# Patient Record
Sex: FEMALE | Race: WHITE | Employment: FULL TIME | URBAN - METROPOLITAN AREA
[De-identification: names, ages, dates, MRNs, and addresses within clinical notes are randomized per-mention and may not be internally consistent; named-entity substitution may affect disease eponyms.]

---
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
## 2019-01-08 ENCOUNTER — OFFICE VISIT (OUTPATIENT)
Dept: ALLERGY | Facility: OTHER | Age: 19
End: 2019-01-08
Payer: COMMERCIAL

## 2019-01-08 VITALS
HEART RATE: 94 BPM | SYSTOLIC BLOOD PRESSURE: 106 MMHG | WEIGHT: 114 LBS | OXYGEN SATURATION: 100 % | DIASTOLIC BLOOD PRESSURE: 72 MMHG | HEIGHT: 65 IN | TEMPERATURE: 98.1 F | RESPIRATION RATE: 16 BRPM | BODY MASS INDEX: 18.99 KG/M2

## 2019-01-08 DIAGNOSIS — T78.2XXD ANAPHYLAXIS, SUBSEQUENT ENCOUNTER: Primary | ICD-10-CM

## 2019-01-08 PROCEDURE — 95079 INGEST CHALLENGE ADDL 60 MIN: CPT | Performed by: ALLERGY & IMMUNOLOGY

## 2019-01-08 PROCEDURE — 99207 ZZC DROP WITH A PROCEDURE: CPT | Performed by: ALLERGY & IMMUNOLOGY

## 2019-01-08 PROCEDURE — 95004 PERQ TESTS W/ALRGNC XTRCS: CPT | Performed by: ALLERGY & IMMUNOLOGY

## 2019-01-08 PROCEDURE — 95076 INGEST CHALLENGE INI 120 MIN: CPT | Performed by: ALLERGY & IMMUNOLOGY

## 2019-01-08 ASSESSMENT — MIFFLIN-ST. JEOR: SCORE: 1292.98

## 2019-01-08 NOTE — LETTER
1/8/2019         RE: Dominguez Oliver  [address]  [facility] [address]        Dear Colleague,    Thank you for referring your patient, Dominguez Oliver, to the [facility]. Please see a copy of my visit note below.    Dominguez Oliver is a 18 year old White female with previous medical history significant for anaphylaxis who returns for a follow up visit.    Patient presents today for oral food challenge. The patient is currently in a good state of health. No recent fevers, chills, cough, wheezing, shortness of breath, skin rash, angioedema, nausea, vomiting or diarrhea. The risks and benefits were discussed and the patient/patient's family wishes to proceed. The consent was signed.    Results for DOMINGUEZ OLIVER (MRN [id number]) as of 1/8/2019 13:15   Ref. Range 12/4/2018 15:30   Allergen, Chicken Meat Latest Ref Range: <0.10 KU(A)/L <0.10     Negative skin testing for chicken, shellfish and an fish.     Past Medical History:   Diagnosis Date     Depression      Nexplanon in place      Family History   Problem Relation Age of Onset     Depression Mother      Depression Father      Depression Maternal Grandmother      Cancer Maternal Grandmother      Diabetes Maternal Grandfather      Heart Disease Maternal Grandfather      Hypertension Maternal Grandfather      Depression Paternal Grandmother         bipolar     Depression Paternal Grandfather         bipolar     Past Surgical History:   Procedure Laterality Date     SURGICAL HISTORY OF -       PE tubes     TONSILLECTOMY, ADENOIDECTOMY ADULT, COMBINED Bilateral 3/19/2018    Procedure: COMBINED TONSILLECTOMY, ADENOIDECTOMY ADULT;  Bilateral Adenotonsillectomy;  Surgeon: Kevin Arias MD;  Location: [facility]       REVIEW OF SYSTEMS:  General: negative for weight gain. negative for weight loss. negative for changes in sleep.   Ears: negative for fullness. negative for hearing loss. negative for dizziness.   Nose: negative for snoring.negative for  changes in smell. negative for drainage.   Throat: negative for hoarseness. negative for sore throat. negative for trouble swallowing.   Lungs: negative for shortness of breath.negative for wheezing. negative for sputum production.   Cardiovascular: negative for chest pain. negative for swelling of ankles. negative for fast or irregular heartbeat.   Gastrointestinal: negative for nausea. negative for heartburn. negative for acid reflux.   Musculoskeletal: negative for joint pain. negative for joint stiffness. negative for joint swelling.   Neurologic: negative for seizures. negative for fainting. negative for weakness.   Psychiatric: negative for changes in mood. negative for anxiety.   Endocrine: negative for cold intolerance. negative for heat intolerance. negative for tremors.   Hematologic: negative for easy bruising. negative for easy bleeding.  Integumentary: negative for rash. negative for scaling. negative for nail changes.       Current Outpatient Medications:      Acetaminophen (TYLENOL PO), Take 650 mg by mouth , Disp: , Rfl:      alum & mag hydroxide-simethicone (MYLANTA/MAALOX) 200-200-20 MG/5ML SUSP suspension, Take 15 mLs by mouth every 4 hours as needed for indigestion or heartburn (upper abdominal pain), Disp: , Rfl: 0     EPINEPHrine (ADRENACLICK) 0.3 MG/0.3ML injection 2-pack, Inject 0.3 mLs (0.3 mg) into the muscle as needed for anaphylaxis, Disp: 0.6 mL, Rfl: 1  Immunization History   Administered Date(s) Administered     DTAP (<7y) 2000, 02/09/2001, 08/04/2005, 04/26/2006     DTaP / Hep B / IPV 04/26/2006     HEPA 11/19/2014, 08/13/2015     HPV 11/19/2014, 05/29/2015, 10/07/2016     HepB 2000, 04/26/2006, 08/13/2015     Hib (PRP-T) 1999     Influenza Vaccine IM 3yrs+ 4 Valent IIV4 10/07/2016     Influenza Vaccine, 3 YRS +, IM (QUADRIVALENT W/PRESERVATIVES) 11/19/2014     MMR 08/04/2005, 04/26/2006     Meningococcal (Menactra ) 08/13/2015     Poliovirus, inactivated (IPV)  1999, 08/04/2005, 04/26/2006     TDAP Vaccine (Adacel) 08/13/2015     Varicella 04/26/2006, 11/19/2014     Allergies   Allergen Reactions     Sulfa Drugs Other (See Comments)     Both parents are allergic           EXAM:   Constitutional:  Appears well-developed and well-nourished. No distress.   HEENT:   Head: Normocephalic.   Mouth/Throat:   No cobblestoning of posterior oropharynx.   Nasal tissue pink and normal appearing.  No rhinorrhea noted.    Eyes: Conjunctivae are non-erythematous   Cardiovascular: Normal rate, regular rhythm and normal heart sounds. Exam reveals no gallop and no friction rub.   No murmur heard.  Respiratory: Effort normal and breath sounds normal. No respiratory distress. No wheezes. No rales.   Musculoskeletal: Normal range of motion.   Neuro: Oriented to person, place, and time.  Skin: Skin is warm and dry. No rash noted.   Psychiatric: Normal mood and affect.     Nursing note and vitals reviewed.      WORKUP:   Chicken skin testing repeat today given last evaluation was immediately after reaction. Now it has been six weeks since reaction.     Oral food challenge  Food:  Chicken     Start time:1339  End time:1649    Outcome:  Passed chicken oral food challenge.    ASSESSMENT/PLAN:  Problem List Items Addressed This Visit        Other    Anaphylaxis, subsequent encounter - Primary     Hives, throat tightness and shortness of breath which developed while at work.  She had eaten a chicken quesadilla that contained flour shell, cheese, chicken and French fries prior to reaction.  Otherwise no associated medications, herbal supplements, soaps, shampoos, detergents, insect stings, exercise associated.  She does not have injectable epinephrine.     Allergy testing was negative for chicken.  Testing was also obtained for shellfish and fish as she does not consume these foods often and potential for cross contamination in her work environment.  These were negative as well.    Chicken skin  testing repeat today given last evaluation was immediately after reaction. Now it has been six weeks since reaction.     Oral food challenge  Food:  Chicken     Start time:1339  End time:1649    Outcome:  Passed chicken oral food challenge.      - Idiopathic reaction at this point.  Unclear etiology.  She can continue to consume chicken.  -If subsequent symptoms develop continue to follow anaphylaxis action plan and keep diary of potential etiologies.         Relevant Orders    ALLERGY SKIN TESTS,ALLERGENS [61239] (Completed)    HC INGESTION CHALLENGE TEST INITIAL 120 MINUTES (Completed)    HC INGESTION CHALLENGE TEST EACH ADDL 60 MINUTES (Completed)          Chart documentation with Dragon Voice recognition Software. Although reviewed after completion, some words and grammatical errors may remain.    Nic Ritchie,    Allergy/Immunology  [facility], [place] and [facility] [place]      Again, thank you for allowing me to participate in the care of your patient.        Sincerely,        Nic Ritchie, DO

## 2019-01-08 NOTE — PROGRESS NOTES
Dominguez Oliver is a 18 year old White female with previous medical history significant for anaphylaxis who returns for a follow up visit.    Patient presents today for oral food challenge. The patient is currently in a good state of health. No recent fevers, chills, cough, wheezing, shortness of breath, skin rash, angioedema, nausea, vomiting or diarrhea. The risks and benefits were discussed and the patient/patient's family wishes to proceed. The consent was signed.    Results for DOMINGUEZ OLIVER (MRN [id number]) as of 1/8/2019 13:15   Ref. Range 12/4/2018 15:30   Allergen, Chicken Meat Latest Ref Range: <0.10 KU(A)/L <0.10     Negative skin testing for chicken, shellfish and an fish.     Past Medical History:   Diagnosis Date     Depression      Nexplanon in place      Family History   Problem Relation Age of Onset     Depression Mother      Depression Father      Depression Maternal Grandmother      Cancer Maternal Grandmother      Diabetes Maternal Grandfather      Heart Disease Maternal Grandfather      Hypertension Maternal Grandfather      Depression Paternal Grandmother         bipolar     Depression Paternal Grandfather         bipolar     Past Surgical History:   Procedure Laterality Date     SURGICAL HISTORY OF -       PE tubes     TONSILLECTOMY, ADENOIDECTOMY ADULT, COMBINED Bilateral 3/19/2018    Procedure: COMBINED TONSILLECTOMY, ADENOIDECTOMY ADULT;  Bilateral Adenotonsillectomy;  Surgeon: Kevin Arias MD;  Location: [facility]       REVIEW OF SYSTEMS:  General: negative for weight gain. negative for weight loss. negative for changes in sleep.   Ears: negative for fullness. negative for hearing loss. negative for dizziness.   Nose: negative for snoring.negative for changes in smell. negative for drainage.   Throat: negative for hoarseness. negative for sore throat. negative for trouble swallowing.   Lungs: negative for shortness of breath.negative for wheezing. negative for sputum production.    Cardiovascular: negative for chest pain. negative for swelling of ankles. negative for fast or irregular heartbeat.   Gastrointestinal: negative for nausea. negative for heartburn. negative for acid reflux.   Musculoskeletal: negative for joint pain. negative for joint stiffness. negative for joint swelling.   Neurologic: negative for seizures. negative for fainting. negative for weakness.   Psychiatric: negative for changes in mood. negative for anxiety.   Endocrine: negative for cold intolerance. negative for heat intolerance. negative for tremors.   Hematologic: negative for easy bruising. negative for easy bleeding.  Integumentary: negative for rash. negative for scaling. negative for nail changes.       Current Outpatient Medications:      Acetaminophen (TYLENOL PO), Take 650 mg by mouth , Disp: , Rfl:      alum & mag hydroxide-simethicone (MYLANTA/MAALOX) 200-200-20 MG/5ML SUSP suspension, Take 15 mLs by mouth every 4 hours as needed for indigestion or heartburn (upper abdominal pain), Disp: , Rfl: 0     EPINEPHrine (ADRENACLICK) 0.3 MG/0.3ML injection 2-pack, Inject 0.3 mLs (0.3 mg) into the muscle as needed for anaphylaxis, Disp: 0.6 mL, Rfl: 1  Immunization History   Administered Date(s) Administered     DTAP (<7y) 1999, 02/09/2001, 08/04/2005, 04/26/2006     DTaP / Hep B / IPV 04/26/2006     HEPA 11/19/2014, 08/13/2015     HPV 11/19/2014, 05/29/2015, 10/07/2016     HepB 2000, 04/26/2006, 08/13/2015     Hib (PRP-T) 1999     Influenza Vaccine IM 3yrs+ 4 Valent IIV4 10/07/2016     Influenza Vaccine, 3 YRS +, IM (QUADRIVALENT W/PRESERVATIVES) 11/19/2014     MMR 08/04/2005, 04/26/2006     Meningococcal (Menactra ) 08/13/2015     Poliovirus, inactivated (IPV) 1999, 08/04/2005, 04/26/2006     TDAP Vaccine (Adacel) 08/13/2015     Varicella 04/26/2006, 11/19/2014     Allergies   Allergen Reactions     Sulfa Drugs Other (See Comments)     Both parents are allergic           EXAM:    Constitutional:  Appears well-developed and well-nourished. No distress.   HEENT:   Head: Normocephalic.   Mouth/Throat:   No cobblestoning of posterior oropharynx.   Nasal tissue pink and normal appearing.  No rhinorrhea noted.    Eyes: Conjunctivae are non-erythematous   Cardiovascular: Normal rate, regular rhythm and normal heart sounds. Exam reveals no gallop and no friction rub.   No murmur heard.  Respiratory: Effort normal and breath sounds normal. No respiratory distress. No wheezes. No rales.   Musculoskeletal: Normal range of motion.   Neuro: Oriented to person, place, and time.  Skin: Skin is warm and dry. No rash noted.   Psychiatric: Normal mood and affect.     Nursing note and vitals reviewed.      WORKUP:   Chicken skin testing repeat today given last evaluation was immediately after reaction. Now it has been six weeks since reaction.     Oral food challenge  Food:  Chicken     Start time:1339  End time:1649    Outcome:  Passed chicken oral food challenge.    ASSESSMENT/PLAN:  Problem List Items Addressed This Visit        Other    Anaphylaxis, subsequent encounter - Primary     Hives, throat tightness and shortness of breath which developed while at work.  She had eaten a chicken quesadilla that contained flour shell, cheese, chicken and French fries prior to reaction.  Otherwise no associated medications, herbal supplements, soaps, shampoos, detergents, insect stings, exercise associated.  She does not have injectable epinephrine.     Allergy testing was negative for chicken.  Testing was also obtained for shellfish and fish as she does not consume these foods often and potential for cross contamination in her work environment.  These were negative as well.    Chicken skin testing repeat today given last evaluation was immediately after reaction. Now it has been six weeks since reaction.     Oral food challenge  Food:  Chicken     Start time:1339  End time:1649    Outcome:  Passed chicken oral  food challenge.      - Idiopathic reaction at this point.  Unclear etiology.  She can continue to consume chicken.  -If subsequent symptoms develop continue to follow anaphylaxis action plan and keep diary of potential etiologies.         Relevant Orders    ALLERGY SKIN TESTS,ALLERGENS [88343] (Completed)    HC INGESTION CHALLENGE TEST INITIAL 120 MINUTES (Completed)    HC INGESTION CHALLENGE TEST EACH ADDL 60 MINUTES (Completed)          Chart documentation with Dragon Voice recognition Software. Although reviewed after completion, some words and grammatical errors may remain.    Nic Ritchie,    Allergy/Immunology  [facility] [place]

## 2019-01-08 NOTE — PATIENT INSTRUCTIONS
Allergy Staff Appt Hours Shot Hours Locations    Physician     Nic Ritchie DO       Support Staff     CHERYL Geller CMA  Monday:                      [place] 8-7     Tuesday:         [place] 8-5     Wednesday:        [place]: 7-5     Friday:        [place] 7-5   [place]        Monday: 9-5:50        Wednesday: 2-5:50        Friday: 7-12:50     [place]        Tuesday: 7-10:50        Thursday: 1:30-6:30     [place] Monday: 7:10-4:50        Tuesday: 12:30-6:30        Thursday: 7-11:50 [facility]  [address] [place] [address]  Appt Line: [phone]  Allergy RN (Monday):  [phone]    [facility]  [address] [place] [address]  Appt Line: [phone]  Allergy RN (Tues & Wed):  [phone]    [facility]  [address] [place] [address]  Appt Line: [phone]  Allergy RN (Friday):  [phone]       Important Scheduling Information  Aspirin Desensitization: Appt will last 2 clinic days. Please call the Allergy RN line for your clinic to schedule. Discontinue antihistamines 7 days prior to the appointment.     Food Challenges: Appt will last 3-4 hours. Please call the Allergy RN line for your clinic to schedule. Discontinue antihistamines 7 days prior to the appointment.     Penicillin Testing: Appt will last 2-3 hours. Please call the Allergy RN line for your clinic to schedule. Discontinue antihistamines 7 days prior to the appointment.     Skin Testing: Appt will about 40 minutes. Call the appointment line for your clinic to schedule. Discontinue antihistamines 7 days prior to the appointment.     Venom Testing: Appt will last 2-3 hours. Please call the Allergy RN line for your clinic to schedule. Discontinue antihistamines 7 days prior to the appointment.     Thank you for trusting us with your Allergy, Asthma, and Immunology care. Please feel free to contact us with any questions or concerns you may have.

## 2019-01-08 NOTE — ASSESSMENT & PLAN NOTE
Hives, throat tightness and shortness of breath which developed while at work.  She had eaten a chicken quesadilla that contained flour shell, cheese, chicken and French fries prior to reaction.  Otherwise no associated medications, herbal supplements, soaps, shampoos, detergents, insect stings, exercise associated.  She does not have injectable epinephrine.     Allergy testing was negative for chicken.  Testing was also obtained for shellfish and fish as she does not consume these foods often and potential for cross contamination in her work environment.  These were negative as well.    Chicken skin testing repeat today given last evaluation was immediately after reaction. Now it has been six weeks since reaction.     Oral food challenge  Food:  Chicken     Start time:1339  End time:1649    Outcome:  Passed chicken oral food challenge.      - Idiopathic reaction at this point.  Unclear etiology.  She can continue to consume chicken.  -If subsequent symptoms develop continue to follow anaphylaxis action plan and keep diary of potential etiologies.

## 2019-01-14 ENCOUNTER — APPOINTMENT (OUTPATIENT)
Dept: GENERAL RADIOLOGY | Facility: CLINIC | Age: 19
End: 2019-01-14
Payer: COMMERCIAL

## 2019-01-14 ENCOUNTER — HOSPITAL ENCOUNTER (EMERGENCY)
Facility: CLINIC | Age: 19
Discharge: HOME | End: 2019-01-14
Attending: NURSE PRACTITIONER | Admitting: NURSE PRACTITIONER
Payer: COMMERCIAL

## 2019-01-14 VITALS
SYSTOLIC BLOOD PRESSURE: 115 MMHG | DIASTOLIC BLOOD PRESSURE: 74 MMHG | RESPIRATION RATE: 14 BRPM | WEIGHT: 113 LBS | OXYGEN SATURATION: 100 % | BODY MASS INDEX: 18.8 KG/M2 | TEMPERATURE: 98.2 F
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
## 2019-01-14 DIAGNOSIS — S40.011A CONTUSION OF RIGHT SHOULDER, INITIAL ENCOUNTER: ICD-10-CM

## 2019-01-14 DIAGNOSIS — M54.2 NECK PAIN: ICD-10-CM

## 2019-01-14 LAB — HCG UR QL: NEGATIVE

## 2019-01-14 PROCEDURE — 73030 X-RAY EXAM OF SHOULDER: CPT | Mod: TC,RT

## 2019-01-14 PROCEDURE — 25000132 ZZH RX MED GY IP 250 OP 250 PS 637: Performed by: NURSE PRACTITIONER

## 2019-01-14 PROCEDURE — 72040 X-RAY EXAM NECK SPINE 2-3 VW: CPT | Mod: TC

## 2019-01-14 PROCEDURE — 73010 X-RAY EXAM OF SHOULDER BLADE: CPT | Mod: TC,RT

## 2019-01-14 PROCEDURE — 99285 EMERGENCY DEPT VISIT HI MDM: CPT | Performed by: NURSE PRACTITIONER

## 2019-01-14 PROCEDURE — 99284 EMERGENCY DEPT VISIT MOD MDM: CPT | Mod: Z6 | Performed by: NURSE PRACTITIONER

## 2019-01-14 PROCEDURE — 81025 URINE PREGNANCY TEST: CPT | Performed by: NURSE PRACTITIONER

## 2019-01-14 RX ORDER — ACETAMINOPHEN 325 MG/1
650 TABLET ORAL ONCE
Status: COMPLETED | OUTPATIENT
Start: 2019-01-14 | End: 2019-01-14

## 2019-01-14 RX ORDER — IBUPROFEN 600 MG/1
600 TABLET, FILM COATED ORAL ONCE
Status: DISCONTINUED | OUTPATIENT
Start: 2019-01-14 | End: 2019-01-14 | Stop reason: HOSPADM

## 2019-01-14 RX ADMIN — ACETAMINOPHEN 650 MG: 325 TABLET ORAL at 13:43

## 2019-01-14 ASSESSMENT — ENCOUNTER SYMPTOMS
ACTIVITY CHANGE: 1
MYALGIAS: 1
NECK STIFFNESS: 1
ARTHRALGIAS: 1

## 2019-01-14 NOTE — ED TRIAGE NOTES
Here with pain after fall on ice two hours ago. Pain located to right lateral neck, hip and shoulder area.

## 2019-01-14 NOTE — ED NOTES
Pt slipped on the ice this morning falling backwards.  No LOC, but c/o neck, upper back, and right shoulder pain.  ANN.  No obvious injury/deformity.  YOAN.

## 2019-01-14 NOTE — LETTER
January 14, 2019      To Whom It May Concern:      Aury Cervantes was seen in our Emergency Department today, 01/14/19.  I expect her condition to improve over the next couple of days.  She can return to work on Wednesday.    Thank you      Sincerely,        CIRO Manning CNP

## 2019-01-14 NOTE — ED AVS SNAPSHOT
[facility] Emergency Department  [address] [place] [address]  Phone:  [phone]  Fax:  [phone]                                    Aury Cervantes   MRN: [id number]    Department:  [facility] Emergency Department   Date of Visit:  1/14/2019           After Visit Summary Signature Page    I have received my discharge instructions, and my questions have been answered. I have discussed any challenges I see with this plan with the nurse or doctor.    ..........................................................................................................................................  Patient/Patient Representative Signature      ..........................................................................................................................................  Patient Representative Print Name and Relationship to Patient    ..................................................               ................................................  Date                                   Time    ..........................................................................................................................................  Reviewed by Signature/Title    ...................................................              ..............................................  Date                                               Time          22EPIC Rev 08/18

## 2019-01-14 NOTE — ED PROVIDER NOTES
History     Chief Complaint   Patient presents with     Fall     pain to right hip, neck, shoulder     HPI  Aury Cervantes is a 18 year old female who presents to the emergency department today with complaints of right shoulder pain, right upper back pain and neck pain after she fell, slipping on the ice this morning and landed on her shoulder.  Patient does not taken anything for pain, any movement makes her symptoms worse or palpation.  Patient reports the pain as aching in nature.  Patient denies any dizziness or lightheadedness prior to fall but she slipped on the ice going to her car.  Patient reports she has not had a period since November, she took a pregnancy test 2 weeks ago and it was negative.    Problem List:    Patient Active Problem List    Diagnosis Date Noted     Anaphylaxis, subsequent encounter 12/04/2018     Priority: Medium     Rhinoconjunctivitis 12/04/2018     Priority: Medium     S/P tonsillectomy and adenoidectomy 03/21/2018     Priority: Medium     Post-op pain 03/21/2018     Priority: Medium     Nexplanon insertion 05/29/2015     Priority: Medium     nexplanon inserted today by Dr BRITNEY Marx  Lot # [id number]  Exp 07/2017  Zulay Jacobsen         Major depressive disorder, recurrent episode, severe (H) 11/19/2014     Priority: Medium        Past Medical History:    Past Medical History:   Diagnosis Date     Depression      Nexplanon in place        Past Surgical History:    Past Surgical History:   Procedure Laterality Date     SURGICAL HISTORY OF -       PE tubes     TONSILLECTOMY, ADENOIDECTOMY ADULT, COMBINED Bilateral 3/19/2018    Procedure: COMBINED TONSILLECTOMY, ADENOIDECTOMY ADULT;  Bilateral Adenotonsillectomy;  Surgeon: Kevin Arias MD;  Location: [facility] OR       Family History:    Family History   Problem Relation Age of Onset     Depression Mother      Depression Father      Depression Maternal Grandmother      Cancer Maternal Grandmother      Diabetes Maternal Grandfather       Heart Disease Maternal Grandfather      Hypertension Maternal Grandfather      Depression Paternal Grandmother         bipolar     Depression Paternal Grandfather         bipolar       Social History:  Marital Status:  Single [1]  Social History     Tobacco Use     Smoking status: Former Smoker     Smokeless tobacco: Never Used   Substance Use Topics     Alcohol use: No     Alcohol/week: 0.0 oz     Drug use: No        Medications:      Acetaminophen (TYLENOL PO)   alum & mag hydroxide-simethicone (MYLANTA/MAALOX) 200-200-20 MG/5ML SUSP suspension   EPINEPHrine (ADRENACLICK) 0.3 MG/0.3ML injection 2-pack         Review of Systems   Constitutional: Positive for activity change.   Musculoskeletal: Positive for arthralgias, myalgias and neck stiffness.   All other systems reviewed and are negative.      Physical Exam   BP: 115/74  Heart Rate: 85  Temp: 98.2  F (36.8  C)  Resp: 14  Weight: 51.3 kg (113 lb)  SpO2: 100 %      Physical Exam   Constitutional: She is oriented to person, place, and time. She appears well-developed and well-nourished. No distress.   HENT:   Head: Normocephalic and atraumatic.   Eyes: EOM are normal. Pupils are equal, round, and reactive to light.   Neck: Normal range of motion.   Cardiovascular: Normal rate.   Pulmonary/Chest: Effort normal.   Abdominal: Soft. Bowel sounds are normal.   Musculoskeletal: Normal range of motion.   C4/C5 tender to palpation, tenderness to palpation to right humeral head and right scapular region, remaining exam is reassuring and unremarkable   Neurological: She is alert and oriented to person, place, and time. No cranial nerve deficit.   Skin: Skin is warm and dry. Capillary refill takes less than 2 seconds. She is not diaphoretic.   Psychiatric: She has a normal mood and affect.       ED Course     Procedures      Results for orders placed or performed during the hospital encounter of 01/14/19 (from the past 24 hour(s))   HCG qualitative urine (UPT)   Result  Value Ref Range    HCG Qual Urine Negative NEG^Negative   XR Shoulder Right 3 Views    Narrative    RIGHT SHOULDER THREE OR MORE VIEWS  1/14/2019 2:33 PM     HISTORY: Fall, pain.    COMPARISON: None.      Impression    IMPRESSION: Glenohumeral and acromioclavicular articulations are  intact. No acute fracture.    RIAN RICE MD   XR Scapula Right    Narrative    SCAPULA RIGHT January 14, 2019 2:33 PM     HISTORY: Fall, pain.    COMPARISON: None.      Impression    IMPRESSION: No definite scapular fracture.    RIAN RICE MD   Cervical spine XR, 2-3 views    Narrative    CERVICAL SPINE TWO TO THREE VIEWS January 14, 2019 2:34 PM     HISTORY: Pain, fall.    COMPARISON: November 19, 2016.      Impression    IMPRESSION: Cervical vertebrae remain normally aligned through the  C7/T1 junction. No prevertebral soft tissue swelling. No acute  fracture.    RIAN RICE MD       Medications   ibuprofen (ADVIL/MOTRIN) tablet 600 mg (not administered)   acetaminophen (TYLENOL) tablet 650 mg (650 mg Oral Given 1/14/19 1343)       Assessments & Plan (with Medical Decision Making)  Fall   Right shoulder contusion/posterior neck pain.  Xrays are negative for fracture.  Patient updated on xray results and negative UPT.  Encouraged Tylenol/Ibuprofen for pain, heat/ice as needed.  Work note provided with return on Wednesday.  Reasons to return to the ED discussed with patient, she is agreeable and patient discharged in stable condition.      I have reviewed the nursing notes.    I have reviewed the findings, diagnosis, plan and need for follow up with the patient.       Medication List      There are no discharge medications for this visit.         Final diagnoses:   Fall, initial encounter   Contusion of right shoulder, initial encounter   Neck pain       1/14/2019   [facility] EMERGENCY DEPARTMENT     Li Isabel, APRN CNP  01/14/19 1508

## 2019-02-08 ENCOUNTER — HOSPITAL ENCOUNTER (EMERGENCY)
Facility: CLINIC | Age: 19
Discharge: HOME | End: 2019-02-08
Attending: EMERGENCY MEDICINE | Admitting: EMERGENCY MEDICINE
Payer: COMMERCIAL

## 2019-02-08 VITALS
RESPIRATION RATE: 18 BRPM | OXYGEN SATURATION: 100 % | SYSTOLIC BLOOD PRESSURE: 117 MMHG | DIASTOLIC BLOOD PRESSURE: 67 MMHG | HEART RATE: 88 BPM | TEMPERATURE: 97.8 F

## 2019-02-08 DIAGNOSIS — R51.9 NONINTRACTABLE HEADACHE, UNSPECIFIED CHRONICITY PATTERN, UNSPECIFIED HEADACHE TYPE: ICD-10-CM

## 2019-02-08 LAB
ALBUMIN SERPL-MCNC: 4.4 G/DL (ref 3.4–5)
ALP SERPL-CCNC: 106 U/L (ref 40–150)
ALT SERPL W P-5'-P-CCNC: 17 U/L (ref 0–50)
ANION GAP SERPL CALCULATED.3IONS-SCNC: 6 MMOL/L (ref 3–14)
AST SERPL W P-5'-P-CCNC: 20 U/L (ref 0–35)
BASOPHILS # BLD AUTO: 0 10E9/L (ref 0–0.2)
BASOPHILS NFR BLD AUTO: 0.7 %
BILIRUB SERPL-MCNC: 0.4 MG/DL (ref 0.2–1.3)
BUN SERPL-MCNC: 12 MG/DL (ref 7–30)
CALCIUM SERPL-MCNC: 9 MG/DL (ref 8.5–10.1)
CHLORIDE SERPL-SCNC: 106 MMOL/L (ref 96–110)
CO2 SERPL-SCNC: 29 MMOL/L (ref 20–32)
CREAT SERPL-MCNC: 0.65 MG/DL (ref 0.5–1)
DIFFERENTIAL METHOD BLD: ABNORMAL
EOSINOPHIL NFR BLD AUTO: 2.4 %
ERYTHROCYTE [DISTWIDTH] IN BLOOD BY AUTOMATED COUNT: 15.6 % (ref 10–15)
GFR SERPL CREATININE-BSD FRML MDRD: >90 ML/MIN/{1.73_M2}
GLUCOSE SERPL-MCNC: 66 MG/DL (ref 70–99)
HCT VFR BLD AUTO: 38.7 % (ref 35–47)
HGB BLD-MCNC: 12.1 G/DL (ref 11.7–15.7)
IMM GRANULOCYTES # BLD: 0 10E9/L (ref 0–0.4)
IMM GRANULOCYTES NFR BLD: 0.4 %
LYMPHOCYTES # BLD AUTO: 1.6 10E9/L (ref 0.8–5.3)
LYMPHOCYTES NFR BLD AUTO: 29 %
MCH RBC QN AUTO: 23 PG (ref 26.5–33)
MCHC RBC AUTO-ENTMCNC: 31.3 G/DL (ref 31.5–36.5)
MCV RBC AUTO: 74 FL (ref 78–100)
MONOCYTES # BLD AUTO: 0.3 10E9/L (ref 0–1.3)
MONOCYTES NFR BLD AUTO: 6.2 %
NEUTROPHILS # BLD AUTO: 3.3 10E9/L (ref 1.6–8.3)
NEUTROPHILS NFR BLD AUTO: 61.3 %
NRBC # BLD AUTO: 0 10*3/UL
NRBC BLD AUTO-RTO: 0 /100
PLATELET # BLD AUTO: 191 10E9/L (ref 150–450)
POTASSIUM SERPL-SCNC: 3.6 MMOL/L (ref 3.4–5.3)
PROT SERPL-MCNC: 8.1 G/DL (ref 6.8–8.8)
RBC # BLD AUTO: 5.25 10E12/L (ref 3.8–5.2)
SODIUM SERPL-SCNC: 141 MMOL/L (ref 133–144)
WBC # BLD AUTO: 5.4 10E9/L (ref 4–11)

## 2019-02-08 PROCEDURE — 80053 COMPREHEN METABOLIC PANEL: CPT | Performed by: EMERGENCY MEDICINE

## 2019-02-08 PROCEDURE — 99284 EMERGENCY DEPT VISIT MOD MDM: CPT | Mod: Z6 | Performed by: EMERGENCY MEDICINE

## 2019-02-08 PROCEDURE — 96374 THER/PROPH/DIAG INJ IV PUSH: CPT | Performed by: EMERGENCY MEDICINE

## 2019-02-08 PROCEDURE — 99284 EMERGENCY DEPT VISIT MOD MDM: CPT | Mod: 25 | Performed by: EMERGENCY MEDICINE

## 2019-02-08 PROCEDURE — 25000128 H RX IP 250 OP 636: Performed by: EMERGENCY MEDICINE

## 2019-02-08 PROCEDURE — 96361 HYDRATE IV INFUSION ADD-ON: CPT | Performed by: EMERGENCY MEDICINE

## 2019-02-08 PROCEDURE — 96375 TX/PRO/DX INJ NEW DRUG ADDON: CPT | Performed by: EMERGENCY MEDICINE

## 2019-02-08 PROCEDURE — 85025 COMPLETE CBC W/AUTO DIFF WBC: CPT | Performed by: EMERGENCY MEDICINE

## 2019-02-08 RX ORDER — KETOROLAC TROMETHAMINE 30 MG/ML
30 INJECTION, SOLUTION INTRAMUSCULAR; INTRAVENOUS ONCE
Status: COMPLETED | OUTPATIENT
Start: 2019-02-08 | End: 2019-02-08

## 2019-02-08 RX ORDER — DEXAMETHASONE SODIUM PHOSPHATE 10 MG/ML
10 INJECTION, SOLUTION INTRAMUSCULAR; INTRAVENOUS ONCE
Status: DISCONTINUED | OUTPATIENT
Start: 2019-02-08 | End: 2019-02-08

## 2019-02-08 RX ORDER — DEXAMETHASONE SODIUM PHOSPHATE 10 MG/ML
10 INJECTION INTRAMUSCULAR; INTRAVENOUS ONCE
Status: COMPLETED | OUTPATIENT
Start: 2019-02-08 | End: 2019-02-08

## 2019-02-08 RX ORDER — SODIUM CHLORIDE 9 MG/ML
1000 INJECTION, SOLUTION INTRAVENOUS CONTINUOUS
Status: DISCONTINUED | OUTPATIENT
Start: 2019-02-08 | End: 2019-02-08 | Stop reason: HOSPADM

## 2019-02-08 RX ADMIN — SODIUM CHLORIDE 1000 ML: 9 INJECTION, SOLUTION INTRAVENOUS at 16:03

## 2019-02-08 RX ADMIN — DEXAMETHASONE SODIUM PHOSPHATE 10 MG: 10 INJECTION INTRAMUSCULAR; INTRAVENOUS at 16:06

## 2019-02-08 RX ADMIN — KETOROLAC TROMETHAMINE 30 MG: 30 INJECTION, SOLUTION INTRAMUSCULAR; INTRAVENOUS at 16:04

## 2019-02-08 RX ADMIN — PROCHLORPERAZINE EDISYLATE 10 MG: 5 INJECTION INTRAMUSCULAR; INTRAVENOUS at 16:01

## 2019-02-08 NOTE — ED AVS SNAPSHOT
[facility] Emergency Department  [address] [place] [address]  Phone:  [phone]  Fax:  [phone]                                    Aury Cervantes   MRN: [id number]    Department:  [facility] Emergency Department   Date of Visit:  2/8/2019           After Visit Summary Signature Page    I have received my discharge instructions, and my questions have been answered. I have discussed any challenges I see with this plan with the nurse or doctor.    ..........................................................................................................................................  Patient/Patient Representative Signature      ..........................................................................................................................................  Patient Representative Print Name and Relationship to Patient    ..................................................               ................................................  Date                                   Time    ..........................................................................................................................................  Reviewed by Signature/Title    ...................................................              ..............................................  Date                                               Time          22EPIC Rev 08/18

## 2019-02-08 NOTE — LETTER
February 8, 2019      To Whom It May Concern:      Aury Cervantes was seen in our Emergency Department today, 02/08/19.  I expect her condition to improve over the next 2 days.  She may return to work/school when improved.    Sincerely,        Almas Isabel MD

## 2019-02-08 NOTE — ED PROVIDER NOTES
"  History     Chief Complaint   Patient presents with     Headache     The history is provided by the patient.     uAry Cervantes is a 18 year old female who presents to the emergency department for a headache. Patient reports having a headache and dizziness for 4 days. She states the headache is in the back of her neck and radiates to the \"rest of my head\". She does have a history of migraines, however, states this headache is not as bad. She was seen a couple of weeks ago for a fall, she is unsure if she hit her head or not. She states her dizziness feels like \"when you stand up too fast and then feel dizzy\" that is what it feels like and it feels like she is spinning. She is not really having dizziness right now. She denies having a fever. She states she has been drinking, however, not a lot of water. She has tried to Ibuprofen for her headache without much relief. Her LMP was last month and was normal. She denies using alcohol or street drugs.    Allergies:  Allergies   Allergen Reactions     Sulfa Drugs Other (See Comments)     Both parents are allergic         Problem List:    Patient Active Problem List    Diagnosis Date Noted     Anaphylaxis, subsequent encounter 12/04/2018     Priority: Medium     Rhinoconjunctivitis 12/04/2018     Priority: Medium     S/P tonsillectomy and adenoidectomy 03/21/2018     Priority: Medium     Post-op pain 03/21/2018     Priority: Medium     Nexplanon insertion 05/29/2015     Priority: Medium     nexplanon inserted today by Dr BRITNEY Marx  Lot # [id number]  Exp 07/2017  Zulay Jacobsen         Major depressive disorder, recurrent episode, severe (H) 11/19/2014     Priority: Medium        Past Medical History:    Past Medical History:   Diagnosis Date     Depression      Nexplanon in place        Past Surgical History:    Past Surgical History:   Procedure Laterality Date     SURGICAL HISTORY OF -       PE tubes     TONSILLECTOMY, ADENOIDECTOMY ADULT, COMBINED Bilateral 3/19/2018 "    Procedure: COMBINED TONSILLECTOMY, ADENOIDECTOMY ADULT;  Bilateral Adenotonsillectomy;  Surgeon: Kevin Arias MD;  Location: [facility] OR       Family History:    Family History   Problem Relation Age of Onset     Depression Mother      Depression Father      Depression Maternal Grandmother      Cancer Maternal Grandmother      Diabetes Maternal Grandfather      Heart Disease Maternal Grandfather      Hypertension Maternal Grandfather      Depression Paternal Grandmother         bipolar     Depression Paternal Grandfather         bipolar       Social History:  Marital Status:  Single [1]  Social History     Tobacco Use     Smoking status: Former Smoker     Smokeless tobacco: Never Used   Substance Use Topics     Alcohol use: No     Alcohol/week: 0.0 oz     Drug use: No        Medications:      Acetaminophen (TYLENOL PO)   alum & mag hydroxide-simethicone (MYLANTA/MAALOX) 200-200-20 MG/5ML SUSP suspension   EPINEPHrine (ADRENACLICK) 0.3 MG/0.3ML injection 2-pack         Review of Systems   All other systems reviewed and are negative.      Physical Exam   BP: 117/67  Pulse: 70  Temp: 97.8  F (36.6  C)  Resp: 16  SpO2: 100 %      Physical Exam   Constitutional: She is oriented to person, place, and time. She appears well-developed and well-nourished. No distress.   HENT:   Head: Normocephalic and atraumatic.   Eyes: Pupils are equal, round, and reactive to light. No scleral icterus.   Neck: Normal range of motion. Neck supple. No neck rigidity.   Cardiovascular: Normal rate and regular rhythm.   Pulmonary/Chest: Effort normal and breath sounds normal.   Musculoskeletal: Normal range of motion.   Neurological: She is alert and oriented to person, place, and time. She has normal strength. No cranial nerve deficit or sensory deficit.   Speech intact     Skin: Skin is warm and dry. No rash noted. She is not diaphoretic. No erythema. No pallor.   Psychiatric: She has a normal mood and affect. Her behavior is normal. Thought  content normal.   Nursing note and vitals reviewed.      ED Course        Procedures               Critical Care time:  none               Results for orders placed or performed during the hospital encounter of 02/08/19 (from the past 24 hour(s))   CBC with platelets differential   Result Value Ref Range    WBC 5.4 4.0 - 11.0 10e9/L    RBC Count 5.25 (H) 3.8 - 5.2 10e12/L    Hemoglobin 12.1 11.7 - 15.7 g/dL    Hematocrit 38.7 35.0 - 47.0 %    MCV 74 (L) 78 - 100 fl    MCH 23.0 (L) 26.5 - 33.0 pg    MCHC 31.3 (L) 31.5 - 36.5 g/dL    RDW 15.6 (H) 10.0 - 15.0 %    Platelet Count 191 150 - 450 10e9/L    Diff Method Automated Method     % Neutrophils 61.3 %    % Lymphocytes 29.0 %    % Monocytes 6.2 %    % Eosinophils 2.4 %    % Basophils 0.7 %    % Immature Granulocytes 0.4 %    Nucleated RBCs 0 0 /100    Absolute Neutrophil 3.3 1.6 - 8.3 10e9/L    Absolute Lymphocytes 1.6 0.8 - 5.3 10e9/L    Absolute Monocytes 0.3 0.0 - 1.3 10e9/L    Absolute Basophils 0.0 0.0 - 0.2 10e9/L    Abs Immature Granulocytes 0.0 0 - 0.4 10e9/L    Absolute Nucleated RBC 0.0    Comprehensive metabolic panel   Result Value Ref Range    Sodium 141 133 - 144 mmol/L    Potassium 3.6 3.4 - 5.3 mmol/L    Chloride 106 96 - 110 mmol/L    Carbon Dioxide 29 20 - 32 mmol/L    Anion Gap 6 3 - 14 mmol/L    Glucose 66 (L) 70 - 99 mg/dL    Urea Nitrogen 12 7 - 30 mg/dL    Creatinine 0.65 0.50 - 1.00 mg/dL    GFR Estimate >90 >60 mL/min/[1.73_m2]    GFR Estimate If Black >90 >60 mL/min/[1.73_m2]    Calcium 9.0 8.5 - 10.1 mg/dL    Bilirubin Total 0.4 0.2 - 1.3 mg/dL    Albumin 4.4 3.4 - 5.0 g/dL    Protein Total 8.1 6.8 - 8.8 g/dL    Alkaline Phosphatase 106 40 - 150 U/L    ALT 17 0 - 50 U/L    AST 20 0 - 35 U/L       Medications   0.9% sodium chloride BOLUS (0 mLs Intravenous Stopped 2/8/19 1700)     Followed by   sodium chloride 0.9% infusion (not administered)   ketorolac (TORADOL) injection 30 mg (30 mg Intravenous Given 2/8/19 1604)   prochlorperazine  (COMPAZINE) injection 10 mg (10 mg Intravenous Given 2/8/19 1601)   dexamethasone (DECADRON) injection 10 mg (10 mg Intravenous Given 2/8/19 1606)       Assessments & Plan (with Medical Decision Making)  18-year-old female with history of recurrent headaches who presents with headache and some vague dizziness.  Symptoms improved with IV fluids and above medications.  Neurological exam normal.  Discharged home in stable condition.  Follow-up primary care if dizziness not improving next week.  Return anytime sooner to the emergency department if condition worsens or other concern.     I have reviewed the nursing notes.    I have reviewed the findings, diagnosis, plan and need for follow up with the patient.          Medication List      ASK your doctor about these medications    fluconazole 150 MG tablet  Commonly known as:  DIFLUCAN  Take one tablet now, and one tablet in three days  Ask about: Should I take this medication?     omeprazole 20 MG DR capsule  Commonly known as:  priLOSEC  20 mg, Oral, EVERY MORNING BEFORE BREAKFAST  Ask about: Should I take this medication?     ranitidine 300 MG tablet  Commonly known as:  ZANTAC  300 mg, Oral, AT BEDTIME  Ask about: Should I take this medication?            Final diagnoses:   Nonintractable headache, unspecified chronicity pattern, unspecified headache type     This document serves as a record of services personally performed by Almas Isabel MD. It was created on their behalf by Belle Garza, a trained medical scribe. The creation of this record is based on the provider's personal observations and the statements of the patient. This document has been checked and approved by the attending provider.    Note: Chart documentation done in part with Dragon Voice Recognition software. Although reviewed after completion, some word and grammatical errors may remain.    2/8/2019   [facility] EMERGENCY DEPARTMENT     Almas Isabel MD  02/08/19 7400

## 2019-02-08 NOTE — ED TRIAGE NOTES
Pt here with headache and dizziness for the past few days. History of Migraines, but has not been nauseated, Has been able to eat and drink OK. Last Menses. January 14th.

## 2019-02-17 ENCOUNTER — HOSPITAL ENCOUNTER (EMERGENCY)
Facility: CLINIC | Age: 19
Discharge: HOME | End: 2019-02-17
Attending: EMERGENCY MEDICINE | Admitting: EMERGENCY MEDICINE
Payer: COMMERCIAL

## 2019-02-17 VITALS
HEART RATE: 94 BPM | DIASTOLIC BLOOD PRESSURE: 94 MMHG | BODY MASS INDEX: 18.37 KG/M2 | TEMPERATURE: 98.1 F | HEIGHT: 65 IN | SYSTOLIC BLOOD PRESSURE: 112 MMHG | WEIGHT: 110.25 LBS | OXYGEN SATURATION: 100 % | RESPIRATION RATE: 15 BRPM

## 2019-02-17 DIAGNOSIS — F32.A DEPRESSION, UNSPECIFIED DEPRESSION TYPE: ICD-10-CM

## 2019-02-17 PROCEDURE — 99283 EMERGENCY DEPT VISIT LOW MDM: CPT | Performed by: EMERGENCY MEDICINE

## 2019-02-17 PROCEDURE — 25000131 ZZH RX MED GY IP 250 OP 636 PS 637: Performed by: EMERGENCY MEDICINE

## 2019-02-17 PROCEDURE — 99284 EMERGENCY DEPT VISIT MOD MDM: CPT | Mod: Z6 | Performed by: EMERGENCY MEDICINE

## 2019-02-17 RX ORDER — ONDANSETRON 4 MG/1
4 TABLET, ORALLY DISINTEGRATING ORAL ONCE
Status: COMPLETED | OUTPATIENT
Start: 2019-02-17 | End: 2019-02-17

## 2019-02-17 RX ADMIN — ONDANSETRON 4 MG: 4 TABLET, ORALLY DISINTEGRATING ORAL at 11:01

## 2019-02-17 ASSESSMENT — MIFFLIN-ST. JEOR: SCORE: 1275.97
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
## 2019-02-17 NOTE — ED AVS SNAPSHOT
[facility] Emergency Department  [address] [place] [address]  Phone:  [phone]  Fax:  [phone]                                    Aury Cervantes   MRN: [id number]    Department:  [facility] Emergency Department   Date of Visit:  2/17/2019           After Visit Summary Signature Page    I have received my discharge instructions, and my questions have been answered. I have discussed any challenges I see with this plan with the nurse or doctor.    ..........................................................................................................................................  Patient/Patient Representative Signature      ..........................................................................................................................................  Patient Representative Print Name and Relationship to Patient    ..................................................               ................................................  Date                                   Time    ..........................................................................................................................................  Reviewed by Signature/Title    ...................................................              ..............................................  Date                                               Time          22EPIC Rev 08/18
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
## 2019-02-17 NOTE — LETTER
February 17, 2019      To Whom It May Concern:      Aury Cervantes was seen in our Emergency Department today, 02/17/19.  I expect her condition to improve over the next 3 days.  She may return to work/school when improved.    Sincerely,        Almas Isabel MD

## 2019-05-06 ENCOUNTER — NURSE TRIAGE (OUTPATIENT)
Dept: NURSING | Facility: CLINIC | Age: 19
End: 2019-05-06

## 2019-05-06 ENCOUNTER — HOSPITAL ENCOUNTER (EMERGENCY)
Facility: CLINIC | Age: 19
Discharge: HOME | End: 2019-05-06
Attending: EMERGENCY MEDICINE | Admitting: EMERGENCY MEDICINE
Payer: COMMERCIAL

## 2019-05-06 ENCOUNTER — APPOINTMENT (OUTPATIENT)
Dept: ULTRASOUND IMAGING | Facility: CLINIC | Age: 19
End: 2019-05-06
Attending: EMERGENCY MEDICINE
Payer: COMMERCIAL

## 2019-05-06 VITALS
DIASTOLIC BLOOD PRESSURE: 75 MMHG | RESPIRATION RATE: 16 BRPM | BODY MASS INDEX: 18.66 KG/M2 | TEMPERATURE: 98.2 F | SYSTOLIC BLOOD PRESSURE: 110 MMHG | HEART RATE: 75 BPM | OXYGEN SATURATION: 100 % | WEIGHT: 112 LBS | HEIGHT: 65 IN

## 2019-05-06 DIAGNOSIS — N92.0 MENORRHAGIA WITH REGULAR CYCLE: ICD-10-CM

## 2019-05-06 LAB
B-HCG SERPL-ACNC: <1 IU/L (ref 0–5)
BASOPHILS # BLD AUTO: 0.1 10E9/L (ref 0–0.2)
BASOPHILS NFR BLD AUTO: 0.9 %
DIFFERENTIAL METHOD BLD: ABNORMAL
EOSINOPHIL # BLD AUTO: 0.1 10E9/L (ref 0–0.7)
EOSINOPHIL NFR BLD AUTO: 2 %
ERYTHROCYTE [DISTWIDTH] IN BLOOD BY AUTOMATED COUNT: 13.6 % (ref 10–15)
HCT VFR BLD AUTO: 32.8 % (ref 35–47)
HGB BLD-MCNC: 9.8 G/DL (ref 11.7–15.7)
IMM GRANULOCYTES # BLD: 0 10E9/L (ref 0–0.4)
IMM GRANULOCYTES NFR BLD: 0.5 %
INR PPP: 1.03 (ref 0.86–1.14)
LYMPHOCYTES # BLD AUTO: 1.8 10E9/L (ref 0.8–5.3)
LYMPHOCYTES NFR BLD AUTO: 27.5 %
MCH RBC QN AUTO: 22.8 PG (ref 26.5–33)
MCHC RBC AUTO-ENTMCNC: 29.9 G/DL (ref 31.5–36.5)
MCV RBC AUTO: 76 FL (ref 78–100)
MONOCYTES # BLD AUTO: 0.6 10E9/L (ref 0–1.3)
MONOCYTES NFR BLD AUTO: 9 %
NEUTROPHILS # BLD AUTO: 4 10E9/L (ref 1.6–8.3)
NEUTROPHILS NFR BLD AUTO: 60.1 %
NRBC # BLD AUTO: 0 10*3/UL
NRBC BLD AUTO-RTO: 0 /100
PLATELET # BLD AUTO: 198 10E9/L (ref 150–450)
RBC # BLD AUTO: 4.3 10E12/L (ref 3.8–5.2)
T4 FREE SERPL-MCNC: 0.81 NG/DL (ref 0.76–1.46)
TSH SERPL DL<=0.005 MIU/L-ACNC: 6.44 MU/L (ref 0.4–4)
WBC # BLD AUTO: 6.6 10E9/L (ref 4–11)

## 2019-05-06 PROCEDURE — 76856 US EXAM PELVIC COMPLETE: CPT

## 2019-05-06 PROCEDURE — 84702 CHORIONIC GONADOTROPIN TEST: CPT | Performed by: EMERGENCY MEDICINE

## 2019-05-06 PROCEDURE — 84443 ASSAY THYROID STIM HORMONE: CPT | Performed by: EMERGENCY MEDICINE

## 2019-05-06 PROCEDURE — 99282 EMERGENCY DEPT VISIT SF MDM: CPT | Mod: Z6 | Performed by: EMERGENCY MEDICINE

## 2019-05-06 PROCEDURE — 84439 ASSAY OF FREE THYROXINE: CPT | Performed by: EMERGENCY MEDICINE

## 2019-05-06 PROCEDURE — 99284 EMERGENCY DEPT VISIT MOD MDM: CPT | Mod: 25 | Performed by: EMERGENCY MEDICINE

## 2019-05-06 PROCEDURE — 85610 PROTHROMBIN TIME: CPT | Performed by: EMERGENCY MEDICINE

## 2019-05-06 PROCEDURE — 85025 COMPLETE CBC W/AUTO DIFF WBC: CPT | Performed by: EMERGENCY MEDICINE

## 2019-05-06 ASSESSMENT — MIFFLIN-ST. JEOR: SCORE: 1283.91

## 2019-05-06 NOTE — ED AVS SNAPSHOT
[facility] Emergency Department  [address] [address]  Phone:  [phone]  Fax:  [phone]                                    Aury Cervantes   MRN: [id number]    Department:  [facility] Emergency Department   Date of Visit:  5/6/2019           After Visit Summary Signature Page    I have received my discharge instructions, and my questions have been answered. I have discussed any challenges I see with this plan with the nurse or doctor.    ..........................................................................................................................................  Patient/Patient Representative Signature      ..........................................................................................................................................  Patient Representative Print Name and Relationship to Patient    ..................................................               ................................................  Date                                   Time    ..........................................................................................................................................  Reviewed by Signature/Title    ...................................................              ..............................................  Date                                               Time          22EPIC Rev 08/18

## 2019-05-06 NOTE — TELEPHONE ENCOUNTER
"She will get to the ER. She has been having heavy menses for months now.  Janette Doshi RN-[facility]      Reason for Disposition    SEVERE vaginal bleeding (i.e., soaking 2 pads or tampons per hour and present 2 or more hours; 1 menstrual cup every 2 hours)    Additional Information    Negative: Shock suspected (e.g., cold/pale/clammy skin, too weak to stand, low BP, rapid pulse)    Negative: Difficult to awaken or acting confused (e.g., disoriented, slurred speech)    Negative: Passed out (i.e., lost consciousness, collapsed and was not responding)    Negative: Sounds like a life-threatening emergency to the triager    Negative: Followed a genital area injury    Negative: Pregnant > 20 weeks  (5 months or more)    Negative: Pregnant < 20 weeks  (less than 5 months)    Negative: Postpartum < 1 month since delivery (\"Did you recently give birth?\")    Negative: Bleeding occurring > 12 months after menopause    Negative: Bleeding from sexual abuse or rape    Negative: [1] Vaginal discharge is main symptom AND [2] small amount of blood    Negative: SEVERE abdominal pain     Cramps at 10 at times    Negative: SEVERE dizziness (e.g., unable to stand, requires support to walk, feels like passing out now)    Protocols used: VAGINAL BLEEDING - VOERPULL-Y-OC      "

## 2019-05-07 NOTE — ED PROVIDER NOTES
History     Chief Complaint   Patient presents with     Vaginal Bleeding     onset with period on saturday, clots and red blood, hx of 3 years of heavy bleeding with period.      HPI  Aury Cervantes is a 18 year old female who presents with 3 years of heavy menstrual periods.  Her last period started on Saturday and she passed a lot of blood and bright red clots.  This is not unusual for her.  After consultation with the nurse line they recommended she be seen in the emergency room.  Patient presents without significant pain or complaints.  Denies lightheadedness.  No chest pain or shortness of breath.  No nausea or vomiting.  No diarrhea or dysuria.  No unusual bleeding or bruising other than her periods.  Patient states she has had this for 3 years and they did try birth control at one point and that seemed to make matters worse.  She also went on Nexplanon and unfortunately that caused her to bleed continuously.  This is since been removed.  She is sexually active but denies vaginal discharge or bleeding.  Denies history of PID.  Do not think she is pregnant as she is menstruating.  Allergies:  Allergies   Allergen Reactions     Sulfa Drugs Other (See Comments)     Both parents are allergic         Problem List:    Patient Active Problem List    Diagnosis Date Noted     Anaphylaxis, subsequent encounter 12/04/2018     Priority: Medium     Rhinoconjunctivitis 12/04/2018     Priority: Medium     S/P tonsillectomy and adenoidectomy 03/21/2018     Priority: Medium     Post-op pain 03/21/2018     Priority: Medium     Nexplanon insertion 05/29/2015     Priority: Medium     nexplanon inserted today by Dr BRITNEY Marx  Lot # [id number]  Exp 07/2017  Zulay Jacobsen         Major depressive disorder, recurrent episode, severe (H) 11/19/2014     Priority: Medium        Past Medical History:    Past Medical History:   Diagnosis Date     Depression      Nexplanon in place        Past Surgical History:    Past Surgical  "History:   Procedure Laterality Date     SURGICAL HISTORY OF -       PE tubes     TONSILLECTOMY, ADENOIDECTOMY ADULT, COMBINED Bilateral 3/19/2018    Procedure: COMBINED TONSILLECTOMY, ADENOIDECTOMY ADULT;  Bilateral Adenotonsillectomy;  Surgeon: Kevin Arias MD;  Location: [facility] OR       Family History:    Family History   Problem Relation Age of Onset     Depression Mother      Depression Father      Depression Maternal Grandmother      Cancer Maternal Grandmother      Diabetes Maternal Grandfather      Heart Disease Maternal Grandfather      Hypertension Maternal Grandfather      Depression Paternal Grandmother         bipolar     Depression Paternal Grandfather         bipolar       Social History:  Marital Status:  Single [1]  Social History     Tobacco Use     Smoking status: Former Smoker     Smokeless tobacco: Never Used   Substance Use Topics     Alcohol use: No     Alcohol/week: 0.0 oz     Drug use: No        Medications:      Acetaminophen (TYLENOL PO)   EPINEPHrine (ADRENACLICK) 0.3 MG/0.3ML injection 2-pack         Review of Systems all other systems were reviewed and are negative.    Physical Exam   BP: 109/72  Pulse: 74  Temp: 98.2  F (36.8  C)  Resp: 16  Height: 165.1 cm (5' 5\")  Weight: 50.8 kg (112 lb)  SpO2: 100 %      Physical Exam alert cooperative female who does not look ill.  She is afebrile and vitally stable.  Does not have scleral icterus.  Able speak in complete sentences.  Neck is supple.  Lungs are clear.  She had no tachycardia.  Benign abdomen.  Deferred pelvic.  No skin bruising is noted.    ED Course        Procedures               Critical Care time:  none               Results for orders placed or performed during the hospital encounter of 05/06/19 (from the past 24 hour(s))   CBC with platelets differential   Result Value Ref Range    WBC 6.6 4.0 - 11.0 10e9/L    RBC Count 4.30 3.8 - 5.2 10e12/L    Hemoglobin 9.8 (L) 11.7 - 15.7 g/dL    Hematocrit 32.8 (L) 35.0 - 47.0 %    MCV 76 " (L) 78 - 100 fl    MCH 22.8 (L) 26.5 - 33.0 pg    MCHC 29.9 (L) 31.5 - 36.5 g/dL    RDW 13.6 10.0 - 15.0 %    Platelet Count 198 150 - 450 10e9/L    Diff Method Automated Method     % Neutrophils 60.1 %    % Lymphocytes 27.5 %    % Monocytes 9.0 %    % Eosinophils 2.0 %    % Basophils 0.9 %    % Immature Granulocytes 0.5 %    Nucleated RBCs 0 0 /100    Absolute Neutrophil 4.0 1.6 - 8.3 10e9/L    Absolute Lymphocytes 1.8 0.8 - 5.3 10e9/L    Absolute Monocytes 0.6 0.0 - 1.3 10e9/L    Absolute Eosinophils 0.1 0.0 - 0.7 10e9/L    Absolute Basophils 0.1 0.0 - 0.2 10e9/L    Abs Immature Granulocytes 0.0 0 - 0.4 10e9/L    Absolute Nucleated RBC 0.0    INR   Result Value Ref Range    INR 1.03 0.86 - 1.14   TSH with free T4 reflex   Result Value Ref Range    TSH 6.44 (H) 0.40 - 4.00 mU/L   HCG quantitative pregnancy (blood)   Result Value Ref Range    HCG Quantitative Serum <1 0 - 5 IU/L   T4 free   Result Value Ref Range    T4 Free 0.81 0.76 - 1.46 ng/dL   US Pelvis Complete without Transvaginal    Narrative    US PELVIS COMPLETE WITHOUT TRANSVAGINAL 5/6/2019 10:10 PM    HISTORY: Menorrhagia. Assess for fibroids.     TECHNIQUE: Transabdominal scan only.     COMPARISON: 10/9/2017.    FINDINGS: Uterus measures 7.2 x 3.7 x 5.4 cm. No uterine fibroids or  other mass lesions are seen. Endometrial stripe thickness is 1.1 cm.  The right ovary is identified and appears normal. Left ovary cannot be  visualized. No free fluid.      Impression    IMPRESSION:   1. Normal-appearing uterus. No uterine myomata.  2. Left ovary not identified.         PETE GERMAN MD       Medications - No data to display    Assessments & Plan (with Medical Decision Making)   Patient is a 18-year-old female presents with 3 years of menorrhagia.  This is with her regular cycles.  Amended ER tonight by triage line due to her heavy menses.  Patient denies other symptoms.  She was vitally stable.  Exam revealed no abdominal tenderness.  Pelvic was deferred.   Ultrasound showed no uterine fibroids or other masses.  Endometrial stripe was 1.1 cm.  Hemoglobin was 9.8.  Her platelets were normal.  TSH was slightly elevated but her T4 was normal.  Recommend she have this repeated in a few months.  Patient is vitally stable and she has had the symptoms for 3 years.  Did not feel any other further work-up is necessary at this time.  Recommend she follow-up with obstetrics for further assessment and discuss how to treat her menorrhagia.  I have reviewed the nursing notes.    I have reviewed the findings, diagnosis, plan and need for follow up with the patient.          Medication List      There are no discharge medications for this visit.         Final diagnoses:   Menorrhagia with regular cycle       5/6/2019   [facility] EMERGENCY DEPARTMENT     Janusz Rodriguez MD  05/06/19 6348

## 2019-05-28 ENCOUNTER — HOSPITAL ENCOUNTER (EMERGENCY)
Facility: CLINIC | Age: 19
Discharge: HOME | End: 2019-05-28
Attending: PHYSICIAN ASSISTANT | Admitting: PHYSICIAN ASSISTANT
Payer: COMMERCIAL

## 2019-05-28 VITALS
DIASTOLIC BLOOD PRESSURE: 63 MMHG | TEMPERATURE: 98.9 F | WEIGHT: 118 LBS | RESPIRATION RATE: 16 BRPM | OXYGEN SATURATION: 100 % | SYSTOLIC BLOOD PRESSURE: 113 MMHG | HEART RATE: 93 BPM | BODY MASS INDEX: 19.64 KG/M2

## 2019-05-28 DIAGNOSIS — M54.50 ACUTE RIGHT-SIDED LOW BACK PAIN WITHOUT SCIATICA: ICD-10-CM

## 2019-05-28 LAB
ALBUMIN UR-MCNC: NEGATIVE MG/DL
AMORPH CRY #/AREA URNS HPF: ABNORMAL /HPF
APPEARANCE UR: ABNORMAL
BILIRUB UR QL STRIP: NEGATIVE
COLOR UR AUTO: YELLOW
GLUCOSE UR STRIP-MCNC: NEGATIVE MG/DL
HCG UR QL: NEGATIVE
HGB UR QL STRIP: NEGATIVE
KETONES UR STRIP-MCNC: NEGATIVE MG/DL
LEUKOCYTE ESTERASE UR QL STRIP: ABNORMAL
MUCOUS THREADS #/AREA URNS LPF: PRESENT /LPF
NITRATE UR QL: NEGATIVE
PH UR STRIP: 7 PH (ref 5–7)
RBC #/AREA URNS AUTO: 4 /HPF (ref 0–2)
SOURCE: ABNORMAL
SP GR UR STRIP: 1.02 (ref 1–1.03)
SQUAMOUS #/AREA URNS AUTO: 3 /HPF (ref 0–1)
UROBILINOGEN UR STRIP-MCNC: 0 MG/DL (ref 0–2)
WBC #/AREA URNS AUTO: 4 /HPF (ref 0–5)

## 2019-05-28 PROCEDURE — 87086 URINE CULTURE/COLONY COUNT: CPT | Performed by: PHYSICIAN ASSISTANT

## 2019-05-28 PROCEDURE — 81025 URINE PREGNANCY TEST: CPT | Performed by: PHYSICIAN ASSISTANT

## 2019-05-28 PROCEDURE — 99214 OFFICE O/P EST MOD 30 MIN: CPT | Mod: Z6 | Performed by: PHYSICIAN ASSISTANT

## 2019-05-28 PROCEDURE — G0463 HOSPITAL OUTPT CLINIC VISIT: HCPCS | Performed by: PHYSICIAN ASSISTANT

## 2019-05-28 PROCEDURE — 81001 URINALYSIS AUTO W/SCOPE: CPT | Performed by: PHYSICIAN ASSISTANT

## 2019-05-28 PROCEDURE — 25000128 H RX IP 250 OP 636: Performed by: PHYSICIAN ASSISTANT

## 2019-05-28 PROCEDURE — 96372 THER/PROPH/DIAG INJ SC/IM: CPT | Performed by: PHYSICIAN ASSISTANT

## 2019-05-28 RX ORDER — KETOROLAC TROMETHAMINE 30 MG/ML
30 INJECTION, SOLUTION INTRAMUSCULAR; INTRAVENOUS ONCE
Status: COMPLETED | OUTPATIENT
Start: 2019-05-28 | End: 2019-05-28

## 2019-05-28 RX ORDER — CYCLOBENZAPRINE HCL 10 MG
10 TABLET ORAL 2 TIMES DAILY PRN
Qty: 10 TABLET | Refills: 0 | Status: SHIPPED | OUTPATIENT
Start: 2019-05-28 | End: 2019-07-03

## 2019-05-28 RX ADMIN — KETOROLAC TROMETHAMINE 30 MG: 30 INJECTION, SOLUTION INTRAMUSCULAR at 16:37

## 2019-05-28 ASSESSMENT — ENCOUNTER SYMPTOMS
BACK PAIN: 1
CARDIOVASCULAR NEGATIVE: 1
RESPIRATORY NEGATIVE: 1
NUMBNESS: 0
FEVER: 0
GASTROINTESTINAL NEGATIVE: 1
WEAKNESS: 0
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
## 2019-05-28 NOTE — DISCHARGE INSTRUCTIONS
Use medications as directed  Caution with cholesterol can cause sedation.  Do not drive or operate machinery while taking this.      Patient informed to follow up in clinic or with PCP in 1 weeks.   Apply warm to back, rest, Tylenol/Ibuprofen OTC as discussed as long as no contraindications or allergies.   Discussed with patient that if symptoms persist patient may need to see Physical Therapy.     Patient advised to go to Emergency Room if symptoms worsen, change, loss of bowel or bladder function or saddle anesthesia occur.     Patient voiced understanding of instructions given.

## 2019-05-28 NOTE — ED AVS SNAPSHOT
[facility] Emergency Department  [address] [address]  Phone:  [phone]  Fax:  [phone]                                    Aury Cervantes   MRN: [id number]    Department:  [facility] Emergency Department   Date of Visit:  5/28/2019           After Visit Summary Signature Page    I have received my discharge instructions, and my questions have been answered. I have discussed any challenges I see with this plan with the nurse or doctor.    ..........................................................................................................................................  Patient/Patient Representative Signature      ..........................................................................................................................................  Patient Representative Print Name and Relationship to Patient    ..................................................               ................................................  Date                                   Time    ..........................................................................................................................................  Reviewed by Signature/Title    ...................................................              ..............................................  Date                                               Time          22EPIC Rev 08/18

## 2019-05-28 NOTE — LETTER
May 28, 2019      To Whom It May Concern:      Aury Cervantes was seen in our Urgent Care Department today, 05/28/19.  Please excuse patient from work today.  Patient can return to work tomorrow with no restrictions.      Sincerely,        Cathy Johnson PA-C

## 2019-05-29 LAB
BACTERIA SPEC CULT: NO GROWTH
Lab: NORMAL
SPECIMEN SOURCE: NORMAL

## 2019-05-30 NOTE — RESULT ENCOUNTER NOTE
Final urine culture report is NEGATIVE per [facility] ED Lab Result protocol.    If NEGATIVE result, no change in treatment, per [facility] ED Lab Result protocol.

## 2019-06-04 ENCOUNTER — HOSPITAL ENCOUNTER (OUTPATIENT)
Dept: PHYSICAL THERAPY | Facility: CLINIC | Age: 19
Setting detail: THERAPIES SERIES
End: 2019-06-04
Attending: PHYSICIAN ASSISTANT
Payer: COMMERCIAL

## 2019-06-04 DIAGNOSIS — M54.50 ACUTE RIGHT-SIDED LOW BACK PAIN WITHOUT SCIATICA: ICD-10-CM

## 2019-06-04 PROCEDURE — 97110 THERAPEUTIC EXERCISES: CPT | Mod: GP | Performed by: PHYSICAL THERAPIST

## 2019-06-04 PROCEDURE — 97161 PT EVAL LOW COMPLEX 20 MIN: CPT | Mod: GP | Performed by: PHYSICAL THERAPIST
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
## 2019-07-02 PROCEDURE — 99283 EMERGENCY DEPT VISIT LOW MDM: CPT | Mod: 25 | Performed by: EMERGENCY MEDICINE

## 2019-07-02 PROCEDURE — 20552 NJX 1/MLT TRIGGER POINT 1/2: CPT | Mod: Z6 | Performed by: EMERGENCY MEDICINE

## 2019-07-02 PROCEDURE — 99284 EMERGENCY DEPT VISIT MOD MDM: CPT | Mod: 25 | Performed by: EMERGENCY MEDICINE

## 2019-07-02 PROCEDURE — 20552 NJX 1/MLT TRIGGER POINT 1/2: CPT | Performed by: EMERGENCY MEDICINE

## 2019-07-03 ENCOUNTER — HOSPITAL ENCOUNTER (EMERGENCY)
Facility: CLINIC | Age: 19
Discharge: HOME | End: 2019-07-03
Attending: EMERGENCY MEDICINE | Admitting: EMERGENCY MEDICINE
Payer: COMMERCIAL

## 2019-07-03 VITALS
WEIGHT: 118 LBS | HEART RATE: 85 BPM | BODY MASS INDEX: 23.16 KG/M2 | HEIGHT: 60 IN | RESPIRATION RATE: 18 BRPM | TEMPERATURE: 98.1 F | DIASTOLIC BLOOD PRESSURE: 77 MMHG | SYSTOLIC BLOOD PRESSURE: 115 MMHG | OXYGEN SATURATION: 98 %

## 2019-07-03 DIAGNOSIS — M54.41 ACUTE RIGHT-SIDED LOW BACK PAIN WITH RIGHT-SIDED SCIATICA: ICD-10-CM

## 2019-07-03 PROCEDURE — 25000131 ZZH RX MED GY IP 250 OP 636 PS 637: Performed by: EMERGENCY MEDICINE

## 2019-07-03 PROCEDURE — 25000132 ZZH RX MED GY IP 250 OP 250 PS 637: Performed by: EMERGENCY MEDICINE

## 2019-07-03 RX ORDER — LORAZEPAM 1 MG/1
1 TABLET ORAL ONCE
Status: COMPLETED | OUTPATIENT
Start: 2019-07-03 | End: 2019-07-03

## 2019-07-03 RX ORDER — IBUPROFEN 400 MG/1
800 TABLET, FILM COATED ORAL ONCE
Status: COMPLETED | OUTPATIENT
Start: 2019-07-03 | End: 2019-07-03

## 2019-07-03 RX ORDER — OXYCODONE HYDROCHLORIDE 5 MG/1
5 TABLET ORAL EVERY 6 HOURS PRN
Status: DISCONTINUED | OUTPATIENT
Start: 2019-07-03 | End: 2019-07-03 | Stop reason: HOSPADM

## 2019-07-03 RX ORDER — PREDNISONE 20 MG/1
TABLET ORAL
Qty: 10 TABLET | Refills: 0 | Status: SHIPPED | OUTPATIENT
Start: 2019-07-03 | End: 2019-09-23

## 2019-07-03 RX ORDER — OXYCODONE HYDROCHLORIDE 5 MG/1
5 TABLET ORAL EVERY 6 HOURS PRN
Qty: 10 TABLET | Refills: 0 | Status: SHIPPED | OUTPATIENT
Start: 2019-07-03 | End: 2019-09-23

## 2019-07-03 RX ORDER — OXYCODONE HYDROCHLORIDE 5 MG/1
10 TABLET ORAL ONCE
Status: COMPLETED | OUTPATIENT
Start: 2019-07-03 | End: 2019-07-03

## 2019-07-03 RX ORDER — ACETAMINOPHEN 500 MG
1000 TABLET ORAL ONCE
Status: COMPLETED | OUTPATIENT
Start: 2019-07-03 | End: 2019-07-03

## 2019-07-03 RX ORDER — ONDANSETRON 4 MG/1
4 TABLET, ORALLY DISINTEGRATING ORAL ONCE
Status: COMPLETED | OUTPATIENT
Start: 2019-07-03 | End: 2019-07-03

## 2019-07-03 RX ADMIN — ACETAMINOPHEN 1000 MG: 500 TABLET, FILM COATED ORAL at 01:51

## 2019-07-03 RX ADMIN — LORAZEPAM 1 MG: 1 TABLET ORAL at 02:51

## 2019-07-03 RX ADMIN — OXYCODONE HYDROCHLORIDE 10 MG: 5 TABLET ORAL at 01:51

## 2019-07-03 RX ADMIN — ONDANSETRON 4 MG: 4 TABLET, ORALLY DISINTEGRATING ORAL at 02:56

## 2019-07-03 RX ADMIN — IBUPROFEN 800 MG: 400 TABLET ORAL at 02:51

## 2019-07-03 ASSESSMENT — MIFFLIN-ST. JEOR: SCORE: 1226.74

## 2019-07-03 NOTE — ED NOTES
Patient here with lower back pain, radiates down right leg, present since April. Had been seeing chiropractor but not getting any relief lately. Now right leg feels weak & having numbness/tingling. No loss of bowel or bladder. Went in for xrays at chiro & was told she had 3 slipped discs in lower back

## 2019-07-03 NOTE — ED AVS SNAPSHOT
[facility] Emergency Department  [address] [address]  Phone:  [phone]  Fax:  [phone]                                    Aury Cervantes   MRN: [id number]    Department:  [facility] Emergency Department   Date of Visit:  7/2/2019           After Visit Summary Signature Page    I have received my discharge instructions, and my questions have been answered. I have discussed any challenges I see with this plan with the nurse or doctor.    ..........................................................................................................................................  Patient/Patient Representative Signature      ..........................................................................................................................................  Patient Representative Print Name and Relationship to Patient    ..................................................               ................................................  Date                                   Time    ..........................................................................................................................................  Reviewed by Signature/Title    ...................................................              ..............................................  Date                                               Time          22EPIC Rev 08/18
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
## 2019-07-03 NOTE — DISCHARGE INSTRUCTIONS
Return to the emergency department for fever, urinary incontinence, numbness or tingling around your vagina or anus, worsening symptoms, or other concerns.  Otherwise follow-up in clinic. Take the prednisone as prescribed.    Use ibuprofen and acetaminophen for your symptoms.  Use oxycodone as needed for pain that is not controlled by the prior two medications.    Oxycodone is an addictive opioid medication, please only take it when the pain is more than can be controlled by acetaminophen or ibuprofen alone. It will also make you lightheaded, nauseated, and constipated.  Do not drive, operate heavy machinery, or take care of young children while taking this medication.     Repeated studies have shown that the longer you use opioid pain medications, the longer it is until you return to normal function. It is our recommendation that you taper off opioids as quickly as possible with the goal of returning to normal function or near normal function. Long term use of opioids quickly results in growing tolerance to the medication (less of the benefits) and increased dependence (more of the bad side effects).     Pain is very difficult to treat and we can very rarely take away pain completely. If you are having difficulty with pain over several weeks after an injury, you may need to start different medications and therapies (such as physical therapy, graded exercise, massage, and acupuncture). Please talk about this with your regular doctor.     If you are interested in seeking free, confidential treatment referral and information service for individuals and families facing mental health and/or substance use disorders please call [phone] ([phone])

## 2019-07-11 ENCOUNTER — APPOINTMENT (OUTPATIENT)
Dept: GENERAL RADIOLOGY | Facility: CLINIC | Age: 19
End: 2019-07-11
Attending: EMERGENCY MEDICINE
Payer: COMMERCIAL

## 2019-07-11 ENCOUNTER — HOSPITAL ENCOUNTER (EMERGENCY)
Facility: CLINIC | Age: 19
Discharge: HOME | End: 2019-07-11
Attending: EMERGENCY MEDICINE | Admitting: EMERGENCY MEDICINE
Payer: COMMERCIAL

## 2019-07-11 VITALS
DIASTOLIC BLOOD PRESSURE: 75 MMHG | HEART RATE: 79 BPM | HEIGHT: 65 IN | SYSTOLIC BLOOD PRESSURE: 119 MMHG | WEIGHT: 118 LBS | OXYGEN SATURATION: 99 % | RESPIRATION RATE: 16 BRPM | TEMPERATURE: 98.3 F | BODY MASS INDEX: 19.66 KG/M2

## 2019-07-11 DIAGNOSIS — G89.29 CHRONIC MIDLINE LOW BACK PAIN WITH RIGHT-SIDED SCIATICA: ICD-10-CM

## 2019-07-11 DIAGNOSIS — M54.41 CHRONIC MIDLINE LOW BACK PAIN WITH RIGHT-SIDED SCIATICA: ICD-10-CM

## 2019-07-11 PROCEDURE — 99284 EMERGENCY DEPT VISIT MOD MDM: CPT | Mod: 25

## 2019-07-11 PROCEDURE — 96372 THER/PROPH/DIAG INJ SC/IM: CPT

## 2019-07-11 PROCEDURE — 25000132 ZZH RX MED GY IP 250 OP 250 PS 637: Performed by: EMERGENCY MEDICINE

## 2019-07-11 PROCEDURE — 72100 X-RAY EXAM L-S SPINE 2/3 VWS: CPT

## 2019-07-11 PROCEDURE — 25000128 H RX IP 250 OP 636: Performed by: EMERGENCY MEDICINE

## 2019-07-11 PROCEDURE — 99284 EMERGENCY DEPT VISIT MOD MDM: CPT | Mod: Z6 | Performed by: EMERGENCY MEDICINE

## 2019-07-11 RX ORDER — METHOCARBAMOL 750 MG/1
1500 TABLET, FILM COATED ORAL ONCE
Status: COMPLETED | OUTPATIENT
Start: 2019-07-11 | End: 2019-07-11

## 2019-07-11 RX ORDER — KETOROLAC TROMETHAMINE 30 MG/ML
30 INJECTION, SOLUTION INTRAMUSCULAR; INTRAVENOUS ONCE
Status: COMPLETED | OUTPATIENT
Start: 2019-07-11 | End: 2019-07-11

## 2019-07-11 RX ADMIN — METHOCARBAMOL 1500 MG: 750 TABLET, FILM COATED ORAL at 03:46

## 2019-07-11 RX ADMIN — KETOROLAC TROMETHAMINE 30 MG: 30 INJECTION, SOLUTION INTRAMUSCULAR at 03:47

## 2019-07-11 ASSESSMENT — ENCOUNTER SYMPTOMS
CHILLS: 0
SHORTNESS OF BREATH: 0
DIAPHORESIS: 0
NUMBNESS: 1
DIARRHEA: 0
BACK PAIN: 1
APPETITE CHANGE: 0
CHEST TIGHTNESS: 0
NECK PAIN: 0
LIGHT-HEADEDNESS: 0
ABDOMINAL PAIN: 1
HEADACHES: 0
COUGH: 0
VOMITING: 0
NAUSEA: 0
FATIGUE: 0
WEAKNESS: 0
WOUND: 0
UNEXPECTED WEIGHT CHANGE: 0
FEVER: 0
ACTIVITY CHANGE: 0
CONSTIPATION: 0

## 2019-07-11 ASSESSMENT — MIFFLIN-ST. JEOR: SCORE: 1306.12

## 2019-07-11 NOTE — ED TRIAGE NOTES
Pt presents with low back pain that radiates to the front where pt reports it feels hard to breathe. Back problems for the past 2 months and has been seeing a chiropractor 2-3X per week. Pt was seen today and felt fine when she left. Pain began at 0130 this morning. Boyfriend had to carry pt down stairs because she was unable to walk due to the pain.

## 2019-07-11 NOTE — ED AVS SNAPSHOT
[facility] Emergency Department  [address] [place] [address]  Phone:  [phone]  Fax:  [phone]                                    Aury Cervantes   MRN: [id number]    Department:  [facility] Emergency Department   Date of Visit:  7/11/2019           After Visit Summary Signature Page    I have received my discharge instructions, and my questions have been answered. I have discussed any challenges I see with this plan with the nurse or doctor.    ..........................................................................................................................................  Patient/Patient Representative Signature      ..........................................................................................................................................  Patient Representative Print Name and Relationship to Patient    ..................................................               ................................................  Date                                   Time    ..........................................................................................................................................  Reviewed by Signature/Title    ...................................................              ..............................................  Date                                               Time          22EPIC Rev 08/18

## 2019-07-11 NOTE — ED PROVIDER NOTES
History     Chief Complaint   Patient presents with     Back Pain     Pt presents with lower back pain and radiates to front where pt states it is hard to breath.      HPI  Aury Cervantes is a 19 year old female with a history of depression presenting for evaluation of low back pain.  Patient reports she is been having severe low back pain for the past several months.  She reports symptoms began in April while working.  Denies recalling any specific event triggering her pain but she is been having difficulty with her with her back ever since.  She reports she has been following with a chiropractor regularly since the initial injury and the chiropractor does provide some temporary relief.  She went to the chiropractor yesterday and reports feeling okay after but tonight woke up with severe pain.  She reports she always has some pain but it did not seem to be worse during the day yesterday.  She reports she last took anything for pain July 9 -denies taking any medications for pain through the day yesterday or tonight before coming into the emergency department.  Denies any fever or chills.  Reports her current pain is identical to previous exacerbations.  Denies IV drug use.  Denies any bowel or bladder incontinence.  Does report some subjective numbness in the right leg but no weakness.  Did have difficulty walking tonight due to her pain.  Reports her current pain is sharp and originating near her sacrum and radiating up her spine to her mid thoracic area and then the stomach and anterior chest.  Also reports some radiating pain shooting down her legs bilaterally but more intensely on the right leg.  Patient reports she has a follow-up appointment scheduled with [facility] today at 10 AM for further evaluation of her ongoing back pain problems.  Patient reports she has previously followed with physical therapy but does not regularly do the exercises as she feels she is unable to do them due to her ongoing  pain.      ==================================================================    CHART REVIEW:      I reviewed over patient's most recent ED visits: One visit to May 28 for back pain and a second July 3 for back pain.    END CHART REVIEW  ==================================================================    Allergies:  Allergies   Allergen Reactions     Sulfa Drugs Other (See Comments)     Both parents are allergic         Problem List:    Patient Active Problem List    Diagnosis Date Noted     Anaphylaxis, subsequent encounter 12/04/2018     Priority: Medium     Rhinoconjunctivitis 12/04/2018     Priority: Medium     S/P tonsillectomy and adenoidectomy 03/21/2018     Priority: Medium     Post-op pain 03/21/2018     Priority: Medium     Nexplanon insertion 05/29/2015     Priority: Medium     nexplanon inserted today by Dr BRITNEY Marx  Lot # [id number]  Exp 07/2017  Zulay Jacobsen         Major depressive disorder, recurrent episode, severe (H) 11/19/2014     Priority: Medium        Past Medical History:    Past Medical History:   Diagnosis Date     Depression      Nexplanon in place        Past Surgical History:    Past Surgical History:   Procedure Laterality Date     SURGICAL HISTORY OF -       PE tubes     TONSILLECTOMY, ADENOIDECTOMY ADULT, COMBINED Bilateral 3/19/2018    Procedure: COMBINED TONSILLECTOMY, ADENOIDECTOMY ADULT;  Bilateral Adenotonsillectomy;  Surgeon: Kevin Arias MD;  Location: [facility] OR       Family History:    Family History   Problem Relation Age of Onset     Depression Mother      Depression Father      Depression Maternal Grandmother      Cancer Maternal Grandmother      Diabetes Maternal Grandfather      Heart Disease Maternal Grandfather      Hypertension Maternal Grandfather      Depression Paternal Grandmother         bipolar     Depression Paternal Grandfather         bipolar       Social History:  Marital Status:  Single [1]  Social History     Tobacco Use     Smoking status: Former  "Smoker     Smokeless tobacco: Never Used   Substance Use Topics     Alcohol use: No     Alcohol/week: 0.0 oz     Drug use: No        Medications:      oxyCODONE (ROXICODONE) 5 MG tablet   predniSONE (DELTASONE) 20 MG tablet   Acetaminophen (TYLENOL PO)   EPINEPHrine (ADRENACLICK) 0.3 MG/0.3ML injection 2-pack         Review of Systems   Constitutional: Negative for activity change, appetite change, chills, diaphoresis, fatigue, fever and unexpected weight change.   HENT: Negative for congestion.    Respiratory: Negative for cough, chest tightness and shortness of breath.    Cardiovascular: Negative for chest pain.   Gastrointestinal: Positive for abdominal pain (radiating from her low back to her epigastrium). Negative for constipation, diarrhea, nausea and vomiting.   Genitourinary: Negative for decreased urine volume and enuresis.   Musculoskeletal: Positive for back pain. Negative for neck pain.   Skin: Negative for rash and wound.   Neurological: Positive for numbness (Right leg). Negative for weakness, light-headedness and headaches.   All other systems reviewed and are negative.      Physical Exam   BP: 119/72  Pulse: 81  Temp: 98.3  F (36.8  C)  Resp: 16  Height: 165.1 cm (5' 5\")  Weight: 53.5 kg (118 lb)  SpO2: 99 %      Physical Exam   Constitutional: She is oriented to person, place, and time. She appears well-developed and well-nourished. No distress.   HENT:   Head: Normocephalic and atraumatic.   Eyes: Conjunctivae are normal.   Cardiovascular: Normal rate.   Pulmonary/Chest: Effort normal.   Abdominal: Soft. There is tenderness (Epigastric).   Musculoskeletal:        Lumbar back: She exhibits decreased range of motion, tenderness (A mild diffuse lumbar back pain and tenderness without visible abnormality to the back) and pain. She exhibits no swelling, no edema and no deformity.   Able to sit forward on her own but does so slowly   Neurological: She is alert and oriented to person, place, and time. "   Skin: Skin is warm and dry. Capillary refill takes less than 2 seconds. She is not diaphoretic.   Psychiatric:   Flat affect   Nursing note and vitals reviewed.      ED Course        Procedures             Results for orders placed or performed during the hospital encounter of 07/11/19 (from the past 24 hour(s))   Lumbar spine XR, 2-3 views    Narrative    XR LUMBAR SPINE 2-3 VIEWS  7/11/2019 4:04 AM     HISTORY: Low back pain.    COMPARISON: None.       Impression    IMPRESSION: No fracture or malalignment. No evidence of arthritis.       Medications   ketorolac (TORADOL) injection 30 mg (30 mg Intramuscular Given 7/11/19 0347)   methocarbamol (ROBAXIN) tablet 1,500 mg (1,500 mg Oral Given 7/11/19 0346)     4:25 AM: Pt re-assessed.  Pain slightly improved.  Patient reports feeling tired.  Advised her of the need for making her appointment this morning with her spine doctor for follow-up.    Assessments & Plan (with Medical Decision Making)  A 19-year-old female with no significant contributing past medical history presenting for evaluation of recurrent severe low back pain.  She reports she has been having difficulty with her back since roughly April of this year although does not recall specific injury.  Alert and oriented with a nonspecific exam diffuse lower back pain and subjective numbness of her right leg but no objective weakness or neurologic deficit.  Patient has no history of cancer or weight loss to suggest metastatic cause of her pain.  Patient denies any night sweats or persistent fevers and has no history of ureter, but does state such as HIV steroid use, or intravenous drug use service risk for epidural abscess or osteomyelitis.  Patient has no motor or neurologic deficit to suggest a cord or root compression tension anesthesia to suggest cauda equina.  Given her repeated visits to the ED screening x-ray was performed to evaluate for gross bony abnormality.  Treated symptomatically with  methocarbamol and IM ketorolac with some improvement in her pain.  Advised patient to follow-up as already scheduled with [facility] later this morning for any further work-up and care of her ongoing low back pain problem     I have reviewed the nursing notes.    I have reviewed the findings, diagnosis, plan and need for follow up with the patient.          Medication List      There are no discharge medications for this visit.         Final diagnoses:   Chronic midline low back pain with right-sided sciatica       7/11/2019   [facility] EMERGENCY DEPARTMENT     Smith, Isidoro Hughes MD  07/11/19 0426

## 2019-07-12 ENCOUNTER — HOSPITAL ENCOUNTER (EMERGENCY)
Facility: CLINIC | Age: 19
Discharge: HOME | End: 2019-07-12
Attending: PHYSICIAN ASSISTANT | Admitting: PHYSICIAN ASSISTANT
Payer: COMMERCIAL

## 2019-07-12 VITALS
BODY MASS INDEX: 19.66 KG/M2 | WEIGHT: 118 LBS | SYSTOLIC BLOOD PRESSURE: 108 MMHG | TEMPERATURE: 98.7 F | RESPIRATION RATE: 18 BRPM | DIASTOLIC BLOOD PRESSURE: 55 MMHG | HEIGHT: 65 IN | OXYGEN SATURATION: 100 %

## 2019-07-12 DIAGNOSIS — K08.409: ICD-10-CM

## 2019-07-12 PROCEDURE — 96372 THER/PROPH/DIAG INJ SC/IM: CPT | Performed by: PHYSICIAN ASSISTANT

## 2019-07-12 PROCEDURE — 99214 OFFICE O/P EST MOD 30 MIN: CPT | Mod: Z6 | Performed by: PHYSICIAN ASSISTANT

## 2019-07-12 PROCEDURE — G0463 HOSPITAL OUTPT CLINIC VISIT: HCPCS | Performed by: PHYSICIAN ASSISTANT

## 2019-07-12 PROCEDURE — 25000128 H RX IP 250 OP 636: Performed by: PHYSICIAN ASSISTANT

## 2019-07-12 RX ORDER — KETOROLAC TROMETHAMINE 30 MG/ML
30 INJECTION, SOLUTION INTRAMUSCULAR; INTRAVENOUS ONCE
Status: COMPLETED | OUTPATIENT
Start: 2019-07-12 | End: 2019-07-12

## 2019-07-12 RX ADMIN — HYDROMORPHONE HYDROCHLORIDE 1 MG: 1 INJECTION, SOLUTION INTRAMUSCULAR; INTRAVENOUS; SUBCUTANEOUS at 21:06

## 2019-07-12 RX ADMIN — KETOROLAC TROMETHAMINE 30 MG: 30 INJECTION, SOLUTION INTRAMUSCULAR at 21:07

## 2019-07-12 ASSESSMENT — MIFFLIN-ST. JEOR: SCORE: 1306.12

## 2019-07-12 NOTE — ED AVS SNAPSHOT
[facility] Emergency Department  [address] [address]  Phone:  [phone]  Fax:  [phone]                                    Aury Cervantes   MRN: [id number]    Department:  [facility] Emergency Department   Date of Visit:  7/12/2019           After Visit Summary Signature Page    I have received my discharge instructions, and my questions have been answered. I have discussed any challenges I see with this plan with the nurse or doctor.    ..........................................................................................................................................  Patient/Patient Representative Signature      ..........................................................................................................................................  Patient Representative Print Name and Relationship to Patient    ..................................................               ................................................  Date                                   Time    ..........................................................................................................................................  Reviewed by Signature/Title    ...................................................              ..............................................  Date                                               Time          22EPIC Rev 08/18

## 2019-07-13 NOTE — ED PROVIDER NOTES
History     Chief Complaint   Patient presents with     Dental Pain     HAD WISDOM TEETH REMOVED TODAY, REPORTS UNABLE TO TAKE HE PO MEDS DUE TO PAIN.      HPI  Aury Cervantes is a 19 year old female who urgent care with concern over uncontrolled dental pain.  Patient reports that she had all 4 wisdom teeth pulled earlier today.  She was discharged home from her dentist with instructions to take Tylenol and ibuprofen which were called into a pharmacy for her and patient reports she did not pick the medicine up until 7 PM this evening and has been unable to swallow them due to discomfort.  She does complain of facial swelling, mild sore throat.  She denies any fever, chills, myalgias, cough, dyspnea, wheezing, nausea, vomiting, diarrhea or abdominal pain.  She has not attempted to contact her dentist regarding her pain control    Allergies:  Allergies   Allergen Reactions     Sulfa Drugs Other (See Comments)     Both parents are allergic         Problem List:    Patient Active Problem List    Diagnosis Date Noted     Anaphylaxis, subsequent encounter 12/04/2018     Priority: Medium     Rhinoconjunctivitis 12/04/2018     Priority: Medium     S/P tonsillectomy and adenoidectomy 03/21/2018     Priority: Medium     Post-op pain 03/21/2018     Priority: Medium     Nexplanon insertion 05/29/2015     Priority: Medium     nexplanon inserted today by Dr BRITNEY Marx  Lot # [id number]  Exp 07/2017  Zulay Jacobsen         Major depressive disorder, recurrent episode, severe (H) 11/19/2014     Priority: Medium        Past Medical History:    Past Medical History:   Diagnosis Date     Depression      Nexplanon in place        Past Surgical History:    Past Surgical History:   Procedure Laterality Date     SURGICAL HISTORY OF -       PE tubes     TONSILLECTOMY, ADENOIDECTOMY ADULT, COMBINED Bilateral 3/19/2018    Procedure: COMBINED TONSILLECTOMY, ADENOIDECTOMY ADULT;  Bilateral Adenotonsillectomy;  Surgeon: Kevin Arias MD;   "Location: [facility] OR       Family History:    Family History   Problem Relation Age of Onset     Depression Mother      Depression Father      Depression Maternal Grandmother      Cancer Maternal Grandmother      Diabetes Maternal Grandfather      Heart Disease Maternal Grandfather      Hypertension Maternal Grandfather      Depression Paternal Grandmother         bipolar     Depression Paternal Grandfather         bipolar       Social History:  Marital Status:  Single [1]  Social History     Tobacco Use     Smoking status: Former Smoker     Smokeless tobacco: Never Used   Substance Use Topics     Alcohol use: No     Alcohol/week: 0.0 oz     Drug use: No        Medications:      Acetaminophen (TYLENOL PO)   EPINEPHrine (ADRENACLICK) 0.3 MG/0.3ML injection 2-pack   oxyCODONE (ROXICODONE) 5 MG tablet   predniSONE (DELTASONE) 20 MG tablet     Review of Systems  CONSTITUTIONAL:NEGATIVE for fever, chills, change in weight  INTEGUMENTARY/SKIN: NEGATIVE for worrisome rashes, moles or lesions  EYES: NEGATIVE for vision changes or irritation  ENT/MOUTH: POSITIVE for positive for dental pain, facial swelling and, mild sore throat NEGATIVE for nasal congestion, ear pain   RESP:NEGATIVE for significant cough or SOB  GI: NEGATIVE for vomiting, diarrhea, abdominal pain   Physical Exam   BP: 108/55  Heart Rate: 96  Temp: 98.7  F (37.1  C)  Resp: 18  Height: 165.1 cm (5' 5\")  Weight: 53.5 kg (118 lb)  SpO2: 100 %  Physical Exam  GENERAL APPEARANCE: healthy, alert and no distress  EYES: EOMI,  PERRL, conjunctiva clear  HENT: ear canals and TM's normal.  + Localized swelling in the mandibular region bilaterally.  No trismus noted despite patient's report that she cannot open her mouth.  She has blood clots present minimal oozing blood from site of her dental extractions  NECK: supple, nontender, no lymphadenopathy  RESP: lungs clear to auscultation - no rales, rhonchi or wheezes  CV: regular rates and rhythm, normal S1 S2, no murmur " noted  SKIN: no suspicious lesions or rashes    ED Course        Procedures               Critical Care time:  none               No results found for this or any previous visit (from the past 24 hour(s)).    Medications   ketorolac (TORADOL) injection 30 mg (30 mg Intramuscular Given 7/12/19 2107)   HYDROmorphone (DILAUDID) injection 1 mg (1 mg Intramuscular Given 7/12/19 2106)       Assessments & Plan (with Medical Decision Making)     I have reviewed the nursing notes.    I have reviewed the findings, diagnosis, plan and need for follow up with the patient.          Medication List      There are no discharge medications for this visit.       Final diagnoses:   H/O wisdom tooth extraction     19-year-old female presents to the urgent care with concern over pain following wisdom tooth extraction earlier today.  She had stable vital signs upon arrival.  Physical exam findings as described above were significant for bilateral facial swelling, multiple extraction sites.  I do not suspect cellulitis, abscess, infection at this time.  She did not have any true trismus.  She was given single dose of Toradol, Dilaudid IM in the department and discharged home stable with instructions to ice, use her home oral per scribed pain management as needed.     Disclaimer: This note consists of symbols derived from keyboarding, dictation, and/or voice recognition software. As a result, there may be errors in the script that have gone undetected.  Please consider this when interpreting information found in the chart.  +    7/12/2019   [facility] EMERGENCY DEPARTMENT     Eliana Willett PA-C  07/14/19 [id number]

## 2019-07-22 ENCOUNTER — HOSPITAL ENCOUNTER (OUTPATIENT)
Dept: MRI IMAGING | Facility: CLINIC | Age: 19
Discharge: HOME | End: 2019-07-22
Attending: PHYSICIAN ASSISTANT | Admitting: PHYSICIAN ASSISTANT
Payer: COMMERCIAL

## 2019-07-22 DIAGNOSIS — M54.50 LOW BACK PAIN AT MULTIPLE SITES: ICD-10-CM

## 2019-07-22 PROCEDURE — 72148 MRI LUMBAR SPINE W/O DYE: CPT

## 2019-09-08 ENCOUNTER — HOSPITAL ENCOUNTER (EMERGENCY)
Facility: CLINIC | Age: 19
Discharge: HOME | End: 2019-09-08
Attending: EMERGENCY MEDICINE | Admitting: EMERGENCY MEDICINE
Payer: COMMERCIAL

## 2019-09-08 VITALS
OXYGEN SATURATION: 100 % | HEART RATE: 95 BPM | RESPIRATION RATE: 16 BRPM | BODY MASS INDEX: 19.14 KG/M2 | TEMPERATURE: 98 F | SYSTOLIC BLOOD PRESSURE: 111 MMHG | DIASTOLIC BLOOD PRESSURE: 78 MMHG | WEIGHT: 115 LBS

## 2019-09-08 DIAGNOSIS — N30.00 ACUTE CYSTITIS WITHOUT HEMATURIA: ICD-10-CM

## 2019-09-08 LAB
ALBUMIN UR-MCNC: NEGATIVE MG/DL
APPEARANCE UR: ABNORMAL
BILIRUB UR QL STRIP: NEGATIVE
COLOR UR AUTO: YELLOW
GLUCOSE UR STRIP-MCNC: NEGATIVE MG/DL
HGB UR QL STRIP: NEGATIVE
KETONES UR STRIP-MCNC: NEGATIVE MG/DL
LEUKOCYTE ESTERASE UR QL STRIP: ABNORMAL
MUCOUS THREADS #/AREA URNS LPF: PRESENT /LPF
NITRATE UR QL: NEGATIVE
PH UR STRIP: 7 PH (ref 5–7)
RBC #/AREA URNS AUTO: 5 /HPF (ref 0–2)
SOURCE: ABNORMAL
SP GR UR STRIP: 1.02 (ref 1–1.03)
SQUAMOUS #/AREA URNS AUTO: 1 /HPF (ref 0–1)
UROBILINOGEN UR STRIP-MCNC: 0 MG/DL (ref 0–2)
WBC #/AREA URNS AUTO: 156 /HPF (ref 0–5)

## 2019-09-08 PROCEDURE — 99284 EMERGENCY DEPT VISIT MOD MDM: CPT | Mod: Z6 | Performed by: EMERGENCY MEDICINE

## 2019-09-08 PROCEDURE — 99283 EMERGENCY DEPT VISIT LOW MDM: CPT | Performed by: EMERGENCY MEDICINE

## 2019-09-08 PROCEDURE — 81001 URINALYSIS AUTO W/SCOPE: CPT | Performed by: EMERGENCY MEDICINE

## 2019-09-08 RX ORDER — CIPROFLOXACIN 500 MG/1
500 TABLET, FILM COATED ORAL 2 TIMES DAILY
Qty: 10 TABLET | Refills: 0 | Status: SHIPPED | OUTPATIENT
Start: 2019-09-08 | End: 2019-09-23

## 2019-09-08 RX ORDER — PHENAZOPYRIDINE HYDROCHLORIDE 200 MG/1
200 TABLET, FILM COATED ORAL 3 TIMES DAILY
Qty: 9 TABLET | Refills: 0 | Status: SHIPPED | OUTPATIENT
Start: 2019-09-08 | End: 2019-09-26

## 2019-09-08 ASSESSMENT — ENCOUNTER SYMPTOMS
DYSURIA: 1
NUMBNESS: 0
FEVER: 0
ABDOMINAL PAIN: 0
FREQUENCY: 1
DIAPHORESIS: 0
APPETITE CHANGE: 1
WEAKNESS: 0
AGITATION: 0
VOMITING: 0
BACK PAIN: 1
NAUSEA: 0

## 2019-09-08 NOTE — ED NOTES
"Pt comes in with HX of dysuria and frequency that started on Friday and just wore out. She also has back issues and was worried that her s/sx could be from her dic being the provider told her that if she had urinary trouble to be seen ASAP. She\" states on Friday she was incontinent of urine and was not seen then\". She has no fever/chills, or change in back pain from previous injury   "

## 2019-09-08 NOTE — ED AVS SNAPSHOT
[facility] Emergency Department  [address] [address]  Phone:  [phone]  Fax:  [phone]                                    Aury Cervantes   MRN: [id number]    Department:  [facility] Emergency Department   Date of Visit:  9/8/2019           After Visit Summary Signature Page    I have received my discharge instructions, and my questions have been answered. I have discussed any challenges I see with this plan with the nurse or doctor.    ..........................................................................................................................................  Patient/Patient Representative Signature      ..........................................................................................................................................  Patient Representative Print Name and Relationship to Patient    ..................................................               ................................................  Date                                   Time    ..........................................................................................................................................  Reviewed by Signature/Title    ...................................................              ..............................................  Date                                               Time          22EPIC Rev 08/18

## 2019-09-08 NOTE — ED PROVIDER NOTES
History     Chief Complaint   Patient presents with     Back Pain     reports urinary incontinence and frequency has a known back injury     HPI  Aury Cervantes is a 19 year old female with a history significant for depression and chronic lower back pain who presents to the ED for evaluation of dysuria and frequency that began on Friday (09/06/2019). The patient reports that she was incontinent of urine on Friday (09/06/2019) and since then has been experiencing a loss of appetite.  No numbness or weakness in her lower extremities. She denies any fever or bowl problems and is currently menstruating.  Her low back pain is similar to what it normally is.  She has a history of a UTI about 2 years ago    Allergies:  Allergies   Allergen Reactions     Sulfa Drugs Other (See Comments)     Both parents are allergic         Problem List:    Patient Active Problem List    Diagnosis Date Noted     Anaphylaxis, subsequent encounter 12/04/2018     Priority: Medium     Rhinoconjunctivitis 12/04/2018     Priority: Medium     S/P tonsillectomy and adenoidectomy 03/21/2018     Priority: Medium     Post-op pain 03/21/2018     Priority: Medium     Nexplanon insertion 05/29/2015     Priority: Medium     nexplanon inserted today by Dr BRITNEY Marx  Lot # [id number]  Exp 07/2017  Zulay Jacobsen         Major depressive disorder, recurrent episode, severe (H) 11/19/2014     Priority: Medium        Past Medical History:    Past Medical History:   Diagnosis Date     Depression      Nexplanon in place        Past Surgical History:    Past Surgical History:   Procedure Laterality Date     SURGICAL HISTORY OF -       PE tubes     TONSILLECTOMY, ADENOIDECTOMY ADULT, COMBINED Bilateral 3/19/2018    Procedure: COMBINED TONSILLECTOMY, ADENOIDECTOMY ADULT;  Bilateral Adenotonsillectomy;  Surgeon: Kevin Arias MD;  Location: [facility] OR       Family History:    Family History   Problem Relation Age of Onset     Depression Mother      Depression  Father      Depression Maternal Grandmother      Cancer Maternal Grandmother      Diabetes Maternal Grandfather      Heart Disease Maternal Grandfather      Hypertension Maternal Grandfather      Depression Paternal Grandmother         bipolar     Depression Paternal Grandfather         bipolar       Social History:  Marital Status:  Single [1]  Social History     Tobacco Use     Smoking status: Former Smoker     Smokeless tobacco: Never Used   Substance Use Topics     Alcohol use: No     Alcohol/week: 0.0 oz     Drug use: No        Medications:      ciprofloxacin (CIPRO) 500 MG tablet   phenazopyridine (PYRIDIUM) 200 MG tablet   Acetaminophen (TYLENOL PO)   EPINEPHrine (ADRENACLICK) 0.3 MG/0.3ML injection 2-pack   oxyCODONE (ROXICODONE) 5 MG tablet   predniSONE (DELTASONE) 20 MG tablet         Review of Systems   Constitutional: Positive for appetite change. Negative for diaphoresis and fever.   Cardiovascular: Negative for chest pain.   Gastrointestinal: Negative for abdominal pain, nausea and vomiting.   Genitourinary: Positive for dysuria, frequency and urgency.   Musculoskeletal: Positive for back pain.   Neurological: Negative for weakness and numbness.   Psychiatric/Behavioral: Negative for agitation.       Physical Exam   BP: 111/78  Pulse: 95  Temp: 98  F (36.7  C)  Resp: 16  Weight: 52.2 kg (115 lb)  SpO2: 100 %      Physical Exam   Constitutional: She is oriented to person, place, and time. She appears well-developed and well-nourished. No distress.   HENT:   Head: Normocephalic and atraumatic.   Mouth/Throat: Oropharynx is clear and moist.   Eyes: Pupils are equal, round, and reactive to light. Conjunctivae are normal.   Neck: Normal range of motion. Neck supple. No tracheal deviation present. No thyromegaly present.   Cardiovascular: Normal rate, regular rhythm, normal heart sounds and intact distal pulses.   No murmur heard.  Pulmonary/Chest: Effort normal and breath sounds normal. No respiratory  distress. She has no wheezes. She exhibits no tenderness.   Abdominal: Soft. She exhibits no distension. There is no tenderness.   Musculoskeletal: She exhibits no edema or tenderness.        Arms:  Neurological: She is alert and oriented to person, place, and time. She has normal strength. No sensory deficit.   Skin: Skin is warm. No rash noted.   Psychiatric: She has a normal mood and affect. Her behavior is normal.       ED Course       Procedures                 Results for orders placed or performed during the hospital encounter of 09/08/19 (from the past 24 hour(s))   UA reflex to Microscopic   Result Value Ref Range    Color Urine Yellow     Appearance Urine Slightly Cloudy     Glucose Urine Negative NEG^Negative mg/dL    Bilirubin Urine Negative NEG^Negative    Ketones Urine Negative NEG^Negative mg/dL    Specific Gravity Urine 1.023 1.003 - 1.035    Blood Urine Negative NEG^Negative    pH Urine 7.0 5.0 - 7.0 pH    Protein Albumin Urine Negative NEG^Negative mg/dL    Urobilinogen mg/dL 0.0 0.0 - 2.0 mg/dL    Nitrite Urine Negative NEG^Negative    Leukocyte Esterase Urine Moderate (A) NEG^Negative    Source Midstream Urine     RBC Urine 5 (H) 0 - 2 /HPF    WBC Urine 156 (H) 0 - 5 /HPF    Squamous Epithelial /HPF Urine 1 0 - 1 /HPF    Mucous Urine Present (A) NEG^Negative /LPF       Medications - No data to display     15:12 patient assessed. Course of care outlined.      Assessments & Plan (with Medical Decision Making)     Patient with chronic low back pain who presents with a few days of dysuria and frequency.  She had an episode of incontinence 2 days ago.  She has had no further episodes.  She has no acute neurologic complaints.  Her back pain does not seem worse than it normally is.  She has an intact neurologic exam.  Urinalysis shows 156 white cells per high-power field.  A urine culture was sent.  She most likely has an acute urinary.  She is afebrile with normal vital signs.  She will be treated with  antibiotics and Pyridium.    I have reviewed the nursing notes.    I have reviewed the findings, diagnosis, plan and need for follow up with the patient.    New Prescriptions    CIPROFLOXACIN (CIPRO) 500 MG TABLET    Take 1 tablet (500 mg) by mouth 2 times daily for 5 days    PHENAZOPYRIDINE (PYRIDIUM) 200 MG TABLET    Take 1 tablet (200 mg) by mouth 3 times daily for 3 days       Final diagnoses:   Acute cystitis without hematuria     This document serves as a record of services personally performed by Tomas Mendez MD. It was created on their behalf by Lizabeth Dumont, a trained medical scribe. The creation of this record is based on the provider's personal observations and the statements of the patient. This document has been checked and approved by the attending provider.    9/8/2019   [facility] EMERGENCY DEPARTMENT     Tomas Mendez MD  09/08/19 1542

## 2019-09-23 ENCOUNTER — TELEPHONE (OUTPATIENT)
Dept: FAMILY MEDICINE | Facility: CLINIC | Age: 19
End: 2019-09-23

## 2019-09-23 ENCOUNTER — ALLIED HEALTH/NURSE VISIT (OUTPATIENT)
Dept: FAMILY MEDICINE | Facility: CLINIC | Age: 19
End: 2019-09-23
Payer: COMMERCIAL

## 2019-09-23 DIAGNOSIS — H53.8 BLURRING OF VISUAL IMAGE: Primary | ICD-10-CM

## 2019-09-23 DIAGNOSIS — H52.13 NEAR SIGHTED, BILATERAL: Primary | ICD-10-CM

## 2019-09-23 PROCEDURE — 99207 ZZC NO CHARGE NURSE ONLY: CPT

## 2019-09-23 NOTE — NURSING NOTE
Pt stopped into clinic asking for a referral from Dr Churchill to [facility] for an eye exam?  Last seen by Dr Churchill 3/16/18    Advised that pt call the patient services number on her insurance card to verify that she has vision benefits and if a referral is needed to [facility] or another vision care provider?    Pt to call her insurance and let us know.    Vandana Shankar RN
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
## 2019-09-23 NOTE — TELEPHONE ENCOUNTER
I called pt back to check on the status of this request.    Pt says that she did not call her insurance.  She says that she called her mother and was told that [facility] has to see pt since they have seen pt before for her eye care and that all that is needed is a referral.    Pt wears corrective lenses and her glasses were broken months ago.  She complains of blurred vision and difficulty seeing at a distance.    Reminded pt that just because a referral is written is no guarantee that the service will be covered.  Is best to call her insurance plan.  Encouraged pt to call and check.    Routed to Dr Churchill for consideration of a referral.    Vandana Shankar RN

## 2019-09-24 NOTE — TELEPHONE ENCOUNTER
Message given to patient. Helped schedule appointment.  Chuyita Schuster on 9/24/2019 at 11:34 AM

## 2019-10-02 ENCOUNTER — HOSPITAL ENCOUNTER (EMERGENCY)
Facility: CLINIC | Age: 19
Discharge: HOME | End: 2019-10-03
Attending: EMERGENCY MEDICINE | Admitting: EMERGENCY MEDICINE
Payer: COMMERCIAL

## 2019-10-02 ENCOUNTER — NURSE TRIAGE (OUTPATIENT)
Dept: NURSING | Facility: CLINIC | Age: 19
End: 2019-10-02

## 2019-10-02 DIAGNOSIS — S20.211A CONTUSION OF RIGHT CHEST WALL, INITIAL ENCOUNTER: ICD-10-CM

## 2019-10-02 PROCEDURE — 99284 EMERGENCY DEPT VISIT MOD MDM: CPT | Mod: Z6 | Performed by: EMERGENCY MEDICINE

## 2019-10-02 PROCEDURE — 99283 EMERGENCY DEPT VISIT LOW MDM: CPT | Mod: 25

## 2019-10-02 NOTE — ED AVS SNAPSHOT
[facility] Emergency Department  [address] [address]  Phone:  [phone]  Fax:  [phone]                                    Aury Cervantes   MRN: [id number]    Department:  [facility] Emergency Department   Date of Visit:  10/2/2019           After Visit Summary Signature Page    I have received my discharge instructions, and my questions have been answered. I have discussed any challenges I see with this plan with the nurse or doctor.    ..........................................................................................................................................  Patient/Patient Representative Signature      ..........................................................................................................................................  Patient Representative Print Name and Relationship to Patient    ..................................................               ................................................  Date                                   Time    ..........................................................................................................................................  Reviewed by Signature/Title    ...................................................              ..............................................  Date                                               Time          22EPIC Rev 08/18

## 2019-10-03 ENCOUNTER — APPOINTMENT (OUTPATIENT)
Dept: GENERAL RADIOLOGY | Facility: CLINIC | Age: 19
End: 2019-10-03
Attending: EMERGENCY MEDICINE
Payer: COMMERCIAL

## 2019-10-03 VITALS
BODY MASS INDEX: 18.97 KG/M2 | WEIGHT: 114 LBS | SYSTOLIC BLOOD PRESSURE: 115 MMHG | DIASTOLIC BLOOD PRESSURE: 73 MMHG | RESPIRATION RATE: 16 BRPM | HEART RATE: 89 BPM | OXYGEN SATURATION: 100 % | TEMPERATURE: 98.2 F

## 2019-10-03 PROCEDURE — 71046 X-RAY EXAM CHEST 2 VIEWS: CPT

## 2019-10-03 PROCEDURE — 25000132 ZZH RX MED GY IP 250 OP 250 PS 637: Performed by: EMERGENCY MEDICINE

## 2019-10-03 RX ORDER — ACETAMINOPHEN 500 MG
1000 TABLET ORAL EVERY 8 HOURS PRN
Qty: 30 TABLET | Refills: 0 | COMMUNITY
Start: 2019-10-03 | End: 2019-10-08

## 2019-10-03 RX ORDER — IBUPROFEN 200 MG
600 TABLET ORAL EVERY 8 HOURS PRN
Qty: 30 TABLET | Refills: 0 | COMMUNITY
Start: 2019-10-03 | End: 2019-10-08

## 2019-10-03 RX ORDER — LIDOCAINE 4 G/G
1 PATCH TOPICAL
Status: DISCONTINUED | OUTPATIENT
Start: 2019-10-03 | End: 2019-10-03 | Stop reason: HOSPADM

## 2019-10-03 RX ORDER — LIDOCAINE 4 G/G
1 PATCH TOPICAL EVERY 24 HOURS
Qty: 10 PATCH | Refills: 0 | COMMUNITY
Start: 2019-10-03 | End: 2020-08-01

## 2019-10-03 RX ORDER — ACETAMINOPHEN 500 MG
1000 TABLET ORAL ONCE
Status: COMPLETED | OUTPATIENT
Start: 2019-10-03 | End: 2019-10-03

## 2019-10-03 RX ADMIN — LIDOCAINE 1 PATCH: 560 PATCH PERCUTANEOUS; TOPICAL; TRANSDERMAL at 01:06

## 2019-10-03 RX ADMIN — ACETAMINOPHEN 1000 MG: 500 TABLET, FILM COATED ORAL at 01:04

## 2019-10-03 RX ADMIN — IBUPROFEN 600 MG: 400 TABLET ORAL at 01:04
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
## 2019-10-04 ASSESSMENT — ENCOUNTER SYMPTOMS
SHORTNESS OF BREATH: 1
LIGHT-HEADEDNESS: 0
NECK PAIN: 0
WOUND: 0
CHEST TIGHTNESS: 0
BACK PAIN: 0
FEVER: 0
ABDOMINAL PAIN: 0
COUGH: 0
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
## 2019-12-04 ENCOUNTER — HOSPITAL ENCOUNTER (EMERGENCY)
Facility: CLINIC | Age: 19
Discharge: HOME | End: 2019-12-04
Attending: NURSE PRACTITIONER | Admitting: NURSE PRACTITIONER
Payer: MEDICAID

## 2019-12-04 VITALS
DIASTOLIC BLOOD PRESSURE: 75 MMHG | SYSTOLIC BLOOD PRESSURE: 126 MMHG | RESPIRATION RATE: 18 BRPM | OXYGEN SATURATION: 100 % | TEMPERATURE: 99.1 F

## 2019-12-04 DIAGNOSIS — R30.0 DYSURIA: ICD-10-CM

## 2019-12-04 DIAGNOSIS — N89.8 VAGINAL ITCHING: ICD-10-CM

## 2019-12-04 DIAGNOSIS — Z11.3 SCREEN FOR STD (SEXUALLY TRANSMITTED DISEASE): ICD-10-CM

## 2019-12-04 LAB
ALBUMIN UR-MCNC: 30 MG/DL
APPEARANCE UR: ABNORMAL
BILIRUB UR QL STRIP: NEGATIVE
COLOR UR AUTO: YELLOW
GLUCOSE UR STRIP-MCNC: NEGATIVE MG/DL
HCG UR QL: NEGATIVE
HGB UR QL STRIP: NEGATIVE
KETONES UR STRIP-MCNC: NEGATIVE MG/DL
LEUKOCYTE ESTERASE UR QL STRIP: NEGATIVE
NITRATE UR QL: NEGATIVE
PH UR STRIP: 5 PH (ref 5–7)
RBC #/AREA URNS AUTO: 0 /HPF (ref 0–2)
SOURCE: ABNORMAL
SP GR UR STRIP: 1.02 (ref 1–1.03)
SPECIMEN SOURCE: NORMAL
SQUAMOUS #/AREA URNS AUTO: <1 /HPF (ref 0–1)
UROBILINOGEN UR STRIP-MCNC: 0 MG/DL (ref 0–2)
WBC #/AREA URNS AUTO: 0 /HPF (ref 0–5)
WET PREP SPEC: NORMAL

## 2019-12-04 PROCEDURE — 87491 CHLMYD TRACH DNA AMP PROBE: CPT | Performed by: NURSE PRACTITIONER

## 2019-12-04 PROCEDURE — 81001 URINALYSIS AUTO W/SCOPE: CPT | Performed by: NURSE PRACTITIONER

## 2019-12-04 PROCEDURE — 99213 OFFICE O/P EST LOW 20 MIN: CPT | Mod: Z6 | Performed by: NURSE PRACTITIONER

## 2019-12-04 PROCEDURE — G0463 HOSPITAL OUTPT CLINIC VISIT: HCPCS

## 2019-12-04 PROCEDURE — 81025 URINE PREGNANCY TEST: CPT | Performed by: NURSE PRACTITIONER

## 2019-12-04 PROCEDURE — 87086 URINE CULTURE/COLONY COUNT: CPT | Performed by: NURSE PRACTITIONER

## 2019-12-04 PROCEDURE — 87591 N.GONORRHOEAE DNA AMP PROB: CPT | Performed by: NURSE PRACTITIONER

## 2019-12-04 PROCEDURE — 87210 SMEAR WET MOUNT SALINE/INK: CPT | Performed by: NURSE PRACTITIONER

## 2019-12-04 ASSESSMENT — ENCOUNTER SYMPTOMS
FLANK PAIN: 0
NAUSEA: 0
VOMITING: 0
FREQUENCY: 0
FEVER: 0
CHILLS: 0
DYSURIA: 0
ABDOMINAL PAIN: 0

## 2019-12-04 NOTE — DISCHARGE INSTRUCTIONS
UA looks good. Urine culture is pending.  Wet prep is normal.  Chlamydia and Gonorrhea testing are pending.

## 2019-12-04 NOTE — LETTER
December 4, 2019      To Whom It May Concern:      Aury Cervantes was seen in our Emergency Department today, 12/04/19.  I expect her condition to improve over the next 1 day.  She may return to work/school when improved.    Sincerely,        Taylor Brady RN

## 2019-12-04 NOTE — ED AVS SNAPSHOT
[facility] Emergency Department  [address] [address]  Phone:  [phone]  Fax:  [phone]                                    Aury Cervantes   MRN: [id number]    Department:  [facility] Emergency Department   Date of Visit:  12/4/2019           After Visit Summary Signature Page    I have received my discharge instructions, and my questions have been answered. I have discussed any challenges I see with this plan with the nurse or doctor.    ..........................................................................................................................................  Patient/Patient Representative Signature      ..........................................................................................................................................  Patient Representative Print Name and Relationship to Patient    ..................................................               ................................................  Date                                   Time    ..........................................................................................................................................  Reviewed by Signature/Title    ...................................................              ..............................................  Date                                               Time          22EPIC Rev 08/18

## 2019-12-05 LAB
BACTERIA SPEC CULT: NO GROWTH
C TRACH DNA SPEC QL NAA+PROBE: NEGATIVE
N GONORRHOEA DNA SPEC QL NAA+PROBE: NEGATIVE
SPECIMEN SOURCE: NORMAL

## 2019-12-05 NOTE — ED PROVIDER NOTES
History     Chief Complaint   Patient presents with     Exposure to STD     here for std testing, boy friend cheated on her     HPI  Aury Cervantes is a 19 year old female who presents to urgent care for concern for sexually transmitted infection.  Patient tells me that she found out her boyfriend is cheating on her and she would like to be checked for STD.  Patient reports having recent symptoms of external vaginal irritation.  Denies dysuria.  Denies vaginal discharge.    Allergies:  Allergies   Allergen Reactions     Sulfa Drugs Other (See Comments)     Both parents are allergic         Problem List:    Patient Active Problem List    Diagnosis Date Noted     Anaphylaxis, subsequent encounter 12/04/2018     Priority: Medium     Rhinoconjunctivitis 12/04/2018     Priority: Medium     S/P tonsillectomy and adenoidectomy 03/21/2018     Priority: Medium     Post-op pain 03/21/2018     Priority: Medium     Nexplanon insertion 05/29/2015     Priority: Medium     nexplanon inserted today by Dr BRITNEY Marx  Lot # [id number]  Exp 07/2017  Zulay Jacobsen         Major depressive disorder, recurrent episode, severe (H) 11/19/2014     Priority: Medium        Past Medical History:    Past Medical History:   Diagnosis Date     Depression      Nexplanon in place        Past Surgical History:    Past Surgical History:   Procedure Laterality Date     SURGICAL HISTORY OF -       PE tubes     TONSILLECTOMY, ADENOIDECTOMY ADULT, COMBINED Bilateral 3/19/2018    Procedure: COMBINED TONSILLECTOMY, ADENOIDECTOMY ADULT;  Bilateral Adenotonsillectomy;  Surgeon: Kevin Arias MD;  Location: [facility] OR       Family History:    Family History   Problem Relation Age of Onset     Depression Mother      Depression Father      Depression Maternal Grandmother      Cancer Maternal Grandmother      Diabetes Maternal Grandfather      Heart Disease Maternal Grandfather      Hypertension Maternal Grandfather      Depression Paternal Grandmother          bipolar     Depression Paternal Grandfather         bipolar       Social History:  Marital Status:  Single [1]  Social History     Tobacco Use     Smoking status: Former Smoker     Smokeless tobacco: Never Used   Substance Use Topics     Alcohol use: No     Alcohol/week: 0.0 standard drinks     Drug use: No        Medications:    EPINEPHrine (ADRENACLICK) 0.3 MG/0.3ML injection 2-pack  Lidocaine (LIDOCARE) 4 % Patch          Review of Systems   Constitutional: Negative for chills and fever.   Gastrointestinal: Negative for abdominal pain, nausea and vomiting.   Genitourinary: Positive for vaginal pain. Negative for dysuria, flank pain, frequency, pelvic pain, urgency, vaginal bleeding and vaginal discharge.       Physical Exam   BP: 126/75  Heart Rate: 97  Temp: 99.1  F (37.3  C)  Resp: 18  SpO2: 100 %      Physical Exam  Pelvic Exam:  Vulva: No external lesions, normal hair distribution, no adenopathy  Vagina: Moist, pink, no abnormal discharge, well rugated, no lesions  Cervix: Nulliparous, no CMT. Cervix is not friable.  Cervix is smooth, pink, no visible lesions      ED Course        Procedures             Results for orders placed or performed during the hospital encounter of 12/04/19 (from the past 24 hour(s))   UA with Microscopic   Result Value Ref Range    Color Urine Yellow     Appearance Urine Cloudy     Glucose Urine Negative NEG^Negative mg/dL    Bilirubin Urine Negative NEG^Negative    Ketones Urine Negative NEG^Negative mg/dL    Specific Gravity Urine 1.024 1.003 - 1.035    Blood Urine Negative NEG^Negative    pH Urine 5.0 5.0 - 7.0 pH    Protein Albumin Urine 30 (A) NEG^Negative mg/dL    Urobilinogen mg/dL 0.0 0.0 - 2.0 mg/dL    Nitrite Urine Negative NEG^Negative    Leukocyte Esterase Urine Negative NEG^Negative    Source Midstream Urine     WBC Urine 0 0 - 5 /HPF    RBC Urine 0 0 - 2 /HPF    Squamous Epithelial /HPF Urine <1 0 - 1 /HPF   HCG qualitative urine (UPT)   Result Value Ref Range    HCG  Qual Urine Negative NEG^Negative   Wet prep   Result Value Ref Range    Specimen Description Vagina     Wet Prep No yeast seen     Wet Prep No clue cells seen     Wet Prep No Trichomonas seen     Wet Prep WBC'S seen  Few          Medications - No data to display    Assessments & Plan (with Medical Decision Making)   Wet prep is negative.  UA is negative for infection.  GC/chlamydia testing is pending.  I discussed the results with patient.  Instructed to recheck for worsening symptoms.  I have reviewed the nursing notes.    I have reviewed the findings, diagnosis, plan and need for follow up with the patient.      Discharge Medication List as of 12/4/2019  1:24 PM          Final diagnoses:   Screen for STD (sexually transmitted disease)   Dysuria   Vaginal itching       12/4/2019   [facility] EMERGENCY DEPARTMENT     Mariza Heart APRN CNP  12/04/19 2029

## 2019-12-06 NOTE — RESULT ENCOUNTER NOTE
Final urine culture report is NEGATIVE per [facility] ED Lab Result protocol.    If NEGATIVE result, no change in treatment, per [facility] ED Lab Result protocol.
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
## 2019-12-09 NOTE — ADDENDUM NOTE
Encounter addended by: Leanna Christiansen, PT on: 12/9/2019 11:17 AM   Actions taken: Clinical Note Signed, Episode resolved

## 2019-12-16 ENCOUNTER — HOSPITAL ENCOUNTER (EMERGENCY)
Facility: CLINIC | Age: 19
Discharge: HOME | End: 2019-12-17
Attending: EMERGENCY MEDICINE | Admitting: EMERGENCY MEDICINE
Payer: MEDICAID

## 2019-12-16 VITALS
TEMPERATURE: 98.3 F | SYSTOLIC BLOOD PRESSURE: 112 MMHG | HEART RATE: 100 BPM | BODY MASS INDEX: 19.16 KG/M2 | HEIGHT: 65 IN | DIASTOLIC BLOOD PRESSURE: 67 MMHG | OXYGEN SATURATION: 100 % | WEIGHT: 115 LBS | RESPIRATION RATE: 18 BRPM

## 2019-12-16 DIAGNOSIS — M54.42 ACUTE LEFT-SIDED LOW BACK PAIN WITH LEFT-SIDED SCIATICA: ICD-10-CM

## 2019-12-16 PROCEDURE — 20552 NJX 1/MLT TRIGGER POINT 1/2: CPT | Performed by: EMERGENCY MEDICINE

## 2019-12-16 PROCEDURE — 25000132 ZZH RX MED GY IP 250 OP 250 PS 637: Performed by: EMERGENCY MEDICINE

## 2019-12-16 PROCEDURE — 20552 NJX 1/MLT TRIGGER POINT 1/2: CPT | Mod: Z6 | Performed by: EMERGENCY MEDICINE

## 2019-12-16 PROCEDURE — 99284 EMERGENCY DEPT VISIT MOD MDM: CPT | Mod: 25 | Performed by: EMERGENCY MEDICINE

## 2019-12-16 RX ORDER — OXYCODONE HYDROCHLORIDE 5 MG/1
5 TABLET ORAL EVERY 6 HOURS PRN
Status: DISCONTINUED | OUTPATIENT
Start: 2019-12-16 | End: 2019-12-17 | Stop reason: HOSPADM

## 2019-12-16 RX ORDER — OXYCODONE HYDROCHLORIDE 5 MG/1
10 TABLET ORAL ONCE
Status: COMPLETED | OUTPATIENT
Start: 2019-12-16 | End: 2019-12-16

## 2019-12-16 RX ORDER — OXYCODONE HYDROCHLORIDE 5 MG/1
5 TABLET ORAL EVERY 6 HOURS PRN
Qty: 6 TABLET | Refills: 0 | Status: SHIPPED | OUTPATIENT
Start: 2019-12-16 | End: 2020-01-18

## 2019-12-16 RX ORDER — LORAZEPAM 1 MG/1
1 TABLET ORAL ONCE
Status: COMPLETED | OUTPATIENT
Start: 2019-12-16 | End: 2019-12-16

## 2019-12-16 RX ORDER — ACETAMINOPHEN 500 MG
1000 TABLET ORAL ONCE
Status: COMPLETED | OUTPATIENT
Start: 2019-12-16 | End: 2019-12-16

## 2019-12-16 RX ADMIN — LORAZEPAM 1 MG: 1 TABLET ORAL at 22:49

## 2019-12-16 RX ADMIN — IBUPROFEN 600 MG: 400 TABLET ORAL at 22:49

## 2019-12-16 RX ADMIN — OXYCODONE HYDROCHLORIDE 10 MG: 5 TABLET ORAL at 22:49

## 2019-12-16 RX ADMIN — ACETAMINOPHEN 1000 MG: 500 TABLET, FILM COATED ORAL at 22:49

## 2019-12-16 ASSESSMENT — MIFFLIN-ST. JEOR: SCORE: 1292.52

## 2019-12-16 NOTE — ED AVS SNAPSHOT
[facility] Emergency Department  [address] [address]  Phone:  [phone]  Fax:  [phone]                                    Aury Cervantes   MRN: [id number]    Department:  [facility] Emergency Department   Date of Visit:  12/16/2019           After Visit Summary Signature Page    I have received my discharge instructions, and my questions have been answered. I have discussed any challenges I see with this plan with the nurse or doctor.    ..........................................................................................................................................  Patient/Patient Representative Signature      ..........................................................................................................................................  Patient Representative Print Name and Relationship to Patient    ..................................................               ................................................  Date                                   Time    ..........................................................................................................................................  Reviewed by Signature/Title    ...................................................              ..............................................  Date                                               Time          22EPIC Rev 08/18

## 2019-12-17 NOTE — ED TRIAGE NOTES
Ongoing issues with back pain usually has R lower pain has now had 3-4 days of L lower pain    Denies urinary symptoms     No injury

## 2019-12-17 NOTE — DISCHARGE INSTRUCTIONS
Return to the emergency department for fevers, weakness in the extremities, numbness or tingling around the vagina or anus, difficulty urinating, or other concerns.  Otherwise follow-up in clinic if not better over the next 2 weeks.    Use ibuprofen 400 mg and acetaminophen 650 mg every 6 hours for your symptoms.  Use oxycodone as needed for pain that is not controlled by the prior two medications.    Oxycodone is an addictive opioid medication, please only take it when the pain is more than can be controlled by acetaminophen or ibuprofen alone. It will also make you lightheaded, nauseated, and constipated.  Do not drive, operate heavy machinery, or take care of young children while taking this medication.     Repeated studies have shown that the longer you use opioid pain medications, the longer it is until you return to normal function. It is our recommendation that you taper off opioids as quickly as possible with the goal of returning to normal function or near normal function. Long term use of opioids quickly results in growing tolerance to the medication (less of the benefits) and increased dependence (more of the bad side effects).     Pain is very difficult to treat and we can very rarely take away pain completely. If you are having difficulty with pain over several weeks after an injury, you may need to start different medications and therapies (such as physical therapy, graded exercise, massage, and acupuncture). Please talk about this with your regular doctor.     If you are interested in seeking free, confidential treatment referral and information service for individuals and families facing mental health and/or substance use disorders please call [phone] ([phone])

## 2019-12-17 NOTE — ED NOTES
Here for left lower back pain. 3-4 days, worse tonight within last hour prompting evaluation. Has not taken any medications for the pain today or over the last few days. Pain radiates down the left leg. Denies urinary sx, injury, or other concerns.

## 2019-12-17 NOTE — ED PROVIDER NOTES
History     Chief Complaint   Patient presents with     Back Pain     L lower     HPI  Aury Cervantes is a 19 year old female who presents for back pain.  Pain started 3 days ago, worse over the last several hours.  Pain localized to left lower back, with radiation to left leg.  Described as sharp, rated as severe, hurts to move and to sit.  She does not have a history of trauma.  Has taken nothing for this pain.  She does have a hx of chronic back pain.  She does not have bowel/bladder dysfunction, history of malignancy, history of IVDU, history of immunosuppression, or fever. Denies numbness or tingling of the perineum. She does not have headache, chest pain, shortness of breath, abdominal pain, nausea, vomiting, diarrhea, or extremity weakness or paresthesias.     Allergies:  Allergies   Allergen Reactions     Sulfa Drugs Other (See Comments)     Both parents are allergic         Problem List:    Patient Active Problem List    Diagnosis Date Noted     Anaphylaxis, subsequent encounter 12/04/2018     Priority: Medium     Rhinoconjunctivitis 12/04/2018     Priority: Medium     S/P tonsillectomy and adenoidectomy 03/21/2018     Priority: Medium     Post-op pain 03/21/2018     Priority: Medium     Nexplanon insertion 05/29/2015     Priority: Medium     nexplanon inserted today by Dr BRITNEY Marx  Lot # [id number]  Exp 07/2017  Zulay Jacobsen         Major depressive disorder, recurrent episode, severe (H) 11/19/2014     Priority: Medium        Past Medical History:    Past Medical History:   Diagnosis Date     Depression      Nexplanon in place        Past Surgical History:    Past Surgical History:   Procedure Laterality Date     SURGICAL HISTORY OF -       PE tubes     TONSILLECTOMY, ADENOIDECTOMY ADULT, COMBINED Bilateral 3/19/2018    Procedure: COMBINED TONSILLECTOMY, ADENOIDECTOMY ADULT;  Bilateral Adenotonsillectomy;  Surgeon: Kevin Arias MD;  Location: [facility] OR       Family History:    Family History  "  Problem Relation Age of Onset     Depression Mother      Depression Father      Depression Maternal Grandmother      Cancer Maternal Grandmother      Diabetes Maternal Grandfather      Heart Disease Maternal Grandfather      Hypertension Maternal Grandfather      Depression Paternal Grandmother         bipolar     Depression Paternal Grandfather         bipolar       Social History:  Marital Status:  Single [1]  Social History     Tobacco Use     Smoking status: Former Smoker     Smokeless tobacco: Never Used   Substance Use Topics     Alcohol use: No     Alcohol/week: 0.0 standard drinks     Drug use: No        Medications:    oxyCODONE (ROXICODONE) 5 MG tablet  EPINEPHrine (ADRENACLICK) 0.3 MG/0.3ML injection 2-pack  Lidocaine (LIDOCARE) 4 % Patch          Review of Systems  Pertinent positives and negatives listed in the HPI, all other systems reviewed and are negative.    Physical Exam   BP: 112/67  Pulse: 100  Temp: 98.3  F (36.8  C)  Resp: 18  Height: 165.1 cm (5' 5\")  Weight: 52.2 kg (115 lb)  SpO2: 100 %      Physical Exam  Vitals signs and nursing note reviewed.   Constitutional:       General: She is in acute distress.      Appearance: She is well-developed. She is not diaphoretic.   HENT:      Head: Normocephalic and atraumatic.      Right Ear: External ear normal.      Left Ear: External ear normal.      Nose: Nose normal.   Eyes:      General: No scleral icterus.     Conjunctiva/sclera: Conjunctivae normal.   Neck:      Musculoskeletal: Normal range of motion.   Cardiovascular:      Rate and Rhythm: Normal rate and regular rhythm.   Pulmonary:      Effort: Pulmonary effort is normal. No respiratory distress.      Breath sounds: No stridor.   Abdominal:      General: There is no distension.      Palpations: Abdomen is soft.      Tenderness: There is no abdominal tenderness. There is no guarding.   Musculoskeletal:      Comments: Back: no deformity, erythema, warmth, or masses appreciated; no tenderness " over midline, R and L paraspinal muscles, sacroiliac region; normal spine ROM; spine symmetric with normal curvature; normal ROM of hips and knees; patellar and plantar reflexes intact; intact LE sensation to light touch; normal LE strength   Skin:     General: Skin is warm and dry.   Neurological:      Mental Status: She is alert and oriented to person, place, and time.   Psychiatric:         Behavior: Behavior normal.         ED Course        Procedures  Trigger point injection  Location: Left lower back  Indication: pain  Date: 12/16/2019   The patient gave verbal consent for the procedure. Risks and benefits were discussed.  Pre-procedure exam: Motor and sensory exam normal prior to injection. Normal perfusion.  Procedure: A 30 gauge needle was inserted under normal sterile procedure. Approximately 2 mL of 0.5% bupivacaine was injected. The patient tolerated the procedure well without immediate complication. Pain was improved.              Critical Care time:  none               No results found for this or any previous visit (from the past 24 hour(s)).    Medications   acetaminophen (TYLENOL) tablet 1,000 mg (1,000 mg Oral Given 12/16/19 2249)   ibuprofen (ADVIL/MOTRIN) tablet 600 mg (600 mg Oral Given 12/16/19 2249)   oxyCODONE (ROXICODONE) tablet 10 mg (10 mg Oral Given 12/16/19 2249)   LORazepam (ATIVAN) tablet 1 mg (1 mg Oral Given 12/16/19 2249)       Assessments & Plan (with Medical Decision Making)   19 year old female who presents with back pain. Patient vitally stable.  Differential includes muscle strain vs spasm, DJD, disk herniation, spinal stenosis, vertebral fracture.  Pt does not have historical features or exam findings to suggest more serious back pathology such as metastatic disease, tuberculosis, epidural abscess, or cauda equina/conus medullaris.  She was given acetaminophen, ibuprofen, oxycodone, and lorazepam as well as a trigger point injection as above.  No indication for imaging at this  time.  On recheck she is feeling better and is safe to discharge with instructions to return if she has worsening symptoms or other concerns, otherwise follow-up in clinic.  The patient is in agreement with this plan.    I have reviewed the nursing notes.    I have reviewed the findings, diagnosis, plan and need for follow up with the patient.       Discharge Medication List as of 12/16/2019 11:52 PM      START taking these medications    Details   oxyCODONE (ROXICODONE) 5 MG tablet Take 1 tablet (5 mg) by mouth every 6 hours as needed for pain, Disp-6 tablet, R-0, Local Print             Final diagnoses:   Acute left-sided low back pain with left-sided sciatica       12/16/2019   [facility] EMERGENCY DEPARTMENT     Skinny Paniagua MD  12/17/19 4595

## 2019-12-17 NOTE — ED NOTES
Prescription for 6 oxycodone 5mg tabs given to patient as prescribed by MD and prepared by RX# [id number]

## 2020-01-06 ENCOUNTER — HOSPITAL ENCOUNTER (EMERGENCY)
Facility: CLINIC | Age: 20
Discharge: HOME | End: 2020-01-06
Attending: NURSE PRACTITIONER | Admitting: NURSE PRACTITIONER
Payer: MEDICAID

## 2020-01-06 ENCOUNTER — NURSE TRIAGE (OUTPATIENT)
Dept: NURSING | Facility: CLINIC | Age: 20
End: 2020-01-06

## 2020-01-06 VITALS
WEIGHT: 115 LBS | OXYGEN SATURATION: 100 % | BODY MASS INDEX: 19.14 KG/M2 | SYSTOLIC BLOOD PRESSURE: 111 MMHG | RESPIRATION RATE: 16 BRPM | TEMPERATURE: 98 F | HEART RATE: 81 BPM | DIASTOLIC BLOOD PRESSURE: 76 MMHG

## 2020-01-06 DIAGNOSIS — R10.2 VAGINAL PAIN: ICD-10-CM

## 2020-01-06 DIAGNOSIS — N76.0 BACTERIAL VAGINOSIS: ICD-10-CM

## 2020-01-06 DIAGNOSIS — B96.89 BACTERIAL VAGINOSIS: ICD-10-CM

## 2020-01-06 LAB
SPECIMEN SOURCE: ABNORMAL
WET PREP SPEC: ABNORMAL

## 2020-01-06 PROCEDURE — 87529 HSV DNA AMP PROBE: CPT | Performed by: NURSE PRACTITIONER

## 2020-01-06 PROCEDURE — 99284 EMERGENCY DEPT VISIT MOD MDM: CPT | Mod: Z6 | Performed by: NURSE PRACTITIONER

## 2020-01-06 PROCEDURE — 87491 CHLMYD TRACH DNA AMP PROBE: CPT | Performed by: NURSE PRACTITIONER

## 2020-01-06 PROCEDURE — 99284 EMERGENCY DEPT VISIT MOD MDM: CPT

## 2020-01-06 PROCEDURE — 87210 SMEAR WET MOUNT SALINE/INK: CPT | Performed by: NURSE PRACTITIONER

## 2020-01-06 PROCEDURE — 87591 N.GONORRHOEAE DNA AMP PROB: CPT | Performed by: NURSE PRACTITIONER

## 2020-01-06 RX ORDER — METRONIDAZOLE 500 MG/1
500 TABLET ORAL 2 TIMES DAILY
Qty: 14 TABLET | Refills: 0 | Status: SHIPPED | OUTPATIENT
Start: 2020-01-06 | End: 2020-01-18

## 2020-01-06 RX ORDER — VALACYCLOVIR HYDROCHLORIDE 1 G/1
1000 TABLET, FILM COATED ORAL 2 TIMES DAILY
Qty: 20 TABLET | Refills: 0 | Status: SHIPPED | OUTPATIENT
Start: 2020-01-06 | End: 2020-08-01

## 2020-01-06 ASSESSMENT — ENCOUNTER SYMPTOMS
NAUSEA: 0
COUGH: 0
NEUROLOGICAL NEGATIVE: 1
VOMITING: 0
BACK PAIN: 0
FREQUENCY: 0
CHILLS: 0
DYSURIA: 0
ABDOMINAL PAIN: 0
FEVER: 0

## 2020-01-06 NOTE — ED NOTES
Pt here with vaginal pain, itching and burning for the past 2 months. Was seen here about 1 month ago for STD testing which was all negative. Pt states that she has some open sores in her perineal area.

## 2020-01-06 NOTE — ED PROVIDER NOTES
History     Chief Complaint   Patient presents with     Vaginal Discharge     2 mos of vag itching, swelling, skin cracking and pain     HPI  Aury Cervantes is a 19 year old female who presents to the emergency department for evaluation of vaginal discharge and pain.  Symptoms started 2 months ago.  Patient reports burning pain on the outside of her vagina.  Reports skin is cracking and bleeding at times.  Patient was evaluated here for the symptoms 1 month ago with negative UA, negative wet prep, and negative GC/chlamydia.  Patient reports symptoms have worsened.  Patient has been sexually active since her last visit here and testing.    Allergies:  Allergies   Allergen Reactions     Sulfa Drugs Other (See Comments)     Both parents are allergic         Problem List:    Patient Active Problem List    Diagnosis Date Noted     Anaphylaxis, subsequent encounter 12/04/2018     Priority: Medium     Rhinoconjunctivitis 12/04/2018     Priority: Medium     S/P tonsillectomy and adenoidectomy 03/21/2018     Priority: Medium     Post-op pain 03/21/2018     Priority: Medium     Nexplanon insertion 05/29/2015     Priority: Medium     nexplanon inserted today by Dr BRITNEY Marx  Lot # [id number]  Exp 07/2017  Zulay Jacobsen         Major depressive disorder, recurrent episode, severe (H) 11/19/2014     Priority: Medium        Past Medical History:    Past Medical History:   Diagnosis Date     Depression      Nexplanon in place        Past Surgical History:    Past Surgical History:   Procedure Laterality Date     SURGICAL HISTORY OF -       PE tubes     TONSILLECTOMY, ADENOIDECTOMY ADULT, COMBINED Bilateral 3/19/2018    Procedure: COMBINED TONSILLECTOMY, ADENOIDECTOMY ADULT;  Bilateral Adenotonsillectomy;  Surgeon: Kevin Arias MD;  Location: [facility] OR       Family History:    Family History   Problem Relation Age of Onset     Depression Mother      Depression Father      Depression Maternal Grandmother      Cancer Maternal  Grandmother      Diabetes Maternal Grandfather      Heart Disease Maternal Grandfather      Hypertension Maternal Grandfather      Depression Paternal Grandmother         bipolar     Depression Paternal Grandfather         bipolar       Social History:  Marital Status:  Single [1]  Social History     Tobacco Use     Smoking status: Former Smoker     Smokeless tobacco: Never Used   Substance Use Topics     Alcohol use: No     Alcohol/week: 0.0 standard drinks     Drug use: No        Medications:    metroNIDAZOLE (FLAGYL) 500 MG tablet  valACYclovir (VALTREX) 1000 mg tablet  EPINEPHrine (ADRENACLICK) 0.3 MG/0.3ML injection 2-pack  Lidocaine (LIDOCARE) 4 % Patch  oxyCODONE (ROXICODONE) 5 MG tablet          Review of Systems   Constitutional: Negative for chills and fever.   HENT: Negative for congestion.    Respiratory: Negative for cough.    Cardiovascular: Negative for chest pain.   Gastrointestinal: Negative for abdominal pain, nausea and vomiting.   Genitourinary: Positive for genital sores, vaginal discharge and vaginal pain. Negative for dysuria, frequency and pelvic pain.   Musculoskeletal: Negative for back pain.   Skin: Negative for rash.   Neurological: Negative.        Physical Exam   BP: 111/76  Pulse: 81  Temp: 98  F (36.7  C)  Resp: 16  Weight: 52.2 kg (115 lb)  SpO2: 100 %      Physical Exam  Constitutional:       General: She is not in acute distress.     Appearance: Normal appearance.   HENT:      Head: Normocephalic and atraumatic.   Cardiovascular:      Rate and Rhythm: Normal rate and regular rhythm.   Pulmonary:      Effort: Pulmonary effort is normal.      Breath sounds: Normal breath sounds.   Abdominal:      General: Abdomen is flat.      Palpations: Abdomen is soft.   Genitourinary:     Exam position: Supine.      Vagina: Normal.      Cervix: Normal.      Uterus: Normal.           Comments: External vaginal irritation, erythema and inflammation. Papular lesion at the superior aspect of the  right labia.  Neurological:      Mental Status: She is alert.         ED Course        Procedures                 Results for orders placed or performed during the hospital encounter of 01/06/20 (from the past 24 hour(s))   Wet prep   Result Value Ref Range    Specimen Description Vagina     Wet Prep Few  WBC'S seen       Wet Prep Few  Clue cells seen   (A)     Wet Prep No Trichomonas seen     Wet Prep No yeast seen        Medications - No data to display    Assessments & Plan (with Medical Decision Making)   On exam patient's external vaginal area and labia is erythematous, inflamed, and appears to be a papular lesion on the superior aspect.  This area was swabbed to test for HSV type I or II infection.  Wet prep reveals a few clue cells, no yeast, and no trichomonas.  GC/chlamydia is pending.  I discussed the results with patient.  She will be treated for bacterial vaginosis with Flagyl.  However, given the severity of her pain on the external perineum and the lesion on the labia I discussed treating her with Valtrex pending her HSV testing.  Patient is agreeable to this.  Plan as follows:    Flagyl 500 mg twice a day for 7 days (for bacterial vaginosis).  Valtrex 1000 mg twice a day for 10 days (pending the HSV testing, if testing comes back negative you can stop this medication).    Recheck for persistent symptoms >7 days.  I have reviewed the nursing notes.    I have reviewed the findings, diagnosis, plan and need for follow up with the patient.      New Prescriptions    METRONIDAZOLE (FLAGYL) 500 MG TABLET    Take 1 tablet (500 mg) by mouth 2 times daily for 7 days    VALACYCLOVIR (VALTREX) 1000 MG TABLET    Take 1 tablet (1,000 mg) by mouth 2 times daily for 10 days       Final diagnoses:   Bacterial vaginosis - Start Flagyl   Vaginal pain - Start Valacyclovir (Valtrex) pending the HSV testing results.       1/6/2020   [facility] EMERGENCY DEPARTMENT     Mariza Heart APRN CNP  01/06/20 7509

## 2020-01-06 NOTE — DISCHARGE INSTRUCTIONS
Flagyl 500 mg twice a day for 7 days (for bacterial vaginosis).  Valtrex 1000 mg twice a day for 10 days (pending the HSV testing, if testing comes back negative you can stop this medication).    Recheck for persistent symptoms >7 days.

## 2020-01-06 NOTE — ED AVS SNAPSHOT
[facility] Emergency Department  [address] [address]  Phone:  [phone]  Fax:  [phone]                                    Aury Cervantes   MRN: [id number]    Department:  [facility] Emergency Department   Date of Visit:  1/6/2020           After Visit Summary Signature Page    I have received my discharge instructions, and my questions have been answered. I have discussed any challenges I see with this plan with the nurse or doctor.    ..........................................................................................................................................  Patient/Patient Representative Signature      ..........................................................................................................................................  Patient Representative Print Name and Relationship to Patient    ..................................................               ................................................  Date                                   Time    ..........................................................................................................................................  Reviewed by Signature/Title    ...................................................              ..............................................  Date                                               Time          22EPIC Rev 08/18

## 2020-01-07 LAB
C TRACH DNA SPEC QL NAA+PROBE: NEGATIVE
HSV1 DNA SPEC QL NAA+PROBE: NEGATIVE
HSV2 DNA SPEC QL NAA+PROBE: NEGATIVE
N GONORRHOEA DNA SPEC QL NAA+PROBE: NEGATIVE
SPECIMEN SOURCE: NORMAL

## 2020-01-07 NOTE — RESULT ENCOUNTER NOTE
Final result for both N. Gonorrhoeae PCR and Chlamydia Trachomatis PCR are NEGATIVE.  No treatment or change in treatment per [place] ED Lab Result protocol.

## 2020-01-07 NOTE — TELEPHONE ENCOUNTER
Reason for Disposition    Caller has medication question only, adult not sick, and triager answers question    Protocols used: MEDICATION QUESTION CALL-A-AH

## 2020-01-07 NOTE — RESULT ENCOUNTER NOTE
Final result for HSV 1 and 2 DNA by PCR is NEGATIVE.  No treatment or change in treatment per [place] ED Lab Result protocol

## 2020-01-18 ENCOUNTER — HOSPITAL ENCOUNTER (EMERGENCY)
Facility: CLINIC | Age: 20
Discharge: HOME | End: 2020-01-18
Attending: NURSE PRACTITIONER | Admitting: NURSE PRACTITIONER
Payer: MEDICAID

## 2020-01-18 VITALS
HEART RATE: 100 BPM | WEIGHT: 115 LBS | SYSTOLIC BLOOD PRESSURE: 115 MMHG | BODY MASS INDEX: 19.14 KG/M2 | OXYGEN SATURATION: 99 % | TEMPERATURE: 98 F | DIASTOLIC BLOOD PRESSURE: 80 MMHG

## 2020-01-18 DIAGNOSIS — N89.8 VAGINAL DISCHARGE: ICD-10-CM

## 2020-01-18 LAB
ALBUMIN UR-MCNC: 30 MG/DL
APPEARANCE UR: ABNORMAL
BACTERIA #/AREA URNS HPF: ABNORMAL /HPF
BILIRUB UR QL STRIP: NEGATIVE
COLOR UR AUTO: YELLOW
GLUCOSE UR STRIP-MCNC: NEGATIVE MG/DL
HCG UR QL: NEGATIVE
HGB UR QL STRIP: ABNORMAL
KETONES UR STRIP-MCNC: NEGATIVE MG/DL
LEUKOCYTE ESTERASE UR QL STRIP: ABNORMAL
MUCOUS THREADS #/AREA URNS LPF: PRESENT /LPF
NITRATE UR QL: NEGATIVE
PH UR STRIP: 6 PH (ref 5–7)
RBC #/AREA URNS AUTO: 180 /HPF (ref 0–2)
SOURCE: ABNORMAL
SP GR UR STRIP: 1.02 (ref 1–1.03)
SPECIMEN SOURCE: NORMAL
SQUAMOUS #/AREA URNS AUTO: 12 /HPF (ref 0–1)
UROBILINOGEN UR STRIP-MCNC: 2 MG/DL (ref 0–2)
WBC #/AREA URNS AUTO: 13 /HPF (ref 0–5)
WET PREP SPEC: NORMAL

## 2020-01-18 PROCEDURE — 87186 SC STD MICRODIL/AGAR DIL: CPT | Performed by: NURSE PRACTITIONER

## 2020-01-18 PROCEDURE — 81025 URINE PREGNANCY TEST: CPT | Performed by: NURSE PRACTITIONER

## 2020-01-18 PROCEDURE — G0463 HOSPITAL OUTPT CLINIC VISIT: HCPCS | Performed by: NURSE PRACTITIONER

## 2020-01-18 PROCEDURE — 87088 URINE BACTERIA CULTURE: CPT | Performed by: NURSE PRACTITIONER

## 2020-01-18 PROCEDURE — 87086 URINE CULTURE/COLONY COUNT: CPT | Performed by: NURSE PRACTITIONER

## 2020-01-18 PROCEDURE — 99213 OFFICE O/P EST LOW 20 MIN: CPT | Mod: Z6 | Performed by: NURSE PRACTITIONER

## 2020-01-18 PROCEDURE — 81001 URINALYSIS AUTO W/SCOPE: CPT | Performed by: NURSE PRACTITIONER

## 2020-01-18 PROCEDURE — 87210 SMEAR WET MOUNT SALINE/INK: CPT | Performed by: NURSE PRACTITIONER

## 2020-01-18 ASSESSMENT — ENCOUNTER SYMPTOMS
HEADACHES: 0
VOMITING: 0
FEVER: 0
WEAKNESS: 0
LIGHT-HEADEDNESS: 0
COUGH: 0
ARTHRALGIAS: 0
DYSURIA: 0
HEMATURIA: 1
NUMBNESS: 0
ABDOMINAL PAIN: 0
CHILLS: 0
DIFFICULTY URINATING: 0
DIZZINESS: 0
FREQUENCY: 0
FLANK PAIN: 0
JOINT SWELLING: 0
SHORTNESS OF BREATH: 0
NAUSEA: 0
MYALGIAS: 0
FATIGUE: 0

## 2020-01-18 NOTE — ED AVS SNAPSHOT
[facility] Emergency Department  [address] [address]  Phone:  [phone]  Fax:  [phone]                                    Aury Cervantes   MRN: [id number]    Department:  [facility] Emergency Department   Date of Visit:  1/18/2020           After Visit Summary Signature Page    I have received my discharge instructions, and my questions have been answered. I have discussed any challenges I see with this plan with the nurse or doctor.    ..........................................................................................................................................  Patient/Patient Representative Signature      ..........................................................................................................................................  Patient Representative Print Name and Relationship to Patient    ..................................................               ................................................  Date                                   Time    ..........................................................................................................................................  Reviewed by Signature/Title    ...................................................              ..............................................  Date                                               Time          22EPIC Rev 08/18

## 2020-01-19 NOTE — ED TRIAGE NOTES
Symptoms for 3 months just finished flagyl no improvement Xin Pederson LPN on 1/18/2020 at 7:24 PM

## 2020-01-19 NOTE — ED PROVIDER NOTES
History     Chief Complaint   Patient presents with     Vaginal Discharge     HPI  Aury Cervantes is a 19 year old female who presents the urgent care for evaluation of vaginal discharge.  Patient states she is intermittently had the symptoms for 2-1/2 months.  Patient recently treated with Flagyl and valacyclovir for BV and a suspicious labial lesion.  Patient denies any sexual activity since this visit.  Along with the discharge symptoms improve intermittent labial swelling, exterior pain with urination, vaginal pruritus.  Patient is currently menstruating and describes her flow as extremely heavy.  She uses tampons and panty liners.  Denies any pelvic pain, abdominal pain, flank pain and fevers.    Allergies:  Allergies   Allergen Reactions     Sulfa Drugs Other (See Comments)     Both parents are allergic         Problem List:    Patient Active Problem List    Diagnosis Date Noted     Anaphylaxis, subsequent encounter 12/04/2018     Priority: Medium     Rhinoconjunctivitis 12/04/2018     Priority: Medium     S/P tonsillectomy and adenoidectomy 03/21/2018     Priority: Medium     Post-op pain 03/21/2018     Priority: Medium     Nexplanon insertion 05/29/2015     Priority: Medium     nexplanon inserted today by Dr BRITNEY Marx  Lot # [id number]  Exp 07/2017  Zulay Jacobsen         Major depressive disorder, recurrent episode, severe (H) 11/19/2014     Priority: Medium        Past Medical History:    Past Medical History:   Diagnosis Date     Depression      Nexplanon in place        Past Surgical History:    Past Surgical History:   Procedure Laterality Date     SURGICAL HISTORY OF -       PE tubes     TONSILLECTOMY, ADENOIDECTOMY ADULT, COMBINED Bilateral 3/19/2018    Procedure: COMBINED TONSILLECTOMY, ADENOIDECTOMY ADULT;  Bilateral Adenotonsillectomy;  Surgeon: Kevin Arias MD;  Location: [facility] OR       Family History:    Family History   Problem Relation Age of Onset     Depression Mother      Depression  Father      Depression Maternal Grandmother      Cancer Maternal Grandmother      Diabetes Maternal Grandfather      Heart Disease Maternal Grandfather      Hypertension Maternal Grandfather      Depression Paternal Grandmother         bipolar     Depression Paternal Grandfather         bipolar       Social History:  Marital Status:  Single [1]  Social History     Tobacco Use     Smoking status: Former Smoker     Smokeless tobacco: Never Used   Substance Use Topics     Alcohol use: No     Alcohol/week: 0.0 standard drinks     Drug use: No        Medications:    EPINEPHrine (ADRENACLICK) 0.3 MG/0.3ML injection 2-pack  Lidocaine (LIDOCARE) 4 % Patch  valACYclovir (VALTREX) 1000 mg tablet          Review of Systems   Constitutional: Negative for chills, fatigue and fever.   Respiratory: Negative for cough and shortness of breath.    Cardiovascular: Negative for chest pain.   Gastrointestinal: Negative for abdominal pain, nausea and vomiting.   Genitourinary: Positive for hematuria (menstruating), vaginal discharge and vaginal pain. Negative for difficulty urinating, dysuria, flank pain, frequency, genital sores, pelvic pain and urgency.   Musculoskeletal: Negative for arthralgias, joint swelling and myalgias.   Skin: Negative for rash.   Neurological: Negative for dizziness, weakness, light-headedness, numbness and headaches.       Physical Exam   BP: 115/80  Pulse: 100  Temp: 98  F (36.7  C)  Weight: 52.2 kg (115 lb)  SpO2: 99 %      Physical Exam  Constitutional:       General: She is not in acute distress.     Appearance: She is well-developed. She is not diaphoretic.   Eyes:      Conjunctiva/sclera: Conjunctivae normal.      Pupils: Pupils are equal, round, and reactive to light.   Neck:      Musculoskeletal: Normal range of motion and neck supple.   Cardiovascular:      Rate and Rhythm: Normal rate and regular rhythm.   Pulmonary:      Effort: Pulmonary effort is normal. No respiratory distress.      Breath  sounds: Normal breath sounds and air entry. No decreased air movement. No decreased breath sounds, wheezing or rhonchi.   Abdominal:      General: There is no distension.      Palpations: Abdomen is soft.      Tenderness: There is no abdominal tenderness.   Genitourinary:     Comments: Declined  exam  Musculoskeletal: Normal range of motion.   Skin:     General: Skin is warm.      Capillary Refill: Capillary refill takes less than 2 seconds.   Neurological:      Mental Status: She is alert and oriented to person, place, and time.         ED Course        Procedures          Results for orders placed or performed during the hospital encounter of 01/18/20 (from the past 24 hour(s))   UA with Microscopic   Result Value Ref Range    Color Urine Yellow     Appearance Urine Slightly Cloudy     Glucose Urine Negative NEG^Negative mg/dL    Bilirubin Urine Negative NEG^Negative    Ketones Urine Negative NEG^Negative mg/dL    Specific Gravity Urine 1.018 1.003 - 1.035    Blood Urine Large (A) NEG^Negative    pH Urine 6.0 5.0 - 7.0 pH    Protein Albumin Urine 30 (A) NEG^Negative mg/dL    Urobilinogen mg/dL 2.0 0.0 - 2.0 mg/dL    Nitrite Urine Negative NEG^Negative    Leukocyte Esterase Urine Moderate (A) NEG^Negative    Source Midstream Urine     WBC Urine 13 (H) 0 - 5 /HPF    RBC Urine 180 (H) 0 - 2 /HPF    Bacteria Urine Few (A) NEG^Negative /HPF    Squamous Epithelial /HPF Urine 12 (H) 0 - 1 /HPF    Mucous Urine Present (A) NEG^Negative /LPF   HCG qualitative urine (UPT)   Result Value Ref Range    HCG Qual Urine Negative NEG^Negative   Wet prep   Result Value Ref Range    Specimen Description Vagina     Wet Prep Few  WBC'S seen       Wet Prep No Trichomonas seen     Wet Prep No clue cells seen     Wet Prep No yeast seen        Medications - No data to display    Assessments & Plan (with Medical Decision Making)   Patient is a 19-year-old female who presents the urgent care for evaluation of continued vaginal discharge  and labial pain and pruritus.  Patient refusing /pelvic exam.  Urinalysis obtained.  Discussed with patient that findings are likely connected to menstruation and not UTI.  We will culture the urine.  Patient completed wet prep with normal findings. Encouraged to try to use Vagisil and/or RePHresh to assess for symptomatic improvement. Advised to develop relationship with OBGYN group. Patient is agreeable to plan of care, all questions answered. Patient discharged in stable condition.   I have reviewed the nursing notes.    I have reviewed the findings, diagnosis, plan and need for follow up with the patient.    Discharge Medication List as of 1/18/2020  8:39 PM          Final diagnoses:   Vaginal discharge       1/18/2020   [facility] EMERGENCY DEPARTMENT     Trisha Nowak, APRN CNP  01/18/20 2046

## 2020-01-20 ENCOUNTER — TELEPHONE (OUTPATIENT)
Dept: EMERGENCY MEDICINE | Facility: CLINIC | Age: 20
End: 2020-01-20

## 2020-01-20 DIAGNOSIS — N39.0 URINARY TRACT INFECTION: ICD-10-CM

## 2020-01-20 NOTE — TELEPHONE ENCOUNTER
[facility] Emergency Department Lab result notification [Adult-Female]    [place] ED lab result protocol used  Urine culture    Reason for call  Notify of lab results, assess symptoms,  review ED providers recommendations/discharge instructions (if necessary) and advise per ED lab result f/u protocol    Lab Result (including Rx patient on, if applicable)  Preliminary urine culture report on 1/20/2020 shows the presence of bacteria(s):  10,000 to 50,000 colonies/mL Staphylococcus aureus,  Also <10,000 colonies/mL Streptococcus agalactiae sero group B   Emergency Dept/Urgent Care discharge antibiotic: None  Recommendations per [place] ED Lab result protocol - Urine culture protocol.  Information table from ED Provider visit on 1/18/2020  Symptoms reported at ED visit (Chief complaint, HPI) Vaginal Discharge      HPI  Aury Cervantes is a 19 year old female who presents the urgent care for evaluation of vaginal discharge.  Patient states she is intermittently had the symptoms for 2-1/2 months.  Patient recently treated with Flagyl and valacyclovir for BV and a suspicious labial lesion.  Patient denies any sexual activity since this visit.  Along with the discharge symptoms improve intermittent labial swelling, exterior pain with urination, vaginal pruritus.  Patient is currently menstruating and describes her flow as extremely heavy.  She uses tampons and panty liners.  Denies any pelvic pain, abdominal pain, flank pain and fevers.     Significant Medical hx, if applicable (i.e. CKD, diabetes) None   Allergies Allergies   Allergen Reactions     Sulfa Drugs Other (See Comments)     Both parents are allergic        Weight, if applicable Wt Readings from Last 2 Encounters:   01/18/20 52.2 kg (115 lb)   01/06/20 52.2 kg (115 lb)      Coumadin/Warfarin [Yes /No] No   Creatinine Level (mg/dl) Creatinine   Date Value Ref Range Status   02/08/2019 0.65 0.50 - 1.00 mg/dL Final      Creatinine clearance (ml/min),  "if applicable Creatinine clearance cannot be calculated (Patient's most recent lab result is older than the maximum 90 days allowed.)   Pregnant (Yes/No/NA) No   Breastfeeding (Yes/No/NA) No   ED providers Impression and Plan (applicable information) Patient is a 19-year-old female who presents the urgent care for evaluation of continued vaginal discharge and labial pain and pruritus.  Patient refusing /pelvic exam.  Urinalysis obtained.  Discussed with patient that findings are likely connected to menstruation and not UTI.  We will culture the urine.  Patient completed wet prep with normal findings. Encouraged to try to use Vagisil and/or RePHresh to assess for symptomatic improvement. Advised to develop relationship with OBGYN group. Patient is agreeable to plan of care, all questions answered. Patient discharged in stable condition.    ED diagnosis Vaginal discharge   ED provider Trisha Nowak, APRN CNP      RN Assessment (Patient s current Symptoms), include time called.  [Insert Left message here if message left]  5:20PM: Spoke with patient. She states she is having some \"irritation\" in the vaginal area \"but I don't think its my urine.\" Has been increasing her fluids so has been urinating more due to the that. Does not have any urinary urgency. Does not feel worse than when she was in the ED.     RN Recommendations/Instructions per [place] ED lab result protocol  Patient notified of lab result and treatment recommendations.    Advised per ED urine culture protocol that if she is not having any urinary symptoms we would wait for the final result to treat. The patient is comfortable with this plan, does not wish to start any antibiotics right now and will wait for the final result.   She has no further questions or concerns.      Please Contact your PCP clinic or return to the Emergency department if your    Symptoms worsen or other concerning symptom's.      [RN Name]  Cristina Chandler RN  [facility] RN  Lung Nodule and ED Lab Result RN  Epic pool (ED late result f/u RN): P [id number]  [facility] INCIDENTAL RADIOLOGY F/U NURSES: P [id number]  # [phone]    Copy of Lab result   Urine Culture [[id number]] (Order [id number])   Order Requisition     Urine Culture (Order #[id number]) on 1/18/20   Preliminary Result   Exam Information     Exam Date Exam Time Accession # Results    1/18/20  7:25 PM [id number]    Specimen Information: Midstream Urine        Component Collected Lab   Specimen Description 01/18/2020  7:25    Midstream Urine    Culture Micro Abnormal  01/18/2020  7:25    <10,000 colonies/mL   Streptococcus agalactiae sero group B   Susceptibility testing not routinely done on this organism from the genitourinary tract.   Our antibiogram indicates that Group B streptococci are susceptible to ampicillin,   penicillin, vancomycin and the cephalosporins. Susceptibility testing must be requested   within 5 days.    Culture Micro Abnormal  01/18/2020  7:25    10,000 to 50,000 colonies/mL   Staphylococcus aureus    Culture Micro 01/18/2020  7:25    Culture in progress

## 2020-01-21 LAB
BACTERIA SPEC CULT: ABNORMAL
BACTERIA SPEC CULT: ABNORMAL
SPECIMEN SOURCE: ABNORMAL

## 2020-01-22 RX ORDER — NITROFURANTOIN 25; 75 MG/1; MG/1
100 CAPSULE ORAL 2 TIMES DAILY
Qty: 10 CAPSULE | Refills: 0 | Status: SHIPPED | OUTPATIENT
Start: 2020-01-22 | End: 2020-01-27

## 2020-01-22 NOTE — TELEPHONE ENCOUNTER
[facility]:    [place] ED lab result protocol used  Urine culture protocol    Reason for call  Notify of lab results, assess symptoms,  review ED providers recommendations/discharge instructions (if necessary) and advise per ED lab result f/u protocol    Lab Result   Final urine culture on 1/21/20 shows the presence of bacteria(s): 10,000 - 50,000 colonies/mL Staphylococcus aureus  [place] Emergency Dept/Urgent Care discharge antibiotic: None  As per  ED lab result protocol, treat per Urine culture protocol.    Information table from ED Provider visit on 1/18/20  Symptoms reported at ED visit (Chief complaint, HPI) HPI  Aury Cervantes is a 19 year old female who presents the urgent care for evaluation of vaginal discharge.  Patient states she is intermittently had the symptoms for 2-1/2 months.  Patient recently treated with Flagyl and valacyclovir for BV and a suspicious labial lesion.  Patient denies any sexual activity since this visit.  Along with the discharge symptoms improve intermittent labial swelling, exterior pain with urination, vaginal pruritus.  Patient is currently menstruating and describes her flow as extremely heavy.  She uses tampons and panty liners.  Denies any pelvic pain, abdominal pain, flank pain and fevers.   ED providers Impression and Plan (applicable information) Patient is a 19-year-old female who presents the urgent care for evaluation of continued vaginal discharge and labial pain and pruritus.  Patient refusing /pelvic exam.  Urinalysis obtained.  Discussed with patient that findings are likely connected to menstruation and not UTI.  We will culture the urine.  Patient completed wet prep with normal findings. Encouraged to try to use Vagisil and/or RePHresh to assess for symptomatic improvement. Advised to develop relationship with OBGYN group. Patient is agreeable to plan of care, all questions answered. Patient  discharged in stable condition.    Miscellaneous information N/A     RN Assessment (Patient s current Symptoms), include time called.  [Insert Left message here if message left]  No urinary complaints.      RN Recommendations/Instructions per [place] ED lab result protocol  Macrobid 100 mg PO BID x 5 days ordered to [facility] pharmacy.     Please Contact your PCP clinic or return to the Emergency department if your:    Symptoms return.    Symptoms do not resolve after completing antibiotic.    Symptoms worsen or other concerning symptom's.    PCP follow-up Questions asked: YES       Janette Mckinney RN    [employer] [place]   Lung Nodule and ED Lab Results F/U RN  Epic pool (ED late result f/u RN) : P [id number]   # [phone]    Copy of Lab result   Order   Urine Culture [[id number]] (Order [id number])   Order Requisition     Urine Culture (Order #[id number]) on 1/18/20   Exam Information     Exam Date Exam Time Accession # Results    1/18/20  7:25 PM [id number]    Component Results     Specimen Information: Midstream Urine        Component Collected Lab   Specimen Description 01/18/2020  7:25    Midstream Urine    Culture Micro Abnormal  01/18/2020  7:25    <10,000 colonies/mL   Streptococcus agalactiae sero group B   Susceptibility testing not routinely done on this organism from the genitourinary tract.   Our antibiogram indicates that Group B streptococci are susceptible to ampicillin,   penicillin, vancomycin and the cephalosporins. Susceptibility testing must be requested   within 5 days.    Culture Micro Abnormal  01/18/2020  7:25    10,000 to 50,000 colonies/mL   Staphylococcus aureus    Susceptibility     Staphylococcus aureus     Antibiotic Interpretation Sensitivity Method Status   GENTAMICIN Sensitive <=0.5 ug/mL RAJ Final   NITROFURANTOIN Sensitive <=16 ug/mL RAJ Final   OXACILLIN Sensitive <=0.25 ug/mL RAJ Final   TETRACYCLINE Sensitive <=1 ug/mL RAJ Final   Trimethoprim/Sulfa  Sensitive <=0.5/9.5 ug/mL RAJ Final   VANCOMYCIN Sensitive 1 ug/mL RAJ Final

## 2020-02-01 ENCOUNTER — NURSE TRIAGE (OUTPATIENT)
Dept: NURSING | Facility: CLINIC | Age: 20
End: 2020-02-01

## 2020-02-01 NOTE — TELEPHONE ENCOUNTER
Pt is calling as her symptoms for Bacterial Vaginosis are still there, she also still has sores. Has finished her medications for the BV and UTI. Advised to be seen within 24 hours. Requested clinic appt, warm transfer done to scheduling.     Charlene Oliveros RN  [facility]  Triage Nurse Advisors      Reason for Disposition    [1] Vaginal discharge AND [2] no fever (Exception: physiological vaginal discharge or yeast infection)    Additional Information    Negative: Sounds like a life-threatening emergency to the triager    Negative: Before puberty    Negative: [1] Has an IUD AND [2] abnormal vaginal discharge    Negative: [1] Requests emergency contraception AND [2] NO vaginal symptoms    Negative: Pain or burning with urination    Negative: Vaginal foreign body suspected    Negative: Followed an injury to the genital area    Negative: [1] Vaginal or pelvic pain AND [2] severe    Negative: Child sounds very sick or weak to the triager    Negative: [1] Vaginal or pelvic pain is constant AND [2] present > 2 hours    Negative: [1] Genital area looks infected AND [2] fever    Negative: [1] Genital area looks infected AND [2] large red area (> 2 in. or 5 cm)    Negative: [1] Yellow or green vaginal discharge AND [2] fever    Negative: [1] Can't pass urine AND [2] bladder feels very full    Negative: [1] Widespread red rash AND [2] vaginal discharge    Negative: Blood in vaginal discharge   (Exception: normal, regular menstrual period)    Protocols used: VAGINAL SYMPTOMS OR DISCHARGE - AFTER [id number]-ZY

## 2020-02-02 ENCOUNTER — HOSPITAL ENCOUNTER (EMERGENCY)
Facility: CLINIC | Age: 20
Discharge: HOME | End: 2020-02-02
Attending: PHYSICIAN ASSISTANT | Admitting: PHYSICIAN ASSISTANT
Payer: COMMERCIAL

## 2020-02-02 VITALS
WEIGHT: 115 LBS | RESPIRATION RATE: 16 BRPM | BODY MASS INDEX: 19.16 KG/M2 | OXYGEN SATURATION: 100 % | HEIGHT: 65 IN | TEMPERATURE: 98.3 F

## 2020-02-02 DIAGNOSIS — B37.31 VULVOVAGINAL CANDIDIASIS: ICD-10-CM

## 2020-02-02 LAB
ALBUMIN UR-MCNC: NEGATIVE MG/DL
APPEARANCE UR: CLEAR
BACTERIA #/AREA URNS HPF: ABNORMAL /HPF
BILIRUB UR QL STRIP: NEGATIVE
COLOR UR AUTO: ABNORMAL
GLUCOSE UR STRIP-MCNC: NEGATIVE MG/DL
HGB UR QL STRIP: NEGATIVE
KETONES UR STRIP-MCNC: NEGATIVE MG/DL
LEUKOCYTE ESTERASE UR QL STRIP: ABNORMAL
MUCOUS THREADS #/AREA URNS LPF: PRESENT /LPF
NITRATE UR QL: NEGATIVE
PH UR STRIP: 8 PH (ref 5–7)
RBC #/AREA URNS AUTO: 1 /HPF (ref 0–2)
SOURCE: ABNORMAL
SP GR UR STRIP: 1.01 (ref 1–1.03)
SPECIMEN SOURCE: ABNORMAL
SQUAMOUS #/AREA URNS AUTO: 1 /HPF (ref 0–1)
UROBILINOGEN UR STRIP-MCNC: 0 MG/DL (ref 0–2)
WBC #/AREA URNS AUTO: 9 /HPF (ref 0–5)
WET PREP SPEC: ABNORMAL

## 2020-02-02 PROCEDURE — 87591 N.GONORRHOEAE DNA AMP PROB: CPT | Performed by: PHYSICIAN ASSISTANT

## 2020-02-02 PROCEDURE — 87210 SMEAR WET MOUNT SALINE/INK: CPT | Performed by: PHYSICIAN ASSISTANT

## 2020-02-02 PROCEDURE — 87077 CULTURE AEROBIC IDENTIFY: CPT | Performed by: PHYSICIAN ASSISTANT

## 2020-02-02 PROCEDURE — G0463 HOSPITAL OUTPT CLINIC VISIT: HCPCS

## 2020-02-02 PROCEDURE — 81001 URINALYSIS AUTO W/SCOPE: CPT | Performed by: PHYSICIAN ASSISTANT

## 2020-02-02 PROCEDURE — 87491 CHLMYD TRACH DNA AMP PROBE: CPT | Performed by: PHYSICIAN ASSISTANT

## 2020-02-02 PROCEDURE — 87086 URINE CULTURE/COLONY COUNT: CPT | Performed by: PHYSICIAN ASSISTANT

## 2020-02-02 PROCEDURE — 99213 OFFICE O/P EST LOW 20 MIN: CPT | Mod: Z6 | Performed by: PHYSICIAN ASSISTANT

## 2020-02-02 RX ORDER — FLUCONAZOLE 150 MG/1
TABLET ORAL
Qty: 2 TABLET | Refills: 0 | Status: SHIPPED | OUTPATIENT
Start: 2020-02-02 | End: 2020-08-01

## 2020-02-02 RX ORDER — BETAMETHASONE DIPROPIONATE 0.5 MG/G
OINTMENT, AUGMENTED TOPICAL 2 TIMES DAILY
Qty: 45 G | Refills: 0 | Status: SHIPPED | OUTPATIENT
Start: 2020-02-02 | End: 2020-08-01

## 2020-02-02 ASSESSMENT — MIFFLIN-ST. JEOR: SCORE: 1292.52

## 2020-02-02 NOTE — ED PROVIDER NOTES
History     Chief Complaint   Patient presents with     Vaginitis     still has s/x      HPI  Aury Cervantes is a 19 year old female who presents the urgent care with concern over ongoing vaginal pain which is been present for approximate last 4 to 5 months.  Patient complains of burning sensation concerns for fissuring or cracking of her skin.  She denies any actual urinary frequency, urgency, hematuria.  She does have some burning intermittently, however she attributes that to her skin changes  She denies any rashes.  She has not had any recent fever, chills ,myaligas, cough, dyspnea, wheezing, vomiting, diarrhea or abdominal pain. She has been evaluated for this numerous times in the ER and UC and had several different diagnoses and patient states she has completed treatments without any improvement whatsoever.  She has not been evaluated by OB/GYN or family practice provider.    Allergies:  Allergies   Allergen Reactions     Sulfa Drugs Other (See Comments)     Both parents are allergic       Problem List:    Patient Active Problem List    Diagnosis Date Noted     Anaphylaxis, subsequent encounter 12/04/2018     Priority: Medium     Rhinoconjunctivitis 12/04/2018     Priority: Medium     S/P tonsillectomy and adenoidectomy 03/21/2018     Priority: Medium     Post-op pain 03/21/2018     Priority: Medium     Nexplanon insertion 05/29/2015     Priority: Medium     nexplanon inserted today by Dr BRITNEY Marx  Lot # [id number]  Exp 07/2017  Zulay Jacobsen         Major depressive disorder, recurrent episode, severe (H) 11/19/2014     Priority: Medium        Past Medical History:    Past Medical History:   Diagnosis Date     Depression      Nexplanon in place        Past Surgical History:    Past Surgical History:   Procedure Laterality Date     SURGICAL HISTORY OF -       PE tubes     TONSILLECTOMY, ADENOIDECTOMY ADULT, COMBINED Bilateral 3/19/2018    Procedure: COMBINED TONSILLECTOMY, ADENOIDECTOMY ADULT;   "Bilateral Adenotonsillectomy;  Surgeon: Kevin Arias MD;  Location: [facility] OR       Family History:    Family History   Problem Relation Age of Onset     Depression Mother      Depression Father      Depression Maternal Grandmother      Cancer Maternal Grandmother      Diabetes Maternal Grandfather      Heart Disease Maternal Grandfather      Hypertension Maternal Grandfather      Depression Paternal Grandmother         bipolar     Depression Paternal Grandfather         bipolar       Social History:  Marital Status:  Single [1]  Social History     Tobacco Use     Smoking status: Former Smoker     Smokeless tobacco: Never Used   Substance Use Topics     Alcohol use: No     Alcohol/week: 0.0 standard drinks     Drug use: No        Medications:    EPINEPHrine (ADRENACLICK) 0.3 MG/0.3ML injection 2-pack  Lidocaine (LIDOCARE) 4 % Patch  valACYclovir (VALTREX) 1000 mg tablet      Review of Systems  CONSTITUTIONAL:NEGATIVE for fever, chills, change in weight  INTEGUMENTARY/SKIN: NEGATIVE for worrisome rashes, moles or lesions  RESP:NEGATIVE for significant cough or SOB  GI: NEGATIVE for abdominal pain, diarrhea, nausea and vomiting  : POSITIVE for intermittent vaginal pain, burning NEGATIVE for dysuria, increased frequency, urgency, hematuria, vaginal discharge  Physical Exam   Heart Rate: 95  Temp: 98.3  F (36.8  C)  Resp: 16  Height: 165.1 cm (5' 5\")  Weight: 52.2 kg (115 lb)  SpO2: 100 %  Physical Exam  GENERAL APPEARANCE: healthy, alert and no distress  RESP: lungs clear to auscultation - no rales, rhonchi or wheezes  CV: regular rates and rhythm, normal S1 S2, no murmur noted  ABDOMEN:  soft, nontender, no HSM or masses and bowel sounds normal  BACK: No CVA tenderness  GU_female: Noticed single small pinpoint area of superficial scratch/recent bleeding.  No ulcerative or vesicular lesions present.  No overlying erythema.  There is thick white discharge present in the vagina with hyphae adherent to the vaginal wall, " no CMT  SKIN: no suspicious lesions or rashes  ED Course        Procedures        Critical Care time:  none          Results for orders placed or performed during the hospital encounter of 02/02/20 (from the past 24 hour(s))   UA with Microscopic   Result Value Ref Range    Color Urine Straw     Appearance Urine Clear     Glucose Urine Negative NEG^Negative mg/dL    Bilirubin Urine Negative NEG^Negative    Ketones Urine Negative NEG^Negative mg/dL    Specific Gravity Urine 1.008 1.003 - 1.035    Blood Urine Negative NEG^Negative    pH Urine 8.0 (H) 5.0 - 7.0 pH    Protein Albumin Urine Negative NEG^Negative mg/dL    Urobilinogen mg/dL 0.0 0.0 - 2.0 mg/dL    Nitrite Urine Negative NEG^Negative    Leukocyte Esterase Urine Moderate (A) NEG^Negative    Source Midstream Urine     WBC Urine 9 (H) 0 - 5 /HPF    RBC Urine 1 0 - 2 /HPF    Bacteria Urine Few (A) NEG^Negative /HPF    Squamous Epithelial /HPF Urine 1 0 - 1 /HPF    Mucous Urine Present (A) NEG^Negative /LPF   Wet prep   Result Value Ref Range    Specimen Description Vagina     Wet Prep Many  WBC'S seen       Wet Prep No Trichomonas seen     Wet Prep No clue cells seen     Wet Prep Yeast seen (A)      Medications - No data to display    Assessments & Plan (with Medical Decision Making)     I have reviewed the nursing notes.  I have reviewed the findings, diagnosis, plan and need for follow up with the patient.       Discharge Medication List as of 2/2/2020  3:13 PM      START taking these medications    Details   augmented betamethasone dipropionate (DIPROLENE) 0.05 % external ointment Apply topically 2 times dailyDisp-45 g, [id number]      fluconazole (DIFLUCAN) 150 MG tablet Take one tablet now, and one tablet in three days, Disp-2 tablet, R-0, E-Prescribe           Final diagnoses:   Vulvovaginal candidiasis     19-year-old female presents to the urgent care with concern over 4 to 5-month history of vaginal pain, burning and concerns for fissuring or  cracking of the skin.  She had stable vital signs upon arrival.  Physical exam findings as described above.  Most consistent with vulvovaginal candidiasis.  Patient did have wet prep which was positive for yeast cells however given ongoing duration of symptoms and multiple prior evaluation suspect there is other concurrent etiology of her pain.  She did have urinalysis which showed minimally increased number of white blood cells however she does not have any active urinary complaints and I have low suspicion for acute cystitis.  I did discuss her/benefits and patient agreed to defer antibiotics at this time.  She was discharged home stable prescription for Diflucan to treat current  episode of vulvovaginal candidiasis.  Prescription for Diprolene ointment was also given to be initiated in 1 week if no resolution of her symptoms.  She was instructed to follow-up with OB/GYN for further evaluation.  Worrisome reasons to return to ER/UC sooner discussed.     Disclaimer: This note consists of symbols derived from keyboarding, dictation, and/or voice recognition software. As a result, there may be errors in the script that have gone undetected.  Please consider this when interpreting information found in the chart.    2/2/2020   [facility] EMERGENCY DEPARTMENT    Eliana Willett PA-C  02/06/20 [id number]

## 2020-02-02 NOTE — ED AVS SNAPSHOT
[facility]  [address] [address]  Phone:  [phone]  Fax:  [phone]                                    Aury Cervantes   MRN: [id number]    Department:  [facility]   Date of Visit:  2/2/2020           After Visit Summary Signature Page    I have received my discharge instructions, and my questions have been answered. I have discussed any challenges I see with this plan with the nurse or doctor.    ..........................................................................................................................................  Patient/Patient Representative Signature      ..........................................................................................................................................  Patient Representative Print Name and Relationship to Patient    ..................................................               ................................................  Date                                   Time    ..........................................................................................................................................  Reviewed by Signature/Title    ...................................................              ..............................................  Date                                               Time          22EPIC Rev 08/18

## 2020-02-03 LAB
BACTERIA SPEC CULT: ABNORMAL
BACTERIA SPEC CULT: ABNORMAL
C TRACH DNA SPEC QL NAA+PROBE: NEGATIVE
Lab: ABNORMAL
N GONORRHOEA DNA SPEC QL NAA+PROBE: NEGATIVE
SPECIMEN SOURCE: ABNORMAL
SPECIMEN SOURCE: NORMAL
SPECIMEN SOURCE: NORMAL

## 2020-02-03 NOTE — RESULT ENCOUNTER NOTE
Final result for both N. Gonorrhoeae PCR and Chlamydia Trachomatis PCR are NEGATIVE.  No treatment or change in treatment per [place] ED Lab Result protocol.

## 2020-02-04 NOTE — RESULT ENCOUNTER NOTE
Final urine culture report is NEGATIVE per [place] ED Lab Result protocol.    If NEGATIVE result, no change in treatment, per [place] ED Lab Result protocol.

## 2020-04-21 ENCOUNTER — TELEPHONE (OUTPATIENT)
Dept: FAMILY MEDICINE | Facility: CLINIC | Age: 20
End: 2020-04-21

## 2020-04-21 NOTE — TELEPHONE ENCOUNTER
"Last seen by Dr Churchill in March 2018.   I called Sylvia back and advised not able to do a referral from primary care.   \"well, could the ER do the referral then?\"      Alesha- this Sylvia is  asking if our ER can do the referral to [facility]. (please see notes below)  They have \"discovered\" that she was a restricted recipient also , with Dr Churchill.     Thanks,   Bev Aly RNC              "

## 2020-04-21 NOTE — TELEPHONE ENCOUNTER
Reason for Call: Request for an order or referral from past services    Order or referral being requested: Sylvia Culelar, director of client relations for [facility].  Needing a past referral to [facility], from Office visits July 11, 2019, Sept 16, 2019 so she doesn't get charged,  For Unspecified low back pain, lateral, back pain sciatica present  Call for more info to Sylvia Cuellar, at [phone] and fax to: [phone]    Date needed: as soon as possible    Has the patient been seen by the PCP for this problem? Not Applicable    Additional comments: none  Call taken on 4/21/2020 at 5:00 PM by Radha Steven

## 2020-04-22 NOTE — TELEPHONE ENCOUNTER
Patient is restricted to Dr. Churchill so would need a referral from her to cover requested visits. I checked ED dictation from 07/11/19 visit and notes state patient already had upcoming appointment with [facility] later that same day.  I could not find a referral to spine from any previous ED visit.  We are not required to issue the referral as it seems that she self- referred.    Alesha  Referral Coordinator  [facility]

## 2020-07-30 ENCOUNTER — HOSPITAL ENCOUNTER (EMERGENCY)
Facility: CLINIC | Age: 20
Discharge: HOME | End: 2020-07-30
Attending: EMERGENCY MEDICINE | Admitting: EMERGENCY MEDICINE
Payer: COMMERCIAL

## 2020-07-30 VITALS
OXYGEN SATURATION: 100 % | HEIGHT: 65 IN | RESPIRATION RATE: 18 BRPM | SYSTOLIC BLOOD PRESSURE: 120 MMHG | TEMPERATURE: 99.2 F | WEIGHT: 120 LBS | HEART RATE: 94 BPM | DIASTOLIC BLOOD PRESSURE: 80 MMHG | BODY MASS INDEX: 19.99 KG/M2

## 2020-07-30 DIAGNOSIS — Z20.822 SUSPECTED 2019 NOVEL CORONAVIRUS INFECTION: ICD-10-CM

## 2020-07-30 DIAGNOSIS — J02.9 ACUTE PHARYNGITIS, UNSPECIFIED ETIOLOGY: ICD-10-CM

## 2020-07-30 LAB
DEPRECATED S PYO AG THROAT QL EIA: NEGATIVE
SPECIMEN SOURCE: NORMAL

## 2020-07-30 PROCEDURE — 99284 EMERGENCY DEPT VISIT MOD MDM: CPT | Mod: Z6 | Performed by: EMERGENCY MEDICINE

## 2020-07-30 PROCEDURE — 99283 EMERGENCY DEPT VISIT LOW MDM: CPT | Performed by: EMERGENCY MEDICINE

## 2020-07-30 PROCEDURE — C9803 HOPD COVID-19 SPEC COLLECT: HCPCS | Performed by: EMERGENCY MEDICINE

## 2020-07-30 PROCEDURE — 87651 STREP A DNA AMP PROBE: CPT | Performed by: PHYSICIAN ASSISTANT

## 2020-07-30 PROCEDURE — 40001204 ZZHCL STATISTIC STREP A RAPID: Performed by: PHYSICIAN ASSISTANT

## 2020-07-30 PROCEDURE — 25000132 ZZH RX MED GY IP 250 OP 250 PS 637: Performed by: EMERGENCY MEDICINE

## 2020-07-30 PROCEDURE — U0003 INFECTIOUS AGENT DETECTION BY NUCLEIC ACID (DNA OR RNA); SEVERE ACUTE RESPIRATORY SYNDROME CORONAVIRUS 2 (SARS-COV-2) (CORONAVIRUS DISEASE [COVID-19]), AMPLIFIED PROBE TECHNIQUE, MAKING USE OF HIGH THROUGHPUT TECHNOLOGIES AS DESCRIBED BY CMS-2020-01-R: HCPCS | Performed by: EMERGENCY MEDICINE

## 2020-07-30 RX ORDER — ACETAMINOPHEN 325 MG/1
650 TABLET ORAL ONCE
Status: COMPLETED | OUTPATIENT
Start: 2020-07-30 | End: 2020-07-30

## 2020-07-30 RX ADMIN — ACETAMINOPHEN 650 MG: 325 TABLET, FILM COATED ORAL at 23:22

## 2020-07-30 ASSESSMENT — MIFFLIN-ST. JEOR: SCORE: 1310.2

## 2020-07-30 NOTE — LETTER
July 30, 2020      To Whom It May Concern:      Aury Cervantes was seen in our Emergency Department today, 07/30/20.     Stay home and away from others (self-isolate) until:  ? At least 10 days have passed since your symptoms started. And   ? You've had no fever--and no medicine that reduces fever--for 3 full days (72 hours). And   ? Your other symptoms have resolved (gotten better).      Sincerely,        Quinn Meyers MD

## 2020-07-30 NOTE — ED AVS SNAPSHOT
[facility] Emergency Department  [address] [address]  Phone:  [phone]  Fax:  [phone]                                    Aury Cervantes   MRN: [id number]    Department:  [facility] Emergency Department   Date of Visit:  7/30/2020           After Visit Summary Signature Page    I have received my discharge instructions, and my questions have been answered. I have discussed any challenges I see with this plan with the nurse or doctor.    ..........................................................................................................................................  Patient/Patient Representative Signature      ..........................................................................................................................................  Patient Representative Print Name and Relationship to Patient    ..................................................               ................................................  Date                                   Time    ..........................................................................................................................................  Reviewed by Signature/Title    ...................................................              ..............................................  Date                                               Time          22EPIC Rev 08/18

## 2020-07-31 ENCOUNTER — NURSE TRIAGE (OUTPATIENT)
Dept: NURSING | Facility: CLINIC | Age: 20
End: 2020-07-31

## 2020-07-31 ENCOUNTER — TELEPHONE (OUTPATIENT)
Dept: EMERGENCY MEDICINE | Facility: CLINIC | Age: 20
End: 2020-07-31

## 2020-07-31 LAB
LABORATORY COMMENT REPORT: ABNORMAL
SARS-COV-2 RNA SPEC QL NAA+PROBE: NORMAL
SARS-COV-2 RNA SPEC QL NAA+PROBE: POSITIVE
SPECIMEN SOURCE: ABNORMAL
SPECIMEN SOURCE: NORMAL
SPECIMEN SOURCE: NORMAL
STREP GROUP A PCR: NOT DETECTED

## 2020-07-31 ASSESSMENT — ENCOUNTER SYMPTOMS
ABDOMINAL PAIN: 0
HEADACHES: 1
SORE THROAT: 1
SHORTNESS OF BREATH: 0
COUGH: 1
CHILLS: 1
BACK PAIN: 0
VOMITING: 0
CHEST TIGHTNESS: 1
FEVER: 1
DYSURIA: 0
NAUSEA: 0
DIARRHEA: 0

## 2020-07-31 NOTE — DISCHARGE INSTRUCTIONS
"Discharge Instructions for COVID-19 Patients  You have--or may have--COVID-19. Please follow the instructions listed below.   If you have a weakened immune system, discuss with your doctor any other actions you need to take.  How can I protect others?  If you have symptoms (fever, cough, body aches or trouble breathing):  Stay home and away from others (self-isolate) until:  At least 10 days have passed since your symptoms started. And   You've had no fever--and no medicine that reduces fever--for 3 full days (72 hours). And   Your other symptoms have resolved (gotten better).  If you don't show symptoms, but testing showed that you have COVID-19:  Stay home and away from others (self-isolate) until at least 10 days have passed since the date of your first positive COVID-19 test.  During this time  Stay in your own room, even for meals. Use your own bathroom if you can.  Stay away from others in your home. No hugging, kissing or shaking hands. No visitors.  Don't go to work, school or anywhere else.  Clean \"high touch\" surfaces often (doorknobs, counters, handles). Use household cleaning spray or wipes. You'll find a full list of  on the EPA website: www.epa.gov/pesticide-registration/list-n-disinfectants-use-against-sars-cov-2.  Cover your mouth and nose with a mask, tissue or wash cloth to avoid spreading germs.  Wash your hands and face often. Use soap and water.  Caregivers in these groups are at risk for severe illness due to COVID-19:  People 65 years and older  People who live in a nursing home or long-term care facility  People with chronic disease (lung, heart, cancer, diabetes, kidney, liver, immunologic)  People who have a weakened immune system, including those who:  Are in cancer treatment  Take medicine that weakens the immune system, such as corticosteroids  Had a bone marrow or organ transplant  Have an immune deficiency  Have poorly controlled HIV or AIDS  Are obese (body mass index of 40 or " higher)  Smoke regularly  Caregivers should wear gloves while washing dishes, handling laundry and cleaning bedrooms and bathrooms.  Use caution when washing and drying laundry: Don't shake dirty laundry and use the warmest water setting that you can.  For more tips on managing your health at home, go to www.cdc.gov/coronavirus/2019-ncov/downloads/10Things.pdf.  How can I take care of myself at home?  Get lots of rest. Drink extra fluids (unless a doctor has told you not to).  Take Tylenol (acetaminophen) for fever or pain. If you have liver or kidney problems, ask your family doctor if it's okay to take Tylenol.     Adults can take either:  650 mg (two 325 mg pills) every 4 to 6 hours, or   1,000 mg (two 500 mg pills) every 8 hours as needed.  Note: Don't take more than 3,000 mg in one day. Acetaminophen is found in many medicines (both prescribed and over-the-counter medicines). Read all labels to be sure you don't take too much.   For children, check the Tylenol bottle for the right dose. The dose is based on the child's age or weight.  If you have other health problems (like cancer, heart failure, an organ transplant or severe kidney disease): Call your specialty clinic if you don't feel better in the next 2 days.  Know when to call 911. Emergency warning signs include:  Trouble breathing or shortness of breath  Pain or pressure in the chest that doesn't go away  Feeling confused like you haven't felt before, or not being able to wake up  Bluish-colored lips or face  Your doctor may have prescribed a blood thinner medicine. Follow their instructions.  Where can I get more information?  [facility] - About COVID-19:   [email or web address]  CDC - What to Do If You're Sick: www.cdc.gov/coronavirus/2019-ncov/about/steps-when-sick.html  CDC - Ending Home Isolation: www.cdc.gov/coronavirus/2019-ncov/hcp/disposition-in-home-patients.html  CDC - Caring for Someone:  www.cdc.gov/coronavirus/2019-ncov/if-you-are-sick/care-for-someone.html  [facility] - Interim Guidance for Hospital Discharge to Home: www.health.[facility].[place].us/diseases/coronavirus/hcp/hospdischarge.pdf  [facility] clinical trials (COVID-19 research studies): clinicalaffairs.[facility]-clinical-trials  Below are the COVID-19 hotlines at the [place] Department of Health ([facility]). Interpreters are available.  For health questions: Call 959-807-7411 or 1-282.303.2363 (7 a.m. to 7 p.m.)  For questions about schools and childcare: Call 507-399-2806 or 1-915.746.8824 (7 a.m. to 7 p.m.)    For informational purposes only. Not to replace the advice of your health care provider. Clinically reviewed by the Infection Prevention Team.Copyright   2020 [facility]. All rights reserved. [employer] 514633 - 06/20.

## 2020-07-31 NOTE — TELEPHONE ENCOUNTER
"Coronavirus (COVID-19) Notification    Caller Name (Patient, parent, daughter/son, grandparent, etc)  Aury Cervantes    Reason for call  Notify of Positive Coronavirus (COVID-19) lab results, assess symptoms,  review [facility] recommendations    Lab Result    Lab test:  2019-nCoV rRt-PCR or SARS-CoV-2 PCR    Oropharyngeal AND/OR nasopharyngeal swabs is POSITIVE for 2019-nCoV RNA/SARS-COV-2 PCR (COVID-19 virus)    RN Recommendations/Instructions per [facility] Coronavirus COVID-19 recommendations    Brief introduction script  Introduce self then review script:  \"I am calling on behalf of [employer].  We were notified that your Coronavirus test (COVID-19) for was POSITIVE for the virus.  I have some information to relay to you but first I wanted to mention that the [place] Dept of Health will be contacting you shortly [it's possible MD already called Patient] to talk to you more about how you are feeling and other people you have had contact with who might now also have the virus.  Also, [facility] is Partnering with the [facility] for Covid-19 research, you may be contacted directly by research staff.\"    Assessment (Inquire about Patient's current symptoms)   Assessment   Current Symptoms at time of phone call: (if no symptoms, document No symptoms] Sore throat, congestion, headache, tight chest   Symptoms onset (if applicable) 7/29/20     If at time of call, Patients symptoms hare worsened, the Patient should contact 911 or have someone drive them to Emergency Dept promptly:      If Patient calling 911, inform 911 personal that you have tested positive for the Coronavirus (COVID-19).  Place mask on and await 911 to arrive.    If Emergency Dept, If possible, please have another adult drive you to the Emergency Dept but you need to wear mask when in contact with other people.      Review information with Patient    Your result was positive. This means you have COVID-19 (coronavirus).  We " have sent you a letter that reviews the information that I'll be reviewing with you now.    How can I protect others?    If you have symptoms: stay home and away from others (self-isolate) until:    You've had no fever--and no medicine that reduces fever--for 3 full days (72 hours). And      Your other symptoms have gotten better. For example, your cough or breathing has improved. And     At least 10 days have passed since your symptoms started.    If you don't have symptoms: Stay home and away from others (self-isolate) until at least 10 days have passed since your first positive COVID-19 test. (Date test collected)    During this time:    Stay in your own room, including for meals. Use your own bathroom if you can.    Stay away from others in your home. No hugging, kissing or shaking hands. No visitors.     Don't go to work, school or anywhere else.     Clean  high touch  surfaces often (doorknobs, counters, handles, etc.). Use a household cleaning spray or wipes. You'll find a full list on the EPA website at www.epa.gov/pesticide-registration/list-n-disinfectants-use-against-sars-cov-2.     Cover your mouth and nose with a mask, tissue or washcloth to avoid spreading germs.    Wash your hands and face often with soap and water.    Caregivers in these groups are at risk for severe illness due to COVID-19:  o People 65 years and older  o People who live in a nursing home or long-term care facility  o People with chronic disease (lung, heart, cancer, diabetes, kidney, liver, immunologic)  o People who have a weakened immune system, including those who:  - Are in cancer treatment  - Take medicine that weakens the immune system, such as corticosteroids  - Had a bone marrow or organ transplant  - Have an immune deficiency  - Have poorly controlled HIV or AIDS  - Are obese (body mass index of 40 or higher)  - Smoke regularly    Caregivers should wear gloves while washing dishes, handling laundry and cleaning bedrooms  and bathrooms.    Wash and dry laundry with special caution. Don't shake dirty laundry, and use the warmest water setting you can.    If you have a weakened immune system, ask your doctor about other actions you should take.    For more tips, go to www.cdc.gov/coronavirus/2019-ncov/downloads/10Things.pdf.    You should not go back to work until you meet the guidelines above for ending your home isolation. You should meet these along with any other guidelines that your employer has.    Employers: This document serves as formal notice of your employee's medical guidelines for going back to work. They must meet the above guidelines before going back to work in person.    How can I take care of myself?    1. Get lots of rest. Drink extra fluids (unless a doctor has told you not to).    2. Take Tylenol (acetaminophen) for fever or pain. If you have liver or kidney problems, ask your family doctor if it's okay to take Tylenol.     Take either:     650 mg (two 325 mg pills) every 4 to 6 hours, or     1,000 mg (two 500 mg pills) every 8 hours as needed.     Note: Don't take more than 3,000 mg in one day. Acetaminophen is found in many medicines (both prescribed and over-the-counter medicines). Read all labels to be sure you don't take too much.    For children, check the Tylenol bottle for the right dose (based on their age or weight).    3. If you have other health problems (like cancer, heart failure, an organ transplant or severe kidney disease): Call your specialty clinic if you don't feel better in the next 2 days.    4. Know when to call 911: Emergency warning signs include:    Trouble breathing or shortness of breath    Pain or pressure in the chest that doesn't go away    Feeling confused like you haven't felt before, or not being able to wake up    Bluish-colored lips or face    5. Sign up for [employer]. We know it's scary to hear that you have COVID-19. We want to track your symptoms to make sure you're okay  over the next 2 weeks. Please look for an email from [employer]--this is a free, online program that we'll use to keep in touch. To sign up, follow the link in the email. Learn more at [email or web address].    Where can I get more information?    [facility]: [email or web address]    Coronavirus Basics: www.health.[facility].mn./diseases/coronavirus/basics.html    What to Do If You're Sick: www.cdc.gov/coronavirus/2019-ncov/about/steps-when-sick.html    Ending Home Isolation: www.cdc.gov/coronavirus/2019-ncov/hcp/disposition-in-home-patients.html     Caring for Someone with COVID-19: www.cdc.gov/coronavirus/2019-ncov/if-you-are-sick/care-for-someone.html     [facility] clinical trials (COVID-19 research studies): clinicalaffairs.[facility]-clinical-trials     A Positive COVID-19 letter will be sent via [employer] or the mail.    [Name]  Sarah Schlatter RN

## 2020-07-31 NOTE — RESULT ENCOUNTER NOTE
Group A Streptococcus PCR is NEGATIVE   No treatment or change in treatment [facility] ED lab result protocol - Strep protocol.

## 2020-07-31 NOTE — ED PROVIDER NOTES
History     Chief Complaint   Patient presents with     Cough     Shortness of Breath     Pharyngitis     HPI  Aury Cervantes is a 20 year old female with history depression s/p tonsillectomy who presents with two days of sore throat, body aches, headache and tightness in her chest. Subjective fever and chills. She has hoarseness of her voice and is speaking softly. She is able to eat and drink with mild discomfort. She has taken ibuprofen with little relief. No difficulty breath. Has cough that she feels is from a scratchy throat. No wheezing. No n/v/d. No abdominal pain. She has a coworker that has also been ill. She works at a restaurant.         The patient's PMHx, Surgical Hx, Allergies, and Medications were all reviewed with the patient.    Allergies:  Allergies   Allergen Reactions     Sulfa Drugs Other (See Comments)     Both parents are allergic         Problem List:    Patient Active Problem List    Diagnosis Date Noted     Anaphylaxis, subsequent encounter 12/04/2018     Priority: Medium     Rhinoconjunctivitis 12/04/2018     Priority: Medium     S/P tonsillectomy and adenoidectomy 03/21/2018     Priority: Medium     Post-op pain 03/21/2018     Priority: Medium     Nexplanon insertion 05/29/2015     Priority: Medium     nexplanon inserted today by Dr BRITNEY Marx  Lot # [id number]  Exp 07/2017  Zulay Jacobsen         Major depressive disorder, recurrent episode, severe (H) 11/19/2014     Priority: Medium        Past Medical History:    Past Medical History:   Diagnosis Date     Depression      Nexplanon in place        Past Surgical History:    Past Surgical History:   Procedure Laterality Date     SURGICAL HISTORY OF -       PE tubes     TONSILLECTOMY, ADENOIDECTOMY ADULT, COMBINED Bilateral 3/19/2018    Procedure: COMBINED TONSILLECTOMY, ADENOIDECTOMY ADULT;  Bilateral Adenotonsillectomy;  Surgeon: Kevin Arias MD;  Location: [facility] OR       Family History:    Family History   Problem Relation Age of  "Onset     Depression Mother      Depression Father      Depression Maternal Grandmother      Cancer Maternal Grandmother      Diabetes Maternal Grandfather      Heart Disease Maternal Grandfather      Hypertension Maternal Grandfather      Depression Paternal Grandmother         bipolar     Depression Paternal Grandfather         bipolar       Social History:  Marital Status:  Single [1]  Social History     Tobacco Use     Smoking status: Former Smoker     Smokeless tobacco: Never Used   Substance Use Topics     Alcohol use: No     Alcohol/week: 0.0 standard drinks     Drug use: No        Medications:    augmented betamethasone dipropionate (DIPROLENE) 0.05 % external ointment  EPINEPHrine (ADRENACLICK) 0.3 MG/0.3ML injection 2-pack  Lidocaine (LIDOCARE) 4 % Patch  valACYclovir (VALTREX) 1000 mg tablet          Review of Systems   Constitutional: Positive for chills and fever.   HENT: Positive for sore throat.    Eyes: Negative for visual disturbance.   Respiratory: Positive for cough and chest tightness. Negative for shortness of breath.    Cardiovascular: Negative for chest pain.   Gastrointestinal: Negative for abdominal pain, diarrhea, nausea and vomiting.   Genitourinary: Negative for dysuria.   Musculoskeletal: Negative for back pain.   Skin: Negative for rash.   Neurological: Positive for headaches.       Physical Exam   BP: 121/83  Pulse: 104  Temp: 99  F (37.2  C)  Resp: 18  Height: 165.1 cm (5' 5\")  Weight: 54.4 kg (120 lb)  SpO2: 100 %    Physical Exam  GEN: Awake, alert, and cooperative. Appears distressed.   HENT: MMM. External ears and nose normal bilaterally. Tonsils surgically absent. Mild erythema of posterior oropharynx with scattered petechiae. No exudates. No peritonsillar abscess.    EYES: EOM intact. Conjunctiva clear. No discharge.   NECK: Supple, symmetric. Submandibular adenopathy bilaterally.   CV : Regular rate and rhythm. Brisk cap refill  PULM: Normal effort. No wheezes, rales, or " rhonchi bilaterally.  ABD: Soft, non-tender, non-distended. No rebound or guarding.   NEURO: Normal speech. Following commands.   EXT: No gross deformity. Warm and well perfused  INT: Warm. No diaphoresis. Normal color.        ED Course        Procedures           Critical Care time:  none               Results for orders placed or performed during the hospital encounter of 07/30/20 (from the past 24 hour(s))   Streptococcus A Rapid Scr w Reflx to PCR    Specimen: Throat   Result Value Ref Range    Strep Specimen Description Throat     Streptococcus Group A Rapid Screen Negative NEG^Negative       Medications - No data to display    Assessments & Plan (with Medical Decision Making)   20 year old female with past medical history of depression and surgically absent tonsils who presents with 2 days of fever, chills, sore throat, body aches, and headache.  On arrival to Emergency Department, vital signs were blood pressure 121/83, temperature 37.2, heart rate 104, respiratory rate 18, and SPO2 100% on room air.  On exam she had mild erythema with a few scattered petechiae on the soft palate, tonsils are surgically absent, no exudates or edema.  Lungs clear to auscultation, no increased work of breathing.  Rapid strep obtained by nursing negative.  Her symptoms are suggestive of a underlying viral infectious etiology.  SARS CoV-2 testing obtained and results pending at time of disposition.  Given her reassuring exam and lack of respiratory distress, I feel she is appropriate for discharge in the department with outpatient follow-up.  She will need to self isolate and she was given a work note stating the specific details.  She was given 6 and 50 mg acetaminophen while emergency department.  I considered treatment with dexamethasone however given the lack of edema I do not feel this would offer much benefit.  She understands she will be notified by phone of a positive result.  She expressed good understanding of plan and  discharged in stable condition.  ED return precautions discussed.    I have reviewed the nursing notes.         Discharge Medication List as of 7/30/2020 11:16 PM          Final diagnoses:   Acute pharyngitis, unspecified etiology   Observation for suspected condition   Suspected 2019 novel coronavirus infection     Quinn Meyers MD    7/30/2020   [facility] EMERGENCY DEPARTMENT    Disclaimer: This note consists of words and symbols derived from keyboarding and dictation using voice recognition software.  As a result, there may be errors that have gone undetected.  Please consider this when interpreting information found in this note.             Quinn Meyers MD  07/31/20 1525

## 2020-08-01 ENCOUNTER — APPOINTMENT (OUTPATIENT)
Dept: GENERAL RADIOLOGY | Facility: CLINIC | Age: 20
End: 2020-08-01
Attending: EMERGENCY MEDICINE
Payer: COMMERCIAL

## 2020-08-01 ENCOUNTER — HOSPITAL ENCOUNTER (EMERGENCY)
Facility: CLINIC | Age: 20
Discharge: HOME | End: 2020-08-01
Attending: EMERGENCY MEDICINE | Admitting: EMERGENCY MEDICINE
Payer: COMMERCIAL

## 2020-08-01 VITALS
BODY MASS INDEX: 19.99 KG/M2 | OXYGEN SATURATION: 98 % | WEIGHT: 120 LBS | HEIGHT: 65 IN | RESPIRATION RATE: 18 BRPM | TEMPERATURE: 98.7 F | DIASTOLIC BLOOD PRESSURE: 65 MMHG | SYSTOLIC BLOOD PRESSURE: 106 MMHG | HEART RATE: 63 BPM

## 2020-08-01 DIAGNOSIS — R42 LIGHT HEADED: ICD-10-CM

## 2020-08-01 DIAGNOSIS — U07.1 2019 NOVEL CORONAVIRUS DISEASE (COVID-19): ICD-10-CM

## 2020-08-01 DIAGNOSIS — R04.2 HEMOPTYSIS: ICD-10-CM

## 2020-08-01 PROCEDURE — 25000128 H RX IP 250 OP 636: Performed by: EMERGENCY MEDICINE

## 2020-08-01 PROCEDURE — 71045 X-RAY EXAM CHEST 1 VIEW: CPT

## 2020-08-01 PROCEDURE — 99284 EMERGENCY DEPT VISIT MOD MDM: CPT | Performed by: EMERGENCY MEDICINE

## 2020-08-01 PROCEDURE — 99284 EMERGENCY DEPT VISIT MOD MDM: CPT | Mod: Z6 | Performed by: EMERGENCY MEDICINE

## 2020-08-01 PROCEDURE — 25000132 ZZH RX MED GY IP 250 OP 250 PS 637: Performed by: EMERGENCY MEDICINE

## 2020-08-01 RX ORDER — ONDANSETRON 4 MG/1
4 TABLET, ORALLY DISINTEGRATING ORAL EVERY 8 HOURS PRN
Qty: 5 TABLET | Refills: 0 | Status: SHIPPED | OUTPATIENT
Start: 2020-08-01 | End: 2020-10-02

## 2020-08-01 RX ORDER — ONDANSETRON 4 MG/1
4 TABLET, ORALLY DISINTEGRATING ORAL EVERY 8 HOURS PRN
Status: DISCONTINUED | OUTPATIENT
Start: 2020-08-01 | End: 2020-08-01 | Stop reason: HOSPADM

## 2020-08-01 RX ORDER — ONDANSETRON 4 MG/1
4 TABLET, ORALLY DISINTEGRATING ORAL ONCE
Status: COMPLETED | OUTPATIENT
Start: 2020-08-01 | End: 2020-08-01

## 2020-08-01 RX ADMIN — ONDANSETRON 4 MG: 4 TABLET, ORALLY DISINTEGRATING ORAL at 02:04

## 2020-08-01 RX ADMIN — IBUPROFEN 600 MG: 400 TABLET ORAL at 02:04

## 2020-08-01 ASSESSMENT — MIFFLIN-ST. JEOR: SCORE: 1310.2

## 2020-08-01 NOTE — ED PROVIDER NOTES
History     Chief Complaint   Patient presents with     Shortness of Breath     vomited blood tonight, covid +     HPI  Aury Cervantes is a 20 year old female who presents for cough and vomiting.  Patient has been sick for several days with fevers, chills, body aches, cough, headache, runny nose.  She was evaluated here yesterday and had a positive COVID19 swab.  She has been feeling weak all day today and has not been eating or drinking much.  She feels nauseous and then this evening shortly prior to her arrival here she had a paroxysmal coughing with a taste of blood in her mouth, she then subsequently vomited and there was some blood mixed in with the emesis.  She says she is still nauseous.  She tried taking acetaminophen this evening but vomited right afterwards.    Allergies:  Allergies   Allergen Reactions     Sulfa Drugs Other (See Comments)     Both parents are allergic         Problem List:    Patient Active Problem List    Diagnosis Date Noted     Anaphylaxis, subsequent encounter 12/04/2018     Priority: Medium     Rhinoconjunctivitis 12/04/2018     Priority: Medium     S/P tonsillectomy and adenoidectomy 03/21/2018     Priority: Medium     Post-op pain 03/21/2018     Priority: Medium     Nexplanon insertion 05/29/2015     Priority: Medium     nexplanon inserted today by Dr BRITNEY Marx  Lot # [id number]  Exp 07/2017  Zulay Jacobsen         Major depressive disorder, recurrent episode, severe (H) 11/19/2014     Priority: Medium        Past Medical History:    Past Medical History:   Diagnosis Date     Depression      Nexplanon in place        Past Surgical History:    Past Surgical History:   Procedure Laterality Date     SURGICAL HISTORY OF -       PE tubes     TONSILLECTOMY, ADENOIDECTOMY ADULT, COMBINED Bilateral 3/19/2018    Procedure: COMBINED TONSILLECTOMY, ADENOIDECTOMY ADULT;  Bilateral Adenotonsillectomy;  Surgeon: Kevin Arias MD;  Location: [facility] OR       Family History:    Family History  "  Problem Relation Age of Onset     Depression Mother      Depression Father      Depression Maternal Grandmother      Cancer Maternal Grandmother      Diabetes Maternal Grandfather      Heart Disease Maternal Grandfather      Hypertension Maternal Grandfather      Depression Paternal Grandmother         bipolar     Depression Paternal Grandfather         bipolar       Social History:  Marital Status:  Single [1]  Social History     Tobacco Use     Smoking status: Former Smoker     Smokeless tobacco: Never Used   Substance Use Topics     Alcohol use: No     Alcohol/week: 0.0 standard drinks     Drug use: No        Medications:    ondansetron (ZOFRAN ODT) 4 MG ODT tab  EPINEPHrine (ADRENACLICK) 0.3 MG/0.3ML injection 2-pack          Review of Systems  A 4 point review of systems was performed. All pertinent positives and negatives were listed in the HPI and rest of ROS were otherwise negative.    Physical Exam   BP: 102/61  Pulse: 81  Temp: 98.7  F (37.1  C)  Resp: 18  Height: 165.1 cm (5' 5\")  Weight: 54.4 kg (120 lb)  SpO2: 98 %      Physical Exam  Vitals signs and nursing note reviewed.   Constitutional:       General: She is in acute distress.      Appearance: She is well-developed. She is not diaphoretic.   HENT:      Head: Normocephalic and atraumatic.      Right Ear: External ear normal.      Left Ear: External ear normal.      Nose: Nose normal.   Eyes:      General: No scleral icterus.     Conjunctiva/sclera: Conjunctivae normal.   Neck:      Musculoskeletal: Normal range of motion.   Cardiovascular:      Rate and Rhythm: Normal rate and regular rhythm.   Pulmonary:      Effort: Pulmonary effort is normal. No respiratory distress.      Breath sounds: No stridor.   Skin:     General: Skin is warm and dry.   Neurological:      Mental Status: She is alert and oriented to person, place, and time.   Psychiatric:         Behavior: Behavior normal.         ED Course        Procedures               Critical Care " time:  none               Results for orders placed or performed during the hospital encounter of 08/01/20 (from the past 24 hour(s))   XR Chest Port 1 View    Narrative    EXAM: XR CHEST PORT 1 VW  LOCATION: [facility]  DATE/TIME: 8/1/2020 1:30 AM    INDICATION: Cough. Hemoptysis.  COMPARISON: 10/03/2019.    FINDINGS: The heart size is normal. The lungs are clear. No pneumothorax.      Impression    IMPRESSION: Negative chest.       Medications   ondansetron (ZOFRAN-ODT) ODT tab 4 mg (has no administration in time range)   ondansetron (ZOFRAN-ODT) ODT tab 4 mg (4 mg Oral Given 8/1/20 0204)   ibuprofen (ADVIL/MOTRIN) tablet 600 mg (600 mg Oral Given 8/1/20 0204)       Assessments & Plan (with Medical Decision Making)   20-year-old female with known COVID19 who presents for coughing and vomiting with some blood mixed in with the emesis.  Blood pressure is 102/70, temperature is 98.7  F, heart 64, SPO2 is 99% on room air.  She is ill-appearing but nontoxic.  Breathing comfortably on room air, mentating well, normal vital signs, no signs of severe sepsis or respiratory decompensation.  She is given ondansetron and ibuprofen for her symptoms with improvement.  She is tolerating oral intake here.  Chest x-ray obtained, images reviewed independently as well as radiology read reviewed, no signs of infiltrate to suggest pneumonia, no signs of mass.  She is safe to discharge home with reassurance.  I do not believe that this represents pulmonary embolism, the hemoptysis is likely related to her COVID19 infection.  She is told to use ondansetron for the nausea, continue taking acetaminophen ibuprofen for her symptoms, return if worse, otherwise follow-up in clinic.  The patient is in agreement to this plan.    I have reviewed the nursing notes.    I have reviewed the findings, diagnosis, plan and need for follow up with the patient.       New Prescriptions    ONDANSETRON (ZOFRAN ODT) 4 MG ODT TAB    Take 1  tablet (4 mg) by mouth every 8 hours as needed for nausea       Final diagnoses:   2019 novel coronavirus disease (COVID-19)   Hemoptysis   Light headed       8/1/2020   [facility] EMERGENCY DEPARTMENT     Skinny Paniagua MD  08/01/20 7741

## 2020-08-01 NOTE — DISCHARGE INSTRUCTIONS
"Discharge Instructions for COVID-19 Patients  You have--or may have--COVID-19. Please follow the instructions listed below.   If you have a weakened immune system, discuss with your doctor any other actions you need to take.  How can I protect others?  If you have symptoms (fever, cough, body aches or trouble breathing):  Stay home and away from others (self-isolate) until:  At least 10 days have passed since your symptoms started. And   You've had no fever--and no medicine that reduces fever--for 3 full days (72 hours). And   Your other symptoms have resolved (gotten better).  If you don't show symptoms, but testing showed that you have COVID-19:  Stay home and away from others (self-isolate) until at least 10 days have passed since the date of your first positive COVID-19 test.  During this time  Stay in your own room, even for meals. Use your own bathroom if you can.  Stay away from others in your home. No hugging, kissing or shaking hands. No visitors.  Don't go to work, school or anywhere else.  Clean \"high touch\" surfaces often (doorknobs, counters, handles). Use household cleaning spray or wipes. You'll find a full list of  on the EPA website: www.epa.gov/pesticide-registration/list-n-disinfectants-use-against-sars-cov-2.  Cover your mouth and nose with a mask, tissue or wash cloth to avoid spreading germs.  Wash your hands and face often. Use soap and water.  Caregivers in these groups are at risk for severe illness due to COVID-19:  People 65 years and older  People who live in a nursing home or long-term care facility  People with chronic disease (lung, heart, cancer, diabetes, kidney, liver, immunologic)  People who have a weakened immune system, including those who:  Are in cancer treatment  Take medicine that weakens the immune system, such as corticosteroids  Had a bone marrow or organ transplant  Have an immune deficiency  Have poorly controlled HIV or AIDS  Are obese (body mass index of 40 or " higher)  Smoke regularly  Caregivers should wear gloves while washing dishes, handling laundry and cleaning bedrooms and bathrooms.  Use caution when washing and drying laundry: Don't shake dirty laundry and use the warmest water setting that you can.  For more tips on managing your health at home, go to www.cdc.gov/coronavirus/2019-ncov/downloads/10Things.pdf.  How can I take care of myself at home?  Get lots of rest. Drink extra fluids (unless a doctor has told you not to).  Take Tylenol (acetaminophen) for fever or pain. If you have liver or kidney problems, ask your family doctor if it's okay to take Tylenol.     Adults can take either:  650 mg (two 325 mg pills) every 4 to 6 hours, or   1,000 mg (two 500 mg pills) every 8 hours as needed.  Note: Don't take more than 3,000 mg in one day. Acetaminophen is found in many medicines (both prescribed and over-the-counter medicines). Read all labels to be sure you don't take too much.   For children, check the Tylenol bottle for the right dose. The dose is based on the child's age or weight.  If you have other health problems (like cancer, heart failure, an organ transplant or severe kidney disease): Call your specialty clinic if you don't feel better in the next 2 days.  Know when to call 911. Emergency warning signs include:  Trouble breathing or shortness of breath  Pain or pressure in the chest that doesn't go away  Feeling confused like you haven't felt before, or not being able to wake up  Bluish-colored lips or face  Your doctor may have prescribed a blood thinner medicine. Follow their instructions.  Where can I get more information?  [facility] - About COVID-19:   [email or web address]  CDC - What to Do If You're Sick: www.cdc.gov/coronavirus/2019-ncov/about/steps-when-sick.html  CDC - Ending Home Isolation: www.cdc.gov/coronavirus/2019-ncov/hcp/disposition-in-home-patients.html  CDC - Caring for Someone:  www.cdc.gov/coronavirus/2019-ncov/if-you-are-sick/care-for-someone.html  [facility] - Interim Guidance for Hospital Discharge to Home: www.health.[facility].[place]/diseases/coronavirus/hcp/hospdischarge.pdf  [facility] clinical trials (COVID-19 research studies): clinicalaffairs.[facility]-clinical-trials  Below are the COVID-19 hotlines at the [place] Department of Health ([facility]). Interpreters are available.  For health questions: Call 065-998-2031 or 1-788.515.5637 (7 a.m. to 7 p.m.)  For questions about schools and childcare: Call 715-127-3668 or 1-843.406.4801 (7 a.m. to 7 p.m.)    For informational purposes only. Not to replace the advice of your health care provider. Clinically reviewed by the Infection Prevention Team.Copyright   2020 [facility]. All rights reserved. [employer] [id number] - 06/20.

## 2020-08-01 NOTE — ED NOTES
Patient states she is having her period now, radiology confirmed no need for pregnancy test, HCG discontinued

## 2020-08-01 NOTE — ED AVS SNAPSHOT
[facility] Emergency Department  [address] [address]  Phone:  [phone]  Fax:  [phone]                                    Aury Cervantes   MRN: [id number]    Department:  [facility] Emergency Department   Date of Visit:  8/1/2020           After Visit Summary Signature Page    I have received my discharge instructions, and my questions have been answered. I have discussed any challenges I see with this plan with the nurse or doctor.    ..........................................................................................................................................  Patient/Patient Representative Signature      ..........................................................................................................................................  Patient Representative Print Name and Relationship to Patient    ..................................................               ................................................  Date                                   Time    ..........................................................................................................................................  Reviewed by Signature/Title    ...................................................              ..............................................  Date                                               Time          22EPIC Rev 08/18

## 2020-08-01 NOTE — ED NOTES
Here with concerns of blood in her vomit, patient states it was small patches of blood & she has been coughing & retching all day. Tested + for Covid from visit yesterday. Also endorsing SOB, body aches, intermittent headache, vomiting, nasal congestion & loss os taste & smell. On arrival appears to breathing comfortably, sats 98% on RA, afebrile & VSS.

## 2020-08-01 NOTE — TELEPHONE ENCOUNTER
[facility] reports that Aury has tested positive for COVID. Now has severe chest pain, stabbing pain when taking a deep breath, Has SOB. Also reports vomiting - vomit appears reddish orange in color but does not appear like blood.    COVID 19 Nurse Triage Plan/Patient Instructions    Please be aware that novel coronavirus (COVID-19) may be circulating in the community. If you develop symptoms such as fever, cough, or SOB or if you have concerns about the presence of another infection including coronavirus (COVID-19), please contact your health care provider or visit [email or web address].     Disposition/Instructions    ED Visit recommended. Follow protocol based instructions.     Bring Your Own Device:  Please also bring your smart device(s) (smart phones, tablets, laptops) and their charging cables for your personal use and to communicate with your care team during your visit.    Thank you for taking steps to prevent the spread of this virus.  o Limit your contact with others.  o Wear a simple mask to cover your cough.  o Wash your hands well and often.    Resources    [facility]: About COVID-19: [email or web address]    CDC: What to Do If You're Sick: www.cdc.gov/coronavirus/2019-ncov/about/steps-when-sick.html    CDC: Ending Home Isolation: www.cdc.gov/coronavirus/2019-ncov/hcp/disposition-in-home-patients.html     CDC: Caring for Someone: www.cdc.gov/coronavirus/2019-ncov/if-you-are-sick/care-for-someone.html     [facility]: Interim Guidance for Hospital Discharge to Home: www.health.[facility].[place]/diseases/coronavirus/hcp/hospdischarge.pdf    [facility] clinical trials (COVID-19 research studies): clinicalaffairs.[facility].edu/umn-clinical-trials     Below are the COVID-19 hotlines at the [place] Department of Health ([facility]). Interpreters are available.   o For health questions: Call 719-209-8738 or 1-756.335.7964 (7 a.m. to 7 p.m.)  o For questions about schools and childcare: Call  711.762.8682 or 1-357.880.5084 (7 a.m. to 7 p.m.)         Maribell Andrade RN/[place] Nurse Advisor            Reason for Disposition    SEVERE or constant chest pain or pressure (Exception: mild central chest pain, present only when coughing)    Additional Information    Negative: SEVERE difficulty breathing (e.g., struggling for each breath, speaks in single words)    Negative: Difficult to awaken or acting confused (e.g., disoriented, slurred speech)    Negative: Bluish (or gray) lips or face now    Negative: Shock suspected (e.g., cold/pale/clammy skin, too weak to stand, low BP, rapid pulse)    Negative: Sounds like a life-threatening emergency to the triager    Protocols used: CORONAVIRUS (COVID-19) DIAGNOSED OR [id number] 5.16.20

## 2020-09-18 ENCOUNTER — HOSPITAL ENCOUNTER (EMERGENCY)
Facility: CLINIC | Age: 20
Discharge: HOME | End: 2020-09-18
Attending: PHYSICIAN ASSISTANT | Admitting: PHYSICIAN ASSISTANT
Payer: COMMERCIAL

## 2020-09-18 VITALS
BODY MASS INDEX: 19.14 KG/M2 | DIASTOLIC BLOOD PRESSURE: 74 MMHG | SYSTOLIC BLOOD PRESSURE: 107 MMHG | WEIGHT: 115 LBS | OXYGEN SATURATION: 100 % | TEMPERATURE: 97.1 F | HEART RATE: 90 BPM | RESPIRATION RATE: 16 BRPM

## 2020-09-18 DIAGNOSIS — J02.9 ACUTE PHARYNGITIS, UNSPECIFIED ETIOLOGY: ICD-10-CM

## 2020-09-18 LAB
ALBUMIN UR-MCNC: NEGATIVE MG/DL
APPEARANCE UR: ABNORMAL
BILIRUB UR QL STRIP: NEGATIVE
COLOR UR AUTO: YELLOW
DEPRECATED S PYO AG THROAT QL EIA: NEGATIVE
GLUCOSE UR STRIP-MCNC: NEGATIVE MG/DL
HGB UR QL STRIP: NEGATIVE
HYALINE CASTS #/AREA URNS LPF: 3 /LPF (ref 0–2)
KETONES UR STRIP-MCNC: NEGATIVE MG/DL
LEUKOCYTE ESTERASE UR QL STRIP: NEGATIVE
MUCOUS THREADS #/AREA URNS LPF: PRESENT /LPF
NITRATE UR QL: NEGATIVE
PH UR STRIP: 6 PH (ref 5–7)
RBC #/AREA URNS AUTO: <1 /HPF (ref 0–2)
SOURCE: ABNORMAL
SP GR UR STRIP: 1.01 (ref 1–1.03)
SPECIMEN SOURCE: NORMAL
SPECIMEN SOURCE: NORMAL
SQUAMOUS #/AREA URNS AUTO: 9 /HPF (ref 0–1)
STREP GROUP A PCR: NOT DETECTED
UROBILINOGEN UR STRIP-MCNC: 0 MG/DL (ref 0–2)
WBC #/AREA URNS AUTO: 1 /HPF (ref 0–5)

## 2020-09-18 PROCEDURE — 87651 STREP A DNA AMP PROBE: CPT | Performed by: PHYSICIAN ASSISTANT

## 2020-09-18 PROCEDURE — 40001204 ZZHCL STATISTIC STREP A RAPID: Performed by: PHYSICIAN ASSISTANT

## 2020-09-18 PROCEDURE — 99214 OFFICE O/P EST MOD 30 MIN: CPT | Mod: Z6 | Performed by: PHYSICIAN ASSISTANT

## 2020-09-18 PROCEDURE — G0463 HOSPITAL OUTPT CLINIC VISIT: HCPCS | Performed by: PHYSICIAN ASSISTANT

## 2020-09-18 PROCEDURE — 81001 URINALYSIS AUTO W/SCOPE: CPT | Performed by: PHYSICIAN ASSISTANT

## 2020-09-18 RX ORDER — DEXAMETHASONE 4 MG/1
10 TABLET ORAL ONCE
Qty: 3 TABLET | Refills: 0 | Status: SHIPPED | OUTPATIENT
Start: 2020-09-18 | End: 2020-10-02

## 2020-09-18 NOTE — ED AVS SNAPSHOT
[facility] Emergency Department  [address] [address]  Phone:  [phone]  Fax:  [phone]                                    Aury Cervantes   MRN: [id number]    Department:  [facility] Emergency Department   Date of Visit:  9/18/2020           After Visit Summary Signature Page    I have received my discharge instructions, and my questions have been answered. I have discussed any challenges I see with this plan with the nurse or doctor.    ..........................................................................................................................................  Patient/Patient Representative Signature      ..........................................................................................................................................  Patient Representative Print Name and Relationship to Patient    ..................................................               ................................................  Date                                   Time    ..........................................................................................................................................  Reviewed by Signature/Title    ...................................................              ..............................................  Date                                               Time          22EPIC Rev 08/18
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
## 2020-10-02 ENCOUNTER — OFFICE VISIT (OUTPATIENT)
Dept: FAMILY MEDICINE | Facility: CLINIC | Age: 20
End: 2020-10-02
Payer: COMMERCIAL

## 2020-10-02 VITALS
TEMPERATURE: 98.9 F | SYSTOLIC BLOOD PRESSURE: 106 MMHG | BODY MASS INDEX: 19.22 KG/M2 | DIASTOLIC BLOOD PRESSURE: 76 MMHG | WEIGHT: 115.4 LBS | HEART RATE: 95 BPM | HEIGHT: 65 IN | RESPIRATION RATE: 16 BRPM | OXYGEN SATURATION: 100 %

## 2020-10-02 DIAGNOSIS — N64.4 BREAST TENDERNESS IN FEMALE: ICD-10-CM

## 2020-10-02 DIAGNOSIS — N63.0 LUMP OR MASS IN BREAST: Primary | ICD-10-CM

## 2020-10-02 PROCEDURE — 99213 OFFICE O/P EST LOW 20 MIN: CPT | Performed by: NURSE PRACTITIONER

## 2020-10-02 ASSESSMENT — MIFFLIN-ST. JEOR: SCORE: 1289.33

## 2020-10-02 NOTE — PATIENT INSTRUCTIONS
1. Schedule breast ultrasound for further evaluation-call [phone]  2. Try taking tylenol or ibuprofen for breast tenderness, you may also use warm packs to the areas that are most tender.  3. Follow-up pending breast ultrasound

## 2020-10-02 NOTE — PROGRESS NOTES
Aury Cervantes is a 20 year old female who presents to clinic today for the following health issues:    HPI         Concern - Lump in Breast   Onset: ongoing since age 13  Description: multiple lumps in both breast  Intensity: moderate  Progression of Symptoms:  worsening and constant  Accompanying Signs & Symptoms: painful to touch   Previous history of similar problem: patient states she had an ultrasound at age 13, at [facility] in [place], [place]. She did at that time have cysts and was told to wait until she was done growing.  She has since noticed more lumps and old ones have grown in size and have become painful  Precipitating factors:        Worsened by: touch or bumped, wearing a bra, arm movements  Alleviating factors:        Improved by: none  Therapies tried and outcome: ibuprofen does not help     Nulliparous female presents with ongoing breast lumps since she was 13. Evaluated at [facility] and was told lumps were cysts. Patient feels lumps have either stayed the same size or have enlarged but have never gone away. Currently she is more aware of the lumps as they are painful and pain has progressively worsened as she has gotten older. Painful to touch and aggravated by wearing a bra or with crossing arms across the chest. Denies any breast skin dimpling or changes, nipple discharge, or any warmth or redness to breast tissue. LMP 9/2/20. Currently sexually active, not using any form of birth control. Denies any nausea, vomiting or pelvic ligament pain. Does not feel she is pregnant at this time.  No family hx of breast cancer that is known.  Family does not talk about their healthcare.    Review of Systems   CONSTITUTIONAL: NEGATIVE for fever, chills, change in weight  NECK  RESP: NEGATIVE for significant cough or SOB  BREAST: POSITIVE for multiple lumps in bilateral breast that are painful pain NEGATIVE for nipple discharge, skin changes, or any assymmetry, swelling or warmth.  CV: NEGATIVE for chest pain,  "palpitations or peripheral edema  ROS otherwise negative      Objective    /76   Pulse 95   Temp 98.9  F (37.2  C) (Tympanic)   Resp 16   Ht 1.651 m (5' 5\")   Wt 52.3 kg (115 lb 6.4 oz)   SpO2 100%   BMI 19.20 kg/m    Body mass index is 19.2 kg/m .  Physical Exam   GENERAL: healthy, alert and no distress  NECK: no adenopathy, no asymmetry, masses, or scars and thyroid normal to palpation  RESP: lungs clear to auscultation - no rales, rhonchi or wheezes  BREAST: No asymmetry of breast tissue with arm movements. Dense breast tissue bilaterally. No nipple discharge, nipple inversion, skin dimpling, warmth or erythema bilaterally.  Left: Ping-pong sized lump at 12 o'clock that is mobile and soft, quarter size or smaller mobile soft lump at 3 o'clock, and a pea-sized pellet that is mobile but hard at the 6 o'clock position. Right: Ping-pong sized mobile soft lump at 12 o'clock position   CV: regular rate and rhythm, normal S1 S2, no S3 or S4, no murmur, click or rub, no peripheral edema and peripheral pulses strong  PSYCH: mentation appears normal, affect normal/bright        Assessment & Plan     Lump or mass in breast  Ongoing, palpable, tender lumps present in bilateral breasts. Unable to review previous US evaluation suggesting breast cysts done at [facility] when patient was 13 y.o. Further evaluation with US ordered. Pending US results may consider further diagnostics like breast biopsy or mammogram. Follow-up pending US results.  - US Breast Bilateral Complete 4 Quadrants; Future    Breast tenderness in female  Encouraged conservative management of breast tenderness with OTC tylenol/ibuprofen and warm packs. Counseled patient on possibility of pregnancy if sexually active and without use of contraception.   Declined that she could be pregnant and not concerned if she was at this time since her and her boyfriend would not care if she did get pregnant.  Discussed that if she misses her period that she " should either follow-up in clinic for pregnancy test or do home pregnancy.    - US Breast Bilateral Complete 4 Quadrants; Future      Offered patient birth control and discussed option of Mirena.  Patient declined at this time.    See Patient Instructions    Return if symptoms worsen or fail to improve.    Deb Baum NP  [facility]

## 2020-10-07 ENCOUNTER — HOSPITAL ENCOUNTER (OUTPATIENT)
Dept: ULTRASOUND IMAGING | Facility: CLINIC | Age: 20
Discharge: HOME | End: 2020-10-07
Attending: NURSE PRACTITIONER | Admitting: NURSE PRACTITIONER
Payer: COMMERCIAL

## 2020-10-07 DIAGNOSIS — N64.4 BREAST TENDERNESS IN FEMALE: ICD-10-CM

## 2020-10-07 DIAGNOSIS — N63.0 LUMP OR MASS IN BREAST: ICD-10-CM

## 2020-10-07 PROCEDURE — 76642 ULTRASOUND BREAST LIMITED: CPT | Mod: 50

## 2020-10-08 DIAGNOSIS — N60.02 BREAST CYST, LEFT: Primary | ICD-10-CM

## 2020-12-15 ENCOUNTER — HOSPITAL ENCOUNTER (EMERGENCY)
Facility: CLINIC | Age: 20
Discharge: HOME | End: 2020-12-15
Attending: PHYSICIAN ASSISTANT | Admitting: PHYSICIAN ASSISTANT
Payer: COMMERCIAL

## 2020-12-15 ENCOUNTER — APPOINTMENT (OUTPATIENT)
Dept: ULTRASOUND IMAGING | Facility: CLINIC | Age: 20
End: 2020-12-15
Attending: PHYSICIAN ASSISTANT
Payer: COMMERCIAL

## 2020-12-15 VITALS
SYSTOLIC BLOOD PRESSURE: 107 MMHG | TEMPERATURE: 98.8 F | RESPIRATION RATE: 16 BRPM | HEIGHT: 65 IN | WEIGHT: 115 LBS | OXYGEN SATURATION: 98 % | DIASTOLIC BLOOD PRESSURE: 69 MMHG | BODY MASS INDEX: 19.16 KG/M2 | HEART RATE: 67 BPM

## 2020-12-15 DIAGNOSIS — N83.291 COMPLEX CYST OF RIGHT OVARY: ICD-10-CM

## 2020-12-15 DIAGNOSIS — D50.9 CHRONIC IRON DEFICIENCY ANEMIA: ICD-10-CM

## 2020-12-15 DIAGNOSIS — N92.0 MENORRHAGIA: ICD-10-CM

## 2020-12-15 DIAGNOSIS — N83.201 RUPTURE OF CYST OF RIGHT OVARY: ICD-10-CM

## 2020-12-15 DIAGNOSIS — R10.30 LOWER ABDOMINAL PAIN: ICD-10-CM

## 2020-12-15 LAB
ALBUMIN SERPL-MCNC: 3.2 G/DL (ref 3.4–5)
ALBUMIN UR-MCNC: NEGATIVE MG/DL
ALP SERPL-CCNC: 69 U/L (ref 40–150)
ALT SERPL W P-5'-P-CCNC: 12 U/L (ref 0–50)
ANION GAP SERPL CALCULATED.3IONS-SCNC: 6 MMOL/L (ref 3–14)
APPEARANCE UR: ABNORMAL
AST SERPL W P-5'-P-CCNC: 16 U/L (ref 0–45)
B-HCG SERPL-ACNC: <1 IU/L (ref 0–5)
BASOPHILS # BLD AUTO: 0 10E9/L (ref 0–0.2)
BASOPHILS NFR BLD AUTO: 0.8 %
BILIRUB SERPL-MCNC: 0.3 MG/DL (ref 0.2–1.3)
BILIRUB UR QL STRIP: NEGATIVE
BUN SERPL-MCNC: 7 MG/DL (ref 7–30)
CALCIUM SERPL-MCNC: 7.6 MG/DL (ref 8.5–10.1)
CHLORIDE SERPL-SCNC: 114 MMOL/L (ref 94–109)
CO2 SERPL-SCNC: 22 MMOL/L (ref 20–32)
COLOR UR AUTO: YELLOW
CREAT SERPL-MCNC: 0.63 MG/DL (ref 0.52–1.04)
DIFFERENTIAL METHOD BLD: ABNORMAL
EOSINOPHIL # BLD AUTO: 0.1 10E9/L (ref 0–0.7)
EOSINOPHIL NFR BLD AUTO: 2.5 %
ERYTHROCYTE [DISTWIDTH] IN BLOOD BY AUTOMATED COUNT: 15.2 % (ref 10–15)
GFR SERPL CREATININE-BSD FRML MDRD: >90 ML/MIN/{1.73_M2}
GLUCOSE SERPL-MCNC: 78 MG/DL (ref 70–99)
GLUCOSE UR STRIP-MCNC: NEGATIVE MG/DL
HCT VFR BLD AUTO: 27.9 % (ref 35–47)
HGB BLD-MCNC: 8 G/DL (ref 11.7–15.7)
HGB UR QL STRIP: NEGATIVE
IMM GRANULOCYTES # BLD: 0 10E9/L (ref 0–0.4)
IMM GRANULOCYTES NFR BLD: 0.3 %
KETONES UR STRIP-MCNC: NEGATIVE MG/DL
LEUKOCYTE ESTERASE UR QL STRIP: NEGATIVE
LYMPHOCYTES # BLD AUTO: 1 10E9/L (ref 0.8–5.3)
LYMPHOCYTES NFR BLD AUTO: 27.7 %
MCH RBC QN AUTO: 19.2 PG (ref 26.5–33)
MCHC RBC AUTO-ENTMCNC: 28.7 G/DL (ref 31.5–36.5)
MCV RBC AUTO: 67 FL (ref 78–100)
MONOCYTES # BLD AUTO: 0.4 10E9/L (ref 0–1.3)
MONOCYTES NFR BLD AUTO: 9.6 %
MUCOUS THREADS #/AREA URNS LPF: PRESENT /LPF
NEUTROPHILS # BLD AUTO: 2.2 10E9/L (ref 1.6–8.3)
NEUTROPHILS NFR BLD AUTO: 59.1 %
NITRATE UR QL: NEGATIVE
NRBC # BLD AUTO: 0 10*3/UL
NRBC BLD AUTO-RTO: 0 /100
PH UR STRIP: 6 PH (ref 5–7)
PLATELET # BLD AUTO: 117 10E9/L (ref 150–450)
POTASSIUM SERPL-SCNC: 3.3 MMOL/L (ref 3.4–5.3)
PROT SERPL-MCNC: 5.9 G/DL (ref 6.8–8.8)
RBC # BLD AUTO: 4.16 10E12/L (ref 3.8–5.2)
RBC #/AREA URNS AUTO: 3 /HPF (ref 0–2)
SODIUM SERPL-SCNC: 142 MMOL/L (ref 133–144)
SOURCE: ABNORMAL
SP GR UR STRIP: 1.01 (ref 1–1.03)
SQUAMOUS #/AREA URNS AUTO: 21 /HPF (ref 0–1)
UROBILINOGEN UR STRIP-MCNC: 0 MG/DL (ref 0–2)
WBC # BLD AUTO: 3.7 10E9/L (ref 4–11)
WBC #/AREA URNS AUTO: 2 /HPF (ref 0–5)

## 2020-12-15 PROCEDURE — 250N000011 HC RX IP 250 OP 636: Performed by: PHYSICIAN ASSISTANT

## 2020-12-15 PROCEDURE — 99285 EMERGENCY DEPT VISIT HI MDM: CPT | Performed by: PHYSICIAN ASSISTANT

## 2020-12-15 PROCEDURE — 85025 COMPLETE CBC W/AUTO DIFF WBC: CPT | Performed by: PHYSICIAN ASSISTANT

## 2020-12-15 PROCEDURE — 258N000003 HC RX IP 258 OP 636: Performed by: PHYSICIAN ASSISTANT

## 2020-12-15 PROCEDURE — 81001 URINALYSIS AUTO W/SCOPE: CPT | Performed by: PHYSICIAN ASSISTANT

## 2020-12-15 PROCEDURE — 96374 THER/PROPH/DIAG INJ IV PUSH: CPT | Performed by: PHYSICIAN ASSISTANT

## 2020-12-15 PROCEDURE — 93976 VASCULAR STUDY: CPT

## 2020-12-15 PROCEDURE — 96361 HYDRATE IV INFUSION ADD-ON: CPT | Performed by: PHYSICIAN ASSISTANT

## 2020-12-15 PROCEDURE — 84702 CHORIONIC GONADOTROPIN TEST: CPT | Performed by: PHYSICIAN ASSISTANT

## 2020-12-15 PROCEDURE — 96375 TX/PRO/DX INJ NEW DRUG ADDON: CPT | Performed by: PHYSICIAN ASSISTANT

## 2020-12-15 PROCEDURE — 99285 EMERGENCY DEPT VISIT HI MDM: CPT | Mod: 25 | Performed by: PHYSICIAN ASSISTANT

## 2020-12-15 PROCEDURE — 80053 COMPREHEN METABOLIC PANEL: CPT | Performed by: PHYSICIAN ASSISTANT

## 2020-12-15 RX ORDER — OXYCODONE HYDROCHLORIDE 5 MG/1
5 TABLET ORAL EVERY 6 HOURS PRN
Qty: 12 TABLET | Refills: 0 | Status: SHIPPED | OUTPATIENT
Start: 2020-12-15 | End: 2022-05-16

## 2020-12-15 RX ORDER — FERROUS SULFATE 325(65) MG
325 TABLET ORAL
Qty: 30 TABLET | Refills: 0 | Status: SHIPPED | OUTPATIENT
Start: 2020-12-15 | End: 2022-05-16

## 2020-12-15 RX ORDER — ONDANSETRON 2 MG/ML
4 INJECTION INTRAMUSCULAR; INTRAVENOUS EVERY 30 MIN PRN
Status: DISCONTINUED | OUTPATIENT
Start: 2020-12-15 | End: 2020-12-15 | Stop reason: HOSPADM

## 2020-12-15 RX ORDER — HYDROMORPHONE HYDROCHLORIDE 1 MG/ML
0.2 INJECTION, SOLUTION INTRAMUSCULAR; INTRAVENOUS; SUBCUTANEOUS
Status: COMPLETED | OUTPATIENT
Start: 2020-12-15 | End: 2020-12-15

## 2020-12-15 RX ORDER — KETOROLAC TROMETHAMINE 15 MG/ML
15 INJECTION, SOLUTION INTRAMUSCULAR; INTRAVENOUS ONCE
Status: COMPLETED | OUTPATIENT
Start: 2020-12-15 | End: 2020-12-15

## 2020-12-15 RX ADMIN — KETOROLAC TROMETHAMINE 15 MG: 15 INJECTION, SOLUTION INTRAMUSCULAR; INTRAVENOUS at 12:34

## 2020-12-15 RX ADMIN — SODIUM CHLORIDE, POTASSIUM CHLORIDE, SODIUM LACTATE AND CALCIUM CHLORIDE 1000 ML: 600; 310; 30; 20 INJECTION, SOLUTION INTRAVENOUS at 13:35

## 2020-12-15 RX ADMIN — HYDROMORPHONE HYDROCHLORIDE 0.2 MG: 1 INJECTION, SOLUTION INTRAMUSCULAR; INTRAVENOUS; SUBCUTANEOUS at 15:34

## 2020-12-15 ASSESSMENT — ENCOUNTER SYMPTOMS
FREQUENCY: 0
NAUSEA: 1
VOMITING: 0
ABDOMINAL PAIN: 1
CONSTITUTIONAL NEGATIVE: 1
DYSURIA: 0
MUSCULOSKELETAL NEGATIVE: 1

## 2020-12-15 ASSESSMENT — MIFFLIN-ST. JEOR: SCORE: 1287.52

## 2020-12-15 NOTE — ED AVS SNAPSHOT
[facility] Emergency Dept  [address] [address]  Phone: [phone]  Fax: [phone]                                    Aury Cervantes   MRN: [id number]    Department: [facility] Emergency Dept   Date of Visit: 12/15/2020           After Visit Summary Signature Page    I have received my discharge instructions, and my questions have been answered. I have discussed any challenges I see with this plan with the nurse or doctor.    ..........................................................................................................................................  Patient/Patient Representative Signature      ..........................................................................................................................................  Patient Representative Print Name and Relationship to Patient    ..................................................               ................................................  Date                                   Time    ..........................................................................................................................................  Reviewed by Signature/Title    ...................................................              ..............................................  Date                                               Time          22EPIC Rev 08/18

## 2020-12-15 NOTE — ED TRIAGE NOTES
C/o lower abdominal pain bilat radiating to the mid lower abdomen. Patient is concerned she may have an ovarian cyst.

## 2020-12-15 NOTE — ED NOTES
"Pt having trouble with her vision across the room, states \"the sign is blurry, I can see the big letters but not the small\" she denies vision changes with texting on her cell phone, she states \"I can see that fine\" started fluid bolus and will let U/S know she is ready. She reports abd pain is improved following Toradol and rates 5/10. Has had no emesis or nausea during her stay   "

## 2020-12-15 NOTE — ED PROVIDER NOTES
"  History     Chief Complaint   Patient presents with     Abdominal Pain     HPI  Aury Cervantes is a 20 year old female who presents via EMS with complaints of diffuse lower abdominal pain that started around 10:30 this morning.  The pain has been constant since its onset.  Pt reports the pain originates to both sides of her lower abdomen/pelvis and radiates and \"meets in the middle.\"  Pt c/o associated nausea but no vomiting.  Denies any associated urinary symptoms such as dysuria or increased urinary urgency or frequency.  No vaginal discharge.  Her LMP was 11/24/2020.  She and her significant other have tried to get pregnancy but \"nothing has come of it.\"  Pt denies hx similar symptoms in the past.  Denies fevers, chills, cough, diarrhea, or vaginal bleeding.  She denies hx abdominal surgeries.  Patient denies any new sexual partners.  Pt denies any lightheadedness or shortness of breath.        Allergies:  Allergies   Allergen Reactions     Sulfa Drugs Other (See Comments)     Both parents are allergic         Problem List:    Patient Active Problem List    Diagnosis Date Noted     Anaphylaxis, subsequent encounter 12/04/2018     Priority: Medium     Rhinoconjunctivitis 12/04/2018     Priority: Medium     S/P tonsillectomy and adenoidectomy 03/21/2018     Priority: Medium     Post-op pain 03/21/2018     Priority: Medium     Nexplanon insertion 05/29/2015     Priority: Medium     nexplanon inserted today by Dr BRITNEY Marx  Lot # [id number]  Exp 07/2017  Zulay Jacobsen         Major depressive disorder, recurrent episode, severe (H) 11/19/2014     Priority: Medium        Past Medical History:    Past Medical History:   Diagnosis Date     Depression      Nexplanon in place        Past Surgical History:    Past Surgical History:   Procedure Laterality Date     SURGICAL HISTORY OF -       PE tubes     TONSILLECTOMY, ADENOIDECTOMY ADULT, COMBINED Bilateral 3/19/2018    Procedure: COMBINED TONSILLECTOMY, ADENOIDECTOMY " "ADULT;  Bilateral Adenotonsillectomy;  Surgeon: Kevin Arias MD;  Location: [facility]       Family History:    Family History   Problem Relation Age of Onset     Depression Mother      Depression Father      Depression Maternal Grandmother      Cancer Maternal Grandmother      Diabetes Maternal Grandfather      Heart Disease Maternal Grandfather      Hypertension Maternal Grandfather      Depression Paternal Grandmother         bipolar     Depression Paternal Grandfather         bipolar       Social History:  Marital Status:  Single [1]  Social History     Tobacco Use     Smoking status: Former Smoker     Smokeless tobacco: Never Used   Substance Use Topics     Alcohol use: No     Alcohol/week: 0.0 standard drinks     Drug use: No        Medications:         ferrous sulfate (FEROSUL) 325 (65 Fe) MG tablet       oxyCODONE (ROXICODONE) 5 MG tablet       EPINEPHrine (ADRENACLICK) 0.3 MG/0.3ML injection 2-pack          Review of Systems   Constitutional: Negative.    Gastrointestinal: Positive for abdominal pain and nausea. Negative for vomiting.   Genitourinary: Positive for pelvic pain. Negative for dysuria, frequency, vaginal bleeding and vaginal discharge.   Musculoskeletal: Negative.    Skin: Negative.    All other systems reviewed and are negative.      Physical Exam   BP: 115/72  Pulse: 74  Temp: 98.8  F (37.1  C)  Resp: 16  Height: 165.1 cm (5' 5\")  Weight: 52.2 kg (115 lb)  SpO2: 100 %      Physical Exam  Constitutional:       General: She is not in acute distress.     Appearance: Normal appearance. She is not ill-appearing, toxic-appearing or diaphoretic.   HENT:      Head: Normocephalic and atraumatic.   Eyes:      Conjunctiva/sclera: Conjunctivae normal.   Neck:      Musculoskeletal: Normal range of motion and neck supple.   Cardiovascular:      Rate and Rhythm: Normal rate and regular rhythm.      Heart sounds: Normal heart sounds.   Pulmonary:      Effort: Pulmonary effort is normal.      Breath sounds: " Normal breath sounds.   Abdominal:      General: There is no distension.      Palpations: Abdomen is soft.      Tenderness: There is abdominal tenderness in the right lower quadrant, suprapubic area and left lower quadrant. There is no right CVA tenderness, left CVA tenderness, guarding or rebound.   Musculoskeletal: Normal range of motion.   Skin:     General: Skin is warm and dry.      Coloration: Skin is pale.   Neurological:      General: No focal deficit present.      Mental Status: She is alert and oriented to person, place, and time.         ED Course        Procedures      Results for orders placed or performed during the hospital encounter of 12/15/20 (from the past 24 hour(s))   UA reflex to Microscopic   Result Value Ref Range    Color Urine Yellow     Appearance Urine Slightly Cloudy     Glucose Urine Negative NEG^Negative mg/dL    Bilirubin Urine Negative NEG^Negative    Ketones Urine Negative NEG^Negative mg/dL    Specific Gravity Urine 1.014 1.003 - 1.035    Blood Urine Negative NEG^Negative    pH Urine 6.0 5.0 - 7.0 pH    Protein Albumin Urine Negative NEG^Negative mg/dL    Urobilinogen mg/dL 0.0 0.0 - 2.0 mg/dL    Nitrite Urine Negative NEG^Negative    Leukocyte Esterase Urine Negative NEG^Negative    Source Midstream Urine     RBC Urine 3 (H) 0 - 2 /HPF    WBC Urine 2 0 - 5 /HPF    Squamous Epithelial /HPF Urine 21 (H) 0 - 1 /HPF    Mucous Urine Present (A) NEG^Negative /LPF   CBC with platelets differential   Result Value Ref Range    WBC 3.7 (L) 4.0 - 11.0 10e9/L    RBC Count 4.16 3.8 - 5.2 10e12/L    Hemoglobin 8.0 (L) 11.7 - 15.7 g/dL    Hematocrit 27.9 (L) 35.0 - 47.0 %    MCV 67 (L) 78 - 100 fl    MCH 19.2 (L) 26.5 - 33.0 pg    MCHC 28.7 (L) 31.5 - 36.5 g/dL    RDW 15.2 (H) 10.0 - 15.0 %    Platelet Count 117 (L) 150 - 450 10e9/L    Diff Method Automated Method     % Neutrophils 59.1 %    % Lymphocytes 27.7 %    % Monocytes 9.6 %    % Eosinophils 2.5 %    % Basophils 0.8 %    % Immature  Granulocytes 0.3 %    Nucleated RBCs 0 0 /100    Absolute Neutrophil 2.2 1.6 - 8.3 10e9/L    Absolute Lymphocytes 1.0 0.8 - 5.3 10e9/L    Absolute Monocytes 0.4 0.0 - 1.3 10e9/L    Absolute Eosinophils 0.1 0.0 - 0.7 10e9/L    Absolute Basophils 0.0 0.0 - 0.2 10e9/L    Abs Immature Granulocytes 0.0 0 - 0.4 10e9/L    Absolute Nucleated RBC 0.0    Comprehensive metabolic panel   Result Value Ref Range    Sodium 142 133 - 144 mmol/L    Potassium 3.3 (L) 3.4 - 5.3 mmol/L    Chloride 114 (H) 94 - 109 mmol/L    Carbon Dioxide 22 20 - 32 mmol/L    Anion Gap 6 3 - 14 mmol/L    Glucose 78 70 - 99 mg/dL    Urea Nitrogen 7 7 - 30 mg/dL    Creatinine 0.63 0.52 - 1.04 mg/dL    GFR Estimate >90 >60 mL/min/[1.73_m2]    GFR Estimate If Black >90 >60 mL/min/[1.73_m2]    Calcium 7.6 (L) 8.5 - 10.1 mg/dL    Bilirubin Total 0.3 0.2 - 1.3 mg/dL    Albumin 3.2 (L) 3.4 - 5.0 g/dL    Protein Total 5.9 (L) 6.8 - 8.8 g/dL    Alkaline Phosphatase 69 40 - 150 U/L    ALT 12 0 - 50 U/L    AST 16 0 - 45 U/L   HCG quantitative pregnancy   Result Value Ref Range    HCG Quantitative Serum <1 0 - 5 IU/L   US Pelvis Cmplt w Transvag & Doppler LmtPel Duplex Limited    Narrative    US PELVIS COMPLETE WITH TRANSVAGINAL AND DOPPLER LIMITED 12/15/2020  2:59 PM     HISTORY: Bilateral lower pelvic pain     COMPARISON: Pelvic ultrasound 5/6/2019.    FINDINGS: Transvaginal images were performed to better evaluate the  patient's uterus, ovaries and endometrial stripe.    The uterus is normal in size measuring 6.8 x 3.8 x 5.3 cm. No fibroids  are evident. Endometrial stripe measures 17 mm and is normal for  patient's age. The right ovary measures 5.0 x 5.4 x 5.0 cm. Right  ovarian cysts are present with the largest measuring 3.6 cm. These  cystic lesions demonstrate internal low-level echoes and fluid-fluid  levels likely reflecting hemorrhagic cysts or endometriomas. No  abnormal color Doppler flow within these cystic lesions. Color Doppler  flow is noted within  "the right ovary. The left ovary is not  visualized. No adnexal masses are present. Moderate amount of mildly  complex free pelvic fluid is noted.      Impression    IMPRESSION: Uterus and endometrial stripe are unremarkable. Right  ovary demonstrates complex appearing cystic lesions possibly  endometriomas. Complex free pelvic fluid may reflect a ruptured  hemorrhagic cyst. Color Doppler flow noted in the right ovary. Left  ovary not visualized.    SHEILA GRIFFIN MD       Medications   ketorolac (TORADOL) injection 15 mg (15 mg Intravenous Given 12/15/20 1234)   lactated ringers BOLUS 1,000 mL (0 mLs Intravenous Stopped 12/15/20 1422)   HYDROmorphone (PF) (DILAUDID) injection 0.2 mg (0.2 mg Intravenous Given 12/15/20 1534)       Assessments & Plan (with Medical Decision Making)     Pt is a 20 year old female who presents via EMS with complaints of diffuse lower abdominal pain that started around 10:30 this morning.  The pain has been constant since its onset.  Pt reports the pain originates to both sides of her lower abdomen/pelvis and radiates and \"meets in the middle.\"  Pt c/o associated nausea but no vomiting.  Denies any associated urinary symptoms such as dysuria or increased urinary urgency or frequency.  No vaginal discharge.  Her LMP was 11/24/2020.  She has been trying to get pregnant.     Pt is afebrile on arrival.  Exam as above.  WBC count of 3.7  Hemoglobin today is 8.0 (pt has history of chronic anemia due to menorrhagia; last hemoglobin on file was 9.8 on 5/6/2019), hematocrit 27.9, platelets of 117.  Potassium is slightly low at 3.3.  Creatinine was normal.  Urinalysis was negative for infection.  Serum quantitative beta-hcg was negative/undetectable.  Pelvic ultrasound shows complex appearing cystic lesions to the right ovary likely reflecting hemorrhagic cyst or endometriomas.  There is moderate amount of complex free fluid within the pelvis which may reflect a ruptured hemorrhagic cyst.  Color " Doppler flow noted to the right ovary.  Left ovary is not visualized.  Discussed pt's case with on-call OB/GYN provider, Dr. Hartman.  Since patient is vitally stable with improving pain and no peritoneal signs, no indication for emergent surgery.  Recommends follow-up in clinic for these ultrasound findings as well as to discuss further evaluation and management of her ongoing chronic anemia and menorrhagia.  Pt was given IV fluids and pain control and is feeling improved.  Pt was monitored in the department for nearly 6 hours with stable blood pressure and no tachycardia.  Pt denies any lightheadedness or shortness of breath related to her anemia.  Pt states she has been on various birth control methods over the past 3 years in attempts to control her menorrhagia which she felt each made her bleeding worse.  She states she is now trying to get pregnant so would rather not be on any birth control methods for treatment of her menorrhagia.  I expressed concern over her further decreasing hemoglobin (which was already low a year and a half ago) and need to manage this.  She expresses understanding of this.  Dr. Hartman even mentioned possibility of taking TXA before her periods each month in attempts to slow bleeding.  Pt states she has never taken iron supplementation so will start that today.  Return precautions were reviewed including returning immediately for worsening pain, vaginal bleeding, fevers, vomiting, lightheadedness, shortness of breath, or any symptoms of concern.  Hand-outs were provided.    Patient was sent with Oxycodone for pain and iron supplementation and was instructed to follow-up with OB/GYN in 1-2 days for continued care and management (referral made) or sooner if new or worsening symptoms.  She is to return to the ED for persistent and/or worsening symptoms.  Patient expressed understanding of the diagnosis and plan and was discharged home in good condition.    I have reviewed the nursing  notes.    I have reviewed the findings, diagnosis, plan and need for follow up with the patient.    Discharge Medication List as of 12/15/2020  5:15 PM      START taking these medications    Details   ferrous sulfate (FEROSUL) 325 (65 Fe) MG tablet Take 1 tablet (325 mg) by mouth daily (with breakfast), Disp-30 tablet, R-0, E-Prescribe      oxyCODONE (ROXICODONE) 5 MG tablet Take 1 tablet (5 mg) by mouth every 6 hours as needed for pain, Disp-12 tablet, R-0, E-Prescribe             Final diagnoses:   Complex cyst of right ovary   Rupture of cyst of right ovary   Lower abdominal pain   Chronic iron deficiency anemia   Menorrhagia       12/15/2020   [facility] EMERGENCY DEPT      Disclaimer:  This note consists of symbols derived from keyboarding, dictation and/or voice recognition software.  As a result, there may be errors in the script that have gone undetected.  Please consider this when interpreting information found in this chart.     Tia Lackey PA-C  12/15/20 2201       Tia Lackey PA-C  12/15/20 2208       Tia Lackey PA-C  12/15/20 2210

## 2020-12-15 NOTE — LETTER
December 15, 2020      To Whom It May Concern:      Aury Cervantes was seen in our Emergency Department today, 12/15/20.  Please excuse from work today and tomorrow.  Thank you.      Sincerely,        Tia Lackey PA-C

## 2020-12-15 NOTE — ED NOTES
Fluids completed, she is feeling some better states vision is getting better, she c/o of pain at IV site, this site looks good, no swelling redness noted. She continues on her phone, seems comfortable

## 2021-01-08 ENCOUNTER — OFFICE VISIT (OUTPATIENT)
Dept: OBGYN | Facility: CLINIC | Age: 21
End: 2021-01-08
Attending: PHYSICIAN ASSISTANT
Payer: COMMERCIAL

## 2021-01-08 VITALS
HEIGHT: 65 IN | BODY MASS INDEX: 19.09 KG/M2 | TEMPERATURE: 99.2 F | DIASTOLIC BLOOD PRESSURE: 72 MMHG | RESPIRATION RATE: 14 BRPM | HEART RATE: 85 BPM | WEIGHT: 114.6 LBS | SYSTOLIC BLOOD PRESSURE: 106 MMHG

## 2021-01-08 DIAGNOSIS — D50.0 IRON DEFICIENCY ANEMIA DUE TO CHRONIC BLOOD LOSS: Primary | ICD-10-CM

## 2021-01-08 DIAGNOSIS — N83.201 HEMORRHAGIC CYST OF RIGHT OVARY: ICD-10-CM

## 2021-01-08 DIAGNOSIS — N93.9 ABNORMAL UTERINE BLEEDING: ICD-10-CM

## 2021-01-08 LAB
ERYTHROCYTE [DISTWIDTH] IN BLOOD BY AUTOMATED COUNT: 16 % (ref 10–15)
FERRITIN SERPL-MCNC: 2 NG/ML (ref 12–150)
HCT VFR BLD AUTO: 30.5 % (ref 35–47)
HGB BLD-MCNC: 8.9 G/DL (ref 11.7–15.7)
IRON SATN MFR SERPL: 6 % (ref 15–46)
IRON SERPL-MCNC: 25 UG/DL (ref 35–180)
MCH RBC QN AUTO: 19.2 PG (ref 26.5–33)
MCHC RBC AUTO-ENTMCNC: 29.2 G/DL (ref 31.5–36.5)
MCV RBC AUTO: 66 FL (ref 78–100)
PLATELET # BLD AUTO: 207 10E9/L (ref 150–450)
RBC # BLD AUTO: 4.64 10E12/L (ref 3.8–5.2)
TIBC SERPL-MCNC: 448 UG/DL (ref 240–430)
TRANSFERRIN SERPL-MCNC: 379 MG/DL (ref 210–360)
WBC # BLD AUTO: 4.6 10E9/L (ref 4–11)

## 2021-01-08 PROCEDURE — 85240 CLOT FACTOR VIII AHG 1 STAGE: CPT | Performed by: OBSTETRICS & GYNECOLOGY

## 2021-01-08 PROCEDURE — 85246 CLOT FACTOR VIII VW ANTIGEN: CPT | Performed by: OBSTETRICS & GYNECOLOGY

## 2021-01-08 PROCEDURE — 85027 COMPLETE CBC AUTOMATED: CPT | Performed by: OBSTETRICS & GYNECOLOGY

## 2021-01-08 PROCEDURE — 99N1023 PR STATISTIC INR NC: Performed by: OBSTETRICS & GYNECOLOGY

## 2021-01-08 PROCEDURE — 85390 FIBRINOLYSINS SCREEN I&R: CPT | Performed by: PATHOLOGY

## 2021-01-08 PROCEDURE — 83540 ASSAY OF IRON: CPT | Performed by: OBSTETRICS & GYNECOLOGY

## 2021-01-08 PROCEDURE — 99205 OFFICE O/P NEW HI 60 MIN: CPT | Performed by: OBSTETRICS & GYNECOLOGY

## 2021-01-08 PROCEDURE — 85245 CLOT FACTOR VIII VW RISTOCTN: CPT | Performed by: OBSTETRICS & GYNECOLOGY

## 2021-01-08 PROCEDURE — 85247 CLOT FACTOR VIII MULTIMETRIC: CPT | Mod: 90 | Performed by: OBSTETRICS & GYNECOLOGY

## 2021-01-08 PROCEDURE — 85245 CLOT FACTOR VIII VW RISTOCTN: CPT | Mod: 90 | Performed by: OBSTETRICS & GYNECOLOGY

## 2021-01-08 PROCEDURE — 99N1035 PR STATISTIC THROMBIN TIME NC: Performed by: OBSTETRICS & GYNECOLOGY

## 2021-01-08 PROCEDURE — 99N1028 PR STATISTIC PTT NC: Performed by: OBSTETRICS & GYNECOLOGY

## 2021-01-08 PROCEDURE — 82728 ASSAY OF FERRITIN: CPT | Performed by: OBSTETRICS & GYNECOLOGY

## 2021-01-08 PROCEDURE — 99000 SPECIMEN HANDLING OFFICE-LAB: CPT | Performed by: OBSTETRICS & GYNECOLOGY

## 2021-01-08 PROCEDURE — 99N1037 PR STATISTIC VON WILLEBRAND MULTIMERS: Performed by: OBSTETRICS & GYNECOLOGY

## 2021-01-08 PROCEDURE — 36415 COLL VENOUS BLD VENIPUNCTURE: CPT | Performed by: OBSTETRICS & GYNECOLOGY

## 2021-01-08 PROCEDURE — 84466 ASSAY OF TRANSFERRIN: CPT | Performed by: OBSTETRICS & GYNECOLOGY

## 2021-01-08 ASSESSMENT — MIFFLIN-ST. JEOR: SCORE: 1285.7

## 2021-01-08 NOTE — PROGRESS NOTES
[facility]  OB/GYN Clinic   Gynecology Consult Note    CC:    Chief Complaint   Patient presents with     Consult     ovarian cysts       HPI: Ms. Cervantes is a 20 year old G0 being seen for GYN consultation for follow-up on right ovarian complex cysts. She started having right-sided pain on 12/15, lasted a few days and went away on its own. Used ibuprofen.  She was seen in the emergency department at this time where she was diagnosed with a 3/6cm and ~2cm right complex ovarian cyst.  Upon review of the images they demonstrate internal low-level echoes likely hemorrhagic ovarian cyst versus endometrioma.  They contain no concerning features for malignancy. She was managed expectantly after this emergency room visit and was able to control her pain with only ibuprofen.  She reports at the time that she was seen in the emergency department her pain was a sharp stabbing pain and thought maybe that the cyst had ruptured.  After that visit she had approximately 2 days of more of an achy pain that resolved spontaneously although reports when she had more strenuous activity she would notice the pain more.  He then reports that the pain returned again on the right-side around [place], for about a day and half, and she was able to manage this at home with ibuprofen.    Eating, drinking, pooping, peeing fine. No fevers.  She does admit to a fairly poor diet with infrequent consumption of iron rich foods.    Patient's last menstrual period was 12/19/2020 (approximate).    GYN Hx: Reports menarche at age 13-14, regular menses every 28-30 days, lasting 6-7 days. Very heavy, has to change a pad or a tampon every 30 min. Passes clots, quarter-sized. The first 2-3 days are very heavy and has over night flooding. Has had heavy periods since menarche. Not using contraception. Trying for pregnancy.  Was told in the emergency department that these complex right ovarian cyst mean that she has PCOS.  Has been trying for  pregnancy for ~5 months. Has some moderate cramping. Does ok with ibuprofen and a heating pad.   Has used OCPs, patch and pills, Nexplanon.  She reports that all of these treatment options have caused her bleeding to be heavier.  Had chlamydia in the past.   Reports a history of anemia which she attributes to her heavy vaginal bleeding.    ROS: A 10 pt ROS was completed and found to be otherwise negative unless mentioned in the HPI.     PMH:   Past Medical History:   Diagnosis Date     Depression      Nexplanon in place        PSHx:   Past Surgical History:   Procedure Laterality Date     SURGICAL HISTORY OF -       PE tubes     TONSILLECTOMY, ADENOIDECTOMY ADULT, COMBINED Bilateral 3/19/2018    Procedure: COMBINED TONSILLECTOMY, ADENOIDECTOMY ADULT;  Bilateral Adenotonsillectomy;  Surgeon: Kevin Arias MD;  Location: [facility]       OBHx:   OB History   No obstetric history on file.       Medications:        EPINEPHrine (ADRENACLICK) 0.3 MG/0.3ML injection 2-pack, Inject 0.3 mLs (0.3 mg) into the muscle as needed for anaphylaxis (Patient not taking: Reported on 10/2/2020)       ferrous sulfate (FEROSUL) 325 (65 Fe) MG tablet, Take 1 tablet (325 mg) by mouth daily (with breakfast) (Patient not taking: Reported on 1/8/2021)       oxyCODONE (ROXICODONE) 5 MG tablet, Take 1 tablet (5 mg) by mouth every 6 hours as needed for pain (Patient not taking: Reported on 1/8/2021)    No current facility-administered medications on file prior to visit.       Allergies:      Allergies   Allergen Reactions     Sulfa Drugs Other (See Comments)     Both parents are allergic         Social History:   Social History     Socioeconomic History     Marital status: Single     Spouse name: Not on file     Number of children: Not on file     Years of education: Not on file     Highest education level: Not on file   Occupational History     Not on file   Social Needs     Financial resource strain: Not on file     Food insecurity     Worry: Not  on file     Inability: Not on file     Transportation needs     Medical: Not on file     Non-medical: Not on file   Tobacco Use     Smoking status: Former Smoker     Smokeless tobacco: Never Used   Substance and Sexual Activity     Alcohol use: No     Alcohol/week: 0.0 standard drinks     Drug use: No     Sexual activity: Yes     Partners: Male     Birth control/protection: Patch   Lifestyle     Physical activity     Days per week: Not on file     Minutes per session: Not on file     Stress: Not on file   Relationships     Social connections     Talks on phone: Not on file     Gets together: Not on file     Attends Buddhist service: Not on file     Active member of club or organization: Not on file     Attends meetings of clubs or organizations: Not on file     Relationship status: Not on file     Intimate partner violence     Fear of current or ex partner: Not on file     Emotionally abused: Not on file     Physically abused: Not on file     Forced sexual activity: Not on file   Other Topics Concern     Parent/sibling w/ CABG, MI or angioplasty before 65F 55M? No   Social History Narrative     Not on file     Social History     Socioeconomic History     Marital status: Single     Spouse name: None     Number of children: None     Years of education: None     Highest education level: None   Occupational History     None   Social Needs     Financial resource strain: None     Food insecurity     Worry: None     Inability: None     Transportation needs     Medical: None     Non-medical: None   Tobacco Use     Smoking status: Former Smoker     Smokeless tobacco: Never Used   Substance and Sexual Activity     Alcohol use: No     Alcohol/week: 0.0 standard drinks     Drug use: No     Sexual activity: Yes     Partners: Male     Birth control/protection: Patch   Lifestyle     Physical activity     Days per week: None     Minutes per session: None     Stress: None   Relationships     Social connections     Talks on phone:  "None     Gets together: None     Attends Synagogue service: None     Active member of club or organization: None     Attends meetings of clubs or organizations: None     Relationship status: None     Intimate partner violence     Fear of current or ex partner: None     Emotionally abused: None     Physically abused: None     Forced sexual activity: None   Other Topics Concern     Parent/sibling w/ CABG, MI or angioplasty before 65F 55M? No   Social History Narrative     None       Family History:   Family History   Problem Relation Age of Onset     Depression Mother      Depression Father      Depression Maternal Grandmother      Cancer Maternal Grandmother      Diabetes Maternal Grandfather      Heart Disease Maternal Grandfather      Hypertension Maternal Grandfather      Depression Paternal Grandmother         bipolar     Depression Paternal Grandfather         bipolar     Physical Exam:   Vitals:    01/08/21 1455   BP: 106/72   BP Location: Right arm   Patient Position: Chair   Cuff Size: Adult Regular   Pulse: 85   Resp: 14   Temp: 99.2  F (37.3  C)   TempSrc: Tympanic   Weight: 52 kg (114 lb 9.6 oz)   Height: 1.651 m (5' 5\")      Estimated body mass index is 19.07 kg/m  as calculated from the following:    Height as of this encounter: 1.651 m (5' 5\").    Weight as of this encounter: 52 kg (114 lb 9.6 oz).    Gen: Pleasant, talkative female in no apparent distress   Respiratory: breathing comfortably on room air   Cardiac: Regular rate, warm and well-perfused.   GI: Abd soft and non-tender  : External genitalia is free of lesion. Urethra and bartholin glands normal.  Vaginal mucosa is moist and pink without unusual discharge.  Cervix is without lesions or discharge. Bimanual exam reveals mobile anteverted uterus without cervical motion tenderness.  Adenexa are mobile and non-tender bilaterally.  Mild fullness over the right adnexa.  Bimanual and pelvic exam are nontender for the patient.  Rectal: no masses or " hemorrhoids visually appreciated  Derm: no acanthosis nigricans, ance or facial/back/abdominal hair growth patterns   MSK: Grossly normal movement of all four extremities  Psych: mood and affect bright   Lower extremity: edema not present     Labs/Imaging:  Component      Latest Ref Rng & Units 9/18/2020 12/15/2020   WBC      4.0 - 11.0 10e9/L  3.7 (L)   RBC Count      3.8 - 5.2 10e12/L  4.16   Hemoglobin      11.7 - 15.7 g/dL  8.0 (L)   Hematocrit      35.0 - 47.0 %  27.9 (L)   MCV      78 - 100 fl  67 (L)   MCH      26.5 - 33.0 pg  19.2 (L)   MCHC      31.5 - 36.5 g/dL  28.7 (L)   RDW      10.0 - 15.0 %  15.2 (H)   Platelet Count      150 - 450 10e9/L  117 (L)   Diff Method        Automated Method   % Neutrophils      %  59.1   % Lymphocytes      %  27.7   % Monocytes      %  9.6   % Eosinophils      %  2.5   % Basophils      %  0.8   % Immature Granulocytes      %  0.3   Nucleated RBCs      0 /100  0   Absolute Neutrophil      1.6 - 8.3 10e9/L  2.2   Absolute Lymphocytes      0.8 - 5.3 10e9/L  1.0   Absolute Monocytes      0.0 - 1.3 10e9/L  0.4   Absolute Eosinophils      0.0 - 0.7 10e9/L  0.1   Absolute Basophils      0.0 - 0.2 10e9/L  0.0   Abs Immature Granulocytes      0 - 0.4 10e9/L  0.0   Absolute Nucleated RBC        0.0   Color Urine       Yellow Yellow   Appearance Urine       Slightly Cloudy Slightly Cloudy   Glucose Urine      NEG:Negative mg/dL Negative Negative   Bilirubin Urine      NEG:Negative Negative Negative   Ketones Urine      NEG:Negative mg/dL Negative Negative   Specific Gravity Urine      1.003 - 1.035 1.008 1.014   Blood Urine      NEG:Negative Negative Negative   pH Urine      5.0 - 7.0 pH 6.0 6.0   Protein Albumin Urine      NEG:Negative mg/dL Negative Negative   Urobilinogen mg/dL      0.0 - 2.0 mg/dL 0.0 0.0   Nitrite Urine      NEG:Negative Negative Negative   Leukocyte Esterase Urine      NEG:Negative Negative Negative   Source       Catheterized Urine Midstream Urine   WBC Urine       0 - 5 /HPF 1 2   RBC Urine      0 - 2 /HPF <1 3 (H)   Squamous Epithelial /HPF Urine      0 - 1 /HPF 9 (H) 21 (H)   Mucous Urine      NEG:Negative /LPF Present (A) Present (A)   Hyaline Casts      0 - 2 /LPF 3 (H)    Sodium      133 - 144 mmol/L  142   Potassium      3.4 - 5.3 mmol/L  3.3 (L)   Chloride      94 - 109 mmol/L  114 (H)   Carbon Dioxide      20 - 32 mmol/L  22   Anion Gap      3 - 14 mmol/L  6   Glucose      70 - 99 mg/dL  78   Urea Nitrogen      7 - 30 mg/dL  7   Creatinine      0.52 - 1.04 mg/dL  0.63   GFR Estimate      >60 mL/min/1.73:m2  >90   GFR Estimate If Black      >60 mL/min/1.73:m2  >90   Calcium      8.5 - 10.1 mg/dL  7.6 (L)   Bilirubin Total      0.2 - 1.3 mg/dL  0.3   Albumin      3.4 - 5.0 g/dL  3.2 (L)   Protein Total      6.8 - 8.8 g/dL  5.9 (L)   Alkaline Phosphatase      40 - 150 U/L  69   ALT      0 - 50 U/L  12   AST      0 - 45 U/L  16   HCG Quantitative Serum      0 - 5 IU/L  <1     Pelvic US:   US PELVIS COMPLETE WITH TRANSVAGINAL AND DOPPLER LIMITED 12/15/2020  2:59 PM      HISTORY: Bilateral lower pelvic pain      COMPARISON: Pelvic ultrasound 5/6/2019.     FINDINGS: Transvaginal images were performed to better evaluate the  patient's uterus, ovaries and endometrial stripe.     The uterus is normal in size measuring 6.8 x 3.8 x 5.3 cm. No fibroids  are evident. Endometrial stripe measures 17 mm and is normal for  patient's age. The right ovary measures 5.0 x 5.4 x 5.0 cm. Right  ovarian cysts are present with the largest measuring 3.6 cm. These  cystic lesions demonstrate internal low-level echoes and fluid-fluid  levels likely reflecting hemorrhagic cysts or endometriomas. No  abnormal color Doppler flow within these cystic lesions. Color Doppler  flow is noted within the right ovary. The left ovary is not  visualized. No adnexal masses are present. Moderate amount of mildly  complex free pelvic fluid is noted.                                                                       IMPRESSION: Uterus and endometrial stripe are unremarkable. Right  ovary demonstrates complex appearing cystic lesions possibly  endometriomas. Complex free pelvic fluid may reflect a ruptured  hemorrhagic cyst. Color Doppler flow noted in the right ovary. Left  ovary not visualized.     SHEILA GRIFFIN MD    I did review this imaging report as well as the images themselves both myself and with the patient.    A&P: 20-year-old G0 who presents to discuss right ovarian cysts, abnormal uterine bleeding and anemia in the setting of desiring pregnancy.  As far as her right ovarian cyst I did discuss with her that these appear to be hemorrhagic cysts versus endometriosis.  I also reviewed that there were no concerning findings for malignancy and we reviewed her ultrasound in detail together.  I did recommend repeating a pelvic ultrasound to assess for possible resolution of these ovarian cysts as this could help us delineate between a hemorrhagic ovarian cyst versus an endometrioma.  Discussed that her clinical picture is certainly consistent with endometriosis, given the endometrioma on ultrasound as well as her abnormal uterine bleeding and pelvic pain. Discussed definitive diagnosis with diagnostic laparoscopy with pathologic diagnosis as well is excision of any implants of endometriosis. Discussed my typical practice of empiric treatment prior to laparoscopy.   Discussed medical treatment options including depo provera, OCPs, hormonal patch and hormonal ring including side effect profile and bleeding patterns. I discussed LARC options of Mirena IUD and the Nexplanon. Discussed placement, expected bleeding profiles, side effect profile, efficacy as contraception and reversibility. Pt denied any contraindications to estrogen (no personal or family history of VTE, no hx of migraine with aura, no cardiovascular or liver disease, pt is a non-smoker and her BP is normatensive).  I did discuss that obviously hormonal  options would preclude her from obtaining pregnancy.  I did discuss that if we were performing diagnostic laparoscopy with excision of the endometriomas and excision of endometriosis implants that I would recommend performing this only one time so that we would not decrease her chance of fertility.  As far as her abnormal uterine bleeding since this has been occurring since menarche I did recommend proceeding with von Willebrand evaluation.  There is no evidence of structural cause of her bleeding on her pelvic ultrasound and her regular cycles do not suggest an endocrinopathy cause of her bleeding.   As far as her anemia, I did recommend proceeding with further evaluation with a ferritin, transferritin and iron studies.  I am suspicious that this is iron deficiency anemia due to a combination of poor diet and chronic heavy menstrual bleeding.  If iron deficiency anemia is confirmed we did discuss an iron rich diet as well as the option of oral iron replacement.  Additionally I did send lysteda for her to take during her menses to reduce her over all blood loss.     Plan to proceed with a virtual visit to review her repeat pelvic ultrasound and her blood work.  Depending upon what we find we may need further evaluation for her abnormal uterine bleeding with an endocrinopathy work-up.    I spent 40 minutes face to face with the patient.   I spent 5 minutes in care coordination and review of labs and imaging studies .   I spent 18 minutes in documentation.      Stefanie Mcnair MD  OB/GYN  1/8/2021

## 2021-01-09 RX ORDER — TRANEXAMIC ACID 650 MG/1
1300 TABLET ORAL 3 TIMES DAILY PRN
Qty: 40 TABLET | Refills: 10 | Status: SHIPPED | OUTPATIENT
Start: 2021-01-09 | End: 2022-05-16

## 2021-01-11 DIAGNOSIS — D69.9 BLEEDING DISORDER (H): Primary | ICD-10-CM

## 2021-01-11 LAB
FACT VIII ACT/NOR PPP: 203 % (ref 55–200)
VWF CBA/VWF AG PPP IA-RTO: 182 % (ref 50–200)
VWF:AC ACT/NOR PPP IA: 150 % (ref 50–180)

## 2021-01-12 LAB — VWF MULTIMERS PPP QL: NORMAL

## 2021-01-14 LAB — VWF:RCO ACT/NOR PPP PL AGG: 132 % (ref 51–215)

## 2021-01-19 LAB — VWF MULTIMERS PPP QL: NORMAL

## 2021-01-20 ENCOUNTER — TELEPHONE (OUTPATIENT)
Dept: OBGYN | Facility: CLINIC | Age: 21
End: 2021-01-20

## 2021-01-20 ENCOUNTER — VIRTUAL VISIT (OUTPATIENT)
Dept: OBGYN | Facility: CLINIC | Age: 21
End: 2021-01-20
Payer: COMMERCIAL

## 2021-01-20 DIAGNOSIS — N93.9 ABNORMAL UTERINE BLEEDING: ICD-10-CM

## 2021-01-20 DIAGNOSIS — D50.0 IRON DEFICIENCY ANEMIA DUE TO CHRONIC BLOOD LOSS: ICD-10-CM

## 2021-01-20 DIAGNOSIS — N83.201 RIGHT OVARIAN CYST: Primary | ICD-10-CM

## 2021-01-20 PROCEDURE — 99212 OFFICE O/P EST SF 10 MIN: CPT | Mod: 95 | Performed by: OBSTETRICS & GYNECOLOGY

## 2021-01-20 NOTE — TELEPHONE ENCOUNTER
----- Message from Stefanie Mcnair MD sent at 1/20/2021 10:39 AM CST -----  Hello,   I placed a referral to hematology for this patient and gave her the information on her after visit summary but she needs some help getting this appointment scheduled.  Can we help her with this?  Thanks,   Stefanie

## 2021-01-20 NOTE — TELEPHONE ENCOUNTER
Call to patient to assist with referral and appointment.  Unable to reach.  Left message on identifiable voice mail for patient to return call to clinic.  Advised we can assist with appointment for referral if needed.    Zunilda Kidd   Ob/Gyn Clinic  RN

## 2021-01-20 NOTE — PROGRESS NOTES
Aury is a 20 year old who is being evaluated via a billable telephone visit.      What phone number would you like to be contacted at? [phone]  How would you like to obtain your AVS? TASSt

## 2021-01-20 NOTE — PROGRESS NOTES
Telephone Visit:   F/U right ovarian complex cyst, pelvic pain, abnormal uterine bleeding and iron-deficiency anemia.     Reviewed results of blood work.   Trying more iron-rich foods, prefers this over po iron.   Call her to help her get scheduled with a Hematologist. Doesn't know how to get this scheduled.   Since 1/8/2021 has not has a period. Has not picked up the Lysteda as she has restrictions from her insurance and she is limited to one prescriber, who is now retired.    Plan f/u virtual visit once she has completed her pelvic US.     5 min was spent over the phone.   5 min was spent in documentation.   5 min was spent in care coordination.     Stefanie Mcnair MD  OB/GYN

## 2021-01-21 ENCOUNTER — COMMUNICATION - HEALTHEAST (OUTPATIENT)
Dept: ADMINISTRATIVE | Facility: HOSPITAL | Age: 21
End: 2021-01-21

## 2021-01-22 ENCOUNTER — HOSPITAL ENCOUNTER (OUTPATIENT)
Dept: ULTRASOUND IMAGING | Facility: CLINIC | Age: 21
Discharge: HOME | End: 2021-01-22
Attending: OBSTETRICS & GYNECOLOGY | Admitting: OBSTETRICS & GYNECOLOGY
Payer: COMMERCIAL

## 2021-01-22 DIAGNOSIS — N83.201 HEMORRHAGIC CYST OF RIGHT OVARY: ICD-10-CM

## 2021-01-22 LAB — VON WILLEBRAND INTERPRETATION: NORMAL

## 2021-01-22 PROCEDURE — 76830 TRANSVAGINAL US NON-OB: CPT

## 2021-01-25 NOTE — TELEPHONE ENCOUNTER
Patient has appointment scheduled for 2/2/2021.       Saul Singh MD   Hematology and Oncology   NPI: [id number]   [address] [place] [address]       Phone: [phone]   Fax: [phone]

## 2021-02-02 ENCOUNTER — OFFICE VISIT - HEALTHEAST (OUTPATIENT)
Dept: ONCOLOGY | Facility: HOSPITAL | Age: 21
End: 2021-02-02

## 2021-02-02 ENCOUNTER — AMBULATORY - HEALTHEAST (OUTPATIENT)
Dept: ONCOLOGY | Facility: HOSPITAL | Age: 21
End: 2021-02-02

## 2021-02-02 ENCOUNTER — AMBULATORY - HEALTHEAST (OUTPATIENT)
Dept: INFUSION THERAPY | Facility: HOSPITAL | Age: 21
End: 2021-02-02

## 2021-02-02 DIAGNOSIS — N92.0 MENORRHAGIA WITH REGULAR CYCLE: ICD-10-CM

## 2021-02-02 DIAGNOSIS — D50.0 IRON DEFICIENCY ANEMIA DUE TO CHRONIC BLOOD LOSS: ICD-10-CM

## 2021-02-02 LAB
ALBUMIN SERPL-MCNC: 4.5 G/DL (ref 3.5–5)
ALP SERPL-CCNC: 78 U/L (ref 45–120)
ALT SERPL W P-5'-P-CCNC: 15 U/L (ref 0–45)
ANION GAP SERPL CALCULATED.3IONS-SCNC: 8 MMOL/L (ref 5–18)
APTT PPP: 31 SECONDS (ref 24–37)
AST SERPL W P-5'-P-CCNC: 20 U/L (ref 0–40)
BASOPHILS # BLD AUTO: 0.1 THOU/UL (ref 0–0.2)
BASOPHILS # BLD AUTO: 0.1 THOU/UL (ref 0–0.2)
BASOPHILS NFR BLD AUTO: 1 % (ref 0–2)
BASOPHILS NFR BLD AUTO: 1 % (ref 0–2)
BILIRUB SERPL-MCNC: 0.4 MG/DL (ref 0–1)
BUN SERPL-MCNC: 10 MG/DL (ref 8–22)
CALCIUM SERPL-MCNC: 9.1 MG/DL (ref 8.5–10.5)
CHLORIDE BLD-SCNC: 108 MMOL/L (ref 98–107)
CO2 SERPL-SCNC: 26 MMOL/L (ref 22–31)
CREAT SERPL-MCNC: 0.73 MG/DL (ref 0.6–1.1)
EOSINOPHIL # BLD AUTO: 0.1 THOU/UL (ref 0–0.4)
EOSINOPHIL # BLD AUTO: 0.1 THOU/UL (ref 0–0.4)
EOSINOPHIL NFR BLD AUTO: 3 % (ref 0–6)
EOSINOPHIL NFR BLD AUTO: 3 % (ref 0–6)
ERYTHROCYTE [DISTWIDTH] IN BLOOD BY AUTOMATED COUNT: 16.4 % (ref 11–14.5)
ERYTHROCYTE [DISTWIDTH] IN BLOOD BY AUTOMATED COUNT: 16.4 % (ref 11–14.5)
GFR SERPL CREATININE-BSD FRML MDRD: >60 ML/MIN/1.73M2
GLUCOSE BLD-MCNC: 87 MG/DL (ref 70–125)
HCT VFR BLD AUTO: 34.3 % (ref 35–47)
HCT VFR BLD AUTO: 34.3 % (ref 35–47)
HGB BLD-MCNC: 9.4 G/DL (ref 12–16)
HGB BLD-MCNC: 9.4 G/DL (ref 12–16)
IMM GRANULOCYTES # BLD: 0 THOU/UL
IMM GRANULOCYTES # BLD: 0 THOU/UL
IMM GRANULOCYTES NFR BLD: 0 %
IMM GRANULOCYTES NFR BLD: 0 %
INR PPP: 1.15 (ref 0.9–1.1)
LYMPHOCYTES # BLD AUTO: 1 THOU/UL (ref 0.8–4.4)
LYMPHOCYTES # BLD AUTO: 1 THOU/UL (ref 0.8–4.4)
LYMPHOCYTES NFR BLD AUTO: 21 % (ref 20–40)
LYMPHOCYTES NFR BLD AUTO: 21 % (ref 20–40)
MCH RBC QN AUTO: 18.3 PG (ref 27–34)
MCH RBC QN AUTO: 18.3 PG (ref 27–34)
MCHC RBC AUTO-ENTMCNC: 27.4 G/DL (ref 32–36)
MCHC RBC AUTO-ENTMCNC: 27.4 G/DL (ref 32–36)
MCV RBC AUTO: 67 FL (ref 80–100)
MCV RBC AUTO: 67 FL (ref 80–100)
MONOCYTES # BLD AUTO: 0.4 THOU/UL (ref 0–0.9)
MONOCYTES # BLD AUTO: 0.4 THOU/UL (ref 0–0.9)
MONOCYTES NFR BLD AUTO: 8 % (ref 2–10)
MONOCYTES NFR BLD AUTO: 8 % (ref 2–10)
NEUTROPHILS # BLD AUTO: 3.2 THOU/UL (ref 2–7.7)
NEUTROPHILS # BLD AUTO: 3.2 THOU/UL (ref 2–7.7)
NEUTROPHILS NFR BLD AUTO: 67 % (ref 50–70)
NEUTROPHILS NFR BLD AUTO: 67 % (ref 50–70)
PATH REPORT.MICROSCOPIC SPEC OTHER STN: ABNORMAL
PLATELET # BLD AUTO: 214 THOU/UL (ref 140–440)
PLATELET # BLD AUTO: 214 THOU/UL (ref 140–440)
PMV BLD AUTO: 9.9 FL (ref 8.5–12.5)
PMV BLD AUTO: 9.9 FL (ref 8.5–12.5)
POTASSIUM BLD-SCNC: 4 MMOL/L (ref 3.5–5)
PROT SERPL-MCNC: 7.6 G/DL (ref 6–8)
RBC # BLD AUTO: 5.15 MILL/UL (ref 3.8–5.4)
RBC # BLD AUTO: 5.15 MILL/UL (ref 3.8–5.4)
SODIUM SERPL-SCNC: 142 MMOL/L (ref 136–145)
T4 FREE SERPL-MCNC: 0.9 NG/DL (ref 0.7–1.8)
TSH SERPL DL<=0.005 MIU/L-ACNC: 3.15 UIU/ML (ref 0.3–5)
WBC: 4.8 THOU/UL (ref 4–11)
WBC: 4.8 THOU/UL (ref 4–11)

## 2021-02-02 ASSESSMENT — MIFFLIN-ST. JEOR: SCORE: 1290

## 2021-02-03 LAB
LAB AP CHARGES (HE HISTORICAL CONVERSION): NORMAL
PATH REPORT.COMMENTS IMP SPEC: NORMAL
PATH REPORT.COMMENTS IMP SPEC: NORMAL
PATH REPORT.FINAL DX SPEC: NORMAL
PATH REPORT.RELEVANT HX SPEC: NORMAL
THROMBIN TIME: 16.2 SEC (ref 13–19)

## 2021-02-08 ENCOUNTER — AMBULATORY - HEALTHEAST (OUTPATIENT)
Dept: ONCOLOGY | Facility: CLINIC | Age: 21
End: 2021-02-08

## 2021-02-08 ENCOUNTER — COMMUNICATION - HEALTHEAST (OUTPATIENT)
Dept: ONCOLOGY | Facility: HOSPITAL | Age: 21
End: 2021-02-08

## 2021-02-08 DIAGNOSIS — N92.0 MENORRHAGIA WITH REGULAR CYCLE: ICD-10-CM

## 2021-02-11 ENCOUNTER — INFUSION - HEALTHEAST (OUTPATIENT)
Dept: INFUSION THERAPY | Facility: HOSPITAL | Age: 21
End: 2021-02-11

## 2021-02-11 DIAGNOSIS — D50.0 IRON DEFICIENCY ANEMIA DUE TO CHRONIC BLOOD LOSS: ICD-10-CM

## 2021-02-11 DIAGNOSIS — N92.0 MENORRHAGIA WITH REGULAR CYCLE: ICD-10-CM

## 2021-02-18 ENCOUNTER — COMMUNICATION - HEALTHEAST (OUTPATIENT)
Dept: ONCOLOGY | Facility: HOSPITAL | Age: 21
End: 2021-02-18

## 2021-02-22 ENCOUNTER — HOSPITAL ENCOUNTER (EMERGENCY)
Facility: CLINIC | Age: 21
Discharge: HOME | End: 2021-02-22
Attending: EMERGENCY MEDICINE | Admitting: EMERGENCY MEDICINE
Payer: COMMERCIAL

## 2021-02-22 VITALS
RESPIRATION RATE: 18 BRPM | HEART RATE: 75 BPM | DIASTOLIC BLOOD PRESSURE: 63 MMHG | HEIGHT: 65 IN | BODY MASS INDEX: 19.99 KG/M2 | OXYGEN SATURATION: 99 % | WEIGHT: 120 LBS | SYSTOLIC BLOOD PRESSURE: 102 MMHG | TEMPERATURE: 97.7 F

## 2021-02-22 DIAGNOSIS — G43.801 OTHER MIGRAINE WITH STATUS MIGRAINOSUS, NOT INTRACTABLE: ICD-10-CM

## 2021-02-22 PROCEDURE — 250N000011 HC RX IP 250 OP 636: Performed by: EMERGENCY MEDICINE

## 2021-02-22 PROCEDURE — 96361 HYDRATE IV INFUSION ADD-ON: CPT | Performed by: EMERGENCY MEDICINE

## 2021-02-22 PROCEDURE — 96375 TX/PRO/DX INJ NEW DRUG ADDON: CPT | Performed by: EMERGENCY MEDICINE

## 2021-02-22 PROCEDURE — 258N000003 HC RX IP 258 OP 636: Performed by: EMERGENCY MEDICINE

## 2021-02-22 PROCEDURE — 99284 EMERGENCY DEPT VISIT MOD MDM: CPT | Mod: 25 | Performed by: EMERGENCY MEDICINE

## 2021-02-22 PROCEDURE — 99284 EMERGENCY DEPT VISIT MOD MDM: CPT | Performed by: EMERGENCY MEDICINE

## 2021-02-22 PROCEDURE — 96374 THER/PROPH/DIAG INJ IV PUSH: CPT | Performed by: EMERGENCY MEDICINE

## 2021-02-22 RX ORDER — DEXAMETHASONE SODIUM PHOSPHATE 10 MG/ML
10 INJECTION, SOLUTION INTRAMUSCULAR; INTRAVENOUS ONCE
Status: COMPLETED | OUTPATIENT
Start: 2021-02-22 | End: 2021-02-22

## 2021-02-22 RX ORDER — DIPHENHYDRAMINE HYDROCHLORIDE 50 MG/ML
25 INJECTION INTRAMUSCULAR; INTRAVENOUS ONCE
Status: COMPLETED | OUTPATIENT
Start: 2021-02-22 | End: 2021-02-22

## 2021-02-22 RX ORDER — ONDANSETRON 2 MG/ML
4 INJECTION INTRAMUSCULAR; INTRAVENOUS ONCE
Status: COMPLETED | OUTPATIENT
Start: 2021-02-22 | End: 2021-02-22

## 2021-02-22 RX ADMIN — ONDANSETRON 4 MG: 2 INJECTION INTRAMUSCULAR; INTRAVENOUS at 00:54

## 2021-02-22 RX ADMIN — DIPHENHYDRAMINE HYDROCHLORIDE 25 MG: 50 INJECTION, SOLUTION INTRAMUSCULAR; INTRAVENOUS at 00:54

## 2021-02-22 RX ADMIN — DEXAMETHASONE SODIUM PHOSPHATE 10 MG: 10 INJECTION, SOLUTION INTRAMUSCULAR; INTRAVENOUS at 00:54

## 2021-02-22 RX ADMIN — PROCHLORPERAZINE EDISYLATE 10 MG: 5 INJECTION INTRAMUSCULAR; INTRAVENOUS at 00:54

## 2021-02-22 RX ADMIN — SODIUM CHLORIDE, POTASSIUM CHLORIDE, SODIUM LACTATE AND CALCIUM CHLORIDE 1000 ML: 600; 310; 30; 20 INJECTION, SOLUTION INTRAVENOUS at 00:53

## 2021-02-22 ASSESSMENT — ENCOUNTER SYMPTOMS
NECK PAIN: 0
CHEST TIGHTNESS: 0
LIGHT-HEADEDNESS: 0
NAUSEA: 1
BACK PAIN: 0
HEADACHES: 1
CHILLS: 0
NECK STIFFNESS: 0
COUGH: 0
ABDOMINAL PAIN: 0
SORE THROAT: 0
WOUND: 0
FATIGUE: 0
FEVER: 0
APPETITE CHANGE: 0
VOMITING: 0
PHOTOPHOBIA: 1
SHORTNESS OF BREATH: 0

## 2021-02-22 ASSESSMENT — MIFFLIN-ST. JEOR: SCORE: 1310.2

## 2021-02-22 NOTE — ED PROVIDER NOTES
History     Chief Complaint   Patient presents with     Headache     HPI  Aury Cervantes is a 20 year old female with a history of headaches presenting for evaluation of roughly 10 days of continuous migraine-like headache.  Patient reports a history of migraine headaches previously but has never had one going on this long.  She reports frontal and bitemporal headache which is pressure-like in nature rated 7-9 out of 10.  No change in pain with position.  Pain is associated with photophobia and nausea but no vomiting.  Still eating and drinking.  No fevers or chills.  No neck pain or stiffness.  Denies any other infectious symptoms including no sore throat, runny nose, cough, abdominal pain, or vomiting.  Has taken ibuprofen intermittently without relief of her pain.  Last dose of ibuprofen was 800 mg at about 11 PM.    Allergies:  Allergies   Allergen Reactions     Sulfa Drugs Other (See Comments)     Both parents are allergic         Problem List:    Patient Active Problem List    Diagnosis Date Noted     Anaphylaxis, subsequent encounter 12/04/2018     Priority: Medium     Rhinoconjunctivitis 12/04/2018     Priority: Medium     S/P tonsillectomy and adenoidectomy 03/21/2018     Priority: Medium     Post-op pain 03/21/2018     Priority: Medium     Nexplanon insertion 05/29/2015     Priority: Medium     nexplanon inserted today by Dr BRITNEY Marx  Lot # [id number]  Exp 07/2017  Zulay Jacobsen         Major depressive disorder, recurrent episode, severe (H) 11/19/2014     Priority: Medium        Past Medical History:    Past Medical History:   Diagnosis Date     Depression      Nexplanon in place        Past Surgical History:    Past Surgical History:   Procedure Laterality Date     SURGICAL HISTORY OF -       PE tubes     TONSILLECTOMY, ADENOIDECTOMY ADULT, COMBINED Bilateral 3/19/2018    Procedure: COMBINED TONSILLECTOMY, ADENOIDECTOMY ADULT;  Bilateral Adenotonsillectomy;  Surgeon: Kevin Arias MD;  Location:  "[facility] OR       Family History:    Family History   Problem Relation Age of Onset     Depression Mother      Depression Father      Depression Maternal Grandmother      Cancer Maternal Grandmother      Diabetes Maternal Grandfather      Heart Disease Maternal Grandfather      Hypertension Maternal Grandfather      Depression Paternal Grandmother         bipolar     Depression Paternal Grandfather         bipolar       Social History:  Marital Status:  Single [1]  Social History     Tobacco Use     Smoking status: Former Smoker     Smokeless tobacco: Never Used   Substance Use Topics     Alcohol use: No     Alcohol/week: 0.0 standard drinks     Drug use: No        Medications:    EPINEPHrine (ADRENACLICK) 0.3 MG/0.3ML injection 2-pack  ferrous sulfate (FEROSUL) 325 (65 Fe) MG tablet  oxyCODONE (ROXICODONE) 5 MG tablet  tranexamic acid (LYSTEDA) 650 MG tablet          Review of Systems   Constitutional: Negative for appetite change, chills, fatigue and fever.   HENT: Negative for congestion and sore throat.    Eyes: Positive for photophobia.   Respiratory: Negative for cough, chest tightness and shortness of breath.    Cardiovascular: Negative for chest pain.   Gastrointestinal: Positive for nausea. Negative for abdominal pain and vomiting.   Genitourinary: Negative for decreased urine volume.   Musculoskeletal: Negative for back pain, neck pain and neck stiffness.   Skin: Negative for rash and wound.   Neurological: Positive for headaches. Negative for light-headedness.   All other systems reviewed and are negative.      Physical Exam   BP: 110/80  Pulse: 73  Temp: 97.7  F (36.5  C)  Resp: 18  Height: 165.1 cm (5' 5\")  Weight: 54.4 kg (120 lb)  SpO2: 100 %      Physical Exam  Vitals signs and nursing note reviewed.   Constitutional:       Appearance: Normal appearance. She is not ill-appearing or diaphoretic.   HENT:      Head: Atraumatic.      Mouth/Throat:      Mouth: Mucous membranes are moist.   Eyes:      " Extraocular Movements: Extraocular movements intact.      Conjunctiva/sclera: Conjunctivae normal.      Pupils: Pupils are equal, round, and reactive to light.   Neck:      Musculoskeletal: Normal range of motion and neck supple. No neck rigidity.   Cardiovascular:      Rate and Rhythm: Normal rate and regular rhythm.      Pulses: Normal pulses.   Pulmonary:      Effort: Pulmonary effort is normal.      Breath sounds: Normal breath sounds.   Abdominal:      Palpations: Abdomen is soft.      Tenderness: There is no abdominal tenderness.   Musculoskeletal: Normal range of motion.   Skin:     General: Skin is warm and dry.      Capillary Refill: Capillary refill takes less than 2 seconds.   Neurological:      Mental Status: She is alert and oriented to person, place, and time.   Psychiatric:         Mood and Affect: Mood normal.         ED Course        Procedures                   No results found for this or any previous visit (from the past 24 hour(s)).    Medications   ondansetron (ZOFRAN) injection 4 mg (4 mg Intravenous Given 2/22/21 0054)   prochlorperazine (COMPAZINE) injection 10 mg (10 mg Intravenous Given 2/22/21 0054)   diphenhydrAMINE (BENADRYL) injection 25 mg (25 mg Intravenous Given 2/22/21 0054)   lactated ringers BOLUS 1,000 mL (1,000 mLs Intravenous New Bag 2/22/21 0053)   dexamethasone PF (DECADRON) injection 10 mg (10 mg Intravenous Given 2/22/21 0054)     2:28 AM Patient re-assessed: Headache notably improved.  Patient was sleeping at the time of my reassessment.  [place] for reevaluation.  Patient reports headache down from 9/10 down to 5/10.  Patient reports she feels well enough to go home and sleep.    Assessments & Plan (with Medical Decision Making)  20-year-old female with history of migraines presenting for evaluation of 10 days of migraine-like headache.  Reports his symptoms are consistent with her previous migraines with a frontal headache with nausea and photophobia.  Treated with  medications as above with significant improvement in her symptoms.  No new neurologic symptoms concerning for significant intracranial pathology.  Encouraged rest and primary care follow-up with consideration for other medications for migraine management     I have reviewed the nursing notes.    I have reviewed the findings, diagnosis, plan and need for follow up with the patient.       New Prescriptions    No medications on file       Final diagnoses:   Other migraine with status migrainosus, not intractable       2/22/2021   [facility] EMERGENCY DEPT     Smith, Isidoro Hughes MD  02/22/21 [id number]

## 2021-02-22 NOTE — Clinical Note
Aury Cervantes was seen and treated in our emergency department on 2/22/2021.  She may return to work on 02/23/2021.       If you have any questions or concerns, please don't hesitate to call.      Isidoro Smith MD

## 2021-02-22 NOTE — ED TRIAGE NOTES
Pt has had a non stop migraine for 10 days, taking 400mg of motrin until 2 days ago she upped the dose to 800mg with no relief. Has had this before but never like this.

## 2021-02-23 ENCOUNTER — INFUSION - HEALTHEAST (OUTPATIENT)
Dept: INFUSION THERAPY | Facility: HOSPITAL | Age: 21
End: 2021-02-23

## 2021-02-23 DIAGNOSIS — D50.0 IRON DEFICIENCY ANEMIA DUE TO CHRONIC BLOOD LOSS: ICD-10-CM

## 2021-02-23 DIAGNOSIS — N92.0 MENORRHAGIA WITH REGULAR CYCLE: ICD-10-CM

## 2021-02-25 ENCOUNTER — OFFICE VISIT (OUTPATIENT)
Dept: FAMILY MEDICINE | Facility: CLINIC | Age: 21
End: 2021-02-25
Payer: COMMERCIAL

## 2021-02-25 VITALS
WEIGHT: 114 LBS | RESPIRATION RATE: 14 BRPM | BODY MASS INDEX: 18.99 KG/M2 | HEIGHT: 65 IN | OXYGEN SATURATION: 98 % | HEART RATE: 82 BPM | DIASTOLIC BLOOD PRESSURE: 68 MMHG | SYSTOLIC BLOOD PRESSURE: 96 MMHG | TEMPERATURE: 97.9 F

## 2021-02-25 DIAGNOSIS — K92.1 BLOOD IN STOOL: ICD-10-CM

## 2021-02-25 DIAGNOSIS — D50.0 IRON DEFICIENCY ANEMIA DUE TO CHRONIC BLOOD LOSS: ICD-10-CM

## 2021-02-25 DIAGNOSIS — R12 HEARTBURN: Primary | ICD-10-CM

## 2021-02-25 DIAGNOSIS — T78.2XXD ANAPHYLAXIS, SUBSEQUENT ENCOUNTER: ICD-10-CM

## 2021-02-25 PROBLEM — N92.0 MENORRHAGIA WITH REGULAR CYCLE: Status: ACTIVE | Noted: 2021-02-07

## 2021-02-25 PROCEDURE — 99213 OFFICE O/P EST LOW 20 MIN: CPT | Performed by: NURSE PRACTITIONER

## 2021-02-25 RX ORDER — EPINEPHRINE 0.3 MG/.3ML
0.3 INJECTION SUBCUTANEOUS PRN
Qty: 0.6 ML | Refills: 1 | Status: ON HOLD | OUTPATIENT
Start: 2021-02-25 | End: 2023-09-10

## 2021-02-25 RX ORDER — SUCRALFATE ORAL 1 G/10ML
1 SUSPENSION ORAL 4 TIMES DAILY
Qty: 1200 ML | Refills: 0 | Status: SHIPPED | OUTPATIENT
Start: 2021-02-25 | End: 2021-03-27

## 2021-02-25 ASSESSMENT — ENCOUNTER SYMPTOMS
VOMITING: 0
DIARRHEA: 1
CONSTIPATION: 1
NAUSEA: 1
HEMATOCHEZIA: 1
ABDOMINAL PAIN: 1
ABDOMINAL DISTENTION: 1
CONSTITUTIONAL NEGATIVE: 1
HEADACHES: 1

## 2021-02-25 ASSESSMENT — MIFFLIN-ST. JEOR: SCORE: 1282.98

## 2021-02-25 NOTE — PROGRESS NOTES
Assessment & Plan     Heartburn  Suspect PUD based on symptoms. Omeprazole and carafate prescribed. Side effects, risks and benefits of medication were discussed with patient. Discussed how and when to take medication. Follow-up in 2-4 weeks if symptoms are not improving, sooner if symptoms worsen. Discussed option to do endoscopy/colonoscopy, patient wants to hold off at this point in time and see if medication will resolve symptoms.     - omeprazole (PRILOSEC) 20 MG DR capsule; Take 2 capsules (40 mg) by mouth daily For 2 weeks, then drop down to 1 tablet (20mg) daily  - sucralfate (CARAFATE) 1 GM/10ML suspension; Take 10 mLs (1 g) by mouth 4 times daily    Blood in stool  Once yesterday. Recommended she monitor and let us know if symptoms persist.     Iron deficiency anemia due to chronic blood loss  Recently finished infusions. Was instructed to follow-up in 10 weeks. States she was diagnosed with Von Willbrand. Heavy periods.    Anaphylaxis, subsequent encounter  Refill sent.    - EPINEPHrine (ADRENACLICK) 0.3 MG/0.3ML injection 2-pack; Inject 0.3 mLs (0.3 mg) into the muscle as needed for anaphylaxis                   Patient Instructions   Epi pen refilled.     You likely have an ulcer.   1. Start taking omeprazole as prescribed.   2. Start taking carafate as prescribed.   3. Follow-up in 4 weeks if symptoms do not improve or sooner if symptoms worsen.  4. If rectal bleeding persists, please follow-up right away.       Return if symptoms worsen or fail to improve.    CIRO [facility] is a 20 year old who presents for the following health issues:    HPI     Chief Complaint   Patient presents with     Rectal Problem     pain happening for about a week, rectal bleeding started yesterday.      Refill Request     Reorder epi pen, it is  but not sure about cost.      Abdominal/Flank Pain  Onset/Duration: for about a week   Description:  "  Character: Dull ache  Location: epigastric region  Radiation: None and chest   Intensity: moderate  Progression of Symptoms:  same  Accompanying Signs & Symptoms:  Fever/Chills: no  Gas/Bloating: YES  Nausea: YES  Vomitting: no  Diarrhea: YES- last week   Constipation: YES- this week   Dysuria or Hematuria: no  History:   Trauma: no  Previous similar pain: no  Previous tests done: none  Precipitating factors:   Does the pain change with:     Food: YES- hurts more with food. Cold water does no irritate it. Lost appetite because of pain     Bowel Movement: YES- noticed blood in stool yesterday, blood on tissue when pt wiped     Urination: no   Other factors:  \"Tasting pennies the past few days\"  Therapies tried and outcome: None  Patient's last menstrual period was 2021.      Additional provider notes: 1 week of heartburn with epigastric pain. Tasting pennies the past few days. Appetite is down. Was having diarrhea last week, now constipation. States she saw bright red blood mixed in with her poop yesterday. Last BM yesterday.     Has been getting iron infusions for 1.5 weeks due to headaches. Was taking ibuprofen for 2 days prior to infusions. Last dose was a couple days ago. Was recently diagnosed with Von Willbrand. Periods have been heavy.     Hx anaphylaxis: current one is . Hx of unknown allergy with anaphylaxis response.     Review of Systems   Constitutional: Negative.    Gastrointestinal: Positive for abdominal distention (gas/bloating), abdominal pain (epigastric pain), constipation, diarrhea, hematochezia and nausea. Negative for vomiting.   Genitourinary: Positive for menstrual problem (heavy periods, chronic).   Neurological: Positive for headaches (chronic due to blood loss).            Objective    BP 96/68   Pulse 82   Temp 97.9  F (36.6  C) (Tympanic)   Resp 14   Ht 1.651 m (5' 5\")   Wt 51.7 kg (114 lb)   LMP 2021   SpO2 98%   BMI 18.97 kg/m    Body mass index is 18.97 " kg/m .  Physical Exam  Vitals signs and nursing note reviewed.   Constitutional:       General: She is not in acute distress.     Appearance: Normal appearance. She is not ill-appearing or toxic-appearing.   Cardiovascular:      Rate and Rhythm: Normal rate and regular rhythm.      Pulses: Normal pulses.      Heart sounds: Normal heart sounds.   Pulmonary:      Effort: Pulmonary effort is normal.      Breath sounds: Normal breath sounds.   Skin:     General: Skin is warm and dry.   Neurological:      Mental Status: She is alert and oriented to person, place, and time.   Psychiatric:         Behavior: Behavior normal.

## 2021-02-25 NOTE — PATIENT INSTRUCTIONS
Epi pen refilled.     You likely have an ulcer.   1. Start taking omeprazole as prescribed.   2. Start taking carafate as prescribed.   3. Follow-up in 4 weeks if symptoms do not improve or sooner if symptoms worsen.  4. If rectal bleeding persists, please follow-up right away.

## 2021-02-26 LAB
FACT VIII ACT/NOR PPP: 183 % (ref 55–200)
VWF AG ACT/NOR PPP IA: 208 % (ref 50–200)
VWF:AC ACT/NOR PPP IA: 183 % (ref 50–180)

## 2021-02-27 LAB — VON WILLEBRAND EVAL PPP-IMP: NORMAL

## 2021-03-01 LAB — ARUP MISCELLANEOUS TEST: NORMAL

## 2021-03-02 LAB
MISCELLANEOUS TEST DEPT. - HE HISTORICAL: NORMAL
PERFORMING LAB: NORMAL
SPECIMEN STATUS: NORMAL
TEST NAME: NORMAL

## 2021-03-05 ENCOUNTER — COMMUNICATION - HEALTHEAST (OUTPATIENT)
Dept: ONCOLOGY | Facility: HOSPITAL | Age: 21
End: 2021-03-05

## 2021-04-20 ENCOUNTER — AMBULATORY - HEALTHEAST (OUTPATIENT)
Dept: INFUSION THERAPY | Facility: HOSPITAL | Age: 21
End: 2021-04-20

## 2021-04-20 ENCOUNTER — OFFICE VISIT - HEALTHEAST (OUTPATIENT)
Dept: ONCOLOGY | Facility: HOSPITAL | Age: 21
End: 2021-04-20

## 2021-04-20 DIAGNOSIS — N92.0 MENORRHAGIA WITH REGULAR CYCLE: ICD-10-CM

## 2021-04-20 DIAGNOSIS — D50.0 IRON DEFICIENCY ANEMIA DUE TO CHRONIC BLOOD LOSS: ICD-10-CM

## 2021-04-20 LAB
ERYTHROCYTE [DISTWIDTH] IN BLOOD BY AUTOMATED COUNT: 21.2 % (ref 11–14.5)
FERRITIN SERPL-MCNC: 87 NG/ML (ref 10–130)
HCT VFR BLD AUTO: 39 % (ref 35–47)
HGB BLD-MCNC: 13 G/DL (ref 12–16)
MCH RBC QN AUTO: 26.6 PG (ref 27–34)
MCHC RBC AUTO-ENTMCNC: 33.3 G/DL (ref 32–36)
MCV RBC AUTO: 80 FL (ref 80–100)
PLATELET # BLD AUTO: 199 THOU/UL (ref 140–440)
PMV BLD AUTO: 9.5 FL (ref 8.5–12.5)
RBC # BLD AUTO: 4.88 MILL/UL (ref 3.8–5.4)
WBC: 5.3 THOU/UL (ref 4–11)

## 2021-04-23 ENCOUNTER — COMMUNICATION - HEALTHEAST (OUTPATIENT)
Dept: ONCOLOGY | Facility: HOSPITAL | Age: 21
End: 2021-04-23

## 2021-05-27 VITALS
RESPIRATION RATE: 18 BRPM | SYSTOLIC BLOOD PRESSURE: 105 MMHG | DIASTOLIC BLOOD PRESSURE: 63 MMHG | HEART RATE: 76 BPM | OXYGEN SATURATION: 98 % | TEMPERATURE: 98.2 F

## 2021-05-27 VITALS
HEART RATE: 78 BPM | SYSTOLIC BLOOD PRESSURE: 108 MMHG | DIASTOLIC BLOOD PRESSURE: 58 MMHG | TEMPERATURE: 98.3 F | OXYGEN SATURATION: 99 %

## 2021-06-05 VITALS
BODY MASS INDEX: 18.29 KG/M2 | HEART RATE: 90 BPM | SYSTOLIC BLOOD PRESSURE: 108 MMHG | WEIGHT: 113.8 LBS | TEMPERATURE: 98.3 F | DIASTOLIC BLOOD PRESSURE: 62 MMHG | OXYGEN SATURATION: 100 % | HEIGHT: 66 IN

## 2021-06-05 VITALS
OXYGEN SATURATION: 99 % | SYSTOLIC BLOOD PRESSURE: 111 MMHG | HEART RATE: 93 BPM | TEMPERATURE: 98.4 F | BODY MASS INDEX: 18.97 KG/M2 | DIASTOLIC BLOOD PRESSURE: 70 MMHG | WEIGHT: 114 LBS

## 2021-06-15 NOTE — PROGRESS NOTES
Pt arrived amb for her 2nd injectafer , Pt stated she had a HA after the last, probably a migraine to went the [facility] ER for med's, No ha for 2 days, Went over today's plan of care, questions and follow up . Pt has her period, IV started in lt hand, labs drawn per MD order. PT infused slowly over 1/2 hr  the injectafer, the iv fluids infused over 1/2h vs's IV was d/cd after, PT to f/u with the MD does  not want her AVS PT left at 1230  Celina Cummings

## 2021-06-15 NOTE — TELEPHONE ENCOUNTER
Call placed to patient per Dr Singh to let her know her second Von Willebrands test is negative.  LM for pt with this information, advised to call if any concerns and then follow up as directed.

## 2021-06-15 NOTE — PROGRESS NOTES
Pt arrives ambulatory to [facility] for first Injectafer infusion. Discussed process and possible medication reaction with pt. IV started in right AC. Pt tolerated Injectafer well, with no adverse reaction. Pt observed 30min post infusion. VSS. IV removed and covered. Pt given AVS with education on Injectafer. Pt aware of next appt and left dept ambulatory.

## 2021-06-15 NOTE — TELEPHONE ENCOUNTER
Patient calls in today stating that she received iron Tuesday in the infusion center.  She states that about 20 minutes into the infusion she started getting a headache.  This headache has continued and is still present today.  She states that it is a pretty nagging, constant headache.  She has tried over-the-counter medicine without any relief.  I let her know that I would discuss with Dr. Singh and get back to her.  Dr. Singh recalls her letting him know that she does have a history of migraines.  If this is the case, he would like her to try that medication.  If she has already done so or she does not have the medication, she can try ibuprofen 600-800 mg 3-4 times daily.  She should only do this for a couple of days or until the headache is gone if that is sooner than a couple days.  She states that she does not have medication for her migraines so she will try the ibuprofen.  If this does work for her, I let her know to take the same dosing about an hour before coming into her iron infusion and then do it until her headaches are gone, at most a couple of days at this dosing.  She verbalized understanding.  I also reminded her per Dr. Singh request that she does need to come in and have labs drawn at the end of her next menstrual cycle.  She again verbalized understanding.    Lolly Ramesh RN

## 2021-06-15 NOTE — TELEPHONE ENCOUNTER
Patient called in today and left a message asking why we had called her earlier today.  When I called her back she did not answer.  I did leave her another detailed voicemail that was last Friday CHETNA Ward earlier today.  I let her know to call as soon as she starts her next menstrual cycle so that she can get back into the clinic to get her von Willebrand's labs rechecked.  Patient was given the scheduling line to call in to.    Lolly Ramesh RN

## 2021-06-15 NOTE — CONSULTS
[facility] Hematology and Oncology Consult Note    Patient: Aury Cervantes  MRN: [id number]  Date of Service: 02/02/2021      Reason for Visit:    1.  Abnormal uterine bleeding woodwinds is blunt  2.  Iron deficiency anemia    Assessment/Plan:    1.  Abnormal uterine bleeding: Per her history, she said heavy periods since menarche.  They seem to be regular.  She has developed iron deficiency anemia.  She has had an evaluation for von Willebrand's disease which was completely normal.  An ultrasound was done a week or so ago which showed several right ovarian cystic lesions but no endometrial polyps or leiomyomas..   These were favored to be hemorrhagic cyst versus endometriomas.  She does not have any signs or symptoms of androgen excess.  She has had other hemostatic challenges in her past including wisdom teeth extraction and tonsillectomy/adenoidectomy in 2018 without any bleeding.  All of her coags are normal.  Platelet count is normal.  No known family history of abnormal bleeding.  These make a bleeding disorder less likely.  Thyroid function is normal.  She does states she gets significant cramping which makes endometriosis a possibility.  We will recheck her von Willebrand levels at the start of her next menstrual cycle.    For now we will continue iron replacement.  We discussed oral versus IV.  We will look into getting an infusion approved for her.    ECOG Performance   ECOG Performance Status: 0    Distress Assessment       Problem List:    1. Menorrhagia with regular cycle     2. Iron deficiency anemia due to chronic blood loss       Staging History:    Cancer Staging  No matching staging information was found for the patient.    History:    Aury is a 20-year-old woman who was referred for an evaluation of a bleeding disorder.  She has a history of abnormal uterine bleeding.  Heavy menstrual periods which are regular and cycle.  She is also developed iron deficiency result.  She has what she describes  as significant menstrual cramping.  Denies any known family history of any bleeding disorders.  She has had a tonsillectomy/adenoidectomy without bleeding.  Also wisdom teeth removal without any bruising or bleeding.  No history of abnormal bruising or nosebleeds.    Past History:    Past Medical History:   Diagnosis Date     Anemia     Family History   Problem Relation Age of Onset     Cancer Maternal Grandmother      Lung cancer Maternal Grandmother      No Medical Problems Mother      No Medical Problems Father      No Medical Problems Sister      No Medical Problems Sister       [unfilled] Social History     Socioeconomic History     Marital status: Single     Spouse name: Not on file     Number of children: Not on file     Years of education: Not on file     Highest education level: Not on file   Occupational History     Occupation: [employer]   Social Needs     Financial resource strain: Not on file     Food insecurity     Worry: Not on file     Inability: Not on file     Transportation needs     Medical: Not on file     Non-medical: Not on file   Tobacco Use     Smoking status: Never Smoker     Smokeless tobacco: Never Used   Substance and Sexual Activity     Alcohol use: Not Currently     Frequency: Never     Drug use: Never     Sexual activity: Never   Lifestyle     Physical activity     Days per week: Not on file     Minutes per session: Not on file     Stress: Not on file   Relationships     Social connections     Talks on phone: Not on file     Gets together: Not on file     Attends Voodoo service: Not on file     Active member of club or organization: Not on file     Attends meetings of clubs or organizations: Not on file     Relationship status: Not on file     Intimate partner violence     Fear of current or ex partner: Not on file     Emotionally abused: Not on file     Physically abused: Not on file     Forced sexual activity: Not on file   Other Topics Concern     Not on file   Social History  "Narrative     Not on file        Allergies:    Allergies   Allergen Reactions     Other Allergy (See Comments) Other (See Comments)     Both parents are allergic     Review of Systems:    General  General (WDL): All general elements are within defined limits  ENT  ENT (WDL): All ENT elements are within defined limits  Respiratory  Respiratory (WDL): All respiratory elements are within defined limits  Cardiovascular  Cardiovascular (WDL): All cardiovascular elements are within defined limits  Endocrine  Endocrine (WDL): All endocrine elements are within defined limits  Gastrointestinal  Gastrointestinal (WDL): All gastrointestinal elements are within defined limits  Musculoskeletal  Musculoskeletal (WDL): All musculoskeletal elements are within defined limits  Neurological  Neurological (WDL): All neurological elements are within defined limits  Psychological/Emotional  Psychological/Emotional (WDL): All psychological/emotional elements are within defined limits  Hematological/Lymphatic  Hematological/Lymphatic (WDL): All hematological/lymphatic elements are within defined limits  Dermatological  Dermatologic (WDL): All dermatological elements are within defined limits  Genitourinary/Reproductive  Genitourinary/Reproductive (WDL): All genitourinary/reproductive elements are within defined limits  Reproductive (Females only)  Menstrual irritation or increase in discharge: Yes  Age at start of periods: 12  Last menstural cycle: 01/22/21  Number of pregnancies: 0  Patient Pregnant?: No  Is the patient trying to get pregnant?: Yes  Is the patient on birth control: No  Pain  Currently in Pain: No/denies    Physical Exam:    Recent Vitals 2/2/2021   Height 5' 5.5\"   Weight 113 lbs 13 oz   BSA (m2) 1.54 m2   /62   Pulse 90   Temp 98.3   Temp src 1   SpO2 100     General: patient appears stated age of 20 y.o.. Nontoxic and in no distress.   HEENT: Head: atraumatic, normocephalic. Sclerae anicteric.  Chest:  Normal " respiratory effort  Cardiac:  No edema.   Abdomen: abdomen is non-distended  Extremities: normal tone and muscle bulk.  Skin: no lesions or rash on visible skin. Warm and dry.   CNS: alert and oriented. Grossly non-focal.   Psychiatric: normal mood and affect.     Lab Results:    Recent Results (from the past 168 hour(s))   Comprehensive Metabolic Panel   Result Value Ref Range    Sodium 142 136 - 145 mmol/L    Potassium 4.0 3.5 - 5.0 mmol/L    Chloride 108 (H) 98 - 107 mmol/L    CO2 26 22 - 31 mmol/L    Anion Gap, Calculation 8 5 - 18 mmol/L    Glucose 87 70 - 125 mg/dL    BUN 10 8 - 22 mg/dL    Creatinine 0.73 0.60 - 1.10 mg/dL    GFR MDRD Af Amer >60 >60 mL/min/1.73m2    GFR MDRD Non Af Amer >60 >60 mL/min/1.73m2    Bilirubin, Total 0.4 0.0 - 1.0 mg/dL    Calcium 9.1 8.5 - 10.5 mg/dL    Protein, Total 7.6 6.0 - 8.0 g/dL    Albumin 4.5 3.5 - 5.0 g/dL    Alkaline Phosphatase 78 45 - 120 U/L    AST 20 0 - 40 U/L    ALT 15 0 - 45 U/L   APTT(PTT)   Result Value Ref Range    PTT 31 24 - 37 seconds   INR   Result Value Ref Range    INR 1.15 (H) 0.90 - 1.10   Thrombin Time   Result Value Ref Range    Thrombin Time 16.2 13.0 - 19.0 sec   Morphology,Smear Review (MORP)   Result Value Ref Range    Pathology, Smear Review See Separate Pathology Report (!) (none)    WBC 4.8 4.0 - 11.0 thou/uL    RBC 5.15 3.80 - 5.40 mill/uL    Hemoglobin 9.4 (L) 12.0 - 16.0 g/dL    Hematocrit 34.3 (L) 35.0 - 47.0 %    MCV 67 (L) 80 - 100 fL    MCH 18.3 (L) 27.0 - 34.0 pg    MCHC 27.4 (L) 32.0 - 36.0 g/dL    RDW 16.4 (H) 11.0 - 14.5 %    Platelets 214 140 - 440 thou/uL    MPV 9.9 8.5 - 12.5 fL    Neutrophils % 67 50 - 70 %    Lymphocytes % 21 20 - 40 %    Monocytes % 8 2 - 10 %    Eosinophils % 3 0 - 6 %    Basophils % 1 0 - 2 %    Immature Granulocyte % 0 <=0 %    Neutrophils Absolute 3.2 2.0 - 7.7 thou/uL    Lymphocytes Absolute 1.0 0.8 - 4.4 thou/uL    Monocytes Absolute 0.4 0.0 - 0.9 thou/uL    Eosinophils Absolute 0.1 0.0 - 0.4 thou/uL     Basophils Absolute 0.1 0.0 - 0.2 thou/uL    Immature Granulocyte Absolute 0.0 <=0.0 thou/uL   TSH   Result Value Ref Range    TSH 3.15 0.30 - 5.00 uIU/mL   T4, Free   Result Value Ref Range    Free T4 0.9 0.7 - 1.8 ng/dL   HM1 (CBC with Diff)   Result Value Ref Range    WBC 4.8 4.0 - 11.0 thou/uL    RBC 5.15 3.80 - 5.40 mill/uL    Hemoglobin 9.4 (L) 12.0 - 16.0 g/dL    Hematocrit 34.3 (L) 35.0 - 47.0 %    MCV 67 (L) 80 - 100 fL    MCH 18.3 (L) 27.0 - 34.0 pg    MCHC 27.4 (L) 32.0 - 36.0 g/dL    RDW 16.4 (H) 11.0 - 14.5 %    Platelets 214 140 - 440 thou/uL    MPV 9.9 8.5 - 12.5 fL    Neutrophils % 67 50 - 70 %    Lymphocytes % 21 20 - 40 %    Monocytes % 8 2 - 10 %    Eosinophils % 3 0 - 6 %    Basophils % 1 0 - 2 %    Immature Granulocyte % 0 <=0 %    Neutrophils Absolute 3.2 2.0 - 7.7 thou/uL    Lymphocytes Absolute 1.0 0.8 - 4.4 thou/uL    Monocytes Absolute 0.4 0.0 - 0.9 thou/uL    Eosinophils Absolute 0.1 0.0 - 0.4 thou/uL    Basophils Absolute 0.1 0.0 - 0.2 thou/uL    Immature Granulocyte Absolute 0.0 <=0.0 thou/uL   Peripheral Blood Smear, Path Review   Result Value Ref Range    Case Report       Peripheral Blood Morphology                       Case: [id number]                                   Authorizing Provider:  Saul Singh MD       Collected:           02/02/2021 1110              Ordering Location:     [facility]   Received:            02/02/2021 [address] [place] [address]                                                             Pathologist:           Brayan Botello MD                                                        Specimen:    Peripheral Blood                                                                           Final Diagnosis       PERIPHERAL BLOOD:     -  MICROCYTIC-HYPOCHROMIC ANEMIA WITH NORMAL RED BLOOD CELL COUNT     -  SEVERAL SPHEROCYTES ARE PRESENT     -  RARE SCHISTOCYTES ARE IDENTIFIED     -  NEGATIVE FOR ACUTE LEUKEMIA       -  UNREMARKABLE PLATELETS    Comment       This patient's microcytic-hypochromic anemia could have a component of iron deficiency; however, there are several spherocytes, and rare schistocytes are observed, raising the possibility of intravascular hemolysis with extravascular and intravascular components. Additional laboratory testing should include serum iron studies, serum haptoglobin, LDH and a direct antiglobulin test. If the patient has a family history of anemia, consideration could be given to hemoglobin electrophoresis in order to rule out a congenital hemoglobinopathy.    Clinical Information N92.0     Charges CPT: 35479  ICD-10: D50.9      Pathology:    Final Diagnosis   PERIPHERAL BLOOD:      -  MICROCYTIC-HYPOCHROMIC ANEMIA WITH NORMAL RED BLOOD CELL COUNT      -  SEVERAL SPHEROCYTES ARE PRESENT      -  RARE SCHISTOCYTES ARE IDENTIFIED      -  NEGATIVE FOR ACUTE LEUKEMIA      -  UNREMARKABLE PLATELETS       Signed by: Saul Singh MD

## 2021-06-16 NOTE — PROGRESS NOTES
[facility] Hematology and Oncology Progress Note    Patient: Aury Cervantes  MRN: [id number]  Date of Service: 04/20/2021      Assessment and Plan:    1.  Iron deficiency anemia: Likely secondary to menorrhagia.  She has no evidence of bleeding disorder.  Von Willebrand evaluation and, including multimer analysis, has been negative.  She has completed a course of Injectafer.  We are checking ferritin and hemoglobin today.  If she is still a little low we can give her one more dose.  She has not completed her evaluation for menorrhagia.  I think there might be a question of endometriosis given her significant menstrual cramping.  She is moving to [place] in a few weeks.  Encouraged her to establish primary care there and then have the person contact me if needed.  Questions were answered.    ECOG Performance   ECOG Performance Status: 0    Distress Assessment  Distress Assessment Score: No distress    Pain  Currently in Pain: No/denies    Diagnosis:    1.  Iron deficiency anemia secondary to blood loss secondary to menorrhagia    Treatment:    She was given two doses of Injectafer in February, 2021.    Interim History:    Aury returns today for follow-up visit.  She had her iron treatment 2 months ago.  She states that she feels better but not back to 100%.  No other new complaints today.  Continues to have menorrhagia and significant cramping with her menstrual cycles.    Review of Systems:    Constitutional  Constitutional (WDL): All constitutional elements are within defined limits  Neurosensory  Neurosensory (WDL): All neurosensory elements are within defined limits  Cardiovascular  Cardiovascular (WDL): All cardiovascular elements are within defined limits  Pulmonary  Respiratory (WDL): Within Defined Limits  Gastrointestinal  Gastrointestinal (WDL): Exceptions to WDL  Anorexia: None(not hungry lately)  Constipation: Occasional or intermittent symptoms, occasional use of stool softeners, laxatives, dietary  modification, or enema(occ)  Genitourinary  Genitourinary (WDL): All genitourinary elements are within defined limits  Integumentary  Integumentary (WDL): All integumentary elements are within defined limits  Patient Coping  Patient Coping: Accepting  Accompanied by  Accompanied by: Alone    Past History:    Past Medical History:   Diagnosis Date     Anemia      Physical Exam:    Recent Vitals 4/20/2021   Height -   Weight 114 lbs   BSA (m2) 1.55 m2   /70   Pulse 93   Temp 98.4   Temp src 1   SpO2 99   Some recent data might be hidden     General: patient appears stated age of 20 y.o.. Nontoxic and in no distress.   HEENT: Head: atraumatic, normocephalic. Sclerae anicteric.  Chest:  Normal respiratory effort  Cardiac:  No edema.   Abdomen: abdomen is non-distended  Extremities: normal tone and muscle bulk.  Skin: no lesions or rash. Warm and dry.   CNS: alert and oriented. Grossly non-focal.   Psychiatric: normal mood and affect.     Lab Results:    No results found for this or any previous visit (from the past 168 hour(s)).     Imaging:    No results found.      Signed by: Saul Singh MD

## 2021-06-16 NOTE — PROGRESS NOTES
Patient is here for follow up with provider regarding Iron deficiency anemia due to chronic blood loss.

## 2021-06-18 NOTE — PATIENT INSTRUCTIONS - HE
Patient Instructions by Janette Mendoza RN at 2/11/2021 10:30 AM     Author: Janette Mendoza RN Service: -- Author Type: Registered Nurse    Filed: 2/11/2021 11:55 AM Encounter Date: 2/11/2021 Status: Signed    : Janette Mendoza RN (Registered Nurse)       Patient Education     Ferric carboxymaltose injection  Brand Name: Injectafer  What is this medicine?  FERRIC CARBOXYMALTOSE (ferr-ik car-box-ee-mol-toes) is an iron complex. Iron is used to make healthy red blood cells, which carry oxygen and nutrients throughout the body. This medicine is used to treat anemia in people with chronic kidney disease or people who cannot take iron by mouth.  How should I use this medicine?  This medicine is for infusion into a vein. It is given by a health care professional in a hospital or clinic setting.  Talk to your pediatrician regarding the use of this medicine in children. Special care may be needed.  What side effects may I notice from receiving this medicine?  Side effects that you should report to your doctor or health care professional as soon as possible:    allergic reactions like skin rash, itching or hives, swelling of the face, lips, or tongue    breathing problems    changes in blood pressure    feeling faint or lightheaded, falls    flushing, sweating, or hot feelings  Side effects that usually do not require medical attention (report to your doctor or health care professional if they continue or are bothersome):    changes in taste    constipation    dizziness    headache    nausea    pain, redness, or irritation at site where injected    vomiting  What may interact with this medicine?  Do not take this medicine with any of the following medications:    deferoxamine    dimercaprol    other iron products  This medicine may also interact with the following medications:    chloramphenicol    deferasirox  What if I miss a dose?  It is important not to miss your dose. Call your doctor or health care  professional if you are unable to keep an appointment.  Where should I keep my medicine?  This drug is given in a hospital or clinic and will not be stored at home.  What should I tell my health care provider before I take this medicine?  They need to know if you have any of these conditions:    anemia not caused by low iron levels    high levels of iron in the blood    liver disease    an unusual or allergic reaction to iron, other medicines, foods, dyes, or preservatives    pregnant or trying to get pregnant    breast-feeding  What should I watch for while using this medicine?  Visit your doctor or health care professional regularly. Tell your doctor if your symptoms do not start to get better or if they get worse. You may need blood work done while you are taking this medicine.  You may need to follow a special diet. Talk to your doctor. Foods that contain iron include: whole grains/cereals, dried fruits, beans, or peas, leafy green vegetables, and organ meats (liver, kidney).  NOTE:This sheet is a summary. It may not cover all possible information. If you have questions about this medicine, talk to your doctor, pharmacist, or health care provider. Copyright  2018 [employer]vier

## 2021-06-21 NOTE — LETTER
Letter by Saul Singh MD at      Author: Saul Singh MD Service: -- Author Type: --    Filed:  Encounter Date: 1/21/2021 Status: (Other)       Dear Aury TAN Billy    Thank you for choosing [facility] for your care.  We are committed to providing you with the highest quality and compassionate healthcare services.  The following information pertains to your first appointment with our clinic.    Date/Time of appointment: Tuesday February 2nd, 10:45 am    Note: Please arrive 30 minutes prior to your appointment time.  This allows time to complete forms, possible labs and nursing assessment.     Name of your Physician: Dr Saul Singh    What to bring to your appointment:    Completed Patient History/Initial Nursing Assessment and Medication/Allergy List (these forms were sent to you).    Any paperwork or films from your physician that we have asked you to bring.    Your current insurance card(s).    Parking:    Please refer to the map included to direct you.  The [facility] is located at the [place] end of [facility] in [place], [place].      After turning onto [address] from [facility], take a right turn at the first stop sign.  We have designated parking on the left, identified as parking for Cancer Care patients (Lot D).     The Code to Enter Lot D is: 0201. This code changes monthly and will always coincide with the current month followed by 01. For example August will be 0801.  The month will continue to change but the 01 will remain constant.  If lot D is full please use Parking Lot A, directly across the street.    Please enter the Cancer Care Center on the north end of the [facility].  You will see a sign on the building.        For Medical Oncology or Hematology appointments, please take the elevator to the second floor to check in.   For Radiation Oncology appointments, please go straight through the double doors and check in.     Also please note appointments can  last 1.5-2 hours.      We hope these instructions are helpful to you.  If you have any questions or concerns, please call us at [phone].  It is our pleasure to assist you.    Warm Regards,  Katarzyna Berkowitz  Nurse Navigator  [phone]

## 2021-06-28 ENCOUNTER — TELEPHONE (OUTPATIENT)
Dept: OBGYN | Facility: CLINIC | Age: 21
End: 2021-06-28

## 2021-08-03 ENCOUNTER — TELEPHONE (OUTPATIENT)
Dept: OBGYN | Facility: CLINIC | Age: 21
End: 2021-08-03

## 2021-08-03 NOTE — TELEPHONE ENCOUNTER
Do you want to approve this? Pt is a restricted recipient pt and needs approval.    See message below.    Tierney-Referral Coordinator

## 2021-08-03 NOTE — TELEPHONE ENCOUNTER
Reason for Call:  Other call back    Detailed comments: -[facility] calling (Madie [phone]).  Pt has been going there for Iron Infusion since Jan.  Insurance denying this service as pt has specific restriction policy.  Will forward to someone to help facilitate getting needed info to try to get these visits approved.    Phone Number Patient can be reached at: [phone]    Best Time:     Can we leave a detailed message on this number? Not Applicable    Call taken on 8/3/2021 at 12:07 PM by Ranjana Joseph

## 2021-12-28 ENCOUNTER — TELEPHONE (OUTPATIENT)
Dept: OBGYN | Facility: CLINIC | Age: 21
End: 2021-12-28
Payer: COMMERCIAL

## 2021-12-28 NOTE — LETTER
December 28, 2021      Aury Cervantes  [address]  [facility] [address]    Dear ,      This letter is to remind you that you are due for your follow up PAP smear and Annual Exam .    Please call [phone] to schedule your appointment at your earliest convenience.     If you have completed the tests outside of [place], please have the results forwarded to our office. We will update the chart for your primary Physician to review before your next annual physical.     Sincerely,      Your [place] Care Team

## 2021-12-28 NOTE — TELEPHONE ENCOUNTER
Panel Management Review        Health Maintenance List    Health Maintenance   Topic Date Due     ADVANCE CARE PLANNING  Never done     DEPRESSION ACTION PLAN  Never done     COVID-19 Vaccine (1) Never done     PREVENTIVE CARE VISIT  08/04/2006     PHQ-9  06/03/2016     PAP  Never done     INFLUENZA VACCINE (1) 09/01/2021     DTAP/TDAP/TD IMMUNIZATION (6 - Td or Tdap) 08/13/2025     HEPATITIS C SCREENING  Completed     HIV SCREENING  Completed     IPV IMMUNIZATION  Completed     HPV IMMUNIZATION  Completed     MENINGITIS IMMUNIZATION  Completed     HEPATITIS B IMMUNIZATION  Completed     Pneumococcal Vaccine: Pediatrics (0 to 5 Years) and At-Risk Patients (6 to 64 Years)  Aged Out       Composite cancer screening  Chart review shows that this patient is due/due soon for the following Pap Smear  No results found for: PAP  Past Surgical History:   Procedure Laterality Date     SURGICAL HISTORY OF -       PE tubes     TONSILLECTOMY, ADENOIDECTOMY ADULT, COMBINED Bilateral 3/19/2018    Procedure: COMBINED TONSILLECTOMY, ADENOIDECTOMY ADULT;  Bilateral Adenotonsillectomy;  Surgeon: Kevin Arias MD;  Location: [facility] OR       Is hysterectomy listed in surgical history? No   Is mastectomy listed in surgical history? No     Summary:    Patient is due/failing the following:   Pap Smear    Action needed: Patient needs office visit for annual and pap smear.    Type of outreach:  Sent [employer] message.      Staff Signature:  Zulay Jacobsen MA

## 2022-04-13 ENCOUNTER — TELEPHONE (OUTPATIENT)
Dept: FAMILY MEDICINE | Facility: CLINIC | Age: 22
End: 2022-04-13
Payer: COMMERCIAL

## 2022-04-13 NOTE — TELEPHONE ENCOUNTER
Clinton from [facility] calls and states this pt needs to be assigned a primary care physician because she is going to be enrolled in a state program similar to managed care where only primary can make decisions for pt. He does not have pt on phone. He states he just wants me to choose. I adv pt needs to choose her own provider. He states she does not want to. I adv I will call pt and discuss options but I will not select provider with him and without pt involvement.   I called pt and no answer. Left message to call. When decision is made [facility] states we are to call them at [phone] and adv who was chosen.       Jun Hermosillo, RN

## 2022-04-14 NOTE — TELEPHONE ENCOUNTER
Clinton from [facility] called back.  He did not get a call from us and was wondering what provider to use. Writer advised the caller that we attempted to reach patient and left a message, per note below.  He was told by the patient that we did speak to the patient and this was addressed.  Writer does not see a note in her chart.  The caller states he is going to choose a provider for the patient.  He choose Marily Formogey, for the patient.  He did not need any more information.      Thank you    Deb BASSETT RN

## 2022-05-02 ENCOUNTER — OFFICE VISIT (OUTPATIENT)
Dept: FAMILY MEDICINE | Facility: CLINIC | Age: 22
End: 2022-05-02
Payer: COMMERCIAL

## 2022-05-02 VITALS
BODY MASS INDEX: 18.66 KG/M2 | WEIGHT: 112 LBS | OXYGEN SATURATION: 99 % | SYSTOLIC BLOOD PRESSURE: 104 MMHG | TEMPERATURE: 97.6 F | HEIGHT: 65 IN | DIASTOLIC BLOOD PRESSURE: 68 MMHG | HEART RATE: 91 BPM

## 2022-05-02 DIAGNOSIS — J33.9 NASAL POLYP: ICD-10-CM

## 2022-05-02 DIAGNOSIS — Z86.2 HISTORY OF ANEMIA: ICD-10-CM

## 2022-05-02 DIAGNOSIS — R10.9 FLANK PAIN: ICD-10-CM

## 2022-05-02 DIAGNOSIS — F33.2 SEVERE EPISODE OF RECURRENT MAJOR DEPRESSIVE DISORDER, WITHOUT PSYCHOTIC FEATURES (H): ICD-10-CM

## 2022-05-02 DIAGNOSIS — N97.9 FEMALE INFERTILITY: ICD-10-CM

## 2022-05-02 DIAGNOSIS — N89.8 VAGINAL DISCHARGE: ICD-10-CM

## 2022-05-02 DIAGNOSIS — Z00.00 ANNUAL PHYSICAL EXAM: Primary | ICD-10-CM

## 2022-05-02 DIAGNOSIS — Z12.4 CERVICAL CANCER SCREENING: ICD-10-CM

## 2022-05-02 LAB
ALBUMIN UR-MCNC: NEGATIVE MG/DL
APPEARANCE UR: CLEAR
BACTERIA #/AREA URNS HPF: ABNORMAL /HPF
BILIRUB UR QL STRIP: NEGATIVE
CLUE CELLS: ABNORMAL
COLOR UR AUTO: YELLOW
ERYTHROCYTE [DISTWIDTH] IN BLOOD BY AUTOMATED COUNT: 13.1 % (ref 10–15)
FERRITIN SERPL-MCNC: 5 NG/ML (ref 12–150)
GLUCOSE UR STRIP-MCNC: NEGATIVE MG/DL
HCT VFR BLD AUTO: 38.2 % (ref 35–47)
HGB BLD-MCNC: 12.2 G/DL (ref 11.7–15.7)
HGB UR QL STRIP: ABNORMAL
KETONES UR STRIP-MCNC: NEGATIVE MG/DL
LEUKOCYTE ESTERASE UR QL STRIP: NEGATIVE
MCH RBC QN AUTO: 26.7 PG (ref 26.5–33)
MCHC RBC AUTO-ENTMCNC: 31.9 G/DL (ref 31.5–36.5)
MCV RBC AUTO: 84 FL (ref 78–100)
MUCOUS THREADS #/AREA URNS LPF: PRESENT /LPF
NITRATE UR QL: NEGATIVE
PH UR STRIP: 6 [PH] (ref 5–7)
PLATELET # BLD AUTO: 185 10E3/UL (ref 150–450)
RBC # BLD AUTO: 4.57 10E6/UL (ref 3.8–5.2)
RBC #/AREA URNS AUTO: ABNORMAL /HPF
SP GR UR STRIP: 1.02 (ref 1–1.03)
SQUAMOUS #/AREA URNS AUTO: ABNORMAL /LPF
TRICHOMONAS, WET PREP: ABNORMAL
TSH SERPL DL<=0.005 MIU/L-ACNC: 3.17 MU/L (ref 0.4–4)
UROBILINOGEN UR STRIP-ACNC: 0.2 E.U./DL
WBC # BLD AUTO: 5.2 10E3/UL (ref 4–11)
WBC #/AREA URNS AUTO: ABNORMAL /HPF
WBC'S/HIGH POWER FIELD, WET PREP: ABNORMAL
YEAST, WET PREP: ABNORMAL

## 2022-05-02 PROCEDURE — G0145 SCR C/V CYTO,THINLAYER,RESCR: HCPCS | Performed by: NURSE PRACTITIONER

## 2022-05-02 PROCEDURE — 36415 COLL VENOUS BLD VENIPUNCTURE: CPT | Performed by: NURSE PRACTITIONER

## 2022-05-02 PROCEDURE — 99395 PREV VISIT EST AGE 18-39: CPT | Performed by: NURSE PRACTITIONER

## 2022-05-02 PROCEDURE — 82728 ASSAY OF FERRITIN: CPT | Performed by: NURSE PRACTITIONER

## 2022-05-02 PROCEDURE — 85027 COMPLETE CBC AUTOMATED: CPT | Performed by: NURSE PRACTITIONER

## 2022-05-02 PROCEDURE — 84443 ASSAY THYROID STIM HORMONE: CPT | Performed by: NURSE PRACTITIONER

## 2022-05-02 PROCEDURE — 99214 OFFICE O/P EST MOD 30 MIN: CPT | Mod: 25 | Performed by: NURSE PRACTITIONER

## 2022-05-02 PROCEDURE — 81001 URINALYSIS AUTO W/SCOPE: CPT | Performed by: NURSE PRACTITIONER

## 2022-05-02 PROCEDURE — 87210 SMEAR WET MOUNT SALINE/INK: CPT | Performed by: NURSE PRACTITIONER

## 2022-05-02 ASSESSMENT — PAIN SCALES - GENERAL: PAINLEVEL: MILD PAIN (2)

## 2022-05-02 NOTE — PROGRESS NOTES
Assessment & Plan     Annual physical exam  -exam normal     Cervical cancer screening    - REVIEW OF HEALTH MAINTENANCE PROTOCOL ORDERS  - Pap Screen only - recommended age 21 - 24 years    Female infertility  -patient is trying to get pregnant, states unsuccessfully for over a year. Recommend follow up with Ob GYN   - Ob/Gyn Referral; Future  - TSH with free T4 reflex; Future  - TSH with free T4 reflex    Severe episode of recurrent major depressive disorder, without psychotic features (H)  -stable    History of anemia  -ferritin level low, recommended over the counter SloFE 45 mg daily with meal and recheck labs again in 4 weeks   - CBC with platelets; Future  - Ferritin; Future  - CBC with platelets  - Ferritin    Flank pain  -history of kidney stones  -urine positive for mild hematuria, recommended renal US   - UA with Microscopic reflex to Culture - lab collect; Future  - UA with Microscopic reflex to Culture - lab collect  - Urine Microscopic  - US Renal Complete; Future    Vaginal discharge    - Wet prep - lab collect    Nasal polyp  -reports history of chronic nasal congestion and polyps   - Otolaryngology Referral; Future      [facility] is a 21 year old who presents for the following health issues:      History of Present Illness       Reason for visit:  Fertility, couple tedious other things  Symptom onset:  More than a month  Symptoms include:  PCOS and fertility issues  Symptom intensity:  Moderate  Symptom progression:  Staying the same  Had these symptoms before:  Yes  Has tried/received treatment for these symptoms:  Yes  Previous treatment was successful:  No  What makes it worse:  Menstruation  What makes it better:  No    She eats 0-1 servings of fruits and vegetables daily.She consumes 1 sweetened beverage(s) daily.She exercises with enough effort to increase her heart rate 20 to 29 minutes per day.  She exercises with  "enough effort to increase her heart rate 4 days per week. She is missing 1 dose(s) of medications per week.  She is not taking prescribed medications regularly due to remembering to take.       Concern - Abdominal pain  Onset: Yesterday morning  Description: Pt states she's been having kidney pain on right side all day, and has spread to her back. Pt is also having pain over where her uterus is that started yesterday.   Intensity: mild  Progression of Symptoms:  same  Accompanying Signs & Symptoms: None  Previous history of similar problem: None  Precipitating factors:        Worsened by: None  Alleviating factors:        Improved by: None  Therapies tried and outcome: Ibuprofen        Review of Systems   Constitutional, HEENT, cardiovascular, pulmonary, gi and gu systems are negative, except as otherwise noted.      Objective    /68 (BP Location: Left arm)   Pulse 91   Temp 97.6  F (36.4  C) (Tympanic)   Ht 1.651 m (5' 5\")   Wt 50.8 kg (112 lb)   LMP 04/22/2022   SpO2 99%   Breastfeeding No   BMI 18.64 kg/m    Body mass index is 18.64 kg/m .  Physical Exam   GENERAL: healthy, alert and no distress  EYES: Eyes grossly normal to inspection, PERRL and conjunctivae and sclerae normal  HENT: ear canals and TM's normal, nose and mouth without ulcers or lesions  NECK: no adenopathy, no asymmetry, masses, or scars and thyroid normal to palpation  RESP: lungs clear to auscultation - no rales, rhonchi or wheezes  CV: regular rate and rhythm, normal S1 S2, no S3 or S4, no murmur, click or rub, no peripheral edema and peripheral pulses strong  ABDOMEN: tenderness upper midline and right flank and no organomegaly or masses   (female): normal female external genitalia, normal urethral meatus, vaginal mucosa, normal cervix/adnexa/uterus without masses or discharge  MS: no gross musculoskeletal defects noted, no edema  SKIN: no suspicious lesions or rashes  NEURO: Normal strength and tone, mentation intact and " speech normal  PSYCH: mentation appears normal, affect normal/bright    Results for orders placed or performed in visit on 05/02/22   TSH with free T4 reflex     Status: Normal   Result Value Ref Range    TSH 3.17 0.40 - 4.00 mU/L   CBC with platelets     Status: Normal   Result Value Ref Range    WBC Count 5.2 4.0 - 11.0 10e3/uL    RBC Count 4.57 3.80 - 5.20 10e6/uL    Hemoglobin 12.2 11.7 - 15.7 g/dL    Hematocrit 38.2 35.0 - 47.0 %    MCV 84 78 - 100 fL    MCH 26.7 26.5 - 33.0 pg    MCHC 31.9 31.5 - 36.5 g/dL    RDW 13.1 10.0 - 15.0 %    Platelet Count 185 150 - 450 10e3/uL   Ferritin     Status: Abnormal   Result Value Ref Range    Ferritin 5 (L) 12 - 150 ng/mL   UA with Microscopic reflex to Culture - lab collect     Status: Abnormal    Specimen: Urine, Clean Catch   Result Value Ref Range    Color Urine Yellow Colorless, Straw, Light Yellow, Yellow    Appearance Urine Clear Clear    Glucose Urine Negative Negative mg/dL    Bilirubin Urine Negative Negative    Ketones Urine Negative Negative mg/dL    Specific Gravity Urine 1.025 1.003 - 1.035    Blood Urine Trace (A) Negative    pH Urine 6.0 5.0 - 7.0    Protein Albumin Urine Negative Negative mg/dL    Urobilinogen Urine 0.2 0.2, 1.0 E.U./dL    Nitrite Urine Negative Negative    Leukocyte Esterase Urine Negative Negative   Urine Microscopic     Status: Abnormal   Result Value Ref Range    Bacteria Urine Few (A) None Seen /HPF    RBC Urine 2-5 (A) 0-2 /HPF /HPF    WBC Urine 0-5 0-5 /HPF /HPF    Squamous Epithelials Urine Few (A) None Seen /LPF    Mucus Urine Present (A) None Seen /LPF    Narrative    Urine Culture not indicated   Wet prep - lab collect     Status: Abnormal    Specimen: Vagina; Swab   Result Value Ref Range    Trichomonas Absent Absent    Yeast Absent Absent    Clue Cells Absent Absent    WBCs/high power field 1+ (A) None

## 2022-05-03 ENCOUNTER — MYC MEDICAL ADVICE (OUTPATIENT)
Dept: FAMILY MEDICINE | Facility: CLINIC | Age: 22
End: 2022-05-03
Payer: COMMERCIAL

## 2022-05-04 LAB
BKR LAB AP GYN ADEQUACY: NORMAL
BKR LAB AP GYN INTERPRETATION: NORMAL
BKR LAB AP HPV REFLEX: NO
BKR LAB AP LMP: NORMAL
BKR LAB AP PREVIOUS ABNORMAL: NORMAL
PATH REPORT.COMMENTS IMP SPEC: NORMAL
PATH REPORT.COMMENTS IMP SPEC: NORMAL
PATH REPORT.RELEVANT HX SPEC: NORMAL

## 2022-05-16 ENCOUNTER — OFFICE VISIT (OUTPATIENT)
Dept: OBGYN | Facility: CLINIC | Age: 22
End: 2022-05-16
Payer: COMMERCIAL

## 2022-05-16 VITALS
BODY MASS INDEX: 18.83 KG/M2 | HEIGHT: 65 IN | RESPIRATION RATE: 16 BRPM | DIASTOLIC BLOOD PRESSURE: 70 MMHG | HEART RATE: 73 BPM | TEMPERATURE: 98.4 F | SYSTOLIC BLOOD PRESSURE: 108 MMHG | WEIGHT: 113 LBS

## 2022-05-16 DIAGNOSIS — N94.6 DYSMENORRHEA: ICD-10-CM

## 2022-05-16 DIAGNOSIS — N97.9 FEMALE INFERTILITY: Primary | ICD-10-CM

## 2022-05-16 PROBLEM — G89.18 POST-OP PAIN: Status: RESOLVED | Noted: 2018-03-21 | Resolved: 2022-05-16

## 2022-05-16 PROCEDURE — 87491 CHLMYD TRACH DNA AMP PROBE: CPT | Performed by: OBSTETRICS & GYNECOLOGY

## 2022-05-16 PROCEDURE — 87109 MYCOPLASMA: CPT | Performed by: OBSTETRICS & GYNECOLOGY

## 2022-05-16 PROCEDURE — 99214 OFFICE O/P EST MOD 30 MIN: CPT | Performed by: OBSTETRICS & GYNECOLOGY

## 2022-05-16 PROCEDURE — 87591 N.GONORRHOEAE DNA AMP PROB: CPT | Performed by: OBSTETRICS & GYNECOLOGY

## 2022-05-16 PROCEDURE — 87076 CULTURE ANAEROBE IDENT EACH: CPT | Performed by: OBSTETRICS & GYNECOLOGY

## 2022-05-16 ASSESSMENT — ANXIETY QUESTIONNAIRES
IF YOU CHECKED OFF ANY PROBLEMS ON THIS QUESTIONNAIRE, HOW DIFFICULT HAVE THESE PROBLEMS MADE IT FOR YOU TO DO YOUR WORK, TAKE CARE OF THINGS AT HOME, OR GET ALONG WITH OTHER PEOPLE: SOMEWHAT DIFFICULT
3. WORRYING TOO MUCH ABOUT DIFFERENT THINGS: SEVERAL DAYS
GAD7 TOTAL SCORE: 5
1. FEELING NERVOUS, ANXIOUS, OR ON EDGE: SEVERAL DAYS
2. NOT BEING ABLE TO STOP OR CONTROL WORRYING: NOT AT ALL
6. BECOMING EASILY ANNOYED OR IRRITABLE: SEVERAL DAYS
5. BEING SO RESTLESS THAT IT IS HARD TO SIT STILL: NOT AT ALL
7. FEELING AFRAID AS IF SOMETHING AWFUL MIGHT HAPPEN: SEVERAL DAYS

## 2022-05-16 ASSESSMENT — PATIENT HEALTH QUESTIONNAIRE - PHQ9
5. POOR APPETITE OR OVEREATING: SEVERAL DAYS
SUM OF ALL RESPONSES TO PHQ QUESTIONS 1-9: 0

## 2022-05-16 NOTE — PROGRESS NOTES
SUBJECTIVE:  Aury is a 21 year old  0 para 0 Tab 0 Sab 0  female who presents with complaint of difficulty with conception for >12 months.  Patient's last menstrual period was 2022. and her menstrual periods are regular, occuring every 24-30 days, normal, painful and associated with premenstrual molimina.  She has been told she may have PCOS; reports facial acne but no extra hair growth; previous ultrasound have shown multiple follicles, no scan since early .    Her most recent form of birth control was Nexplanon, last used .    OB history: never pregnant    GYN history: Pap smears Normal   history of STD Chlamydia 5 years ago  history of PID, none  prior LEEP or cervical cone bx, no  history of endometriosis, none known (but + FH of endometriosis and her periods are very painful.    Her partner is a 23 year old who has not fathered children previously.  He has no exposure to toxins, radiation or excessive heat, and is in good. health.    Previous infertility workup or treatment: none    CURRENT PROBLEM LIST:    Patient Active Problem List    Diagnosis Date Noted     Dysmenorrhea 2022     Priority: Medium     Female infertility 2022     Priority: Medium     Iron deficiency anemia due to chronic blood loss 2021     Priority: Medium     Menorrhagia with regular cycle 2021     Priority: Medium     Anaphylaxis, subsequent encounter 2018     Priority: Medium     Rhinoconjunctivitis 2018     Priority: Medium     S/P tonsillectomy and adenoidectomy 2018     Priority: Medium     Major depressive disorder, recurrent episode, severe (H) 2014     Priority: Medium       REVIEW OF SYSTEMS:  All systems were reviewed and pertinent information in noted in subjective/HPI.    PAST MEDICAL HISTORY:    Past Medical History:   Diagnosis Date     Anemia      Depression      Nexplanon in place        PAST SURGICAL HISTORY:    Past Surgical History:   Procedure  "Laterality Date     SURGICAL HISTORY OF -       PE tubes     TONSILLECTOMY, ADENOIDECTOMY ADULT, COMBINED Bilateral 3/19/2018    Procedure: COMBINED TONSILLECTOMY, ADENOIDECTOMY ADULT;  Bilateral Adenotonsillectomy;  Surgeon: Kevin Arias MD;  Location: [facility] OR       MEDICATIONS:    Current Outpatient Medications   Medication Sig Dispense Refill     EPINEPHrine (ADRENACLICK) 0.3 MG/0.3ML injection 2-pack Inject 0.3 mLs (0.3 mg) into the muscle as needed for anaphylaxis 0.6 mL 1       ALLERGIES:  Sulfa drugs    SOCIAL HISTORY:    Social History     Socioeconomic History     Marital status: Single     Spouse name: None     Number of children: None     Years of education: None     Highest education level: None   Tobacco Use     Smoking status: Never Smoker     Smokeless tobacco: Never Used   Vaping Use     Vaping Use: Never used   Substance and Sexual Activity     Alcohol use: Not Currently     Alcohol/week: 0.0 standard drinks     Drug use: Never     Sexual activity: Yes     Partners: Male   Other Topics Concern     Parent/sibling w/ CABG, MI or angioplasty before 65F 55M? No       FAMILY HISTORY:    Family History   Problem Relation Age of Onset     Depression Mother      Depression Father      Depression Maternal Grandmother      Cancer Maternal Grandmother      Diabetes Maternal Grandfather      Heart Disease Maternal Grandfather      Hypertension Maternal Grandfather      Depression Paternal Grandmother         bipolar     Depression Paternal Grandfather         bipolar     Lung Cancer Maternal Grandmother      No Known Problems Mother      No Known Problems Father      No Known Problems Sister      No Known Problems Sister        PHYSICAL EXAM: /70 (BP Location: Right arm, Patient Position: Chair, Cuff Size: Adult Regular)   Pulse 73   Temp 98.4  F (36.9  C) (Tympanic)   Resp 16   Ht 1.651 m (5' 5\")   Wt 51.3 kg (113 lb)   LMP 04/22/2022   Breastfeeding No   BMI 18.80 kg/m       GENERAL APPEARANCE: " healthy, alert and no distress  ABDOMEN:  soft, nontender, no hepato-splenomegaly or hernias  PELVIC:  EGBUS:  within normal limits  VAGINA:  normoestrogenic, well-supported, no unusual discharge  CERVIX:  smooth, non-friable, no gross lesions, thin-layer PAP was not taken' cultures taken  UTERUS:  RV; globular, tender in culdesac with possible nodularity  ADNEXAE:  No masses    ASSESSMENT: infertility, uncharacterized  dysmenorrhea    PLAN:   Patient Instructions   In order to further evaluate for infertility, you will need the following tests: Keep these instructions so you are able to inform the lab of which tests you are needing at each lab visit.    --we will notify you of the culture results taken today for chlamydia, gonorrhea, Ureaplasma and Mycoplasma; if any of these are positive, we will recommend antibiotics for you and your partner.    -- when your next period starts, the first day of bleeding is called cycle day 1;  on cycle day 2 or 3, go to the lab and have blood work for Estradiol, LH  and FSH    -- on cycle day 9 or 10, you need to have a Hysterosalpingogram (HSG); this is a test done to check your uterus and tubes through the diagnostic imaging department at [facility]    -- on cycle day 21 of this cycle, go to the lab and obtain blood tests for Progesterone, Prolactin, Anti-Mullerian Hormone and TSH (thyroid)    -- your partner needs to obtain and submit a semen sample for Semen Analysis (best if 3 days of no ejaculation prior, and keep the specimen warm until it arrives at the specialized lab), we will be giving you a separate request for this test    -- you should be taking a vitamin with Folic Acid to prevent birth defects.    -- make a follow up appointment with Dr. Wilkinson after all the above testing is done in order to review results and discuss plans moving forward      Future considerations: diagnostic laparoscopy for endometriosis  Serum androgens, A1C  Catherine Wilkinson,  MD  [facility]

## 2022-05-16 NOTE — NURSING NOTE
"Initial /70 (BP Location: Right arm, Patient Position: Chair, Cuff Size: Adult Regular)   Pulse 73   Temp 98.4  F (36.9  C) (Tympanic)   Resp 16   Ht 1.651 m (5' 5\")   Wt 51.3 kg (113 lb)   LMP 04/22/2022   Breastfeeding No   BMI 18.80 kg/m   Estimated body mass index is 18.8 kg/m  as calculated from the following:    Height as of this encounter: 1.651 m (5' 5\").    Weight as of this encounter: 51.3 kg (113 lb). .      "

## 2022-05-16 NOTE — PATIENT INSTRUCTIONS
In order to further evaluate for infertility, you will need the following tests: Keep these instructions so you are able to inform the lab of which tests you are needing at each lab visit.    --we will notify you of the culture results taken today for chlamydia, gonorrhea, Ureaplasma and Mycoplasma; if any of these are positive, we will recommend antibiotics for you and your partner.    -- when your next period starts, the first day of bleeding is called cycle day 1;  on cycle day 2 or 3, go to the lab and have blood work for Estradiol, LH  and FSH    -- on cycle day 9 or 10, you need to have a Hysterosalpingogram (HSG); this is a test done to check your uterus and tubes through the diagnostic imaging department at [facility]    -- on cycle day 21 of this cycle, go to the lab and obtain blood tests for Progesterone, Prolactin, Anti-Mullerian Hormone and TSH (thyroid)    -- your partner needs to obtain and submit a semen sample for Semen Analysis (best if 3 days of no ejaculation prior, and keep the specimen warm until it arrives at the specialized lab), we will be giving you a separate request for this test    -- you should be taking a vitamin with Folic Acid to prevent birth defects.    -- make a follow up appointment with Dr. Wilkinson after all the above testing is done in order to review results and discuss plans moving forward

## 2022-05-17 LAB
C TRACH DNA SPEC QL PROBE+SIG AMP: NEGATIVE
N GONORRHOEA DNA SPEC QL NAA+PROBE: NEGATIVE

## 2022-05-17 ASSESSMENT — ANXIETY QUESTIONNAIRES: GAD7 TOTAL SCORE: 5

## 2022-05-18 ENCOUNTER — MYC MEDICAL ADVICE (OUTPATIENT)
Dept: FAMILY MEDICINE | Facility: CLINIC | Age: 22
End: 2022-05-18
Payer: COMMERCIAL

## 2022-05-18 DIAGNOSIS — N97.9 FEMALE INFERTILITY: Primary | ICD-10-CM

## 2022-05-18 DIAGNOSIS — N97.9 FEMALE INFERTILITY: ICD-10-CM

## 2022-05-18 DIAGNOSIS — Z22.39 CARRIER OF UREAPLASMA UREALYTICUM: Primary | ICD-10-CM

## 2022-05-18 RX ORDER — DOXYCYCLINE 100 MG/1
100 CAPSULE ORAL 2 TIMES DAILY
Qty: 28 CAPSULE | Refills: 0 | Status: SHIPPED | OUTPATIENT
Start: 2022-05-18 | End: 2022-05-20

## 2022-05-18 NOTE — RESULT ENCOUNTER NOTE
Pt notified of below.  Pt reports understanding.  Pt does not have further questions or concerns.    Zunilda Kidd   [facility]  RN

## 2022-05-18 NOTE — RESULT ENCOUNTER NOTE
Call to pt to notify of below.  Unable to reach.  Left message for pt to call back     Zunilda Kidd   [facility]  RN

## 2022-05-19 NOTE — TELEPHONE ENCOUNTER
Routed to provider.  Please see MyChart message from pt regarding a medication prescribed by ob/gyn.  Vandana Shankar RN

## 2022-05-20 RX ORDER — DOXYCYCLINE 100 MG/1
100 CAPSULE ORAL 2 TIMES DAILY
Qty: 28 CAPSULE | Refills: 0 | Status: SHIPPED | OUTPATIENT
Start: 2022-05-20 | End: 2022-09-13

## 2022-05-20 NOTE — TELEPHONE ENCOUNTER
Can you call pharmacy to see if I can send it, or does it have to come from [place]? Route it to me if able, or otherwise back to [place].  Covering for clinician.  Thank you,  Yamil Singh MD  [facility]

## 2022-05-20 NOTE — TELEPHONE ENCOUNTER
I spoke with pharmacy and Aury is restricted to [facility]. She states we can have the patient call her insurance to add a provider if she needs this before [facility] returns to clinic. I will mychart patient back to let her know.    Lavinia Valadez RN

## 2022-05-23 LAB — BACTERIA SPEC CULT: ABNORMAL

## 2022-05-26 ENCOUNTER — LAB (OUTPATIENT)
Dept: LAB | Facility: CLINIC | Age: 22
End: 2022-05-26
Payer: COMMERCIAL

## 2022-05-26 DIAGNOSIS — N97.9 FEMALE INFERTILITY: ICD-10-CM

## 2022-05-26 LAB
ESTRADIOL SERPL-MCNC: 74 PG/ML
FSH SERPL-ACNC: 6.2 IU/L
LH SERPL-ACNC: 6.5 IU/L

## 2022-05-26 PROCEDURE — 83001 ASSAY OF GONADOTROPIN (FSH): CPT

## 2022-05-26 PROCEDURE — 83002 ASSAY OF GONADOTROPIN (LH): CPT

## 2022-05-26 PROCEDURE — 82670 ASSAY OF TOTAL ESTRADIOL: CPT

## 2022-05-26 PROCEDURE — 36415 COLL VENOUS BLD VENIPUNCTURE: CPT

## 2022-06-01 ENCOUNTER — OFFICE VISIT (OUTPATIENT)
Dept: OBGYN | Facility: CLINIC | Age: 22
End: 2022-06-01
Payer: COMMERCIAL

## 2022-06-01 ENCOUNTER — HOSPITAL ENCOUNTER (OUTPATIENT)
Dept: GENERAL RADIOLOGY | Facility: CLINIC | Age: 22
Discharge: HOME | End: 2022-06-01
Attending: OBSTETRICS & GYNECOLOGY
Payer: COMMERCIAL

## 2022-06-01 ENCOUNTER — HOSPITAL ENCOUNTER (OUTPATIENT)
Dept: ULTRASOUND IMAGING | Facility: CLINIC | Age: 22
Discharge: HOME | End: 2022-06-01
Attending: OBSTETRICS & GYNECOLOGY
Payer: COMMERCIAL

## 2022-06-01 DIAGNOSIS — N97.9 FEMALE INFERTILITY: ICD-10-CM

## 2022-06-01 DIAGNOSIS — N97.9 PRIMARY FEMALE INFERTILITY: Primary | ICD-10-CM

## 2022-06-01 DIAGNOSIS — N94.6 DYSMENORRHEA: ICD-10-CM

## 2022-06-01 PROCEDURE — 74740 X-RAY FEMALE GENITAL TRACT: CPT

## 2022-06-01 PROCEDURE — 58340 CATHETER FOR HYSTEROGRAPHY: CPT

## 2022-06-01 PROCEDURE — 250N000011 HC RX IP 250 OP 636: Performed by: RADIOLOGY

## 2022-06-01 PROCEDURE — 58340 CATHETER FOR HYSTEROGRAPHY: CPT | Performed by: OBSTETRICS & GYNECOLOGY

## 2022-06-01 PROCEDURE — 76856 US EXAM PELVIC COMPLETE: CPT

## 2022-06-01 RX ORDER — IOPAMIDOL 612 MG/ML
30 INJECTION, SOLUTION INTRATHECAL ONCE
Status: COMPLETED | OUTPATIENT
Start: 2022-06-01 | End: 2022-06-01

## 2022-06-01 RX ADMIN — IOPAMIDOL 30 ML: 612 INJECTION, SOLUTION INTRATHECAL at 12:14

## 2022-06-01 NOTE — PROGRESS NOTES
[facility]  OB/GYN      Name:Aury Cervantes  : 2000  MRN: [id number]     Procedure: Hysterosalpingogram     Indication: Infertility evaluation      Procedure: I reviewed the risks of the procedure including bleeding, cramping/pain and infection. Written informed consent was signed. The patient was placed in dorsal lithotomy and a speculum was used to visualize the cervix. The cervix was cleaned with betadine swabs x3. The HSG catheter was passed through the cervix and the catheter balloon was inflated within the uterus. Dye was injected through the catheter while real-time x-rays were obtained by the Radiologist. Please see their documentation for interpretation of these images. Once adequate images were obtained, all instruments and catheter were removed. The patient tolerated the procedure well and EBL 0cc.     Stefanie Mcnair MD  OB/GYN

## 2022-06-14 ENCOUNTER — MYC MEDICAL ADVICE (OUTPATIENT)
Dept: FAMILY MEDICINE | Facility: CLINIC | Age: 22
End: 2022-06-14

## 2022-06-14 ENCOUNTER — LAB (OUTPATIENT)
Dept: LAB | Facility: CLINIC | Age: 22
End: 2022-06-14
Payer: COMMERCIAL

## 2022-06-14 DIAGNOSIS — Z86.2 HISTORY OF ANEMIA: ICD-10-CM

## 2022-06-14 DIAGNOSIS — N97.9 FEMALE INFERTILITY: ICD-10-CM

## 2022-06-14 LAB
FERRITIN SERPL-MCNC: 4 NG/ML (ref 12–150)
IRON SATN MFR SERPL: 17 % (ref 15–46)
IRON SERPL-MCNC: 66 UG/DL (ref 35–180)
PROGEST SERPL-MCNC: 8.7 NG/ML
PROLACTIN SERPL-MCNC: 10 UG/L (ref 3–27)
TIBC SERPL-MCNC: 385 UG/DL (ref 240–430)
TSH SERPL DL<=0.005 MIU/L-ACNC: 4.15 MU/L (ref 0.4–4)

## 2022-06-14 PROCEDURE — 84144 ASSAY OF PROGESTERONE: CPT

## 2022-06-14 PROCEDURE — 83520 IMMUNOASSAY QUANT NOS NONAB: CPT | Mod: 90

## 2022-06-14 PROCEDURE — 83550 IRON BINDING TEST: CPT

## 2022-06-14 PROCEDURE — 99000 SPECIMEN HANDLING OFFICE-LAB: CPT

## 2022-06-14 PROCEDURE — 82728 ASSAY OF FERRITIN: CPT

## 2022-06-14 PROCEDURE — 84146 ASSAY OF PROLACTIN: CPT

## 2022-06-14 PROCEDURE — 84443 ASSAY THYROID STIM HORMONE: CPT

## 2022-06-14 PROCEDURE — 36415 COLL VENOUS BLD VENIPUNCTURE: CPT

## 2022-06-18 LAB — MIS SERPL-MCNC: 15.64 NG/ML

## 2022-06-25 ENCOUNTER — MYC MEDICAL ADVICE (OUTPATIENT)
Dept: FAMILY MEDICINE | Facility: CLINIC | Age: 22
End: 2022-06-25

## 2022-06-25 DIAGNOSIS — H00.019 HORDEOLUM EXTERNUM, UNSPECIFIED LATERALITY: Primary | ICD-10-CM

## 2022-07-19 ENCOUNTER — NURSE TRIAGE (OUTPATIENT)
Dept: NURSING | Facility: CLINIC | Age: 22
End: 2022-07-19

## 2022-07-20 ENCOUNTER — APPOINTMENT (OUTPATIENT)
Dept: ULTRASOUND IMAGING | Facility: CLINIC | Age: 22
End: 2022-07-20
Attending: FAMILY MEDICINE
Payer: COMMERCIAL

## 2022-07-20 ENCOUNTER — HOSPITAL ENCOUNTER (EMERGENCY)
Facility: CLINIC | Age: 22
Discharge: HOME | End: 2022-07-21
Attending: FAMILY MEDICINE | Admitting: FAMILY MEDICINE
Payer: COMMERCIAL

## 2022-07-20 DIAGNOSIS — R10.13 EPIGASTRIC PAIN: ICD-10-CM

## 2022-07-20 LAB
ALBUMIN SERPL-MCNC: 3.8 G/DL (ref 3.4–5)
ALBUMIN UR-MCNC: NEGATIVE MG/DL
ALP SERPL-CCNC: 68 U/L (ref 40–150)
ALT SERPL W P-5'-P-CCNC: 15 U/L (ref 0–50)
ANION GAP SERPL CALCULATED.3IONS-SCNC: 5 MMOL/L (ref 3–14)
APPEARANCE UR: CLEAR
AST SERPL W P-5'-P-CCNC: 12 U/L (ref 0–45)
BACTERIA #/AREA URNS HPF: ABNORMAL /HPF
BASOPHILS # BLD AUTO: 0.1 10E3/UL (ref 0–0.2)
BASOPHILS NFR BLD AUTO: 1 %
BILIRUB SERPL-MCNC: 0.5 MG/DL (ref 0.2–1.3)
BILIRUB UR QL STRIP: NEGATIVE
BUN SERPL-MCNC: 9 MG/DL (ref 7–30)
CALCIUM SERPL-MCNC: 8.9 MG/DL (ref 8.5–10.1)
CHLORIDE BLD-SCNC: 108 MMOL/L (ref 94–109)
CO2 SERPL-SCNC: 28 MMOL/L (ref 20–32)
COLOR UR AUTO: ABNORMAL
CREAT SERPL-MCNC: 0.62 MG/DL (ref 0.52–1.04)
EOSINOPHIL # BLD AUTO: 0.1 10E3/UL (ref 0–0.7)
EOSINOPHIL NFR BLD AUTO: 2 %
ERYTHROCYTE [DISTWIDTH] IN BLOOD BY AUTOMATED COUNT: 14 % (ref 10–15)
GFR SERPL CREATININE-BSD FRML MDRD: >90 ML/MIN/1.73M2
GLUCOSE BLD-MCNC: 75 MG/DL (ref 70–99)
GLUCOSE UR STRIP-MCNC: NEGATIVE MG/DL
HCG UR QL: NEGATIVE
HCT VFR BLD AUTO: 37.1 % (ref 35–47)
HGB BLD-MCNC: 12.1 G/DL (ref 11.7–15.7)
HGB UR QL STRIP: ABNORMAL
IMM GRANULOCYTES # BLD: 0 10E3/UL
IMM GRANULOCYTES NFR BLD: 0 %
KETONES UR STRIP-MCNC: NEGATIVE MG/DL
LEUKOCYTE ESTERASE UR QL STRIP: NEGATIVE
LIPASE SERPL-CCNC: 209 U/L (ref 73–393)
LYMPHOCYTES # BLD AUTO: 1.9 10E3/UL (ref 0.8–5.3)
LYMPHOCYTES NFR BLD AUTO: 27 %
MCH RBC QN AUTO: 26.7 PG (ref 26.5–33)
MCHC RBC AUTO-ENTMCNC: 32.6 G/DL (ref 31.5–36.5)
MCV RBC AUTO: 82 FL (ref 78–100)
MONOCYTES # BLD AUTO: 0.5 10E3/UL (ref 0–1.3)
MONOCYTES NFR BLD AUTO: 8 %
MUCOUS THREADS #/AREA URNS LPF: PRESENT /LPF
NEUTROPHILS # BLD AUTO: 4.5 10E3/UL (ref 1.6–8.3)
NEUTROPHILS NFR BLD AUTO: 62 %
NITRATE UR QL: NEGATIVE
NRBC # BLD AUTO: 0 10E3/UL
NRBC BLD AUTO-RTO: 0 /100
PH UR STRIP: 6 [PH] (ref 5–7)
PLATELET # BLD AUTO: 168 10E3/UL (ref 150–450)
POTASSIUM BLD-SCNC: 3.6 MMOL/L (ref 3.4–5.3)
PROT SERPL-MCNC: 6.9 G/DL (ref 6.8–8.8)
RBC # BLD AUTO: 4.54 10E6/UL (ref 3.8–5.2)
RBC URINE: 3 /HPF
SODIUM SERPL-SCNC: 141 MMOL/L (ref 133–144)
SP GR UR STRIP: 1.03 (ref 1–1.03)
SQUAMOUS EPITHELIAL: 2 /HPF
UROBILINOGEN UR STRIP-MCNC: NORMAL MG/DL
WBC # BLD AUTO: 7.1 10E3/UL (ref 4–11)
WBC URINE: 2 /HPF

## 2022-07-20 PROCEDURE — 80053 COMPREHEN METABOLIC PANEL: CPT | Performed by: FAMILY MEDICINE

## 2022-07-20 PROCEDURE — 96374 THER/PROPH/DIAG INJ IV PUSH: CPT | Performed by: FAMILY MEDICINE

## 2022-07-20 PROCEDURE — 99284 EMERGENCY DEPT VISIT MOD MDM: CPT | Mod: 25 | Performed by: FAMILY MEDICINE

## 2022-07-20 PROCEDURE — 81001 URINALYSIS AUTO W/SCOPE: CPT | Performed by: FAMILY MEDICINE

## 2022-07-20 PROCEDURE — 36415 COLL VENOUS BLD VENIPUNCTURE: CPT | Performed by: FAMILY MEDICINE

## 2022-07-20 PROCEDURE — 250N000013 HC RX MED GY IP 250 OP 250 PS 637: Performed by: FAMILY MEDICINE

## 2022-07-20 PROCEDURE — 83690 ASSAY OF LIPASE: CPT | Performed by: FAMILY MEDICINE

## 2022-07-20 PROCEDURE — 250N000009 HC RX 250: Performed by: FAMILY MEDICINE

## 2022-07-20 PROCEDURE — 96375 TX/PRO/DX INJ NEW DRUG ADDON: CPT | Performed by: FAMILY MEDICINE

## 2022-07-20 PROCEDURE — 76705 ECHO EXAM OF ABDOMEN: CPT

## 2022-07-20 PROCEDURE — 81025 URINE PREGNANCY TEST: CPT | Performed by: FAMILY MEDICINE

## 2022-07-20 PROCEDURE — 99284 EMERGENCY DEPT VISIT MOD MDM: CPT | Performed by: FAMILY MEDICINE

## 2022-07-20 PROCEDURE — 85025 COMPLETE CBC W/AUTO DIFF WBC: CPT | Performed by: FAMILY MEDICINE

## 2022-07-20 PROCEDURE — 250N000011 HC RX IP 250 OP 636: Performed by: FAMILY MEDICINE

## 2022-07-20 RX ORDER — ONDANSETRON 2 MG/ML
4 INJECTION INTRAMUSCULAR; INTRAVENOUS
Status: DISCONTINUED | OUTPATIENT
Start: 2022-07-20 | End: 2022-07-21 | Stop reason: HOSPADM

## 2022-07-20 RX ADMIN — ALUMINUM HYDROXIDE, MAGNESIUM HYDROXIDE, AND SIMETHICONE 30 ML: 200; 200; 20 SUSPENSION ORAL at 22:30

## 2022-07-20 RX ADMIN — ONDANSETRON 4 MG: 2 INJECTION INTRAMUSCULAR; INTRAVENOUS at 22:27

## 2022-07-20 NOTE — ED TRIAGE NOTES
C/o epigastric pain x 2 days, feels like severe hunger pain, is concerned it is h pylori. Pt states is unable to eat due to pain. Pt states she prefers urgent care, discussed capabilities of care available in urgent care vs ED, pt states she is limited to where she can go due to cost. Discussed pt s/s with urgent care provider who agrees pt should probably be seen in ED based on chief complaint. Reiterated with pt multiple possible diagnoses that can cause abd pain and generally require more testing than what is available in urgent care and wanting to get pt to the correct resources to best fit her needs and symptoms, pt states she is OK with being seen in ED.      Triage Assessment     Row Name 07/20/22 6480       Triage Assessment (Adult)    Airway WDL WDL       Respiratory WDL    Respiratory WDL WDL       Skin Circulation/Temperature WDL    Skin Circulation/Temperature WDL WDL       Cardiac WDL    Cardiac WDL WDL       Peripheral/Neurovascular WDL    Peripheral Neurovascular WDL WDL       Cognitive/Neuro/Behavioral WDL    Cognitive/Neuro/Behavioral WDL WDL

## 2022-07-20 NOTE — TELEPHONE ENCOUNTER
"Pt calling with concerns about;    Began on or about 7/17 with upper abdominal cramping that she reports feels like painful hunger pangs.  Has had some nausea but mainly just very uncomfortable feeling if she eats or does not eat.    Pt Denies;  Fever  Lower abdominal pain  Vomiting     According to the protocol, patient should see a physician within 24 hours.  Care advice given. Patient verbalizes understanding and agrees with plan of care. Transferred to scheduling.     Reshma Lutz RN, Nurse Advisor 8:31 PM 7/19/2022  COVID 19 Nurse Triage Plan/Patient Instructions    Please be aware that novel coronavirus (COVID-19) may be circulating in the community. If you develop symptoms such as fever, cough, or SOB or if you have concerns about the presence of another infection including coronavirus (COVID-19), please contact your health care provider or visit [email or web address].     Disposition/Instructions    In-Person Visit with provider recommended. Reference Visit Selection Guide.    Thank you for taking steps to prevent the spread of this virus.  o Limit your contact with others.  o Wear a simple mask to cover your cough.  o Wash your hands well and often.    Reason for Disposition    [1] MODERATE pain (e.g., interferes with normal activities) AND [2] pain comes and goes (cramps) AND [3] present > 24 hours  (Exception: pain with Vomiting or Diarrhea - see that Guideline)    Additional Information    Negative: Shock suspected (e.g., cold/pale/clammy skin, too weak to stand, low BP, rapid pulse)    Negative: Difficult to awaken or acting confused (e.g., disoriented, slurred speech)    Negative: Passed out (i.e., lost consciousness, collapsed and was not responding)    Negative: Sounds like a life-threatening emergency to the triager    Negative: Chest pain    Negative: Pain is mainly in upper abdomen  (if needed ask: \"is it mainly above the belly button?\")    Negative: Followed an abdomen (stomach) injury    " "Negative: [1] Abdominal pain AND [2] pregnant < 20 weeks    Negative: [1] Abdominal pain AND [2] pregnant > 20 weeks    Negative: [1] Abdominal pain AND [2] postpartum (from 0 to 6 weeks after delivery)    Negative: [1] SEVERE pain (e.g., excruciating) AND [2] present > 1 hour    Negative: [1] SEVERE pain AND [2] age > 60    Negative: [1] Vomiting AND [2] contains red blood or black (\"coffee ground\") material  (Exception: few red streaks in vomit that only happened once)    Negative: Blood in bowel movements   (Exception: blood on surface of BM with constipation)    Negative: Black or tarry bowel movements  (Exception: chronic-unchanged  black-grey bowel movements AND is taking iron pills or Pepto-bismol)    Negative: Patient sounds very sick or weak to the triager    Negative: [1] MILD-MODERATE pain AND [2] constant AND [3] present > 2 hours    Negative: [1] Vomiting AND [2] abdomen looks much more swollen than usual    Negative: [1] Vomiting AND [2] contains bile (green color)    Negative: White of the eyes have turned yellow (i.e., jaundice)    Negative: Fever > 103 F (39.4 C)    Negative: [1] Fever > 101 F (38.3 C) AND [2] age > 60    Negative: [1] Fever > 100.0 F (37.8 C) AND [2] bedridden (e.g., nursing home patient, CVA, chronic illness, recovering from surgery)    Negative: [1] Fever > 100.0 F (37.8 C) AND [2] diabetes mellitus or weak immune system (e.g., HIV positive, cancer chemo, splenectomy, organ transplant, chronic steroids)    Negative: [1] SEVERE pain AND [2] present < 1 hour    Protocols used: ABDOMINAL PAIN - FEMALE-A-AH      "

## 2022-07-21 VITALS
DIASTOLIC BLOOD PRESSURE: 62 MMHG | HEART RATE: 67 BPM | SYSTOLIC BLOOD PRESSURE: 91 MMHG | BODY MASS INDEX: 19.16 KG/M2 | WEIGHT: 115 LBS | TEMPERATURE: 98 F | OXYGEN SATURATION: 98 % | HEIGHT: 65 IN

## 2022-07-21 PROCEDURE — C9113 INJ PANTOPRAZOLE SODIUM, VIA: HCPCS | Performed by: EMERGENCY MEDICINE

## 2022-07-21 PROCEDURE — 250N000011 HC RX IP 250 OP 636: Performed by: EMERGENCY MEDICINE

## 2022-07-21 RX ADMIN — PANTOPRAZOLE SODIUM 40 MG: 40 INJECTION, POWDER, FOR SOLUTION INTRAVENOUS at 00:43

## 2022-07-21 NOTE — ED PROVIDER NOTES
HPI   The patient is a 22-year-old female presenting with epigastric pain and occasional nausea.  No prior abdominal pathology is reported by the patient.  No prior abdominal surgery.  Her past medical history shows dysmenorrhea and menorrhagia.  The patient reports new epigastric pain that radiates directly to the back starting on Sunday, the 3 days ago.  Initially she had only some mild nausea with eating but it changed to include significant pain and nausea that has been coming and going frequently today.  Eating does make her symptoms worse.  However, the pain will happen even without eating.  When present, the pain is moderate in severity.  She has some mild background discomfort currently.  No vomiting.  No diarrhea.  No constipation.  No dysuria, urgency, or frequency of urination.  No vaginal discharge or irritation out of the ordinary.  No trauma or injury.  No chest pain, coughing, shortness of breath.  No fever.  She has not had similar symptoms previously.        Allergies:  Allergies   Allergen Reactions     Sulfa Drugs Other (See Comments)     Both parents are allergic       Problem List:    Patient Active Problem List    Diagnosis Date Noted     Dysmenorrhea 05/16/2022     Priority: Medium     Female infertility 05/16/2022     Priority: Medium     Iron deficiency anemia due to chronic blood loss 02/07/2021     Priority: Medium     Menorrhagia with regular cycle 02/07/2021     Priority: Medium     Anaphylaxis, subsequent encounter 12/04/2018     Priority: Medium     Rhinoconjunctivitis 12/04/2018     Priority: Medium     S/P tonsillectomy and adenoidectomy 03/21/2018     Priority: Medium     Major depressive disorder, recurrent episode, severe (H) 11/19/2014     Priority: Medium      Past Medical History:    Past Medical History:   Diagnosis Date     Anemia      Depression      Nexplanon in place      Past Surgical History:    Past Surgical History:   Procedure Laterality Date     SURGICAL HISTORY OF  "-       PE tubes     TONSILLECTOMY, ADENOIDECTOMY ADULT, COMBINED Bilateral 3/19/2018    Procedure: COMBINED TONSILLECTOMY, ADENOIDECTOMY ADULT;  Bilateral Adenotonsillectomy;  Surgeon: Kevin Arias MD;  Location: [facility] OR     Family History:    Family History   Problem Relation Age of Onset     Depression Mother      Depression Father      Depression Maternal Grandmother      Cancer Maternal Grandmother      Diabetes Maternal Grandfather      Heart Disease Maternal Grandfather      Hypertension Maternal Grandfather      Depression Paternal Grandmother         bipolar     Depression Paternal Grandfather         bipolar     Lung Cancer Maternal Grandmother      No Known Problems Mother      No Known Problems Father      No Known Problems Sister      No Known Problems Sister      Social History:  Marital Status:  Single [1]  Social History     Tobacco Use     Smoking status: Never Smoker     Smokeless tobacco: Never Used   Vaping Use     Vaping Use: Never used   Substance Use Topics     Alcohol use: Not Currently     Alcohol/week: 0.0 standard drinks     Drug use: Never      Medications:    omeprazole (PRILOSEC) 20 MG DR capsule  doxycycline hyclate (VIBRAMYCIN) 100 MG capsule  EPINEPHrine (ADRENACLICK) 0.3 MG/0.3ML injection 2-pack      Review of Systems   All other systems reviewed and are negative.      PE   BP: 118/78  Pulse: 94  Temp: 98  F (36.7  C)  Height: 165.1 cm (5' 5\")  Weight: 52.2 kg (115 lb)  SpO2: 99 %  Physical Exam  Vitals reviewed.   Constitutional:       General: She is not in acute distress.     Appearance: She is well-developed.   HENT:      Head: Normocephalic and atraumatic.      Right Ear: External ear normal.      Left Ear: External ear normal.      Nose: Nose normal.      Mouth/Throat:      Mouth: Mucous membranes are moist.      Pharynx: Oropharynx is clear.   Eyes:      Extraocular Movements: Extraocular movements intact.      Conjunctiva/sclera: Conjunctivae normal.      Pupils: Pupils " are equal, round, and reactive to light.   Cardiovascular:      Rate and Rhythm: Normal rate and regular rhythm.   Pulmonary:      Effort: Pulmonary effort is normal.   Abdominal:      Comments: Thin, flat abdomen.  No organomegaly or masses obvious.  No distention.  She has some mild to moderate tenderness as a push in the epigastrium only.   Musculoskeletal:         General: Normal range of motion.      Cervical back: Normal range of motion.   Skin:     General: Skin is warm and dry.   Neurological:      Mental Status: She is alert and oriented to person, place, and time.   Psychiatric:         Behavior: Behavior normal.         ED COURSE and MDM   2200.  The patient has epigastric pain and nausea that comes and goes over the past 3 days, worsening with time.  She takes ibuprofen 600 mg twice a week.  She does not smoke, chew tobacco, use alcohol, or use drugs of abuse.  Lab values pending.  GI cocktail and Zofran ordered.    LABS  Labs Ordered and Resulted from Time of ED Arrival to Time of ED Departure   ROUTINE UA WITH MICROSCOPIC REFLEX TO CULTURE - Abnormal       Result Value    Color Urine Straw      Appearance Urine Clear      Glucose Urine Negative      Bilirubin Urine Negative      Ketones Urine Negative      Specific Gravity Urine 1.030      Blood Urine Trace (*)     pH Urine 6.0      Protein Albumin Urine Negative      Urobilinogen Urine Normal      Nitrite Urine Negative      Leukocyte Esterase Urine Negative      Bacteria Urine Few (*)     Mucus Urine Present (*)     RBC Urine 3 (*)     WBC Urine 2      Squamous Epithelials Urine 2 (*)    HCG QUALITATIVE URINE - Normal    hCG Urine Qualitative Negative     COMPREHENSIVE METABOLIC PANEL - Normal    Sodium 141      Potassium 3.6      Chloride 108      Carbon Dioxide (CO2) 28      Anion Gap 5      Urea Nitrogen 9      Creatinine 0.62      Calcium 8.9      Glucose 75      Alkaline Phosphatase 68      AST 12      ALT 15      Protein Total 6.9      Albumin  3.8      Bilirubin Total 0.5      GFR Estimate >90     LIPASE - Normal    Lipase 209     CBC WITH PLATELETS AND DIFFERENTIAL    WBC Count 7.1      RBC Count 4.54      Hemoglobin 12.1      Hematocrit 37.1      MCV 82      MCH 26.7      MCHC 32.6      RDW 14.0      Platelet Count 168      % Neutrophils 62      % Lymphocytes 27      % Monocytes 8      % Eosinophils 2      % Basophils 1      % Immature Granulocytes 0      NRBCs per 100 WBC 0      Absolute Neutrophils 4.5      Absolute Lymphocytes 1.9      Absolute Monocytes 0.5      Absolute Eosinophils 0.1      Absolute Basophils 0.1      Absolute Immature Granulocytes 0.0      Absolute NRBCs 0.0         IMAGING  Images reviewed by me.  Radiology report also reviewed.  US Abdomen Limited   Final Result   IMPRESSION:    1. Unremarkable appearance of the gallbladder and liver.   2. No biliary dilatation.                Procedures    Medications   lidocaine (viscous) (XYLOCAINE) 2 % 15 mL, alum & mag hydroxide-simethicone (MAALOX) 15 mL GI Cocktail (30 mLs Oral Given 7/20/22 2230)   pantoprazole (PROTONIX) IV push injection 40 mg (40 mg Intravenous Given 7/21/22 0043)         IMPRESSION       ICD-10-CM    1. Epigastric pain  R10.13             Medication List      Started    omeprazole 20 MG DR capsule  Commonly known as: priLOSEC  20 mg, Oral, DAILY                        Crispin Gallo MD  07/31/22 2858

## 2022-07-21 NOTE — ED PROVIDER NOTES
"     Emergency Department Patient Sign-out       Brief HPI:  This is a 22 year old female signed out to me by Dr. Gallo .  See initial ED Provider note for details of the presentation.            Significant Events prior to my assuming care: workup in progress      Exam:   Patient Vitals for the past 24 hrs:   BP Temp Temp src Pulse SpO2 Height Weight   07/20/22 2100 104/66 -- -- 70 98 % -- --   07/20/22 1851 118/78 98  F (36.7  C) Tympanic 94 99 % 1.651 m (5' 5\") 52.2 kg (115 lb)           ED RESULTS:   Results for orders placed or performed during the hospital encounter of 07/20/22 (from the past 24 hour(s))   UA with Microscopic reflex to Culture     Status: Abnormal    Collection Time: 07/20/22  8:28 PM    Specimen: Urine, Midstream   Result Value Ref Range    Color Urine Straw Colorless, Straw, Light Yellow, Yellow    Appearance Urine Clear Clear    Glucose Urine Negative Negative mg/dL    Bilirubin Urine Negative Negative    Ketones Urine Negative Negative mg/dL    Specific Gravity Urine 1.030 1.003 - 1.035    Blood Urine Trace (A) Negative    pH Urine 6.0 5.0 - 7.0    Protein Albumin Urine Negative Negative mg/dL    Urobilinogen Urine Normal Normal, 2.0 mg/dL    Nitrite Urine Negative Negative    Leukocyte Esterase Urine Negative Negative    Bacteria Urine Few (A) None Seen /HPF    Mucus Urine Present (A) None Seen /LPF    RBC Urine 3 (H) <=2 /HPF    WBC Urine 2 <=5 /HPF    Squamous Epithelials Urine 2 (H) <=1 /HPF    Narrative    Urine Culture not indicated   HCG qualitative urine     Status: Normal    Collection Time: 07/20/22  8:28 PM   Result Value Ref Range    hCG Urine Qualitative Negative Negative   CBC with Platelets & Differential     Status: None    Collection Time: 07/20/22 10:57 PM    Narrative    The following orders were created for panel order CBC with Platelets & Differential.  Procedure                               Abnormality         Status                     ---------                    "            -----------         ------                     CBC with platelets and d...[[id number]]                      Final result                 Please view results for these tests on the individual orders.   Comprehensive metabolic panel     Status: Normal    Collection Time: 07/20/22 10:57 PM   Result Value Ref Range    Sodium 141 133 - 144 mmol/L    Potassium 3.6 3.4 - 5.3 mmol/L    Chloride 108 94 - 109 mmol/L    Carbon Dioxide (CO2) 28 20 - 32 mmol/L    Anion Gap 5 3 - 14 mmol/L    Urea Nitrogen 9 7 - 30 mg/dL    Creatinine 0.62 0.52 - 1.04 mg/dL    Calcium 8.9 8.5 - 10.1 mg/dL    Glucose 75 70 - 99 mg/dL    Alkaline Phosphatase 68 40 - 150 U/L    AST 12 0 - 45 U/L    ALT 15 0 - 50 U/L    Protein Total 6.9 6.8 - 8.8 g/dL    Albumin 3.8 3.4 - 5.0 g/dL    Bilirubin Total 0.5 0.2 - 1.3 mg/dL    GFR Estimate >90 >60 mL/min/1.73m2   Lipase     Status: Normal    Collection Time: 07/20/22 10:57 PM   Result Value Ref Range    Lipase 209 73 - 393 U/L   CBC with platelets and differential     Status: None    Collection Time: 07/20/22 10:57 PM   Result Value Ref Range    WBC Count 7.1 4.0 - 11.0 10e3/uL    RBC Count 4.54 3.80 - 5.20 10e6/uL    Hemoglobin 12.1 11.7 - 15.7 g/dL    Hematocrit 37.1 35.0 - 47.0 %    MCV 82 78 - 100 fL    MCH 26.7 26.5 - 33.0 pg    MCHC 32.6 31.5 - 36.5 g/dL    RDW 14.0 10.0 - 15.0 %    Platelet Count 168 150 - 450 10e3/uL    % Neutrophils 62 %    % Lymphocytes 27 %    % Monocytes 8 %    % Eosinophils 2 %    % Basophils 1 %    % Immature Granulocytes 0 %    NRBCs per 100 WBC 0 <1 /100    Absolute Neutrophils 4.5 1.6 - 8.3 10e3/uL    Absolute Lymphocytes 1.9 0.8 - 5.3 10e3/uL    Absolute Monocytes 0.5 0.0 - 1.3 10e3/uL    Absolute Eosinophils 0.1 0.0 - 0.7 10e3/uL    Absolute Basophils 0.1 0.0 - 0.2 10e3/uL    Absolute Immature Granulocytes 0.0 <=0.4 10e3/uL    Absolute NRBCs 0.0 10e3/uL   US Abdomen Limited     Status: None    Collection Time: 07/20/22 11:46 PM    Narrative    EXAM:  ULTRASOUND ABDOMEN LIMITED - RIGHT UPPER QUADRANT  LOCATION: [facility]  DATE/TIME: 7/20/2022 11:24 PM    INDICATION: Epigastric pain.  COMPARISON: None.    TECHNIQUE: Limited abdominal ultrasound.    FINDINGS:    GALLBLADDER: Unremarkable without gallstones, wall thickening or pericholecystic fluid. No focal tenderness over the gallbladder.    BILE DUCTS: No intra or extrahepatic biliary dilatation. The common duct measures 0.5 cm in diameter.    LIVER: Normal echogenicity. No visualized masses.    RIGHT KIDNEY: 11.0 cm in length. Normal echogenicity. No intrarenal collecting system dilatation, calculi or masses.     OTHER: No free fluid in the upper right hemiabdomen.      Impression    IMPRESSION:   1. Unremarkable appearance of the gallbladder and liver.  2. No biliary dilatation.           ED MEDICATIONS:   Medications   ondansetron (ZOFRAN) injection 4 mg (4 mg Intravenous Given 7/20/22 2227)   pantoprazole (PROTONIX) IV push injection 40 mg (has no administration in time range)   lidocaine (viscous) (XYLOCAINE) 2 % 15 mL, alum & mag hydroxide-simethicone (MAALOX) 15 mL GI Cocktail (30 mLs Oral Given 7/20/22 2230)         Impression:    ICD-10-CM    1. Epigastric pain  R10.13        Plan:    Pending studies include labs.       12:36 AM Patient re-assessed: Resting comfortably.  Pain currently gone.  Advised of reassuring work-up including labs, urine, and ultrasound.  Recommended trial of antacid therapy with primary care follow-up.      MD Luis Cast Christopher James, MD  07/21/22 0037

## 2022-07-21 NOTE — ED NOTES
"Epigastric pain started on Sunday with eating [employer] has not gone away has worsened \"shoots through to back\"     Gets nausea with attempting to eat no vomiting had very soft stool yesterday   "

## 2022-08-19 ENCOUNTER — OFFICE VISIT (OUTPATIENT)
Dept: FAMILY MEDICINE | Facility: CLINIC | Age: 22
End: 2022-08-19
Payer: COMMERCIAL

## 2022-08-19 VITALS
WEIGHT: 109.6 LBS | HEIGHT: 65 IN | TEMPERATURE: 95.2 F | OXYGEN SATURATION: 95 % | RESPIRATION RATE: 16 BRPM | BODY MASS INDEX: 18.26 KG/M2 | DIASTOLIC BLOOD PRESSURE: 60 MMHG | HEART RATE: 68 BPM | SYSTOLIC BLOOD PRESSURE: 90 MMHG

## 2022-08-19 DIAGNOSIS — D68.00 VON WILLEBRAND'S DISEASE (H): ICD-10-CM

## 2022-08-19 DIAGNOSIS — J33.9 NASAL POLYP: ICD-10-CM

## 2022-08-19 DIAGNOSIS — R10.13 EPIGASTRIC PAIN: Primary | ICD-10-CM

## 2022-08-19 DIAGNOSIS — R09.81 NASAL CONGESTION: ICD-10-CM

## 2022-08-19 DIAGNOSIS — E61.1 IRON DEFICIENCY: ICD-10-CM

## 2022-08-19 DIAGNOSIS — N97.9 FEMALE INFERTILITY: ICD-10-CM

## 2022-08-19 LAB
BASOPHILS # BLD AUTO: 0 10E3/UL (ref 0–0.2)
BASOPHILS NFR BLD AUTO: 1 %
EOSINOPHIL # BLD AUTO: 0.2 10E3/UL (ref 0–0.7)
EOSINOPHIL NFR BLD AUTO: 4 %
ERYTHROCYTE [DISTWIDTH] IN BLOOD BY AUTOMATED COUNT: 13.9 % (ref 10–15)
FERRITIN SERPL-MCNC: 7 NG/ML (ref 12–150)
HCT VFR BLD AUTO: 40.6 % (ref 35–47)
HGB BLD-MCNC: 13.2 G/DL (ref 11.7–15.7)
IRON SATN MFR SERPL: 10 % (ref 15–46)
IRON SERPL-MCNC: 39 UG/DL (ref 35–180)
LYMPHOCYTES # BLD AUTO: 1.7 10E3/UL (ref 0.8–5.3)
LYMPHOCYTES NFR BLD AUTO: 31 %
MCH RBC QN AUTO: 27.4 PG (ref 26.5–33)
MCHC RBC AUTO-ENTMCNC: 32.5 G/DL (ref 31.5–36.5)
MCV RBC AUTO: 84 FL (ref 78–100)
MONOCYTES # BLD AUTO: 0.6 10E3/UL (ref 0–1.3)
MONOCYTES NFR BLD AUTO: 11 %
NEUTROPHILS # BLD AUTO: 2.9 10E3/UL (ref 1.6–8.3)
NEUTROPHILS NFR BLD AUTO: 54 %
PLATELET # BLD AUTO: 166 10E3/UL (ref 150–450)
RBC # BLD AUTO: 4.82 10E6/UL (ref 3.8–5.2)
T4 FREE SERPL-MCNC: 1.08 NG/DL (ref 0.76–1.46)
TIBC SERPL-MCNC: 384 UG/DL (ref 240–430)
TSH SERPL DL<=0.005 MIU/L-ACNC: 5.42 MU/L (ref 0.4–4)
WBC # BLD AUTO: 5.3 10E3/UL (ref 4–11)

## 2022-08-19 PROCEDURE — 84439 ASSAY OF FREE THYROXINE: CPT | Performed by: NURSE PRACTITIONER

## 2022-08-19 PROCEDURE — 83550 IRON BINDING TEST: CPT | Performed by: NURSE PRACTITIONER

## 2022-08-19 PROCEDURE — 36415 COLL VENOUS BLD VENIPUNCTURE: CPT | Performed by: NURSE PRACTITIONER

## 2022-08-19 PROCEDURE — 84443 ASSAY THYROID STIM HORMONE: CPT | Performed by: NURSE PRACTITIONER

## 2022-08-19 PROCEDURE — 82728 ASSAY OF FERRITIN: CPT | Performed by: NURSE PRACTITIONER

## 2022-08-19 PROCEDURE — 85025 COMPLETE CBC W/AUTO DIFF WBC: CPT | Performed by: NURSE PRACTITIONER

## 2022-08-19 PROCEDURE — 99214 OFFICE O/P EST MOD 30 MIN: CPT | Performed by: NURSE PRACTITIONER

## 2022-08-19 ASSESSMENT — PAIN SCALES - GENERAL: PAINLEVEL: NO PAIN (0)

## 2022-08-19 NOTE — PATIENT INSTRUCTIONS
Urea breath test to check for H. Pylori    Labs    Try iron supplement SloFE 45 mg daily for 3 weeks, will recheck labs again in 3 weeks    Hematology consult     Will consider upper endoscopy, if H. Pylori test normal and if you still have acid reflex and abdominal pain    Repeat thyroid levels

## 2022-08-19 NOTE — PROGRESS NOTES
Assessment & Plan     Epigastric pain    - Helicobacter pylori Antigen Stool; Future    Iron deficiency  -recommended to restart oral supplement SloFe 45 mg daily and recheck Ferritin level in 3 weeks, recommended follow up with hematologist   - CBC with platelets and differential; Future  - Ferritin; Future  - Iron and iron binding capacity; Future  - Adult Oncology/Hematology  Referral; Future  - Ferritin  - CBC with platelets and differential  - Iron and iron binding capacity  - ferrous sulfate (SLO-FE) 142 (45 Fe) MG CR tablet; Take 1 tablet (142 mg) by mouth daily    Female infertility  -following OB GYN  -patient possibly have Von Willebrand disease, she denies any bleeding currently, denies heavy periods, she is trying to get pregnant in case if she does have Von Willibrand and since increased Estrogen during pregnancy can stimulate VWF production I advised Aury to talk to her hematologist and Ob doctor if pregnancy will be safe for her   -she had slightly abnormal TSH level in the past, recommended to repeat thyroid studies   - TSH; Future  - T4, free; Future  - T4, free  - TSH    Nasal polyp    - Adult ENT  Referral; Future    Nasal congestion  -reports chronic nasal congestion, tried over the counter treatment with steroid sprays, recommended ENT consult   - Adult ENT  Referral; Future    Von Willebrand's disease (H)  -recommended follow up with hematologist   - Adult Oncology/Hematology  Referral; Future      CIRO Kline CNP  [facility]   Aury is a 22 year old, presenting for the following health issues:  Anemia (She would like to discuss her iron deficiency and infertility. )    Chief Complaint   Patient presents with     Anemia     She would like to discuss her iron deficiency and infertility.          History of Present Illness       Reason for visit:  Revisit for iron deficiency, etc    She eats 0-1 servings of  "fruits and vegetables daily.She consumes 0 sweetened beverage(s) daily.She exercises with enough effort to increase her heart rate 20 to 29 minutes per day.  She exercises with enough effort to increase her heart rate 3 or less days per week. She is missing 2 dose(s) of medications per week.           Review of Systems   Constitutional, HEENT, cardiovascular, pulmonary, gi and gu systems are negative, except as otherwise noted.      Objective    BP 90/60 (BP Location: Right arm, Patient Position: Sitting, Cuff Size: Adult Regular)   Pulse 68   Temp (!) 95.2  F (35.1  C) (Tympanic)   Resp 16   Ht 1.651 m (5' 5\")   Wt 49.7 kg (109 lb 9.6 oz)   LMP 08/02/2022   SpO2 95%   BMI 18.24 kg/m    Body mass index is 18.24 kg/m .  Physical Exam   GENERAL: healthy, alert and no distress  EYES: Eyes grossly normal to inspection, PERRL and conjunctivae and sclerae normal  NECK: no adenopathy, no asymmetry, masses, or scars and thyroid normal to palpation  RESP: lungs clear to auscultation - no rales, rhonchi or wheezes  CV: regular rate and rhythm, normal S1 S2, no S3 or S4, no murmur, click or rub, no peripheral edema and peripheral pulses strong  ABDOMEN: tenderness epigastric  MS: no gross musculoskeletal defects noted, no edema  NEURO: Normal strength and tone, mentation intact and speech normal  PSYCH: mentation appears normal, affect normal/bright                    .  ..  "

## 2022-08-23 DIAGNOSIS — D50.0 IRON DEFICIENCY ANEMIA DUE TO CHRONIC BLOOD LOSS: Primary | ICD-10-CM

## 2022-08-23 RX ORDER — EPINEPHRINE 1 MG/ML
0.3 INJECTION, SOLUTION, CONCENTRATE INTRAVENOUS EVERY 5 MIN PRN
Status: CANCELLED | OUTPATIENT
Start: 2022-08-23

## 2022-08-23 RX ORDER — METHYLPREDNISOLONE SODIUM SUCCINATE 125 MG/2ML
125 INJECTION, POWDER, LYOPHILIZED, FOR SOLUTION INTRAMUSCULAR; INTRAVENOUS
Status: CANCELLED
Start: 2022-08-23

## 2022-08-23 RX ORDER — DIPHENHYDRAMINE HYDROCHLORIDE 50 MG/ML
50 INJECTION INTRAMUSCULAR; INTRAVENOUS
Status: CANCELLED
Start: 2022-08-23

## 2022-08-23 RX ORDER — MEPERIDINE HYDROCHLORIDE 25 MG/ML
25 INJECTION INTRAMUSCULAR; INTRAVENOUS; SUBCUTANEOUS EVERY 30 MIN PRN
Status: CANCELLED | OUTPATIENT
Start: 2022-08-23

## 2022-08-23 RX ORDER — ALBUTEROL SULFATE 90 UG/1
1-2 AEROSOL, METERED RESPIRATORY (INHALATION)
Status: CANCELLED
Start: 2022-08-23

## 2022-08-23 RX ORDER — ALBUTEROL SULFATE 0.83 MG/ML
2.5 SOLUTION RESPIRATORY (INHALATION)
Status: CANCELLED | OUTPATIENT
Start: 2022-08-23

## 2022-09-13 ENCOUNTER — ONCOLOGY VISIT (OUTPATIENT)
Dept: ONCOLOGY | Facility: HOSPITAL | Age: 22
End: 2022-09-13
Attending: INTERNAL MEDICINE
Payer: COMMERCIAL

## 2022-09-13 VITALS
HEART RATE: 73 BPM | RESPIRATION RATE: 14 BRPM | SYSTOLIC BLOOD PRESSURE: 105 MMHG | BODY MASS INDEX: 17.84 KG/M2 | OXYGEN SATURATION: 99 % | TEMPERATURE: 98.5 F | DIASTOLIC BLOOD PRESSURE: 65 MMHG | HEIGHT: 66 IN | WEIGHT: 111 LBS

## 2022-09-13 DIAGNOSIS — D50.0 IRON DEFICIENCY ANEMIA DUE TO CHRONIC BLOOD LOSS: Primary | ICD-10-CM

## 2022-09-13 PROCEDURE — G0463 HOSPITAL OUTPT CLINIC VISIT: HCPCS

## 2022-09-13 PROCEDURE — 99214 OFFICE O/P EST MOD 30 MIN: CPT | Performed by: INTERNAL MEDICINE

## 2022-09-13 RX ORDER — HEPARIN SODIUM,PORCINE 10 UNIT/ML
5 VIAL (ML) INTRAVENOUS
Status: CANCELLED | OUTPATIENT
Start: 2022-09-16

## 2022-09-13 RX ORDER — HEPARIN SODIUM (PORCINE) LOCK FLUSH IV SOLN 100 UNIT/ML 100 UNIT/ML
5 SOLUTION INTRAVENOUS
Status: CANCELLED | OUTPATIENT
Start: 2022-09-16

## 2022-09-13 RX ORDER — IBUPROFEN 200 MG
400-600 TABLET ORAL PRN
COMMUNITY
End: 2022-12-21

## 2022-09-13 RX ORDER — ALBUTEROL SULFATE 90 UG/1
1-2 AEROSOL, METERED RESPIRATORY (INHALATION)
Status: CANCELLED
Start: 2022-09-16

## 2022-09-13 RX ORDER — ALBUTEROL SULFATE 0.83 MG/ML
2.5 SOLUTION RESPIRATORY (INHALATION)
Status: CANCELLED | OUTPATIENT
Start: 2022-09-16

## 2022-09-13 RX ORDER — MEPERIDINE HYDROCHLORIDE 25 MG/ML
25 INJECTION INTRAMUSCULAR; INTRAVENOUS; SUBCUTANEOUS EVERY 30 MIN PRN
Status: CANCELLED | OUTPATIENT
Start: 2022-09-16

## 2022-09-13 RX ORDER — EPINEPHRINE 1 MG/ML
0.3 INJECTION, SOLUTION INTRAMUSCULAR; SUBCUTANEOUS EVERY 5 MIN PRN
Status: CANCELLED | OUTPATIENT
Start: 2022-09-16

## 2022-09-13 RX ORDER — METHYLPREDNISOLONE SODIUM SUCCINATE 125 MG/2ML
125 INJECTION, POWDER, LYOPHILIZED, FOR SOLUTION INTRAMUSCULAR; INTRAVENOUS
Status: CANCELLED
Start: 2022-09-16

## 2022-09-13 RX ORDER — NEOMYCIN SULFATE, POLYMYXIN B SULFATE, AND DEXAMETHASONE 3.5; 10000; 1 MG/G; [USP'U]/G; MG/G
OINTMENT OPHTHALMIC
COMMUNITY
Start: 2022-07-08 | End: 2022-09-15

## 2022-09-13 RX ORDER — DIPHENHYDRAMINE HYDROCHLORIDE 50 MG/ML
50 INJECTION INTRAMUSCULAR; INTRAVENOUS
Status: CANCELLED
Start: 2022-09-16

## 2022-09-13 ASSESSMENT — PAIN SCALES - GENERAL: PAINLEVEL: NO PAIN (0)

## 2022-09-13 NOTE — PROGRESS NOTES
[facility] Hematology and Oncology Progress Note    Patient: Aury Cervantes  MRN: [id number]  Date of Service: Sep 13, 2022        Assessment and Plan:    1.  Iron deficiency: Current laboratory studies from August 19 were personally reviewed.  White count of 5.3, hemoglobin 13.2.  Mean cell volume 84.  Her ferritin was 7.  Ferritin was 5 in May 2022.  We will get her set up for iron infusions again.  I think she is symptomatic from her iron deficiency and her fatigue.  We will plan on having her return for lab 4 to 5 weeks after her infusions are complete to recheck her indices.    ECOG Performance  0    Diagnosis:    Iron deficiency anemia secondary to menorrhagia    Treatment:    Injectafer x2 doses in February 2021.    Interim History:    Aury comes in today for follow-up visit.  I last saw her in our clinic in April 2021.  She was sent back in today for a low ferritin.  Is otherwise been doing okay.  No acute complaints today.  No fevers, chills, night sweats.  No unintentional weight loss.  In the past she was treated with intravenous iron for iron deficiency anemia likely secondary to menorrhagia.    Review of Systems:    As above in the history.     Review of Systems otherwise Negative for:  General: chills, fever or night sweats  Psychological: anxiety or depression  Ophthalmic: blurry vision, double vision or loss of vision, vision change  ENT: epistaxis, oral lesions, hearing changes  Hematological and Lymphatic: bleeding, bruising, jaundice, swollen lymph nodes  Endocrine: hot flashes, unexpected weight changes  Respiratory: cough, hemoptysis, orthopnea or shortness of breath/GOODEN  Cardiovascular: chest pain, edema, palpitations or PND  Gastrointestinal: abdominal pain, blood in stools, change in bowel habits, constipation, diarrhea or nausea/vomiting  Genito-Urinary: change in urinary stream, incontinence, frequency/urgency  Musculoskeletal: joint pain, stiffness, swelling, muscle  "pain  Neurological: dizziness, headaches, numbness/tingling  Dermatological: lumps and rash    Past History:    Past Medical History:   Diagnosis Date     Anemia      Depression      Nexplanon in place      Physical Exam:    /65 (BP Location: Left arm, Patient Position: Sitting, Cuff Size: Adult Small)   Pulse 73   Temp 98.5  F (36.9  C) (Oral)   Resp 14   Ht 1.664 m (5' 5.5\")   Wt 50.3 kg (111 lb)   LMP 08/02/2022   SpO2 99%   BMI 18.19 kg/m      General: patient appears stated age of 22 year old. Nontoxic and in no distress.   HEENT: Head: atraumatic, normocephalic. Sclerae anicteric.  Chest:  Normal respiratory effort  Cardiac:  No edema.   Abdomen: abdomen is soft, non-distended  Extremities: normal tone and muscle bulk.  Skin: no lesions or rash on visible skin. Warm and dry.   CNS: alert and oriented. Grossly non-focal.   Psychiatric: normal mood and affect.     Lab Results:    No results found for this or any previous visit (from the past 168 hour(s)).     Imaging:    No results found.    Signed by: Saul Singh MD    "

## 2022-09-13 NOTE — LETTER
9/13/2022         RE: Aury Cervantes  [address] [address]        Dear Colleague,    Thank you for referring your patient, Aury Cervantes, to the [facility] [place]. Please see a copy of my visit note below.    [facility] Hematology and Oncology Progress Note    Patient: Aury Cervantes  MRN: [id number]  Date of Service: Sep 13, 2022        Assessment and Plan:    1.  Iron deficiency: Current laboratory studies from August 19 were personally reviewed.  White count of 5.3, hemoglobin 13.2.  Mean cell volume 84.  Her ferritin was 7.  Ferritin was 5 in May 2022.  We will get her set up for iron infusions again.  I think she is symptomatic from her iron deficiency and her fatigue.  We will plan on having her return for lab 4 to 5 weeks after her infusions are complete to recheck her indices.    ECOG Performance  0    Diagnosis:    Iron deficiency anemia secondary to menorrhagia    Treatment:    Injectafer x2 doses in February 2021.    Interim History:    Aury comes in today for follow-up visit.  I last saw her in our clinic in April 2021.  She was sent back in today for a low ferritin.  Is otherwise been doing okay.  No acute complaints today.  No fevers, chills, night sweats.  No unintentional weight loss.  In the past she was treated with intravenous iron for iron deficiency anemia likely secondary to menorrhagia.    Review of Systems:    As above in the history.     Review of Systems otherwise Negative for:  General: chills, fever or night sweats  Psychological: anxiety or depression  Ophthalmic: blurry vision, double vision or loss of vision, vision change  ENT: epistaxis, oral lesions, hearing changes  Hematological and Lymphatic: bleeding, bruising, jaundice, swollen lymph nodes  Endocrine: hot flashes, unexpected weight changes  Respiratory: cough, hemoptysis, orthopnea or shortness of breath/GOODEN  Cardiovascular: chest pain, edema, palpitations or PND  Gastrointestinal:  "abdominal pain, blood in stools, change in bowel habits, constipation, diarrhea or nausea/vomiting  Genito-Urinary: change in urinary stream, incontinence, frequency/urgency  Musculoskeletal: joint pain, stiffness, swelling, muscle pain  Neurological: dizziness, headaches, numbness/tingling  Dermatological: lumps and rash    Past History:    Past Medical History:   Diagnosis Date     Anemia      Depression      Nexplanon in place      Physical Exam:    /65 (BP Location: Left arm, Patient Position: Sitting, Cuff Size: Adult Small)   Pulse 73   Temp 98.5  F (36.9  C) (Oral)   Resp 14   Ht 1.664 m (5' 5.5\")   Wt 50.3 kg (111 lb)   LMP 08/02/2022   SpO2 99%   BMI 18.19 kg/m      General: patient appears stated age of 22 year old. Nontoxic and in no distress.   HEENT: Head: atraumatic, normocephalic. Sclerae anicteric.  Chest:  Normal respiratory effort  Cardiac:  No edema.   Abdomen: abdomen is soft, non-distended  Extremities: normal tone and muscle bulk.  Skin: no lesions or rash on visible skin. Warm and dry.   CNS: alert and oriented. Grossly non-focal.   Psychiatric: normal mood and affect.     Lab Results:    No results found for this or any previous visit (from the past 168 hour(s)).     Imaging:    No results found.    Signed by: Saul Singh MD      Oncology Rooming Note    September 13, 2022 11:26 AM   Aury Cervantes is a 22 year old female who presents for:    Chief Complaint   Patient presents with     Hematology     Return visit for anemia      Initial Vitals: /65 (BP Location: Left arm, Patient Position: Sitting, Cuff Size: Adult Small)   Pulse 73   Temp 98.5  F (36.9  C) (Oral)   Resp 14   Ht 1.664 m (5' 5.5\")   Wt 50.3 kg (111 lb)   LMP 08/02/2022   SpO2 99%   BMI 18.19 kg/m   Estimated body mass index is 18.19 kg/m  as calculated from the following:    Height as of this encounter: 1.664 m (5' 5.5\").    Weight as of this encounter: 50.3 kg (111 lb). Body surface area is 1.52 " meters squared.  No Pain (0) Comment: Data Unavailable   Patient's last menstrual period was 08/02/2022.  Allergies reviewed: Yes  Medications reviewed: Yes    Medications: Medication refills not needed today.  Pharmacy name entered into [facility]:    [facility] [place] [place] [address] [facility] - [place], [place] - [address] [facility]    Clinical concerns: Return visit for anemia         ERNIE MORGAN CMA                Again, thank you for allowing me to participate in the care of your patient.        Sincerely,        Saul Singh MD

## 2022-09-13 NOTE — PROGRESS NOTES
"Oncology Rooming Note    September 13, 2022 11:26 AM   Aury Cervantes is a 22 year old female who presents for:    Chief Complaint   Patient presents with     Hematology     Return visit for anemia      Initial Vitals: /65 (BP Location: Left arm, Patient Position: Sitting, Cuff Size: Adult Small)   Pulse 73   Temp 98.5  F (36.9  C) (Oral)   Resp 14   Ht 1.664 m (5' 5.5\")   Wt 50.3 kg (111 lb)   LMP 08/02/2022   SpO2 99%   BMI 18.19 kg/m   Estimated body mass index is 18.19 kg/m  as calculated from the following:    Height as of this encounter: 1.664 m (5' 5.5\").    Weight as of this encounter: 50.3 kg (111 lb). Body surface area is 1.52 meters squared.  No Pain (0) Comment: Data Unavailable   Patient's last menstrual period was 08/02/2022.  Allergies reviewed: Yes  Medications reviewed: Yes    Medications: Medication refills not needed today.  Pharmacy name entered into [facility]:    [facility] [place] - [address] [facility] [facility] - [place], [place] - [address] [facility]    Clinical concerns: Return visit for anemia         ERNIE MORGAN CMA            "

## 2022-09-15 DIAGNOSIS — D50.0 IRON DEFICIENCY ANEMIA DUE TO CHRONIC BLOOD LOSS: Primary | ICD-10-CM

## 2022-09-15 RX ORDER — HEPARIN SODIUM,PORCINE 10 UNIT/ML
5 VIAL (ML) INTRAVENOUS
OUTPATIENT
Start: 2022-09-21

## 2022-09-15 RX ORDER — ALBUTEROL SULFATE 90 UG/1
1-2 AEROSOL, METERED RESPIRATORY (INHALATION)
Start: 2022-09-21

## 2022-09-15 RX ORDER — ALBUTEROL SULFATE 0.83 MG/ML
2.5 SOLUTION RESPIRATORY (INHALATION)
OUTPATIENT
Start: 2022-09-21

## 2022-09-15 RX ORDER — DIPHENHYDRAMINE HYDROCHLORIDE 50 MG/ML
50 INJECTION INTRAMUSCULAR; INTRAVENOUS
Start: 2022-09-21

## 2022-09-15 RX ORDER — EPINEPHRINE 1 MG/ML
0.3 INJECTION, SOLUTION, CONCENTRATE INTRAVENOUS EVERY 5 MIN PRN
OUTPATIENT
Start: 2022-09-21

## 2022-09-15 RX ORDER — MEPERIDINE HYDROCHLORIDE 25 MG/ML
25 INJECTION INTRAMUSCULAR; INTRAVENOUS; SUBCUTANEOUS EVERY 30 MIN PRN
OUTPATIENT
Start: 2022-09-21

## 2022-09-15 RX ORDER — HEPARIN SODIUM (PORCINE) LOCK FLUSH IV SOLN 100 UNIT/ML 100 UNIT/ML
5 SOLUTION INTRAVENOUS
OUTPATIENT
Start: 2022-09-21

## 2022-09-26 ENCOUNTER — OFFICE VISIT (OUTPATIENT)
Dept: OBGYN | Facility: CLINIC | Age: 22
End: 2022-09-26
Payer: COMMERCIAL

## 2022-09-26 ENCOUNTER — ALLIED HEALTH/NURSE VISIT (OUTPATIENT)
Dept: FAMILY MEDICINE | Facility: CLINIC | Age: 22
End: 2022-09-26

## 2022-09-26 VITALS
BODY MASS INDEX: 17.97 KG/M2 | TEMPERATURE: 97.5 F | WEIGHT: 111.8 LBS | DIASTOLIC BLOOD PRESSURE: 70 MMHG | HEART RATE: 84 BPM | SYSTOLIC BLOOD PRESSURE: 114 MMHG | RESPIRATION RATE: 14 BRPM | HEIGHT: 66 IN

## 2022-09-26 DIAGNOSIS — N97.0 SECONDARY ANOVULATORY INFERTILITY: ICD-10-CM

## 2022-09-26 DIAGNOSIS — D50.0 IRON DEFICIENCY ANEMIA DUE TO CHRONIC BLOOD LOSS: Primary | ICD-10-CM

## 2022-09-26 DIAGNOSIS — N97.9 FEMALE INFERTILITY: Primary | ICD-10-CM

## 2022-09-26 DIAGNOSIS — E03.8 SUBCLINICAL HYPOTHYROIDISM: Primary | ICD-10-CM

## 2022-09-26 PROCEDURE — 99207 PR NO CHARGE NURSE ONLY: CPT

## 2022-09-26 PROCEDURE — 99214 OFFICE O/P EST MOD 30 MIN: CPT | Performed by: OBSTETRICS & GYNECOLOGY

## 2022-09-26 RX ORDER — PRENATAL VIT/IRON FUM/FOLIC AC 27MG-0.8MG
1 TABLET ORAL DAILY
Status: ON HOLD | COMMUNITY
End: 2023-09-10

## 2022-09-26 RX ORDER — LEVOTHYROXINE SODIUM 50 UG/1
50 TABLET ORAL DAILY
Qty: 30 TABLET | Refills: 12 | Status: SHIPPED | OUTPATIENT
Start: 2022-09-26 | End: 2023-02-27 | Stop reason: DRUGHIGH

## 2022-09-26 RX ORDER — LETROZOLE 2.5 MG/1
2.5 TABLET, FILM COATED ORAL DAILY
Qty: 5 TABLET | Refills: 6 | Status: SHIPPED | OUTPATIENT
Start: 2022-09-26 | End: 2022-09-30

## 2022-09-26 NOTE — PATIENT INSTRUCTIONS
1) Start letrazole today and for the next five days. Also start synthroid for your thyroid.   2) Take ovulation predictor kits and have sex the next day after a positive test.   3) Every cycle day #21 have a blood draw for a progesterone level. We will retest your thyroid levels then as well.

## 2022-09-26 NOTE — PROGRESS NOTES
"Initial /70 (BP Location: Left arm, Patient Position: Chair, Cuff Size: Adult Small)   Pulse 84   Temp 97.5  F (36.4  C) (Tympanic)   Resp 14   Ht 1.664 m (5' 5.5\")   Wt 50.7 kg (111 lb 12.8 oz)   LMP 09/23/2022 (Exact Date)   BMI 18.32 kg/m   Estimated body mass index is 18.32 kg/m  as calculated from the following:    Height as of this encounter: 1.664 m (5' 5.5\").    Weight as of this encounter: 50.7 kg (111 lb 12.8 oz). .      "

## 2022-09-26 NOTE — PROGRESS NOTES
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  S/p normal HSG. Hx of ovarian cysts; possible endometriomas, ovaries appear polycystic on US.   Regular, painful menses.   Hypothyroidism and has intermittently been on synthroid. Didn't realize this was a chronic medication. Has venofer infusions for significant DAMIR.   Patient's last menstrual period was 2022 (exact date).   Period cycle length is 24-32 days. Bleed for 5 days, periods are painful and heavy.   Has acne, no facial hair growth and ovaries appear polycystic on US.     OB Hx:     General health   Pertinent PMH: There is no height or weight on file to calculate BMI. Body mass index is 18.32 kg/m .      Past Medical History:   Diagnosis Date     Anemia      Depression      Nexplanon in place       Past Surgical History:   Procedure Laterality Date     SURGICAL HISTORY OF -       PE tubes     TONSILLECTOMY, ADENOIDECTOMY ADULT, COMBINED Bilateral 3/19/2018    Procedure: COMBINED TONSILLECTOMY, ADENOIDECTOMY ADULT;  Bilateral Adenotonsillectomy;  Surgeon: Kevin Arias MD;  Location: [facility] OR      Current Outpatient Medications   Medication     EPINEPHrine (ADRENACLICK) 0.3 MG/0.3ML injection 2-pack     ibuprofen (ADVIL/MOTRIN) 200 MG tablet     No current facility-administered medications for this visit.     Allergies   Allergen Reactions     Sulfa Drugs Other (See Comments)     Both parents are allergic       Family History   Problem Relation Age of Onset     Depression Mother      Depression Father      Depression Maternal Grandmother      Cancer Maternal Grandmother      Diabetes Maternal Grandfather      Heart Disease Maternal Grandfather      Hypertension Maternal Grandfather      Depression Paternal Grandmother         bipolar     Depression Paternal Grandfather         bipolar     Lung Cancer Maternal  "Grandmother      No Known Problems Mother      No Known Problems Father      No Known Problems Sister      No Known Problems Sister      Social History     Tobacco Use     Smoking status: Never Smoker     Smokeless tobacco: Never Used   Vaping Use     Vaping Use: Never used   Substance Use Topics     Alcohol use: Not Currently     Alcohol/week: 0.0 standard drinks     Drug use: Never       ROS: A 10 pt ROS was completed and found to be negative unless mentioned in the HPI.     Objective:   VS: /70 (BP Location: Left arm, Patient Position: Chair, Cuff Size: Adult Small)   Pulse 84   Temp 97.5  F (36.4  C) (Tympanic)   Resp 14   Ht 1.664 m (5' 5.5\")   Wt 50.7 kg (111 lb 12.8 oz)   LMP 09/23/2022 (Exact Date)   BMI 18.32 kg/m    Gen: Pleasant, talkative female in no apparent distress   Dermatology: +facial acne   Respiratory: breathing comfortably on room air   Cardiac: warm and well-perfused.   GI: Abd soft and non-tender  MSK: Grossly normal movement of all four extremities  Psych: mood and affect bright     Labs and Imaging:   ULTRASOUND PELVIC TRANSABDOMINAL AND TRANSVAGINAL June 1, 2022 9:20 AM     CLINICAL HISTORY: Dysmenorrhea.     TECHNIQUE: Transabdominal scans were performed. Endovaginal ultrasound  was performed to better visualize the adnexa.     COMPARISON: 1/22/2021, 12/15/2020.     FINDINGS:     UTERUS: 7.8 x 5.2 x 5.1 cm. Normal in size and position with no  masses.     ENDOMETRIUM: 12 mm. Smooth endometrium without evidence for polyp or  mass. Trace nonspecific hypoechoic fluid along the endometrial canal,  which may relate to patient's phase of menstrual cycle.     RIGHT OVARY: 4.3 x 3.7 x 3.4 cm. Normal with flow demonstrated.  Several small hypoechoic follicles.     LEFT OVARY: 4.1 x 2.4 x 2.2 cm. Normal with flow demonstrated. Several  small hypoechoic follicles.     No significant free fluid.                                                                      IMPRESSION: Normal " pelvic ultrasound.     Component      Latest Ref Rng & Units 2022   WBC      4.0 - 11.0 10e3/uL 5.3   RBC Count      3.80 - 5.20 10e6/uL 4.82   Hemoglobin      11.7 - 15.7 g/dL 13.2   Hematocrit      35.0 - 47.0 % 40.6   MCV      78 - 100 fL 84   MCH      26.5 - 33.0 pg 27.4   MCHC      31.5 - 36.5 g/dL 32.5   RDW      10.0 - 15.0 % 13.9   Platelet Count      150 - 450 10e3/uL 166   % Neutrophils      % 54   % Lymphocytes      % 31   % Monocytes      % 11   % Eosinophils      % 4   % Basophils      % 1   Absolute Neutrophils      1.6 - 8.3 10e3/uL 2.9   Absolute Lymphocytes      0.8 - 5.3 10e3/uL 1.7   Absolute Monocytes      0.0 - 1.3 10e3/uL 0.6   Absolute Eosinophils      0.0 - 0.7 10e3/uL 0.2   Absolute Basophils      0.0 - 0.2 10e3/uL 0.0   Iron      35 - 180 ug/dL 39   Iron Binding Cap      240 - 430 ug/dL 384   Iron Saturation Index      15 - 46 % 10 (L)   Ferritin      12 - 150 ng/mL 7 (L)   T4 Free      0.76 - 1.46 ng/dL 1.08   TSH      0.40 - 4.00 mU/L 5.42 (H)     Component      Latest Ref Rng & Units 2022   Chlamydia Trachomatis PCR      Negative Negative    Neisseria gonorrhoeae      Negative Negative    FSH      IU/L  6.2   Lutropin      IU/L  6.5   Estradiol      pg/mL  74     Component      Latest Ref Rng & Units 2022   Progesterone      ng/mL 8.7   Prolactin      3 - 27 ug/L 10   Anti-Mullerian Hormone      0.401 - 16.015 ng/mL 15.642   TSH      0.40 - 4.00 mU/L 4.15 (H)     Assessment/Plan:   My impression is that this is a 22 year old  with secondary infertility, suspect PCOS and/or endometriosis.   Recommended ovulation induction with letrozole.  We will start 2.5 mg.  We discussed expected side effects of menopausal symptoms, ovarian cyst stimulation and the risk of twinning as well as the associated pregnancy complications.  She has had a normal baseline pelvic ultrasound recently.  We discussed ovulation predictor kit testing and then timed intercourse at home.   I did recommend a cycle day 21 progesterone level every month and up titration of letrozole to regular ovulation.  If she is not pregnant within 3-6 cycles I would recommend diagnostic laparoscopy with excision Paravict of endometriosis given her significant dysmenorrhea as well as historical evidence of endometrioma on pelvic ultrasound.  Could also consider adding a monitored cycle with follicular ultrasound, trigger injection and intrauterine insemination.  I also recommended adding Synthroid replacement for her subclinical hypothyroidism as well as completion of her laboratory evaluation for PCOS.  I spent 25 min face to face with the patient and an additional 7min in chart review and documentation.   Will MyChart with results and make plan from there.     Stefanie Mcnair MD  OB/GYN  9/26/2022

## 2022-09-26 NOTE — PROGRESS NOTES
Pt stopped into clinic after her visit with Dr Stefanie Mcnair MD, in ob/gyn clinic on this campus.    Pt is on the Restricted Recipient Program with [employer].  Pt needs prescriptions approved by PCP.    I spoke with Laurence Bloom CNP, in clinic today in [facility]'s absence and Laurence Bloom approves requested prescriptions as ordered by Dr Mcnair.    Form completed and faxed.  Form placed in basket at [employer], forms person, work station.      Thank you.  Vandana Shankar RN

## 2022-09-27 ENCOUNTER — PATIENT OUTREACH (OUTPATIENT)
Dept: ONCOLOGY | Facility: HOSPITAL | Age: 22
End: 2022-09-27

## 2022-09-27 NOTE — PROGRESS NOTES
[facility]: Cancer Care                                                                                      RN Cancer Care Coordinator called patient at request of Dr. Saul Singh. She has lab appt scheduled for tomorrow, along with infusion appt for venofer. Asked her if she is taking oral iron and she reports that she stopped taking it about 1 month ago due to bad stomach ulcers.     Five  venofer infusions are scheduled starting tomorrow.      Signature:  Kristina Dumont RN

## 2022-09-29 ENCOUNTER — PATIENT OUTREACH (OUTPATIENT)
Dept: ONCOLOGY | Facility: HOSPITAL | Age: 22
End: 2022-09-29

## 2022-09-29 ENCOUNTER — LAB (OUTPATIENT)
Dept: LAB | Facility: CLINIC | Age: 22
End: 2022-09-29
Payer: COMMERCIAL

## 2022-09-29 DIAGNOSIS — D50.0 IRON DEFICIENCY ANEMIA DUE TO CHRONIC BLOOD LOSS: Primary | ICD-10-CM

## 2022-09-29 DIAGNOSIS — N97.0 SECONDARY ANOVULATORY INFERTILITY: ICD-10-CM

## 2022-09-29 DIAGNOSIS — E03.8 SUBCLINICAL HYPOTHYROIDISM: ICD-10-CM

## 2022-09-29 DIAGNOSIS — D50.0 IRON DEFICIENCY ANEMIA DUE TO CHRONIC BLOOD LOSS: ICD-10-CM

## 2022-09-29 LAB
ERYTHROCYTE [DISTWIDTH] IN BLOOD BY AUTOMATED COUNT: 13.2 % (ref 10–15)
HCT VFR BLD AUTO: 40.3 % (ref 35–47)
HGB BLD-MCNC: 13.4 G/DL (ref 11.7–15.7)
MCH RBC QN AUTO: 27.6 PG (ref 26.5–33)
MCHC RBC AUTO-ENTMCNC: 33.3 G/DL (ref 31.5–36.5)
MCV RBC AUTO: 83 FL (ref 78–100)
PLATELET # BLD AUTO: 193 10E3/UL (ref 150–450)
RBC # BLD AUTO: 4.86 10E6/UL (ref 3.8–5.2)
SHBG SERPL-SCNC: 75 NMOL/L (ref 30–135)
T4 FREE SERPL-MCNC: 1.36 NG/DL (ref 0.9–1.7)
TSH SERPL DL<=0.005 MIU/L-ACNC: 3.33 UIU/ML (ref 0.3–4.2)
WBC # BLD AUTO: 6 10E3/UL (ref 4–11)

## 2022-09-29 PROCEDURE — 84403 ASSAY OF TOTAL TESTOSTERONE: CPT

## 2022-09-29 PROCEDURE — 84270 ASSAY OF SEX HORMONE GLOBUL: CPT

## 2022-09-29 PROCEDURE — 82627 DEHYDROEPIANDROSTERONE: CPT

## 2022-09-29 PROCEDURE — 84143 ASSAY OF 17-HYDROXYPREGNENO: CPT | Mod: 90

## 2022-09-29 PROCEDURE — 36415 COLL VENOUS BLD VENIPUNCTURE: CPT

## 2022-09-29 PROCEDURE — 99000 SPECIMEN HANDLING OFFICE-LAB: CPT

## 2022-09-29 PROCEDURE — 84439 ASSAY OF FREE THYROXINE: CPT

## 2022-09-29 PROCEDURE — 85027 COMPLETE CBC AUTOMATED: CPT

## 2022-09-29 PROCEDURE — 84443 ASSAY THYROID STIM HORMONE: CPT

## 2022-09-29 NOTE — PROGRESS NOTES
[facility]/Voicemail    Clinical Data: Care Coordinator Outreach    Outreach attempted x 1.  Left message on patient's voicemail with call back information and requested return call.    Plan: Care Coordinator will do no further outreaches at this time.    I called per Dr. Singh request.  Insurance is not covering iron infusions due to lack of evidence for anemia.  We would still like Aury to get labs done.  I left this on her VM and asked her to call me back if there are any questions.    Evan Pham, RNCC

## 2022-09-30 DIAGNOSIS — D50.0 IRON DEFICIENCY ANEMIA DUE TO CHRONIC BLOOD LOSS: Primary | ICD-10-CM

## 2022-09-30 DIAGNOSIS — N97.0 SECONDARY ANOVULATORY INFERTILITY: ICD-10-CM

## 2022-09-30 LAB — DHEA-S SERPL-MCNC: 229 UG/DL (ref 35–430)

## 2022-09-30 RX ORDER — LETROZOLE 2.5 MG/1
2.5 TABLET, FILM COATED ORAL DAILY
Qty: 5 TABLET | Refills: 6 | Status: SHIPPED | OUTPATIENT
Start: 2022-09-30 | End: 2023-01-04 | Stop reason: DRUGHIGH

## 2022-10-01 LAB
TESTOST FREE SERPL-MCNC: 0.26 NG/DL
TESTOST SERPL-MCNC: 25 NG/DL (ref 8–60)

## 2022-10-08 LAB — 17OH-PREG SERPL-MCNC: 105 NG/DL

## 2022-10-13 ENCOUNTER — LAB (OUTPATIENT)
Dept: LAB | Facility: CLINIC | Age: 22
End: 2022-10-13
Payer: COMMERCIAL

## 2022-10-13 DIAGNOSIS — N97.0 SECONDARY ANOVULATORY INFERTILITY: ICD-10-CM

## 2022-10-13 LAB — PROGEST SERPL-MCNC: 8.7 NG/ML

## 2022-10-13 PROCEDURE — 84144 ASSAY OF PROGESTERONE: CPT

## 2022-10-13 PROCEDURE — 36415 COLL VENOUS BLD VENIPUNCTURE: CPT

## 2022-10-14 DIAGNOSIS — R79.89 LOW SERUM PROGESTERONE: Primary | ICD-10-CM

## 2022-10-14 RX ORDER — PROGESTERONE 100 MG/1
100 CAPSULE ORAL DAILY
Qty: 30 CAPSULE | Refills: 12 | Status: ON HOLD | OUTPATIENT
Start: 2022-10-14 | End: 2023-09-10

## 2022-11-01 NOTE — PROGRESS NOTES
Chief Complaint   Patient presents with     RECHECK     History of Present Illness   Aury Cervantes is a 22 year old female who presents today for evaluation.  I am seeing this patient in consultation for nasal polyp, nasal congestion at the request of the provider Marily Crane CNP. The patient describes symptoms of nasal obstruction, nasal congestion, and facial pressure for the past few years.  She was seen by provider thought she might have a nasal polyp.  She reports bilateral nasal obstruction and congestion that seems to alternate sides.  She denies any significant rhinorrhea or postnasal drainage but does occasionally have a sensation of phlegm in her throat.  No taste or smell disturbance.  She reports some facial pressure in her malar and forehead areas.  She has this most days of the week.  She does have a history of migraine headache and was treated in her teens for migraine.  She has not had a history of nose or sinus surgery.  She is tried some Flonase in the past without significant benefit.  She previously underwent allergy skin testing which was negative for aeroallergens.  She previously underwent tonsillectomy.  She is not had any recent nose or sinus imaging.     Past Medical History  Patient Active Problem List   Diagnosis     Major depressive disorder, recurrent episode, severe (H)     S/P tonsillectomy and adenoidectomy     Anaphylaxis, subsequent encounter     Rhinoconjunctivitis     Iron deficiency anemia due to chronic blood loss     Menorrhagia with regular cycle     Dysmenorrhea     Female infertility     Current Medications     Current Outpatient Medications:      letrozole (FEMARA) 2.5 MG tablet, Take 1 tablet (2.5 mg) by mouth daily, Disp: 5 tablet, Rfl: 6     levothyroxine (SYNTHROID/LEVOTHROID) 50 MCG tablet, Take 1 tablet (50 mcg) by mouth daily, Disp: 30 tablet, Rfl: 12     progesterone (PROMETRIUM) 100 MG capsule, Take 1 capsule (100 mg) by mouth daily, Disp: 30 capsule, Rfl:  12     EPINEPHrine (ADRENACLICK) 0.3 MG/0.3ML injection 2-pack, Inject 0.3 mLs (0.3 mg) into the muscle as needed for anaphylaxis (Patient not taking: Reported on 9/26/2022), Disp: 0.6 mL, Rfl: 1     ibuprofen (ADVIL/MOTRIN) 200 MG tablet, Take 400-600 mg by mouth as needed for mild pain (Patient not taking: Reported on 11/2/2022), Disp: , Rfl:      Prenatal Vit-Fe Fumarate-FA (PRENATAL MULTIVITAMIN W/IRON) 27-0.8 MG tablet, Take 1 tablet by mouth daily (Patient not taking: Reported on 9/26/2022), Disp: , Rfl:     Allergies  Allergies   Allergen Reactions     Sulfa Drugs Other (See Comments)     Both parents are allergic         Social History   Social History     Socioeconomic History     Marital status: Single   Tobacco Use     Smoking status: Never     Smokeless tobacco: Never   Vaping Use     Vaping Use: Never used   Substance and Sexual Activity     Alcohol use: Not Currently     Alcohol/week: 0.0 standard drinks     Drug use: Never     Sexual activity: Yes     Partners: Male   Other Topics Concern     Parent/sibling w/ CABG, MI or angioplasty before 65F 55M? No       Family History  Family History   Problem Relation Age of Onset     Depression Mother      Depression Father      Depression Maternal Grandmother      Cancer Maternal Grandmother      Diabetes Maternal Grandfather      Heart Disease Maternal Grandfather      Hypertension Maternal Grandfather      Depression Paternal Grandmother         bipolar     Depression Paternal Grandfather         bipolar     Lung Cancer Maternal Grandmother      No Known Problems Mother      No Known Problems Father      No Known Problems Sister      No Known Problems Sister        Review of Systems  As per HPI and PMHx, otherwise 10+ comprehensive system review is negative.    Physical Exam  /71   Pulse 90   Temp 98.1  F (36.7  C) (Tympanic)   LMP 09/23/2022 (Exact Date)   GENERAL: Patient is a pleasant, cooperative 22 year old female in no acute distress.  HEAD:  Normocephalic, atraumatic.  Hair and scalp are normal.  EYES: Pupils are equal, round, reactive to light and accommodation.  Extraocular movements are intact.  The sclera nonicteric without injection.  The extraocular structures are normal.  EARS: Normal shape and symmetry.  No tenderness when palpating the mastoid or tragal areas bilaterally.  Otoscopic exam reveals a minimal amount of cerumen bilaterally.  The bilateral tympanic membranes are round, intact without evidence of effusion, good landmarks.  No retraction, granulation, or drainage.  NOSE: Nares are patent.  Nasal mucosa is boggy and inflamed with sticky, inflammatory mucus.  The patient had severe/marked inferior turban hypertrophy right greater than left.  The patient does have a rightward anterior and mid nasal septal deviation.  No nasal cavity masses, polyps, or mucopurulence on anterior rhinoscopy  ORAL CAVITY: Dentition is in good repair.  Mucous membranes are moist.  Tongue is mobile, protrudes to the midline.  Palate elevates symmetrically.  Tonsils are surgically absent.  No erythema or exudate.  No oral cavity or oropharyngeal masses, lesions, ulcerations, leukoplakia.  NECK: Supple, trachea is midline.  There no palpable cervical lymphadenopathy or masses bilaterally.  NEUROLOGIC: Cranial nerves II through XII are grossly intact.  Voice is strong.  Patient is House-Brackmann I/VI bilaterally.  CARDIOVASCULAR: Extremities are warm and well-perfused.  No significant peripheral edema.  RESPIRATORY: Patient has nonlabored breathing without cough, wheeze, stridor.  PSYCHIATRIC: Patient is alert and oriented.  Mood and affect appear normal.  SKIN: Warm and dry.  No scalp, face, or neck lesions noted.    Procedure: Flexible Nasal Endoscopy  Indication: Nasal structure, nasal congestion, possible nasal polyp    To best visualize the sinonasal anatomy and due to the chief complaint and HPI, I proceeded with flexible fiberoptic nasal endoscopy.  The  bilateral nasal cavities were anesthetized and decongested with a mixture of lidocaine and neosynephrine.  The bilateral nasal cavities were then examined using flexible fiberoptic nasal endoscope.  The right nasal cavity was without masses, polyps, or mucopurulence.  The right middle turbinate and middle meatus was normal in appearance.  The sphenoethmoid recess was clear.  The left nasal cavity was without masses, polyps, or mucopurulence.  The left middle turbinate and middle meatus was normal in appearance.  The sphenoethmoid recess was clear.  The sinonasal mucosa appeared boggy and inflamed with sticky, inflammatory mucus.  The nasal septum deviates to the right of the anterior and mid septum.  The nasopharynx had a normal appearance with normal Eustachian tube openings and fossa of Rosenmuller bilaterally. Minimal adenoid tissue.  The scope was removed.  The patient tolerated the procedure well.    Assessment and Plan     ICD-10-CM    1. Nasal obstruction  J34.89 NASAL ENDOSCOPY, DIAGNOSTIC     Adult ENT  Referral      2. Nasal septal deviation  J34.2 NASAL ENDOSCOPY, DIAGNOSTIC     Adult ENT  Referral      3. Hypertrophy of both inferior nasal turbinates  J34.3 NASAL ENDOSCOPY, DIAGNOSTIC      4. Chronic rhinitis  J31.0 NASAL ENDOSCOPY, DIAGNOSTIC        It was my pleasure seeing Aury Cervantes today in clinic.  The patient presents with nasal obstruction due to chronic rhinitis, inferior turban hypertrophy, and a rightward nasal septal deviation.  She is not been on a good trial of medical management.  We discussed daily nasal saline irrigation followed by topical use of Flonase 2 sprays each side once daily.  Her previous allergy testing was negative for environmental allergens.  I will contact her in 3 to 4 weeks to recheck her symptoms.  If she is not improving, I would pursue CT imaging to ensure there is no concomitant sinus disease.  If she is not improving, she would be a candidate  for septoplasty and turbinate reduction.    Aram Grace MD  Department of Otolaryngology-Head and Neck Surgery  [facility]

## 2022-11-02 ENCOUNTER — OFFICE VISIT (OUTPATIENT)
Dept: OTOLARYNGOLOGY | Facility: CLINIC | Age: 22
End: 2022-11-02
Attending: NURSE PRACTITIONER
Payer: COMMERCIAL

## 2022-11-02 VITALS — DIASTOLIC BLOOD PRESSURE: 71 MMHG | TEMPERATURE: 98.1 F | SYSTOLIC BLOOD PRESSURE: 112 MMHG | HEART RATE: 90 BPM

## 2022-11-02 DIAGNOSIS — J34.2 NASAL SEPTAL DEVIATION: ICD-10-CM

## 2022-11-02 DIAGNOSIS — J34.3 HYPERTROPHY OF BOTH INFERIOR NASAL TURBINATES: ICD-10-CM

## 2022-11-02 DIAGNOSIS — J31.0 CHRONIC RHINITIS: ICD-10-CM

## 2022-11-02 DIAGNOSIS — J34.89 NASAL OBSTRUCTION: Primary | ICD-10-CM

## 2022-11-02 PROCEDURE — 31231 NASAL ENDOSCOPY DX: CPT | Performed by: OTOLARYNGOLOGY

## 2022-11-02 PROCEDURE — 99203 OFFICE O/P NEW LOW 30 MIN: CPT | Mod: 25 | Performed by: OTOLARYNGOLOGY

## 2022-11-02 ASSESSMENT — PAIN SCALES - GENERAL: PAINLEVEL: MILD PAIN (3)

## 2022-11-02 NOTE — NURSING NOTE
"Initial /71   Pulse 90   Temp 98.1  F (36.7  C) (Tympanic)   LMP 09/23/2022 (Exact Date)  Estimated body mass index is 18.32 kg/m  as calculated from the following:    Height as of 9/26/22: 1.664 m (5' 5.5\").    Weight as of 9/26/22: 50.7 kg (111 lb 12.8 oz). .    Kaity Frank LPN on 11/2/2022 at 3:41 PM    "

## 2022-11-02 NOTE — LETTER
11/2/2022         RE: Aury Cervantes  [address] [address]        Dear Colleague,    Thank you for referring your patient, Aury Cervantes, to the [facility]. Please see a copy of my visit note below.    Chief Complaint   Patient presents with     RECHECK     History of Present Illness   Aury Cervantes is a 22 year old female who presents today for evaluation.  I am seeing this patient in consultation for nasal polyp, nasal congestion at the request of the provider Marily Crane CNP. The patient describes symptoms of nasal obstruction, nasal congestion, and facial pressure for the past few years.  She was seen by provider thought she might have a nasal polyp.  She reports bilateral nasal obstruction and congestion that seems to alternate sides.  She denies any significant rhinorrhea or postnasal drainage but does occasionally have a sensation of phlegm in her throat.  No taste or smell disturbance.  She reports some facial pressure in her malar and forehead areas.  She has this most days of the week.  She does have a history of migraine headache and was treated in her teens for migraine.  She has not had a history of nose or sinus surgery.  She is tried some Flonase in the past without significant benefit.  She previously underwent allergy skin testing which was negative for aeroallergens.  She previously underwent tonsillectomy.  She is not had any recent nose or sinus imaging.     Past Medical History  Patient Active Problem List   Diagnosis     Major depressive disorder, recurrent episode, severe (H)     S/P tonsillectomy and adenoidectomy     Anaphylaxis, subsequent encounter     Rhinoconjunctivitis     Iron deficiency anemia due to chronic blood loss     Menorrhagia with regular cycle     Dysmenorrhea     Female infertility     Current Medications     Current Outpatient Medications:      letrozole (FEMARA) 2.5 MG tablet, Take 1 tablet (2.5 mg) by mouth daily, Disp: 5 tablet,  Rfl: 6     levothyroxine (SYNTHROID/LEVOTHROID) 50 MCG tablet, Take 1 tablet (50 mcg) by mouth daily, Disp: 30 tablet, Rfl: 12     progesterone (PROMETRIUM) 100 MG capsule, Take 1 capsule (100 mg) by mouth daily, Disp: 30 capsule, Rfl: 12     EPINEPHrine (ADRENACLICK) 0.3 MG/0.3ML injection 2-pack, Inject 0.3 mLs (0.3 mg) into the muscle as needed for anaphylaxis (Patient not taking: Reported on 9/26/2022), Disp: 0.6 mL, Rfl: 1     ibuprofen (ADVIL/MOTRIN) 200 MG tablet, Take 400-600 mg by mouth as needed for mild pain (Patient not taking: Reported on 11/2/2022), Disp: , Rfl:      Prenatal Vit-Fe Fumarate-FA (PRENATAL MULTIVITAMIN W/IRON) 27-0.8 MG tablet, Take 1 tablet by mouth daily (Patient not taking: Reported on 9/26/2022), Disp: , Rfl:     Allergies  Allergies   Allergen Reactions     Sulfa Drugs Other (See Comments)     Both parents are allergic         Social History   Social History     Socioeconomic History     Marital status: Single   Tobacco Use     Smoking status: Never     Smokeless tobacco: Never   Vaping Use     Vaping Use: Never used   Substance and Sexual Activity     Alcohol use: Not Currently     Alcohol/week: 0.0 standard drinks     Drug use: Never     Sexual activity: Yes     Partners: Male   Other Topics Concern     Parent/sibling w/ CABG, MI or angioplasty before 65F 55M? No       Family History  Family History   Problem Relation Age of Onset     Depression Mother      Depression Father      Depression Maternal Grandmother      Cancer Maternal Grandmother      Diabetes Maternal Grandfather      Heart Disease Maternal Grandfather      Hypertension Maternal Grandfather      Depression Paternal Grandmother         bipolar     Depression Paternal Grandfather         bipolar     Lung Cancer Maternal Grandmother      No Known Problems Mother      No Known Problems Father      No Known Problems Sister      No Known Problems Sister        Review of Systems  As per HPI and PMHx, otherwise 10+  comprehensive system review is negative.    Physical Exam  /71   Pulse 90   Temp 98.1  F (36.7  C) (Tympanic)   LMP 09/23/2022 (Exact Date)   GENERAL: Patient is a pleasant, cooperative 22 year old female in no acute distress.  HEAD: Normocephalic, atraumatic.  Hair and scalp are normal.  EYES: Pupils are equal, round, reactive to light and accommodation.  Extraocular movements are intact.  The sclera nonicteric without injection.  The extraocular structures are normal.  EARS: Normal shape and symmetry.  No tenderness when palpating the mastoid or tragal areas bilaterally.  Otoscopic exam reveals a minimal amount of cerumen bilaterally.  The bilateral tympanic membranes are round, intact without evidence of effusion, good landmarks.  No retraction, granulation, or drainage.  NOSE: Nares are patent.  Nasal mucosa is boggy and inflamed with sticky, inflammatory mucus.  The patient had severe/marked inferior turban hypertrophy right greater than left.  The patient does have a rightward anterior and mid nasal septal deviation.  No nasal cavity masses, polyps, or mucopurulence on anterior rhinoscopy  ORAL CAVITY: Dentition is in good repair.  Mucous membranes are moist.  Tongue is mobile, protrudes to the midline.  Palate elevates symmetrically.  Tonsils are surgically absent.  No erythema or exudate.  No oral cavity or oropharyngeal masses, lesions, ulcerations, leukoplakia.  NECK: Supple, trachea is midline.  There no palpable cervical lymphadenopathy or masses bilaterally.  NEUROLOGIC: Cranial nerves II through XII are grossly intact.  Voice is strong.  Patient is House-Brackmann I/VI bilaterally.  CARDIOVASCULAR: Extremities are warm and well-perfused.  No significant peripheral edema.  RESPIRATORY: Patient has nonlabored breathing without cough, wheeze, stridor.  PSYCHIATRIC: Patient is alert and oriented.  Mood and affect appear normal.  SKIN: Warm and dry.  No scalp, face, or neck lesions  noted.    Procedure: Flexible Nasal Endoscopy  Indication: Nasal structure, nasal congestion, possible nasal polyp    To best visualize the sinonasal anatomy and due to the chief complaint and HPI, I proceeded with flexible fiberoptic nasal endoscopy.  The bilateral nasal cavities were anesthetized and decongested with a mixture of lidocaine and neosynephrine.  The bilateral nasal cavities were then examined using flexible fiberoptic nasal endoscope.  The right nasal cavity was without masses, polyps, or mucopurulence.  The right middle turbinate and middle meatus was normal in appearance.  The sphenoethmoid recess was clear.  The left nasal cavity was without masses, polyps, or mucopurulence.  The left middle turbinate and middle meatus was normal in appearance.  The sphenoethmoid recess was clear.  The sinonasal mucosa appeared boggy and inflamed with sticky, inflammatory mucus.  The nasal septum deviates to the right of the anterior and mid septum.  The nasopharynx had a normal appearance with normal Eustachian tube openings and fossa of Rosenmuller bilaterally. Minimal adenoid tissue.  The scope was removed.  The patient tolerated the procedure well.    Assessment and Plan     ICD-10-CM    1. Nasal obstruction  J34.89 NASAL ENDOSCOPY, DIAGNOSTIC     Adult ENT  Referral      2. Nasal septal deviation  J34.2 NASAL ENDOSCOPY, DIAGNOSTIC     Adult ENT  Referral      3. Hypertrophy of both inferior nasal turbinates  J34.3 NASAL ENDOSCOPY, DIAGNOSTIC      4. Chronic rhinitis  J31.0 NASAL ENDOSCOPY, DIAGNOSTIC        It was my pleasure seeing Aury Cervantes today in clinic.  The patient presents with nasal obstruction due to chronic rhinitis, inferior turban hypertrophy, and a rightward nasal septal deviation.  She is not been on a good trial of medical management.  We discussed daily nasal saline irrigation followed by topical use of Flonase 2 sprays each side once daily.  Her previous allergy testing  was negative for environmental allergens.  I will contact her in 3 to 4 weeks to recheck her symptoms.  If she is not improving, I would pursue CT imaging to ensure there is no concomitant sinus disease.  If she is not improving, she would be a candidate for septoplasty and turbinate reduction.    Aram Grace MD  Department of Otolaryngology-Head and Neck Surgery  [facility]         Again, thank you for allowing me to participate in the care of your patient.        Sincerely,        Aram Grace MD

## 2022-11-02 NOTE — PATIENT INSTRUCTIONS
NASAL SALINE IRRIGATION INSTRUCTIONS    You will be starting nasal saline irrigations and will need to obtain the following:      - NeilMed Sinus Rinse 8 oz Kit  - Distilled or filtered water   - Normal saline salt packets    Place filtered or distilled water into the NeilMed bottle up to the fill line (DO NOT USE TAP OR WELL WATER).  Place the pre-made salt packet in the 8 oz of saline.  Shake the bottle to suspend into solution.  Lean head forward over a sink or a basin.  Rinse each side of the nose with one-half of the bottle (each squeeze is about one-half of the bottle). Rinse the nose daily.     If you use topical nasal sprays, apply following irrigation.    Video example: [email or web address]

## 2022-11-07 ENCOUNTER — LAB (OUTPATIENT)
Dept: LAB | Facility: CLINIC | Age: 22
End: 2022-11-07
Payer: COMMERCIAL

## 2022-11-07 ENCOUNTER — MYC MEDICAL ADVICE (OUTPATIENT)
Dept: FAMILY MEDICINE | Facility: CLINIC | Age: 22
End: 2022-11-07

## 2022-11-07 DIAGNOSIS — N97.0 SECONDARY ANOVULATORY INFERTILITY: ICD-10-CM

## 2022-11-07 LAB — PROGEST SERPL-MCNC: 2.3 NG/ML

## 2022-11-07 PROCEDURE — 36415 COLL VENOUS BLD VENIPUNCTURE: CPT

## 2022-11-07 PROCEDURE — 84144 ASSAY OF PROGESTERONE: CPT

## 2022-11-08 RX ORDER — LETROZOLE 2.5 MG/1
5 TABLET, FILM COATED ORAL DAILY
Qty: 10 TABLET | Refills: 12 | Status: SHIPPED | OUTPATIENT
Start: 2022-11-08 | End: 2023-01-04 | Stop reason: DRUGHIGH

## 2022-12-05 ENCOUNTER — LAB (OUTPATIENT)
Dept: LAB | Facility: CLINIC | Age: 22
End: 2022-12-05
Payer: COMMERCIAL

## 2022-12-05 DIAGNOSIS — N97.0 SECONDARY ANOVULATORY INFERTILITY: ICD-10-CM

## 2022-12-05 LAB — PROGEST SERPL-MCNC: 33.7 NG/ML

## 2022-12-05 PROCEDURE — 36415 COLL VENOUS BLD VENIPUNCTURE: CPT

## 2022-12-05 PROCEDURE — 84144 ASSAY OF PROGESTERONE: CPT

## 2022-12-09 ENCOUNTER — HOSPITAL ENCOUNTER (OUTPATIENT)
Dept: CT IMAGING | Facility: CLINIC | Age: 22
Discharge: HOME | End: 2022-12-09
Attending: OTOLARYNGOLOGY | Admitting: OTOLARYNGOLOGY
Payer: COMMERCIAL

## 2022-12-09 DIAGNOSIS — J31.0 CHRONIC RHINITIS: ICD-10-CM

## 2022-12-09 DIAGNOSIS — J34.3 HYPERTROPHY OF BOTH INFERIOR NASAL TURBINATES: ICD-10-CM

## 2022-12-09 DIAGNOSIS — J34.89 NASAL OBSTRUCTION: ICD-10-CM

## 2022-12-09 DIAGNOSIS — J34.2 NASAL SEPTAL DEVIATION: ICD-10-CM

## 2022-12-09 PROCEDURE — 70486 CT MAXILLOFACIAL W/O DYE: CPT

## 2022-12-10 ENCOUNTER — MYC MEDICAL ADVICE (OUTPATIENT)
Dept: FAMILY MEDICINE | Facility: CLINIC | Age: 22
End: 2022-12-10

## 2022-12-20 NOTE — PROGRESS NOTES
Chief Complaint   Patient presents with     Sinus Problem     Follow up sinus/CT scan done 12/09      History of Present Illness  Aury Cervantes is a 22 year old female who presents today for follow-up.  I evaluated the patient on 11/2/2022 with nose and sinus issues.  We placed her on a medical regimen using saline irrigations and Flonase nasal spray.  I contacted her to check her symptoms she was having minimal improvement.  We obtained a CT scan of her sinuses and she returns today for follow-up.    I reviewed the patient's sinus CT performed 12/9/2022 shows a significant rightward nasal septal deviation.  The patient is moderate inferior turbinate hypertrophy.  She has narrowing of the left ostiomeatal unit.  On the right, she has significant hypoplastic appearance of the maxillary sinus with bowing of the lateral wall and uncinate laterally consistent with silent sinus syndrome.  The remainder the paranasal sinuses are clear without any significant evidence of acute or chronic inflammation.    From a symptom standpoint, the patient reports no significant improvement with medical management. She reports bilateral nasal obstruction and congestion that seems to alternate sides.  She denies any significant rhinorrhea or postnasal drainage but does occasionally have a sensation of phlegm in her throat.  No taste or smell disturbance.  She reports some facial pressure in her malar and forehead areas.  She has this most days of the week.  She does have a history of migraine headache and was treated in her teens for migraine.  She has not had a history of nose or sinus surgery.  She previously underwent allergy skin testing which was negative for aeroallergens.  She previously underwent tonsillectomy.      Past Medical History  Patient Active Problem List   Diagnosis     Major depressive disorder, recurrent episode, severe (H)     S/P tonsillectomy and adenoidectomy     Anaphylaxis, subsequent encounter      Rhinoconjunctivitis     Iron deficiency anemia due to chronic blood loss     Menorrhagia with regular cycle     Dysmenorrhea     Female infertility     Current Medications    Current Outpatient Medications:      cyclobenzaprine (FLEXERIL) 5 MG tablet, Take 1 tablet (5 mg) by mouth 3 times daily as needed for muscle spasms, Disp: 30 tablet, Rfl: 1     ketorolac (TORADOL) 10 MG tablet, Take 1 tablet (10 mg) by mouth every 6 hours as needed for moderate pain (4-6), Disp: 20 tablet, Rfl: 0     letrozole (FEMARA) 2.5 MG tablet, Take 1 tablet (2.5 mg) by mouth daily, Disp: 5 tablet, Rfl: 6     levothyroxine (SYNTHROID/LEVOTHROID) 50 MCG tablet, Take 1 tablet (50 mcg) by mouth daily, Disp: 30 tablet, Rfl: 12     Prenatal Vit-Fe Fumarate-FA (PRENATAL MULTIVITAMIN W/IRON) 27-0.8 MG tablet, Take 1 tablet by mouth daily, Disp: , Rfl:      progesterone (PROMETRIUM) 100 MG capsule, Take 1 capsule (100 mg) by mouth daily, Disp: 30 capsule, Rfl: 12     EPINEPHrine (ADRENACLICK) 0.3 MG/0.3ML injection 2-pack, Inject 0.3 mLs (0.3 mg) into the muscle as needed for anaphylaxis (Patient not taking: Reported on 9/26/2022), Disp: 0.6 mL, Rfl: 1     letrozole (FEMARA) 2.5 MG tablet, Take 2 tablets (5 mg) by mouth daily for 5 days, Disp: 10 tablet, Rfl: 12    Allergies  Allergies   Allergen Reactions     Sulfa Drugs Other (See Comments)     Both parents are allergic         Social History  Social History     Socioeconomic History     Marital status: Single   Tobacco Use     Smoking status: Never     Smokeless tobacco: Never   Vaping Use     Vaping Use: Never used   Substance and Sexual Activity     Alcohol use: Not Currently     Alcohol/week: 0.0 standard drinks     Drug use: Never     Sexual activity: Yes     Partners: Male   Other Topics Concern     Parent/sibling w/ CABG, MI or angioplasty before 65F 55M? No       Family History  Family History   Problem Relation Age of Onset     Depression Mother      Depression Father      Depression  "Maternal Grandmother      Cancer Maternal Grandmother      Diabetes Maternal Grandfather      Heart Disease Maternal Grandfather      Hypertension Maternal Grandfather      Depression Paternal Grandmother         bipolar     Depression Paternal Grandfather         bipolar     Lung Cancer Maternal Grandmother      No Known Problems Mother      No Known Problems Father      No Known Problems Sister      No Known Problems Sister        Review of Systems  As per HPI and PMHx, otherwise 10 system review including the head and neck, constitutional, eyes, respiratory, GI, skin, neurologic, lymphatic, endocrine, and allergy systems is negative.    Physical Exam  BP 90/62   Pulse 81   Temp 98.6  F (37  C) (Tympanic)   Ht 1.664 m (5' 5.5\")   Wt 51.4 kg (113 lb 6.4 oz)   BMI 18.58 kg/m    GENERAL: Patient is a pleasant, cooperative 22 year old female in no acute distress.  HEAD: Normocephalic, atraumatic.  Hair and scalp are normal.  EYES: Pupils are equal, round, reactive to light and accommodation.  Extraocular movements are intact.  The sclera nonicteric without injection.  The extraocular structures are normal.  EARS: Normal shape and symmetry.  No tenderness when palpating the mastoid or tragal areas bilaterally.    NOSE: Nares are patent.  Nasal mucosa is boggy and inflamed with sticky, inflammatory mucus.  The patient had severe/marked inferior turbinate hypertrophy right greater than left.  The patient does have a rightward anterior and mid nasal septal deviation.  No nasal cavity masses, polyps, or mucopurulence on anterior rhinoscopy  ORAL CAVITY: Dentition is in good repair.  Mucous membranes are moist.  Tongue is mobile, protrudes to the midline.  Palate elevates symmetrically.  Tonsils are surgically absent.  No erythema or exudate.  No oral cavity or oropharyngeal masses, lesions, ulcerations, leukoplakia.  NECK: Supple, trachea is midline.  There no palpable cervical lymphadenopathy or masses " bilaterally.  NEUROLOGIC: Cranial nerves II through XII are grossly intact.  Voice is strong.  Patient is House-Brackmann I/VI bilaterally.  CARDIOVASCULAR: Extremities are warm and well-perfused.  No significant peripheral edema.  RESPIRATORY: Patient has nonlabored breathing without cough, wheeze, stridor.  PSYCHIATRIC: Patient is alert and oriented.  Mood and affect appear normal.  SKIN: Warm and dry.  No scalp, face, or neck lesions noted.    Assessment and Plan     ICD-10-CM    1. Nasal obstruction  J34.89 Case Request: SINUS SURGERY, ENDOSCOPIC, USING OPTICAL TRACKING SYSTEM, SEPTOPLASTY, NOSE, WITH TURBINOPLASTY      2. Nasal septal deviation  J34.2 Case Request: SINUS SURGERY, ENDOSCOPIC, USING OPTICAL TRACKING SYSTEM, SEPTOPLASTY, NOSE, WITH TURBINOPLASTY      3. Chronic rhinitis  J31.0 Case Request: SINUS SURGERY, ENDOSCOPIC, USING OPTICAL TRACKING SYSTEM, SEPTOPLASTY, NOSE, WITH TURBINOPLASTY      4. Hypertrophy of both inferior nasal turbinates  J34.3 Case Request: SINUS SURGERY, ENDOSCOPIC, USING OPTICAL TRACKING SYSTEM, SEPTOPLASTY, NOSE, WITH TURBINOPLASTY      5. Silent sinus syndrome  J34.89          It was my pleasure seeing Aury Cervantes today in clinic.  She has signs of hypoplastic maxillary sinus on the right-hand side with silent sinus syndrome and some mucosal thickening.  She also has narrowing of her left maxillary sinus infundibula.  She has a fairly significant rightward nasal septal deviation and inferior turbinate hypertrophy.  I do think the patient would benefit from endoscopic sinus surgery to help with sinus ventilation and drainage and septoplasty with turbinate reduction to help with nasal breathing and congestion.    We discussed the risks, benefits, alternatives, options of endonasal septoplasty, bilateral inferior turbinate reduction, bilateral endoscopic maxillary antrostomies and bilateral endoscopic anterior ethmoidectomies with image guidance including, but not limited to:  Risk of bleeding, risk of infection, risk of postoperative pain, risk of septal hematoma, risk of septal perforation, risk of CSF leak, risk of injury to the orbital contents, risk of intranasal scarring or adhesions, risk of smell disturbance or smell loss, potential need for additional procedures, risk of general anesthesia.  The patient voiced understanding and is willing to proceed.  We discussed the postoperative course and convalescence including lifting and activity restrictions, placement of nasal splints, nasal saline irrigations postoperatively, postoperative debridement, and follow-up.    Aram Grace MD  Department of Otolaryngology-Head and Neck Surgery  [facility]

## 2022-12-21 ENCOUNTER — OFFICE VISIT (OUTPATIENT)
Dept: FAMILY MEDICINE | Facility: CLINIC | Age: 22
End: 2022-12-21
Payer: COMMERCIAL

## 2022-12-21 ENCOUNTER — DOCUMENTATION ONLY (OUTPATIENT)
Dept: LAB | Facility: CLINIC | Age: 22
End: 2022-12-21

## 2022-12-21 ENCOUNTER — OFFICE VISIT (OUTPATIENT)
Dept: OTOLARYNGOLOGY | Facility: CLINIC | Age: 22
End: 2022-12-21
Payer: COMMERCIAL

## 2022-12-21 VITALS
DIASTOLIC BLOOD PRESSURE: 62 MMHG | SYSTOLIC BLOOD PRESSURE: 90 MMHG | HEART RATE: 81 BPM | TEMPERATURE: 98.6 F | WEIGHT: 113.4 LBS | BODY MASS INDEX: 18.23 KG/M2 | HEIGHT: 66 IN

## 2022-12-21 VITALS
TEMPERATURE: 98.6 F | WEIGHT: 113.4 LBS | RESPIRATION RATE: 16 BRPM | OXYGEN SATURATION: 99 % | HEART RATE: 81 BPM | BODY MASS INDEX: 18.23 KG/M2 | SYSTOLIC BLOOD PRESSURE: 90 MMHG | DIASTOLIC BLOOD PRESSURE: 62 MMHG | HEIGHT: 66 IN

## 2022-12-21 DIAGNOSIS — J34.3 HYPERTROPHY OF BOTH INFERIOR NASAL TURBINATES: ICD-10-CM

## 2022-12-21 DIAGNOSIS — D50.9 IRON DEFICIENCY ANEMIA, UNSPECIFIED IRON DEFICIENCY ANEMIA TYPE: Primary | ICD-10-CM

## 2022-12-21 DIAGNOSIS — J34.89 NASAL OBSTRUCTION: Primary | ICD-10-CM

## 2022-12-21 DIAGNOSIS — R53.83 FATIGUE, UNSPECIFIED TYPE: ICD-10-CM

## 2022-12-21 DIAGNOSIS — J31.0 CHRONIC RHINITIS: ICD-10-CM

## 2022-12-21 DIAGNOSIS — J34.89 SILENT SINUS SYNDROME: ICD-10-CM

## 2022-12-21 DIAGNOSIS — G44.86 CERVICOGENIC HEADACHE: ICD-10-CM

## 2022-12-21 DIAGNOSIS — J34.2 NASAL SEPTAL DEVIATION: ICD-10-CM

## 2022-12-21 LAB
ERYTHROCYTE [DISTWIDTH] IN BLOOD BY AUTOMATED COUNT: 13.7 % (ref 10–15)
FERRITIN SERPL-MCNC: 5 NG/ML (ref 6–175)
HCT VFR BLD AUTO: 38.3 % (ref 35–47)
HGB BLD-MCNC: 12.3 G/DL (ref 11.7–15.7)
MCH RBC QN AUTO: 26.7 PG (ref 26.5–33)
MCHC RBC AUTO-ENTMCNC: 32.1 G/DL (ref 31.5–36.5)
MCV RBC AUTO: 83 FL (ref 78–100)
PLATELET # BLD AUTO: 188 10E3/UL (ref 150–450)
RBC # BLD AUTO: 4.6 10E6/UL (ref 3.8–5.2)
TSH SERPL DL<=0.005 MIU/L-ACNC: 2.7 UIU/ML (ref 0.3–4.2)
WBC # BLD AUTO: 5.6 10E3/UL (ref 4–11)

## 2022-12-21 PROCEDURE — 82728 ASSAY OF FERRITIN: CPT | Performed by: NURSE PRACTITIONER

## 2022-12-21 PROCEDURE — 99214 OFFICE O/P EST MOD 30 MIN: CPT | Performed by: NURSE PRACTITIONER

## 2022-12-21 PROCEDURE — 99214 OFFICE O/P EST MOD 30 MIN: CPT | Performed by: OTOLARYNGOLOGY

## 2022-12-21 PROCEDURE — 36415 COLL VENOUS BLD VENIPUNCTURE: CPT | Performed by: NURSE PRACTITIONER

## 2022-12-21 PROCEDURE — 84443 ASSAY THYROID STIM HORMONE: CPT | Performed by: NURSE PRACTITIONER

## 2022-12-21 PROCEDURE — 85027 COMPLETE CBC AUTOMATED: CPT | Performed by: NURSE PRACTITIONER

## 2022-12-21 RX ORDER — CYCLOBENZAPRINE HCL 5 MG
5 TABLET ORAL 3 TIMES DAILY PRN
Qty: 30 TABLET | Refills: 1 | Status: SHIPPED | OUTPATIENT
Start: 2022-12-21 | End: 2023-02-22

## 2022-12-21 RX ORDER — KETOROLAC TROMETHAMINE 10 MG/1
10 TABLET, FILM COATED ORAL EVERY 6 HOURS PRN
Qty: 20 TABLET | Refills: 0 | Status: SHIPPED | OUTPATIENT
Start: 2022-12-21 | End: 2023-01-20

## 2022-12-21 ASSESSMENT — PAIN SCALES - GENERAL: PAINLEVEL: NO PAIN (0)

## 2022-12-21 ASSESSMENT — PATIENT HEALTH QUESTIONNAIRE - PHQ9
SUM OF ALL RESPONSES TO PHQ QUESTIONS 1-9: 2
10. IF YOU CHECKED OFF ANY PROBLEMS, HOW DIFFICULT HAVE THESE PROBLEMS MADE IT FOR YOU TO DO YOUR WORK, TAKE CARE OF THINGS AT HOME, OR GET ALONG WITH OTHER PEOPLE: NOT DIFFICULT AT ALL
SUM OF ALL RESPONSES TO PHQ QUESTIONS 1-9: 2

## 2022-12-21 ASSESSMENT — ENCOUNTER SYMPTOMS
HEADACHES: 1
DIZZINESS: 1

## 2022-12-21 NOTE — NURSING NOTE
"Initial BP 90/62   Pulse 81   Temp 98.6  F (37  C) (Tympanic)   Ht 1.664 m (5' 5.5\")   Wt 51.4 kg (113 lb 6.4 oz)   BMI 18.58 kg/m   Estimated body mass index is 18.58 kg/m  as calculated from the following:    Height as of this encounter: 1.664 m (5' 5.5\").    Weight as of this encounter: 51.4 kg (113 lb 6.4 oz). .    Rasheeda Jackson CMA    "

## 2022-12-21 NOTE — PATIENT INSTRUCTIONS
Per physician instructions      If you have questions or concerns on any instructions given to you by your provider today or if you need to schedule an appointment, you can reach us at [phone].

## 2022-12-21 NOTE — PROGRESS NOTES
Fred a ferritin and cbc on patient, patient requested for her thyroid to be checked as well. Please place an order and add on to ferritin green tube top if needed. Thank you.     [facility]

## 2022-12-21 NOTE — PATIENT INSTRUCTIONS
Labs today    Try Toradol 10 mg every 6 hrs as needed for neck pain and headaches  Try Flexeril 5-10 mg at bedtime

## 2022-12-21 NOTE — LETTER
12/21/2022         RE: Aury Cervantes  [address] [address]        Dear Colleague,    Thank you for referring your patient, Aury Cervantes, to the [facility]. Please see a copy of my visit note below.    Chief Complaint   Patient presents with     Sinus Problem     Follow up sinus/CT scan done 12/09      History of Present Illness  Aury Cervantes is a 22 year old female who presents today for follow-up.  I evaluated the patient on 11/2/2022 with nose and sinus issues.  We placed her on a medical regimen using saline irrigations and Flonase nasal spray.  I contacted her to check her symptoms she was having minimal improvement.  We obtained a CT scan of her sinuses and she returns today for follow-up.    I reviewed the patient's sinus CT performed 12/9/2022 shows a significant rightward nasal septal deviation.  The patient is moderate inferior turbinate hypertrophy.  She has narrowing of the left ostiomeatal unit.  On the right, she has significant hypoplastic appearance of the maxillary sinus with bowing of the lateral wall and uncinate laterally consistent with silent sinus syndrome.  The remainder the paranasal sinuses are clear without any significant evidence of acute or chronic inflammation.    From a symptom standpoint, the patient reports no significant improvement with medical management. She reports bilateral nasal obstruction and congestion that seems to alternate sides.  She denies any significant rhinorrhea or postnasal drainage but does occasionally have a sensation of phlegm in her throat.  No taste or smell disturbance.  She reports some facial pressure in her malar and forehead areas.  She has this most days of the week.  She does have a history of migraine headache and was treated in her teens for migraine.  She has not had a history of nose or sinus surgery.  She previously underwent allergy skin testing which was negative for aeroallergens.  She previously  underwent tonsillectomy.      Past Medical History  Patient Active Problem List   Diagnosis     Major depressive disorder, recurrent episode, severe (H)     S/P tonsillectomy and adenoidectomy     Anaphylaxis, subsequent encounter     Rhinoconjunctivitis     Iron deficiency anemia due to chronic blood loss     Menorrhagia with regular cycle     Dysmenorrhea     Female infertility     Current Medications    Current Outpatient Medications:      cyclobenzaprine (FLEXERIL) 5 MG tablet, Take 1 tablet (5 mg) by mouth 3 times daily as needed for muscle spasms, Disp: 30 tablet, Rfl: 1     ketorolac (TORADOL) 10 MG tablet, Take 1 tablet (10 mg) by mouth every 6 hours as needed for moderate pain (4-6), Disp: 20 tablet, Rfl: 0     letrozole (FEMARA) 2.5 MG tablet, Take 1 tablet (2.5 mg) by mouth daily, Disp: 5 tablet, Rfl: 6     levothyroxine (SYNTHROID/LEVOTHROID) 50 MCG tablet, Take 1 tablet (50 mcg) by mouth daily, Disp: 30 tablet, Rfl: 12     Prenatal Vit-Fe Fumarate-FA (PRENATAL MULTIVITAMIN W/IRON) 27-0.8 MG tablet, Take 1 tablet by mouth daily, Disp: , Rfl:      progesterone (PROMETRIUM) 100 MG capsule, Take 1 capsule (100 mg) by mouth daily, Disp: 30 capsule, Rfl: 12     EPINEPHrine (ADRENACLICK) 0.3 MG/0.3ML injection 2-pack, Inject 0.3 mLs (0.3 mg) into the muscle as needed for anaphylaxis (Patient not taking: Reported on 9/26/2022), Disp: 0.6 mL, Rfl: 1     letrozole (FEMARA) 2.5 MG tablet, Take 2 tablets (5 mg) by mouth daily for 5 days, Disp: 10 tablet, Rfl: 12    Allergies  Allergies   Allergen Reactions     Sulfa Drugs Other (See Comments)     Both parents are allergic         Social History  Social History     Socioeconomic History     Marital status: Single   Tobacco Use     Smoking status: Never     Smokeless tobacco: Never   Vaping Use     Vaping Use: Never used   Substance and Sexual Activity     Alcohol use: Not Currently     Alcohol/week: 0.0 standard drinks     Drug use: Never     Sexual activity: Yes     " Partners: Male   Other Topics Concern     Parent/sibling w/ CABG, MI or angioplasty before 65F 55M? No       Family History  Family History   Problem Relation Age of Onset     Depression Mother      Depression Father      Depression Maternal Grandmother      Cancer Maternal Grandmother      Diabetes Maternal Grandfather      Heart Disease Maternal Grandfather      Hypertension Maternal Grandfather      Depression Paternal Grandmother         bipolar     Depression Paternal Grandfather         bipolar     Lung Cancer Maternal Grandmother      No Known Problems Mother      No Known Problems Father      No Known Problems Sister      No Known Problems Sister        Review of Systems  As per HPI and PMHx, otherwise 10 system review including the head and neck, constitutional, eyes, respiratory, GI, skin, neurologic, lymphatic, endocrine, and allergy systems is negative.    Physical Exam  BP 90/62   Pulse 81   Temp 98.6  F (37  C) (Tympanic)   Ht 1.664 m (5' 5.5\")   Wt 51.4 kg (113 lb 6.4 oz)   BMI 18.58 kg/m    GENERAL: Patient is a pleasant, cooperative 22 year old female in no acute distress.  HEAD: Normocephalic, atraumatic.  Hair and scalp are normal.  EYES: Pupils are equal, round, reactive to light and accommodation.  Extraocular movements are intact.  The sclera nonicteric without injection.  The extraocular structures are normal.  EARS: Normal shape and symmetry.  No tenderness when palpating the mastoid or tragal areas bilaterally.    NOSE: Nares are patent.  Nasal mucosa is boggy and inflamed with sticky, inflammatory mucus.  The patient had severe/marked inferior turbinate hypertrophy right greater than left.  The patient does have a rightward anterior and mid nasal septal deviation.  No nasal cavity masses, polyps, or mucopurulence on anterior rhinoscopy  ORAL CAVITY: Dentition is in good repair.  Mucous membranes are moist.  Tongue is mobile, protrudes to the midline.  Palate elevates symmetrically.  " Tonsils are surgically absent.  No erythema or exudate.  No oral cavity or oropharyngeal masses, lesions, ulcerations, leukoplakia.  NECK: Supple, trachea is midline.  There no palpable cervical lymphadenopathy or masses bilaterally.  NEUROLOGIC: Cranial nerves II through XII are grossly intact.  Voice is strong.  Patient is House-Brackmann I/VI bilaterally.  CARDIOVASCULAR: Extremities are warm and well-perfused.  No significant peripheral edema.  RESPIRATORY: Patient has nonlabored breathing without cough, wheeze, stridor.  PSYCHIATRIC: Patient is alert and oriented.  Mood and affect appear normal.  SKIN: Warm and dry.  No scalp, face, or neck lesions noted.    Assessment and Plan     ICD-10-CM    1. Nasal obstruction  J34.89 Case Request: SINUS SURGERY, ENDOSCOPIC, USING OPTICAL TRACKING SYSTEM, SEPTOPLASTY, NOSE, WITH TURBINOPLASTY      2. Nasal septal deviation  J34.2 Case Request: SINUS SURGERY, ENDOSCOPIC, USING OPTICAL TRACKING SYSTEM, SEPTOPLASTY, NOSE, WITH TURBINOPLASTY      3. Chronic rhinitis  J31.0 Case Request: SINUS SURGERY, ENDOSCOPIC, USING OPTICAL TRACKING SYSTEM, SEPTOPLASTY, NOSE, WITH TURBINOPLASTY      4. Hypertrophy of both inferior nasal turbinates  J34.3 Case Request: SINUS SURGERY, ENDOSCOPIC, USING OPTICAL TRACKING SYSTEM, SEPTOPLASTY, NOSE, WITH TURBINOPLASTY      5. Silent sinus syndrome  J34.89          It was my pleasure seeing Aury Cervantes today in clinic.  She has signs of hypoplastic maxillary sinus on the right-hand side with silent sinus syndrome and some mucosal thickening.  She also has narrowing of her left maxillary sinus infundibula.  She has a fairly significant rightward nasal septal deviation and inferior turbinate hypertrophy.  I do think the patient would benefit from endoscopic sinus surgery to help with sinus ventilation and drainage and septoplasty with turbinate reduction to help with nasal breathing and congestion.    We discussed the risks, benefits,  alternatives, options of endonasal septoplasty, bilateral inferior turbinate reduction, bilateral endoscopic maxillary antrostomies and bilateral endoscopic anterior ethmoidectomies with image guidance including, but not limited to: Risk of bleeding, risk of infection, risk of postoperative pain, risk of septal hematoma, risk of septal perforation, risk of CSF leak, risk of injury to the orbital contents, risk of intranasal scarring or adhesions, risk of smell disturbance or smell loss, potential need for additional procedures, risk of general anesthesia.  The patient voiced understanding and is willing to proceed.  We discussed the postoperative course and convalescence including lifting and activity restrictions, placement of nasal splints, nasal saline irrigations postoperatively, postoperative debridement, and follow-up.    Aram Grace MD  Department of Otolaryngology-Head and Neck Surgery  [facility]         Again, thank you for allowing me to participate in the care of your patient.        Sincerely,        Aram Grace MD

## 2022-12-21 NOTE — PROGRESS NOTES
Assessment & Plan     Iron deficiency anemia, unspecified iron deficiency anemia type    - Ferritin; Future  - CBC with platelets; Future  - Ferritin  - CBC with platelets    Cervicogenic headache  - ketorolac (TORADOL) 10 MG tablet; Take 1 tablet (10 mg) by mouth every 6 hours as needed for moderate pain (4-6)  - cyclobenzaprine (FLEXERIL) 5 MG tablet; Take 1 tablet (5 mg) by mouth 3 times daily as needed for muscle spasms    Fatigue, unspecified type  - TSH with free T4 reflex; Future        Return in about 2 weeks (around 1/4/2023), or if symptoms worsen or fail to improve.    CIRO Kline [facility] is a 22 year old patient presenting for the following health issues:  Dizziness and Headache      Dizziness  Associated symptoms include headaches.   Headache     History of Present Illness       Reason for visit:  General check up / back and neck pain with headaches    She eats 0-1 servings of fruits and vegetables daily.She consumes 1 sweetened beverage(s) daily.She exercises with enough effort to increase her heart rate 9 or less minutes per day.  She exercises with enough effort to increase her heart rate 3 or less days per week.   She is taking medications regularly.    Today's PHQ-9         PHQ-9 Total Score: 2    PHQ-9 Q9 Thoughts of better off dead/self-harm past 2 weeks :   Not at all    How difficult have these problems made it for you to do your work, take care of things at home, or get along with other people: Not difficult at all       Migraine     Since your last clinic visit, how have your headaches changed?  Worsened- getting pain in the back of her neck     How often are you getting headaches or migraines? Every day     Are you able to do normal daily activities when you have a migraine? No    Are you taking rescue/relief medications? (Select all that apply) ibuprofen (Advil, Motrin)    How helpful is your rescue/relief medication?  I get  "some relief    Are you taking any medications to prevent migraines? (Select all that apply)  No    In the past 4 weeks, how often have you gone to urgent care or the emergency room because of your headaches?  0       Constitutional, HEENT, cardiovascular, pulmonary, gi and gu systems are negative, except as otherwise noted.      Objective    BP 90/62 (BP Location: Left arm, Patient Position: Sitting, Cuff Size: Adult Regular)   Pulse 81   Temp 98.6  F (37  C) (Tympanic)   Resp 16   Ht 1.664 m (5' 5.5\")   Wt 51.4 kg (113 lb 6.4 oz)   SpO2 99%   BMI 18.58 kg/m    Body mass index is 18.58 kg/m .  Physical Exam   GENERAL: healthy, alert and no distress  EYES: Eyes grossly normal to inspection, PERRL and conjunctivae and sclerae normal  HENT: ear canals and TM's normal, nose and mouth without ulcers or lesions  NECK: no adenopathy, no asymmetry, masses, or scars and thyroid normal to palpation  RESP: lungs clear to auscultation - no rales, rhonchi or wheezes  CV: regular rate and rhythm, normal S1 S2, no S3 or S4, no murmur, click or rub, no peripheral edema and peripheral pulses strong  MS: no gross musculoskeletal defects noted, no edema  NEURO: Normal strength and tone, mentation intact and speech normal  PSYCH: mentation appears normal, affect normal/bright                    "

## 2022-12-22 ENCOUNTER — HOSPITAL ENCOUNTER (OUTPATIENT)
Facility: AMBULATORY SURGERY CENTER | Age: 22
End: 2022-12-22
Attending: OTOLARYNGOLOGY
Payer: COMMERCIAL

## 2022-12-22 ENCOUNTER — TELEPHONE (OUTPATIENT)
Dept: OTOLARYNGOLOGY | Facility: CLINIC | Age: 22
End: 2022-12-22

## 2022-12-22 DIAGNOSIS — J34.2 NASAL SEPTAL DEVIATION: ICD-10-CM

## 2022-12-22 DIAGNOSIS — J34.89 NASAL OBSTRUCTION: ICD-10-CM

## 2022-12-22 DIAGNOSIS — J34.3 HYPERTROPHY OF BOTH INFERIOR NASAL TURBINATES: ICD-10-CM

## 2022-12-22 NOTE — TELEPHONE ENCOUNTER
Type of surgery: SINUS SURGERY, ENDOSCOPIC, USING OPTICAL TRACKING SYSTEM (Bilateral)   SEPTOPLASTY, NOSE, WITH TURBINOPLASTY (N/A)    CPT 27936,32021,91252,92839     Nasal obstruction J34.89     Nasal septal deviation J34.2     Chronic rhinitis J31.0     Hypertrophy of both inferior nasal turbinates J34.3     Silent sinus syndrome J34.89    [facility] of surgery:  [facility]  Date and time of surgery: 03/07/2023  Surgeon: Sanjay  Pre-Op Appt Date: 02/22/2023  Post-Op Appt Date: 03/13/2023   Packet sent out: Yes  Pre-cert/Authorization completed: Per [employer] online list, prior auth is required for [id number]. Submitted online Availity/ICR. Pending.  TrackingID [id number]  Ref# [id number]    Date: 1/27/23    Leilani Choi  Financial Securing/Prior Auth Dept  [phone]

## 2023-01-02 ENCOUNTER — LAB (OUTPATIENT)
Dept: LAB | Facility: CLINIC | Age: 23
End: 2023-01-02
Payer: COMMERCIAL

## 2023-01-02 DIAGNOSIS — N97.0 SECONDARY ANOVULATORY INFERTILITY: ICD-10-CM

## 2023-01-02 DIAGNOSIS — E03.8 SUBCLINICAL HYPOTHYROIDISM: Primary | ICD-10-CM

## 2023-01-02 LAB
HOLD SPECIMEN: NORMAL
PROGEST SERPL-MCNC: 4 NG/ML

## 2023-01-02 PROCEDURE — 84144 ASSAY OF PROGESTERONE: CPT

## 2023-01-02 PROCEDURE — 36415 COLL VENOUS BLD VENIPUNCTURE: CPT

## 2023-01-02 NOTE — PROGRESS NOTES
Aury Thornton was in for labs 01/02. She said she needed Thyroid labs done as well. There wasn't any orders for those. I drew an extra tube incase you want to add that on. We keep the blood for a week.  Thank you,  [facility]

## 2023-01-04 DIAGNOSIS — N97.0 SECONDARY ANOVULATORY INFERTILITY: Primary | ICD-10-CM

## 2023-01-04 RX ORDER — LETROZOLE 2.5 MG/1
7.5 TABLET, FILM COATED ORAL DAILY
Qty: 15 TABLET | Refills: 12 | Status: SHIPPED | OUTPATIENT
Start: 2023-01-04 | End: 2023-02-01

## 2023-01-08 ENCOUNTER — MYC MEDICAL ADVICE (OUTPATIENT)
Dept: OBGYN | Facility: CLINIC | Age: 23
End: 2023-01-08

## 2023-01-12 ENCOUNTER — TELEPHONE (OUTPATIENT)
Dept: FAMILY MEDICINE | Facility: CLINIC | Age: 23
End: 2023-01-12

## 2023-01-12 NOTE — TELEPHONE ENCOUNTER
Pt called stating that she just got a letter from her insurance stating that her sinus CT is not covered.    Pt is asking for a referral retroactively?    However, I see a referral in the chart.     Pt is concerned because she is supposed to have sinus surgery in March.      Routed to referral coordination for further clarification.    Vandana Shankar RN   [facility], and [facility]

## 2023-01-13 NOTE — TELEPHONE ENCOUNTER
Prior Authorization's for high tech imaging are handled by the [employer].  If you need assistance or have questions on these authorizations,  please call  [phone].     All other Prior Authorizations are done in clinic by Provider and/or Care Team placing the order.    Thank you,     Jennifer GARNER [facility] Referrals Coordinator

## 2023-01-16 NOTE — TELEPHONE ENCOUNTER
I have called the number listed in this message.  It is not correct.  I have been advised of a phone number and name who works in [facility] [place].  I have teamed a message to her and she will be looking into this for the patient. Radha ALEJO RN

## 2023-01-16 NOTE — TELEPHONE ENCOUNTER
I have now been directed to Lisa Lala who is the supervisor of financial securing.  I have sent her a email asking her to check into this for our patient. Radha ALEJO RN

## 2023-01-20 ENCOUNTER — OFFICE VISIT (OUTPATIENT)
Dept: FAMILY MEDICINE | Facility: CLINIC | Age: 23
End: 2023-01-20
Payer: COMMERCIAL

## 2023-01-20 VITALS
HEART RATE: 90 BPM | DIASTOLIC BLOOD PRESSURE: 64 MMHG | RESPIRATION RATE: 16 BRPM | WEIGHT: 116.5 LBS | BODY MASS INDEX: 18.72 KG/M2 | SYSTOLIC BLOOD PRESSURE: 92 MMHG | TEMPERATURE: 96.2 F | OXYGEN SATURATION: 98 % | HEIGHT: 66 IN

## 2023-01-20 DIAGNOSIS — F32.0 CURRENT MILD EPISODE OF MAJOR DEPRESSIVE DISORDER WITHOUT PRIOR EPISODE (H): ICD-10-CM

## 2023-01-20 DIAGNOSIS — R05.8 DRY COUGH: Primary | ICD-10-CM

## 2023-01-20 DIAGNOSIS — R06.02 SOB (SHORTNESS OF BREATH): ICD-10-CM

## 2023-01-20 PROCEDURE — 99214 OFFICE O/P EST MOD 30 MIN: CPT | Performed by: NURSE PRACTITIONER

## 2023-01-20 RX ORDER — ALBUTEROL SULFATE 90 UG/1
2 AEROSOL, METERED RESPIRATORY (INHALATION) EVERY 6 HOURS PRN
Qty: 18 G | Refills: 1 | Status: SHIPPED | OUTPATIENT
Start: 2023-01-20 | End: 2023-11-18

## 2023-01-20 ASSESSMENT — PAIN SCALES - GENERAL: PAINLEVEL: NO PAIN (0)

## 2023-01-20 NOTE — PROGRESS NOTES
Assessment & Plan     Dry cough  -recommended PFT's  -may try Albuterol as needed   - General PFT Lab (Please always keep checked); Future  - Exercise Spirometry Test (GH); Future  - Pulmonary Function Test; Future  - albuterol (PROAIR HFA/PROVENTIL HFA/VENTOLIN HFA) 108 (90 Base) MCG/ACT inhaler; Inhale 2 puffs into the lungs every 6 hours as needed for shortness of breath, wheezing or cough    SOB (shortness of breath)    - General PFT Lab (Please always keep checked); Future  - Exercise Spirometry Test (GH); Future  - Pulmonary Function Test; Future  - albuterol (PROAIR HFA/PROVENTIL HFA/VENTOLIN HFA) 108 (90 Base) MCG/ACT inhaler; Inhale 2 puffs into the lungs every 6 hours as needed for shortness of breath, wheezing or cough    Current mild episode of major depressive disorder without prior episode (H)  -improved, doing well       CIRO Kline [facility] is a 22 year old patient presenting for the following health issues: complains of episodic shortness of breath, dry cough and chest congestion. The patient have concerns about possible asthma. Complains that symptoms more common at bedtime and after exercise   Cough      History of Present Illness       Reason for visit:  Asthma symptoms  Symptom onset:  More than a month  Symptoms include:  Tight chest, cough,etc  Symptom intensity:  Mild  Symptom progression:  Staying the same  Had these symptoms before:  Yes  Has tried/received treatment for these symptoms:  No  What makes it worse:  Night time, cold, exercise  What makes it better:  Not that rupali found    She eats 0-1 servings of fruits and vegetables daily.She consumes 1 sweetened beverage(s) daily.She exercises with enough effort to increase her heart rate 9 or less minutes per day.  She exercises with enough effort to increase her heart rate 3 or less days per week.   She is taking medications regularly.           Review of Systems  "  Constitutional, HEENT, cardiovascular, pulmonary, gi and gu systems are negative, except as otherwise noted.      Objective    BP 92/64 (BP Location: Left arm, Patient Position: Sitting, Cuff Size: Adult Regular)   Pulse 90   Temp (!) 96.2  F (35.7  C) (Tympanic)   Resp 16   Ht 1.664 m (5' 5.5\")   Wt 52.8 kg (116 lb 8 oz)   SpO2 98%   BMI 19.09 kg/m    Body mass index is 19.09 kg/m .  Physical Exam   GENERAL: healthy, alert and no distress  EYES: Eyes grossly normal to inspection, PERRL and conjunctivae and sclerae normal  RESP: lungs clear to auscultation - no rales, rhonchi or wheezes  CV: regular rate and rhythm, normal S1 S2, no S3 or S4, no murmur, click or rub, no peripheral edema and peripheral pulses strong  PSYCH: mentation appears normal, affect normal/bright                "

## 2023-01-25 ENCOUNTER — LAB (OUTPATIENT)
Dept: LAB | Facility: CLINIC | Age: 23
End: 2023-01-25
Payer: COMMERCIAL

## 2023-01-25 DIAGNOSIS — N97.0 SECONDARY ANOVULATORY INFERTILITY: ICD-10-CM

## 2023-01-25 DIAGNOSIS — D50.0 IRON DEFICIENCY ANEMIA DUE TO CHRONIC BLOOD LOSS: Primary | ICD-10-CM

## 2023-01-25 LAB — PROGEST SERPL-MCNC: 27.8 NG/ML

## 2023-01-25 PROCEDURE — 36415 COLL VENOUS BLD VENIPUNCTURE: CPT

## 2023-01-25 PROCEDURE — 84144 ASSAY OF PROGESTERONE: CPT

## 2023-02-01 ENCOUNTER — MYC REFILL (OUTPATIENT)
Dept: OBGYN | Facility: CLINIC | Age: 23
End: 2023-02-01
Payer: COMMERCIAL

## 2023-02-01 DIAGNOSIS — N97.0 SECONDARY ANOVULATORY INFERTILITY: ICD-10-CM

## 2023-02-01 NOTE — TELEPHONE ENCOUNTER
"Fax received today from [employer]    \" We can APPROVE the request for one sinus surgery - CPT code 38708 (nasal septoplasty).   Auth# [id number]   Dates of service: 3/7/23 -5/6/23 Includes 16609    We can NOT approve the request for the other procedure (functional endoscopic sinus surgery billing codes 83184 and 19475) We reviewed your records. We see that you have trouble breathing through your nose. We see that you have certain findings (deviated septum). We do not see that you have (sinus) infections. We were not told that you have seen your doctor for at least 4 infections in the past year or an infection that lasted more than 12 weeks. We were not told that you had other conditions (such as tumor, polyposis, fungal mycetoma or encephalocele). For these reasons, we can approve one surgery (septoplasty) and we cannot approve the other surgery (functional endoscopic sinus surgery).\"    If the requesting provider would like to discuss the case, please request a [id number] review within one business day of this notice by calling [phone]    "

## 2023-02-07 RX ORDER — LETROZOLE 2.5 MG/1
7.5 TABLET, FILM COATED ORAL DAILY
Qty: 15 TABLET | Refills: 12 | Status: ON HOLD | OUTPATIENT
Start: 2023-02-07 | End: 2023-09-10

## 2023-02-07 NOTE — TELEPHONE ENCOUNTER
Left message with info for a peer to peer @  [phone]. They usually call within a week.   Rasheeda Jackson CMA

## 2023-02-14 NOTE — TELEPHONE ENCOUNTER
[employer] called back and stated that we need to send a written appeal first.   Sent letter with denial letter.  Rasheeda Jackson CMA

## 2023-02-15 NOTE — TELEPHONE ENCOUNTER
21 Left detailed message reminding her to call our office within the first 3 days of her next menstrual cycle for the Von Willebrand's panel to be rechecked. Scheduling number left for her to contact. Encouraged her to call with any questions or concerns. Sylvia Wallace, [facility]

## 2023-02-21 ENCOUNTER — LAB (OUTPATIENT)
Dept: LAB | Facility: CLINIC | Age: 23
End: 2023-02-21
Payer: COMMERCIAL

## 2023-02-21 DIAGNOSIS — N97.0 SECONDARY ANOVULATORY INFERTILITY: ICD-10-CM

## 2023-02-21 DIAGNOSIS — D50.0 IRON DEFICIENCY ANEMIA DUE TO CHRONIC BLOOD LOSS: ICD-10-CM

## 2023-02-21 LAB
BASOPHILS # BLD AUTO: 0 10E3/UL (ref 0–0.2)
BASOPHILS NFR BLD AUTO: 1 %
EOSINOPHIL # BLD AUTO: 0.2 10E3/UL (ref 0–0.7)
EOSINOPHIL NFR BLD AUTO: 3 %
ERYTHROCYTE [DISTWIDTH] IN BLOOD BY AUTOMATED COUNT: 14.5 % (ref 10–15)
HCT VFR BLD AUTO: 38.6 % (ref 35–47)
HGB BLD-MCNC: 12.2 G/DL (ref 11.7–15.7)
LYMPHOCYTES # BLD AUTO: 1.3 10E3/UL (ref 0.8–5.3)
LYMPHOCYTES NFR BLD AUTO: 21 %
MCH RBC QN AUTO: 25.5 PG (ref 26.5–33)
MCHC RBC AUTO-ENTMCNC: 31.6 G/DL (ref 31.5–36.5)
MCV RBC AUTO: 81 FL (ref 78–100)
MONOCYTES # BLD AUTO: 0.6 10E3/UL (ref 0–1.3)
MONOCYTES NFR BLD AUTO: 10 %
NEUTROPHILS # BLD AUTO: 4 10E3/UL (ref 1.6–8.3)
NEUTROPHILS NFR BLD AUTO: 66 %
PLATELET # BLD AUTO: 170 10E3/UL (ref 150–450)
PROGEST SERPL-MCNC: 4.4 NG/ML
RBC # BLD AUTO: 4.79 10E6/UL (ref 3.8–5.2)
WBC # BLD AUTO: 6.1 10E3/UL (ref 4–11)

## 2023-02-21 PROCEDURE — 84144 ASSAY OF PROGESTERONE: CPT

## 2023-02-21 PROCEDURE — 36415 COLL VENOUS BLD VENIPUNCTURE: CPT

## 2023-02-21 PROCEDURE — 85025 COMPLETE CBC W/AUTO DIFF WBC: CPT

## 2023-02-22 ENCOUNTER — OFFICE VISIT (OUTPATIENT)
Dept: FAMILY MEDICINE | Facility: CLINIC | Age: 23
End: 2023-02-22
Payer: COMMERCIAL

## 2023-02-22 ENCOUNTER — HOSPITAL ENCOUNTER (OUTPATIENT)
Dept: RESPIRATORY THERAPY | Facility: CLINIC | Age: 23
Discharge: HOME | End: 2023-02-22
Attending: NURSE PRACTITIONER | Admitting: NURSE PRACTITIONER
Payer: COMMERCIAL

## 2023-02-22 VITALS
OXYGEN SATURATION: 100 % | SYSTOLIC BLOOD PRESSURE: 90 MMHG | BODY MASS INDEX: 18.74 KG/M2 | DIASTOLIC BLOOD PRESSURE: 70 MMHG | RESPIRATION RATE: 16 BRPM | HEIGHT: 66 IN | TEMPERATURE: 96.3 F | WEIGHT: 116.6 LBS | HEART RATE: 84 BPM

## 2023-02-22 DIAGNOSIS — J34.2 DEVIATED NASAL SEPTUM: ICD-10-CM

## 2023-02-22 DIAGNOSIS — J34.89 NASAL OBSTRUCTION: ICD-10-CM

## 2023-02-22 DIAGNOSIS — R06.02 SOB (SHORTNESS OF BREATH): ICD-10-CM

## 2023-02-22 DIAGNOSIS — R05.8 DRY COUGH: ICD-10-CM

## 2023-02-22 DIAGNOSIS — Z01.818 PREOPERATIVE EXAMINATION: Primary | ICD-10-CM

## 2023-02-22 PROCEDURE — 99214 OFFICE O/P EST MOD 30 MIN: CPT | Performed by: NURSE PRACTITIONER

## 2023-02-22 PROCEDURE — 250N000009 HC RX 250: Performed by: NURSE PRACTITIONER

## 2023-02-22 PROCEDURE — 94060 EVALUATION OF WHEEZING: CPT

## 2023-02-22 PROCEDURE — 94729 DIFFUSING CAPACITY: CPT

## 2023-02-22 PROCEDURE — 94726 PLETHYSMOGRAPHY LUNG VOLUMES: CPT

## 2023-02-22 RX ORDER — ALBUTEROL SULFATE 0.83 MG/ML
2.5 SOLUTION RESPIRATORY (INHALATION) ONCE
Status: COMPLETED | OUTPATIENT
Start: 2023-02-22 | End: 2023-02-22

## 2023-02-22 RX ADMIN — ALBUTEROL SULFATE 2.5 MG: 2.5 SOLUTION RESPIRATORY (INHALATION) at 09:50

## 2023-02-22 ASSESSMENT — PAIN SCALES - GENERAL: PAINLEVEL: NO PAIN (0)

## 2023-02-22 NOTE — PROGRESS NOTES
[facility]  [address] [address]  Phone: [phone]  Primary Provider: Twin Crane  Pre-op Performing Provider: TWIN CRANE    :[id number]}  PREOPERATIVE EVALUATION:  Today's date: 2/22/2023    Aury Cervantes is a 22 year old female who presents for a preoperative evaluation.    Surgical Information:  Surgery/Procedure:   SINUS SURGERY, ENDOSCOPIC, USING OPTICAL TRACKING SYSTEM Bilateral       [facility]gery [facility]   Surgeon: DR Grace   Surgery Date: 3/7/23   Time of Surgery: 7:30 AM   Where patient plans to recover: At home with family  Fax number for surgical facility: Note does not need to be faxed, will be available electronically in Epic.    Type of Anesthesia Anticipated: General    Assessment & Plan     The proposed surgical procedure is considered INTERMEDIATE risk.    Preoperative examination  -exam normal  -reviewed recent lab work, all normal     Nasal obstruction  -patient is cleared for surgery     Deviated nasal septum  -cleared for ENT surgery     Risks and Recommendations:  The patient has the following additional risks and recommendations for perioperative complications:   - No identified additional risk factors other than previously addressed    Medication Instructions:  Patient is to take all scheduled medications on the day of surgery    RECOMMENDATION:  APPROVAL GIVEN to proceed with proposed procedure, without further diagnostic evaluation.    [id number]}    Subjective     HPI related to upcoming procedure: scheduled for surgery for treatment of nasal obstruction and deviated septum     Preop Questions 2/22/2023   1. Have you ever had a heart attack or stroke? No   2. Have you ever had surgery on your heart or blood vessels, such as a stent placement, a coronary artery bypass, or surgery on an artery in your head, neck, heart, or legs? No   3. Do you have chest pain with activity? No   4. Do you have a history of  heart  failure? No   5. Do you currently have a cold, bronchitis or symptoms of other infection? No   6. Do you have a cough, shortness of breath, or wheezing? No   7. Do you or anyone in your family have previous history of blood clots? No   8. Do you or does anyone in your family have a serious bleeding problem such as prolonged bleeding following surgeries or cuts? No   9. Have you ever had problems with anemia or been told to take iron pills? YES    10. Have you had any abnormal blood loss such as black, tarry or bloody stools, or abnormal vaginal bleeding? No   11. Have you ever had a blood transfusion? No   12. Are you willing to have a blood transfusion if it is medically needed before, during, or after your surgery? Yes   13. Have you or any of your relatives ever had problems with anesthesia? No   14. Do you have sleep apnea, excessive snoring or daytime drowsiness? No   15. Do you have any artifical heart valves or other implanted medical devices like a pacemaker, defibrillator, or continuous glucose monitor? No   16. Do you have artificial joints? No   17. Are you allergic to latex? No   18. Is there any chance that you may be pregnant? No         Preoperative Review of :   reviewed - no record of controlled substances prescribed.      Status of Chronic Conditions:  See problem list for active medical problems.  Problems all longstanding and stable, except as noted/documented.  See ROS for pertinent symptoms related to these conditions.      Review of Systems  Constitutional, neuro, ENT, endocrine, pulmonary, cardiac, gastrointestinal, genitourinary, musculoskeletal, integument and psychiatric systems are negative, except as otherwise noted.    Patient Active Problem List    Diagnosis Date Noted     Nasal obstruction 12/22/2022     Priority: Medium     Added automatically from request for surgery [id number]       Nasal septal deviation 12/22/2022     Priority: Medium     Added automatically from request for  surgery [id number]       Hypertrophy of both inferior nasal turbinates 12/22/2022     Priority: Medium     Added automatically from request for surgery [id number]       Dysmenorrhea 05/16/2022     Priority: Medium     Female infertility 05/16/2022     Priority: Medium     Iron deficiency anemia due to chronic blood loss 02/07/2021     Priority: Medium     Menorrhagia with regular cycle 02/07/2021     Priority: Medium     Anaphylaxis, subsequent encounter 12/04/2018     Priority: Medium     Rhinoconjunctivitis 12/04/2018     Priority: Medium     S/P tonsillectomy and adenoidectomy 03/21/2018     Priority: Medium     Major depressive disorder, recurrent episode, severe (H) 11/19/2014     Priority: Medium      Past Medical History:   Diagnosis Date     Anemia      Depression      Nexplanon in place      Past Surgical History:   Procedure Laterality Date     SURGICAL HISTORY OF -       PE tubes     TONSILLECTOMY, ADENOIDECTOMY ADULT, COMBINED Bilateral 3/19/2018    Procedure: COMBINED TONSILLECTOMY, ADENOIDECTOMY ADULT;  Bilateral Adenotonsillectomy;  Surgeon: Kevin Arias MD;  Location: [facility]     Current Outpatient Medications   Medication Sig Dispense Refill     albuterol (PROAIR HFA/PROVENTIL HFA/VENTOLIN HFA) 108 (90 Base) MCG/ACT inhaler Inhale 2 puffs into the lungs every 6 hours as needed for shortness of breath, wheezing or cough 18 g 1     letrozole (FEMARA) 2.5 MG tablet Take 3 tablets (7.5 mg) by mouth daily 15 tablet 12     levothyroxine (SYNTHROID/LEVOTHROID) 50 MCG tablet Take 1 tablet (50 mcg) by mouth daily 30 tablet 12     Prenatal Vit-Fe Fumarate-FA (PRENATAL MULTIVITAMIN W/IRON) 27-0.8 MG tablet Take 1 tablet by mouth daily       progesterone (PROMETRIUM) 100 MG capsule Take 1 capsule (100 mg) by mouth daily 30 capsule 12     EPINEPHrine (ADRENACLICK) 0.3 MG/0.3ML injection 2-pack Inject 0.3 mLs (0.3 mg) into the muscle as needed for anaphylaxis (Patient not taking: Reported on 9/26/2022) 0.6 mL 1  "      Allergies   Allergen Reactions     Sulfa Drugs Other (See Comments)     Both parents are allergic          Social History     Tobacco Use     Smoking status: Never     Smokeless tobacco: Never   Substance Use Topics     Alcohol use: Not Currently     Alcohol/week: 0.0 standard drinks       History   Drug Use Unknown         Objective     BP 90/70 (BP Location: Left arm, Patient Position: Sitting, Cuff Size: Adult Regular)   Pulse 84   Temp (!) 96.3  F (35.7  C) (Tympanic)   Resp 16   Ht 1.664 m (5' 5.5\")   Wt 52.9 kg (116 lb 9.6 oz)   SpO2 100%   BMI 19.11 kg/m      Physical Exam    GENERAL APPEARANCE: healthy, alert and no distress     EYES: EOMI, PERRL     HENT: ear canals and TM's normal and nose and mouth without ulcers or lesions     NECK: no adenopathy, no asymmetry, masses, or scars and thyroid normal to palpation     RESP: lungs clear to auscultation - no rales, rhonchi or wheezes     CV: regular rates and rhythm, normal S1 S2, no S3 or S4 and no murmur, click or rub     MS: extremities normal- no gross deformities noted, no evidence of inflammation in joints, FROM in all extremities.     SKIN: no suspicious lesions or rashes     NEURO: Normal strength and tone, sensory exam grossly normal, mentation intact and speech normal     PSYCH: mentation appears normal. and affect normal/bright     LYMPHATICS: No cervical adenopathy    Recent Labs   Lab Test 02/21/23  1414 12/21/22  1512 08/19/22  0823 07/20/22  2257   HGB 12.2 12.3   < > 12.1    188   < > 168   NA  --   --   --  141   POTASSIUM  --   --   --  3.6   CR  --   --   --  0.62    < > = values in this interval not displayed.        Diagnostics:  No labs were ordered during this visit.   No EKG required, no history of coronary heart disease, significant arrhythmia, peripheral arterial disease or other structural heart disease.    Revised Cardiac Risk Index (RCRI):  The patient has the following serious cardiovascular risks for " perioperative complications:   - No serious cardiac risks = 0 points     RCRI Interpretation: 0 points: Class I (very low risk - 0.4% complication rate)           Signed Electronically by: CIRO Kline CNP  Copy of this evaluation report is provided to requesting physician.

## 2023-02-23 LAB
DLCOCOR-%PRED-PRE: 107 %
DLCOCOR-PRE: 25.23 ML/MIN/MMHG
DLCOUNC-%PRED-PRE: 103 %
DLCOUNC-PRE: 24.34 ML/MIN/MMHG
DLCOUNC-PRED: 23.41 ML/MIN/MMHG
ERV-%PRED-PRE: 83 %
ERV-PRE: 1.35 L
ERV-PRED: 1.61 L
EXPTIME-PRE: 6.89 SEC
FEF2575-%PRED-POST: 64 %
FEF2575-%PRED-PRE: 49 %
FEF2575-POST: 2.43 L/SEC
FEF2575-PRE: 1.86 L/SEC
FEF2575-PRED: 3.78 L/SEC
FEFMAX-%PRED-PRE: 51 %
FEFMAX-PRE: 3.64 L/SEC
FEFMAX-PRED: 7.13 L/SEC
FEV1-%PRED-PRE: 73 %
FEV1-PRE: 2.39 L
FEV1FEV6-PRE: 73 %
FEV1FEV6-PRED: 87 %
FEV1FVC-PRE: 72 %
FEV1FVC-PRED: 88 %
FEV1SVC-PRE: 70 %
FEV1SVC-PRED: 76 %
FIFMAX-PRE: 2.62 L/SEC
FRCPLETH-%PRED-PRE: 120 %
FRCPLETH-PRE: 3.33 L
FRCPLETH-PRED: 2.75 L
FVC-%PRED-PRE: 89 %
FVC-PRE: 3.33 L
FVC-PRED: 3.7 L
GAW-%PRED-PRE: 72 %
GAW-PRE: 0.74 L/S/CMH2O
GAW-PRED: 1.03 L/S/CMH2O
IC-%PRED-PRE: 75 %
IC-PRE: 1.99 L
IC-PRED: 2.63 L
RVPLETH-%PRED-PRE: 135 %
RVPLETH-PRE: 1.92 L
RVPLETH-PRED: 1.41 L
SGAW-%PRED-PRE: 211 %
SGAW-PRE: 0.22 1/CMH2O*S
SGAW-PRED: 0.1 1/CMH2O*S
SRAW-%PRED-PRE: 97 %
SRAW-PRE: 4.66 CMH2O*S
SRAW-PRED: 4.76 CMH2O*S
TLCPLETH-%PRED-PRE: 102 %
TLCPLETH-PRE: 5.32 L
TLCPLETH-PRED: 5.19 L
VA-%PRED-PRE: 97 %
VA-PRE: 4.9 L
VC-%PRED-PRE: 80 %
VC-PRE: 3.4 L
VC-PRED: 4.24 L

## 2023-02-27 ENCOUNTER — OFFICE VISIT (OUTPATIENT)
Dept: OBGYN | Facility: CLINIC | Age: 23
End: 2023-02-27
Payer: COMMERCIAL

## 2023-02-27 VITALS
WEIGHT: 114.8 LBS | HEIGHT: 66 IN | HEART RATE: 86 BPM | RESPIRATION RATE: 14 BRPM | BODY MASS INDEX: 18.45 KG/M2 | SYSTOLIC BLOOD PRESSURE: 103 MMHG | DIASTOLIC BLOOD PRESSURE: 65 MMHG | TEMPERATURE: 97.1 F

## 2023-02-27 DIAGNOSIS — N97.9 FEMALE INFERTILITY: Primary | ICD-10-CM

## 2023-02-27 DIAGNOSIS — E03.9 HYPOTHYROIDISM, UNSPECIFIED TYPE: ICD-10-CM

## 2023-02-27 DIAGNOSIS — R10.2 PELVIC PAIN IN FEMALE: ICD-10-CM

## 2023-02-27 PROCEDURE — 99213 OFFICE O/P EST LOW 20 MIN: CPT | Mod: 25 | Performed by: OBSTETRICS & GYNECOLOGY

## 2023-02-27 PROCEDURE — 76830 TRANSVAGINAL US NON-OB: CPT | Performed by: OBSTETRICS & GYNECOLOGY

## 2023-02-27 RX ORDER — LEVOTHYROXINE SODIUM 75 UG/1
75 TABLET ORAL DAILY
Qty: 90 TABLET | Refills: 3 | Status: ON HOLD | OUTPATIENT
Start: 2023-02-27 | End: 2023-09-10

## 2023-02-27 NOTE — PROGRESS NOTES
"Initial /65 (BP Location: Left arm, Patient Position: Chair, Cuff Size: Adult Regular)   Pulse 86   Temp 97.1  F (36.2  C) (Tympanic)   Resp 14   Ht 1.664 m (5' 5.5\")   Wt 52.1 kg (114 lb 12.8 oz)   LMP 02/25/2023 (Exact Date)   BMI 18.81 kg/m   Estimated body mass index is 18.81 kg/m  as calculated from the following:    Height as of this encounter: 1.664 m (5' 5.5\").    Weight as of this encounter: 52.1 kg (114 lb 12.8 oz). .      "

## 2023-02-27 NOTE — PROGRESS NOTES
[facility]  OB/GYN Clinic   Gynecology Consult Note    CC:  Chief Complaint   Patient presents with     Consult     Ovarian Cysts, conception       HPI: Ms. Cervantes is a 22 year old  being seen for GYN consultation for f/u fertility treatments and concern for ovarian cysts.   As far as her ovarian cysts, she reports that she had a cyst rupture 3 years ago, had pain all across her abdomen. Was taken by ambulance and this was managed expectantly.   Feels like this is also happening 1-2x this year. Didn't seek eval at the time.   As far as her infertility treatments; concern for anovulatory infertility. We have been uptitrating letrazole. Two months she did the 7.5mg letrazole, 1x ovulated, 1x did not. Using progesterone throughout the entire cycle.   SA done 8-9 months ago, looked normal.   Normal baseline pelvic US and HSG.     Of note, has a hx of significant dysmenorrhea, ?endometriosis.     ROS: A 10 pt ROS was completed and found to be otherwise negative unless mentioned in the HPI.     PMH:  Past Medical History:   Diagnosis Date     Anemia      Depression      Nexplanon in place        PSHx:   Past Surgical History:   Procedure Laterality Date     SURGICAL HISTORY OF -       PE tubes     TONSILLECTOMY, ADENOIDECTOMY ADULT, COMBINED Bilateral 3/19/2018    Procedure: COMBINED TONSILLECTOMY, ADENOIDECTOMY ADULT;  Bilateral Adenotonsillectomy;  Surgeon: Kevin Arias MD;  Location: [facility] OR       OBHx:   OB History    Para Term  AB Living   0 0 0 0 0 0   SAB IAB Ectopic Multiple Live Births   0 0 0 0 0       Medications:   levothyroxine (SYNTHROID/LEVOTHROID) 50 MCG tablet, Take 1 tablet (50 mcg) by mouth daily  progesterone (PROMETRIUM) 100 MG capsule, Take 1 capsule (100 mg) by mouth daily  albuterol (PROAIR HFA/PROVENTIL HFA/VENTOLIN HFA) 108 (90 Base) MCG/ACT inhaler, Inhale 2 puffs into the lungs every 6 hours as needed for shortness of breath, wheezing or cough (Patient not  taking: Reported on 2/27/2023)  EPINEPHrine (ADRENACLICK) 0.3 MG/0.3ML injection 2-pack, Inject 0.3 mLs (0.3 mg) into the muscle as needed for anaphylaxis (Patient not taking: Reported on 9/26/2022)  letrozole (FEMARA) 2.5 MG tablet, Take 3 tablets (7.5 mg) by mouth daily (Patient not taking: Reported on 2/27/2023)  Prenatal Vit-Fe Fumarate-FA (PRENATAL MULTIVITAMIN W/IRON) 27-0.8 MG tablet, Take 1 tablet by mouth daily (Patient not taking: Reported on 2/27/2023)    No current facility-administered medications on file prior to visit.      Allergies:      Allergies   Allergen Reactions     Sulfa Drugs Other (See Comments)     Both parents are allergic         Social History:   Social History     Socioeconomic History     Marital status: Single     Spouse name: Not on file     Number of children: Not on file     Years of education: Not on file     Highest education level: Not on file   Occupational History     Not on file   Tobacco Use     Smoking status: Never     Smokeless tobacco: Never   Vaping Use     Vaping Use: Never used   Substance and Sexual Activity     Alcohol use: Not Currently     Alcohol/week: 0.0 standard drinks     Drug use: Never     Sexual activity: Yes     Partners: Male   Other Topics Concern     Parent/sibling w/ CABG, MI or angioplasty before 65F 55M? No   Social History Narrative     Not on file     Social Determinants of Health     Financial Resource Strain: Not on file   Food Insecurity: Not on file   Transportation Needs: Not on file   Physical Activity: Not on file   Stress: Not on file   Social Connections: Not on file   Intimate Partner Violence: Not on file   Housing Stability: Not on file     Social History     Socioeconomic History     Marital status: Single     Spouse name: None     Number of children: None     Years of education: None     Highest education level: None   Tobacco Use     Smoking status: Never     Smokeless tobacco: Never   Vaping Use     Vaping Use: Never used  "  Substance and Sexual Activity     Alcohol use: Not Currently     Alcohol/week: 0.0 standard drinks     Drug use: Never     Sexual activity: Yes     Partners: Male   Other Topics Concern     Parent/sibling w/ CABG, MI or angioplasty before 65F 55M? No       Family History:   Family History   Problem Relation Age of Onset     Depression Mother      Depression Father      Depression Maternal Grandmother      Cancer Maternal Grandmother      Diabetes Maternal Grandfather      Heart Disease Maternal Grandfather      Hypertension Maternal Grandfather      Depression Paternal Grandmother         bipolar     Depression Paternal Grandfather         bipolar     Lung Cancer Maternal Grandmother      No Known Problems Mother      No Known Problems Father      No Known Problems Sister      No Known Problems Sister        Physical Exam:   Vitals:    02/27/23 1040   BP: 103/65   BP Location: Left arm   Patient Position: Chair   Cuff Size: Adult Regular   Pulse: 86   Resp: 14   Temp: 97.1  F (36.2  C)   TempSrc: Tympanic   Weight: 52.1 kg (114 lb 12.8 oz)   Height: 1.664 m (5' 5.5\")      Estimated body mass index is 18.81 kg/m  as calculated from the following:    Height as of this encounter: 1.664 m (5' 5.5\").    Weight as of this encounter: 52.1 kg (114 lb 12.8 oz).    Gen: Pleasant, talkative female in no apparent distress   Respiratory: breathing comfortably on room air   Cardiac: Regular rate, warm and well-perfused.   GI: Abd soft and non-tender  : External genitalia is free of lesion.   MSK: Grossly normal movement of all four extremities  Psych: mood and affect bright   Lower extremity: edema not present     Transvaginal US: mid-position uterus measuring [id number]. Right ovary with 5.8x4cm simple smooth-walled ovarian cyst. Left ovary with 3.4x3.5cm simple smooth-walled ovarian cyst. EMS is thin with fluid, consistent with menses.           Labs/Imaging:   Component      Latest Ref Rng & Units 9/29/2022 10/13/2022 " 11/7/2022 12/5/2022   WBC      4.0 - 11.0 10e3/uL 6.0      RBC Count      3.80 - 5.20 10e6/uL 4.86      Hemoglobin      11.7 - 15.7 g/dL 13.4      Hematocrit      35.0 - 47.0 % 40.3      MCV      78 - 100 fL 83      MCH      26.5 - 33.0 pg 27.6      MCHC      31.5 - 36.5 g/dL 33.3      RDW      10.0 - 15.0 % 13.2      Platelet Count      150 - 450 10e3/uL 193      Free Testosterone Calculated      ng/dL 0.26      Testosterone Total      8 - 60 ng/dL 25      DHEA Sulfate      35 - 430 ug/dL 229      17-OH Pregnenolone      <=226 ng/dL 105      Sex Hormone Binding Globulin      30 - 135 nmol/L 75      T4 Free      0.90 - 1.70 ng/dL 1.36      TSH      0.30 - 4.20 uIU/mL 3.33      Progesterone      ng/mL  8.7 2.3 33.7   Ferritin      6 - 175 ng/mL         Component      Latest Ref Rng & Units 12/21/2022 1/2/2023 1/25/2023 2/21/2023   WBC      4.0 - 11.0 10e3/uL 5.6      RBC Count      3.80 - 5.20 10e6/uL 4.60      Hemoglobin      11.7 - 15.7 g/dL 12.3      Hematocrit      35.0 - 47.0 % 38.3      MCV      78 - 100 fL 83      MCH      26.5 - 33.0 pg 26.7      MCHC      31.5 - 36.5 g/dL 32.1      RDW      10.0 - 15.0 % 13.7      Platelet Count      150 - 450 10e3/uL 188      Free Testosterone Calculated      ng/dL       Testosterone Total      8 - 60 ng/dL       DHEA Sulfate      35 - 430 ug/dL       17-OH Pregnenolone      <=226 ng/dL       Sex Hormone Binding Globulin      30 - 135 nmol/L       T4 Free      0.90 - 1.70 ng/dL       TSH      0.30 - 4.20 uIU/mL 2.70      Progesterone      ng/mL  4.0 27.8 4.4   Ferritin      6 - 175 ng/mL 5 (L)        ULTRASOUND PELVIC TRANSABDOMINAL AND TRANSVAGINAL June 1, 2022 9:20 AM     CLINICAL HISTORY: Dysmenorrhea.     TECHNIQUE: Transabdominal scans were performed. Endovaginal ultrasound  was performed to better visualize the adnexa.     COMPARISON: 1/22/2021, 12/15/2020.     FINDINGS:     UTERUS: 7.8 x 5.2 x 5.1 cm. Normal in size and position with no  masses.     ENDOMETRIUM: 12  mm. Smooth endometrium without evidence for polyp or  mass. Trace nonspecific hypoechoic fluid along the endometrial canal,  which may relate to patient's phase of menstrual cycle.     RIGHT OVARY: 4.3 x 3.7 x 3.4 cm. Normal with flow demonstrated.  Several small hypoechoic follicles.     LEFT OVARY: 4.1 x 2.4 x 2.2 cm. Normal with flow demonstrated. Several  small hypoechoic follicles.     No significant free fluid.                                                                      IMPRESSION: Normal pelvic ultrasound.  HYSTEROSALPINGOGRAM June 1, 2022 12:15 PM     HISTORY: Female infertility.     COMPARISON: Pelvic ultrasound from 6/1/2022.     TECHNIQUE: A standard HSG was performed. Contrast was injected into  the endometrial cavity via the cervix by the OB/GYN.     Fluoroscopy time: 0.5 minutes.     Number of images: Four.     FINDINGS: Contrast fills the endometrial cavity, which is normal  appearing. The fallopian tubes are patent with free spill of contrast  into the pelvis bilaterally.                                                                      IMPRESSION: Normal hysterosalpingogram.       A&P: 23 yo G0 with ovarian cyst hx and pelvic pain as well as f/u on infertility treatments.   Pelvic US today does demonstrate 2x simple ovarian cysts. I did recommend stopping letrazole this cycle due to ovarian hyperstimulation. Plan for repeat pelvic US with next menses. Pain precautions reviewed.   Will proceed with a monitored cycle with 7.5mg letrazole if cysts have resolved. Plan follicular US cycle day #10, trigger injection and +/- IUI.   Plan to increase letrazole     Stefanie Mcnair MD  OB/GYN  2/27/2023

## 2023-03-02 RX ORDER — CEFAZOLIN SODIUM 2 G/100ML
2 INJECTION, SOLUTION INTRAVENOUS
Status: CANCELLED | OUTPATIENT
Start: 2023-03-02

## 2023-03-02 RX ORDER — ACETAMINOPHEN 325 MG/1
975 TABLET ORAL ONCE
Status: CANCELLED | OUTPATIENT
Start: 2023-03-02 | End: 2023-03-02

## 2023-03-02 RX ORDER — CEFAZOLIN SODIUM 2 G/100ML
2 INJECTION, SOLUTION INTRAVENOUS SEE ADMIN INSTRUCTIONS
Status: CANCELLED | OUTPATIENT
Start: 2023-03-02

## 2023-03-02 NOTE — TELEPHONE ENCOUNTER
"Per [employer] online chat feature, the request for appeal is 'still in progress'.   \"[id number] received 2/16/2023 is still in progress.\"      "

## 2023-03-02 NOTE — TELEPHONE ENCOUNTER
The surgery center needs to know if prior authorization has been obtained for Aury. If we do not have this in place by tomorrow afternoon we will need to reschedule it out further.   Have we heard anything on the appeal?    Thank you!

## 2023-03-03 NOTE — TELEPHONE ENCOUNTER
lvm for patient suggesting that she might want to try calling her insurance regarding the appeal. There have been rare instances where the patient advocating for themselves is what pushes the  to look into it and approve it in a more timely manner.     I will call again for approval on Monday morning, if there is not a resolution, the patient was made aware in the vm from me that I would have the schedulers reach out to reschedule her.

## 2023-03-06 RX ORDER — HYDRALAZINE HYDROCHLORIDE 20 MG/ML
2.5-5 INJECTION INTRAMUSCULAR; INTRAVENOUS EVERY 10 MIN PRN
Status: CANCELLED | OUTPATIENT
Start: 2023-03-06

## 2023-03-06 RX ORDER — OXYCODONE HYDROCHLORIDE 5 MG/1
10 TABLET ORAL EVERY 4 HOURS PRN
Status: CANCELLED | OUTPATIENT
Start: 2023-03-06

## 2023-03-06 RX ORDER — ONDANSETRON 4 MG/1
4 TABLET, ORALLY DISINTEGRATING ORAL EVERY 30 MIN PRN
Status: CANCELLED | OUTPATIENT
Start: 2023-03-06

## 2023-03-06 RX ORDER — ACETAMINOPHEN 325 MG/1
975 TABLET ORAL ONCE
Status: CANCELLED | OUTPATIENT
Start: 2023-03-06 | End: 2023-03-06

## 2023-03-06 RX ORDER — FENTANYL CITRATE 50 UG/ML
25 INJECTION, SOLUTION INTRAMUSCULAR; INTRAVENOUS EVERY 5 MIN PRN
Status: CANCELLED | OUTPATIENT
Start: 2023-03-06

## 2023-03-06 RX ORDER — ONDANSETRON 2 MG/ML
4 INJECTION INTRAMUSCULAR; INTRAVENOUS EVERY 30 MIN PRN
Status: CANCELLED | OUTPATIENT
Start: 2023-03-06

## 2023-03-06 RX ORDER — SODIUM CHLORIDE, SODIUM LACTATE, POTASSIUM CHLORIDE, CALCIUM CHLORIDE 600; 310; 30; 20 MG/100ML; MG/100ML; MG/100ML; MG/100ML
INJECTION, SOLUTION INTRAVENOUS CONTINUOUS
Status: CANCELLED | OUTPATIENT
Start: 2023-03-06

## 2023-03-06 RX ORDER — FENTANYL CITRATE 50 UG/ML
50 INJECTION, SOLUTION INTRAMUSCULAR; INTRAVENOUS EVERY 5 MIN PRN
Status: CANCELLED | OUTPATIENT
Start: 2023-03-06

## 2023-03-06 RX ORDER — METOPROLOL TARTRATE 1 MG/ML
1-2 INJECTION, SOLUTION INTRAVENOUS EVERY 5 MIN PRN
Status: CANCELLED | OUTPATIENT
Start: 2023-03-06

## 2023-03-06 RX ORDER — LIDOCAINE 40 MG/G
CREAM TOPICAL
Status: CANCELLED | OUTPATIENT
Start: 2023-03-06

## 2023-03-06 RX ORDER — FENTANYL CITRATE 50 UG/ML
25 INJECTION, SOLUTION INTRAMUSCULAR; INTRAVENOUS
Status: CANCELLED | OUTPATIENT
Start: 2023-03-06

## 2023-03-06 RX ORDER — OXYCODONE HYDROCHLORIDE 5 MG/1
5 TABLET ORAL EVERY 4 HOURS PRN
Status: CANCELLED | OUTPATIENT
Start: 2023-03-06

## 2023-03-06 NOTE — TELEPHONE ENCOUNTER
"Per [email or web address] rep, Appeal denial is upheld. Requested a fax and this was received now.     Appeal ID# [id number]    \" On 3/2/23, the reviewer determined that the request should remain denied because it did not meet [employer] Clinical Guideline for FESS.  The decision was made because: Your doctor asked for sinus surgery. This is to treat your condition. You have sinus problems. This service is to help with your sinuses. The information sent to us shows that you have not failed treatment with antibiotics. For this reason, the sinus surgery is not necessary for you.\"      "

## 2023-03-06 NOTE — TELEPHONE ENCOUNTER
Patient notified that her insurance is denying her surgery.  Surgery cancelled and left message for patient and will my chart patient also.  Rasheeda Jackson CMA

## 2023-03-31 ENCOUNTER — HOSPITAL ENCOUNTER (OUTPATIENT)
Dept: ULTRASOUND IMAGING | Facility: CLINIC | Age: 23
Discharge: HOME | End: 2023-03-31
Attending: OBSTETRICS & GYNECOLOGY | Admitting: OBSTETRICS & GYNECOLOGY
Payer: COMMERCIAL

## 2023-03-31 DIAGNOSIS — N97.9 FEMALE INFERTILITY: ICD-10-CM

## 2023-03-31 PROCEDURE — 76856 US EXAM PELVIC COMPLETE: CPT

## 2023-04-03 ENCOUNTER — PREP FOR PROCEDURE (OUTPATIENT)
Dept: OBGYN | Facility: CLINIC | Age: 23
End: 2023-04-03
Payer: COMMERCIAL

## 2023-04-03 ENCOUNTER — TELEPHONE (OUTPATIENT)
Dept: OBGYN | Facility: CLINIC | Age: 23
End: 2023-04-03
Payer: COMMERCIAL

## 2023-04-03 DIAGNOSIS — N84.0 UTERINE POLYP: Primary | ICD-10-CM

## 2023-04-03 NOTE — TELEPHONE ENCOUNTER
"[id number]  Aury BRITNEY Cervantes    You are now scheduled for surgery at [facility].  Below are the details for your surgery.  Please read the \"Preparing for Your Surgery\" instructions and let us know if you have any questions.    Type of surgery: HYSTEROSCOPY, WITH DILATION AND CURETTAGE OF UTERUS, polypectomy     Surgeon:  Stefanie Mcnair MD  Location of surgery: [facility] OR    Date of surgery: 4/6/23    Time: 11:30am   Arrival Time:  10:30am    Time can change, to be confirmed a couple of days prior by pre-op surgery nurse.    Pre-Op Appt Date: MD will do morning of.    Packet sent out: Yes  Pre-cert/Authorization completed:  TBD by Financial Securing Office.   MA Sterilization/Hysterectomy Acknowledgment Consent signed: Not Applicable    [facility]  [phone]    Fax: [phone]  Same Day Surgery [phone]  Fax: [phone]  [facility] [phone]    "

## 2023-04-04 ENCOUNTER — ANESTHESIA EVENT (OUTPATIENT)
Dept: SURGERY | Facility: CLINIC | Age: 23
End: 2023-04-04
Payer: COMMERCIAL

## 2023-04-04 NOTE — ANESTHESIA PREPROCEDURE EVALUATION
Anesthesia Pre-Procedure Evaluation    Patient: Aury Cervantes   MRN: [id number] : 2000        Procedure : Procedure(s):  HYSTEROSCOPY, WITH DILATION AND CURETTAGE OF UTERUS, polypectomy          Past Medical History:   Diagnosis Date     Anemia      Depression      Nexplanon in place       Past Surgical History:   Procedure Laterality Date     SURGICAL HISTORY OF -       PE tubes     TONSILLECTOMY, ADENOIDECTOMY ADULT, COMBINED Bilateral 3/19/2018    Procedure: COMBINED TONSILLECTOMY, ADENOIDECTOMY ADULT;  Bilateral Adenotonsillectomy;  Surgeon: Kevin Arias MD;  Location: [place] OR      Allergies   Allergen Reactions     Sulfa Drugs Other (See Comments)     Both parents are allergic        Social History     Tobacco Use     Smoking status: Never     Smokeless tobacco: Never   Vaping Use     Vaping status: Never Used   Substance Use Topics     Alcohol use: Not Currently     Alcohol/week: 0.0 standard drinks of alcohol      Wt Readings from Last 1 Encounters:   23 52.1 kg (114 lb 12.8 oz)        Anesthesia Evaluation   Pt has had prior anesthetic. Type: General.        ROS/MED HX  ENT/Pulmonary:       Neurologic:       Cardiovascular:       METS/Exercise Tolerance:     Hematologic:     (+) anemia,     Musculoskeletal:       GI/Hepatic:       Renal/Genitourinary:       Endo:       Psychiatric/Substance Use:     (+) psychiatric history depression     Infectious Disease:       Malignancy:       Other:            Physical Exam    Airway  airway exam normal           Respiratory Devices and Support         Dental       (+) Minor Abnormalities - some fillings, tiny chips      Cardiovascular   cardiovascular exam normal          Pulmonary   pulmonary exam normal                OUTSIDE LABS:  CBC:   Lab Results   Component Value Date    WBC 6.1 2023    WBC 5.6 2022    HGB 12.2 2023    HGB 12.3 2022    HCT 38.6 2023    HCT 38.3 2022     2023     2022      BMP:   Lab Results   Component Value Date     07/20/2022     02/02/2021    POTASSIUM 3.6 07/20/2022    POTASSIUM 4.0 02/02/2021    CHLORIDE 108 07/20/2022    CHLORIDE 108 (H) 02/02/2021    CO2 28 07/20/2022    CO2 26 02/02/2021    BUN 9 07/20/2022    BUN 10 02/02/2021    CR 0.62 07/20/2022    CR 0.73 02/02/2021    GLC 75 07/20/2022    GLC 87 02/02/2021     COAGS:   Lab Results   Component Value Date    PTT 31 02/02/2021    INR 1.15 (H) 02/02/2021     POC:   Lab Results   Component Value Date    BGM 95 03/22/2018    HCG Negative 07/20/2022     HEPATIC:   Lab Results   Component Value Date    ALBUMIN 3.8 07/20/2022    PROTTOTAL 6.9 07/20/2022    ALT 15 07/20/2022    AST 12 07/20/2022    ALKPHOS 68 07/20/2022    BILITOTAL 0.5 07/20/2022     OTHER:   Lab Results   Component Value Date    VICKIE 8.9 07/20/2022    LIPASE 209 07/20/2022    TSH 2.70 12/21/2022    T4 1.36 09/29/2022    CRP 50.6 (H) 03/17/2015    SED 19 (H) 03/17/2015       Anesthesia Plan    ASA Status:  2   NPO Status:  NPO Appropriate    Anesthesia Type: General.      Maintenance: Balanced.        Consents    Anesthesia Plan(s) and associated risks, benefits, and realistic alternatives discussed. Questions answered and patient/representative(s) expressed understanding.    - Discussed:     - Discussed with:  Patient         Postoperative Care            Comments:                CIRO Toth CRNA

## 2023-04-06 ENCOUNTER — ANESTHESIA (OUTPATIENT)
Dept: SURGERY | Facility: CLINIC | Age: 23
End: 2023-04-06
Payer: COMMERCIAL

## 2023-04-06 ENCOUNTER — HOSPITAL ENCOUNTER (OUTPATIENT)
Facility: CLINIC | Age: 23
Discharge: HOME | End: 2023-04-06
Attending: OBSTETRICS & GYNECOLOGY | Admitting: OBSTETRICS & GYNECOLOGY
Payer: COMMERCIAL

## 2023-04-06 VITALS
HEART RATE: 67 BPM | HEIGHT: 66 IN | TEMPERATURE: 98.4 F | SYSTOLIC BLOOD PRESSURE: 100 MMHG | DIASTOLIC BLOOD PRESSURE: 62 MMHG | RESPIRATION RATE: 15 BRPM | WEIGHT: 114 LBS | OXYGEN SATURATION: 97 % | BODY MASS INDEX: 18.32 KG/M2

## 2023-04-06 DIAGNOSIS — N84.0 UTERINE POLYP: Primary | ICD-10-CM

## 2023-04-06 LAB — HCG UR QL: NEGATIVE

## 2023-04-06 PROCEDURE — 250N000013 HC RX MED GY IP 250 OP 250 PS 637: Performed by: OBSTETRICS & GYNECOLOGY

## 2023-04-06 PROCEDURE — 272N000001 HC OR GENERAL SUPPLY STERILE: Performed by: OBSTETRICS & GYNECOLOGY

## 2023-04-06 PROCEDURE — 370N000017 HC ANESTHESIA TECHNICAL FEE, PER MIN: Performed by: OBSTETRICS & GYNECOLOGY

## 2023-04-06 PROCEDURE — 88305 TISSUE EXAM BY PATHOLOGIST: CPT | Mod: TC | Performed by: OBSTETRICS & GYNECOLOGY

## 2023-04-06 PROCEDURE — 58558 HYSTEROSCOPY BIOPSY: CPT | Performed by: OBSTETRICS & GYNECOLOGY

## 2023-04-06 PROCEDURE — 710N000012 HC RECOVERY PHASE 2, PER MINUTE: Performed by: OBSTETRICS & GYNECOLOGY

## 2023-04-06 PROCEDURE — 360N000076 HC SURGERY LEVEL 3, PER MIN: Performed by: OBSTETRICS & GYNECOLOGY

## 2023-04-06 PROCEDURE — 81025 URINE PREGNANCY TEST: CPT | Performed by: OBSTETRICS & GYNECOLOGY

## 2023-04-06 PROCEDURE — 88305 TISSUE EXAM BY PATHOLOGIST: CPT | Mod: 26 | Performed by: PATHOLOGY

## 2023-04-06 PROCEDURE — 999N000141 HC STATISTIC PRE-PROCEDURE NURSING ASSESSMENT: Performed by: OBSTETRICS & GYNECOLOGY

## 2023-04-06 PROCEDURE — 271N000001 HC OR GENERAL SUPPLY NON-STERILE: Performed by: OBSTETRICS & GYNECOLOGY

## 2023-04-06 PROCEDURE — 258N000003 HC RX IP 258 OP 636: Performed by: NURSE ANESTHETIST, CERTIFIED REGISTERED

## 2023-04-06 PROCEDURE — 250N000011 HC RX IP 250 OP 636: Performed by: NURSE ANESTHETIST, CERTIFIED REGISTERED

## 2023-04-06 PROCEDURE — 250N000011 HC RX IP 250 OP 636: Performed by: OBSTETRICS & GYNECOLOGY

## 2023-04-06 PROCEDURE — 250N000009 HC RX 250: Performed by: NURSE ANESTHETIST, CERTIFIED REGISTERED

## 2023-04-06 RX ORDER — DEXAMETHASONE SODIUM PHOSPHATE 4 MG/ML
INJECTION, SOLUTION INTRA-ARTICULAR; INTRALESIONAL; INTRAMUSCULAR; INTRAVENOUS; SOFT TISSUE PRN
Status: DISCONTINUED | OUTPATIENT
Start: 2023-04-06 | End: 2023-04-06

## 2023-04-06 RX ORDER — FENTANYL CITRATE 50 UG/ML
INJECTION, SOLUTION INTRAMUSCULAR; INTRAVENOUS PRN
Status: DISCONTINUED | OUTPATIENT
Start: 2023-04-06 | End: 2023-04-06

## 2023-04-06 RX ORDER — IBUPROFEN 400 MG/1
800 TABLET, FILM COATED ORAL ONCE
Status: CANCELLED | OUTPATIENT
Start: 2023-04-06 | End: 2023-04-06

## 2023-04-06 RX ORDER — FENTANYL CITRATE 50 UG/ML
25 INJECTION, SOLUTION INTRAMUSCULAR; INTRAVENOUS EVERY 5 MIN PRN
Status: CANCELLED | OUTPATIENT
Start: 2023-04-06

## 2023-04-06 RX ORDER — ONDANSETRON 2 MG/ML
4 INJECTION INTRAMUSCULAR; INTRAVENOUS EVERY 30 MIN PRN
Status: CANCELLED | OUTPATIENT
Start: 2023-04-06

## 2023-04-06 RX ORDER — HYDROMORPHONE HCL IN WATER/PF 6 MG/30 ML
0.2 PATIENT CONTROLLED ANALGESIA SYRINGE INTRAVENOUS EVERY 5 MIN PRN
Status: CANCELLED | OUTPATIENT
Start: 2023-04-06

## 2023-04-06 RX ORDER — OXYCODONE HYDROCHLORIDE 5 MG/1
5 TABLET ORAL
Status: CANCELLED | OUTPATIENT
Start: 2023-04-06

## 2023-04-06 RX ORDER — ACETAMINOPHEN 325 MG/1
975 TABLET ORAL ONCE
Status: COMPLETED | OUTPATIENT
Start: 2023-04-06 | End: 2023-04-06

## 2023-04-06 RX ORDER — FENTANYL CITRATE 50 UG/ML
50 INJECTION, SOLUTION INTRAMUSCULAR; INTRAVENOUS EVERY 5 MIN PRN
Status: CANCELLED | OUTPATIENT
Start: 2023-04-06

## 2023-04-06 RX ORDER — ONDANSETRON 2 MG/ML
INJECTION INTRAMUSCULAR; INTRAVENOUS PRN
Status: DISCONTINUED | OUTPATIENT
Start: 2023-04-06 | End: 2023-04-06

## 2023-04-06 RX ORDER — SODIUM CHLORIDE, SODIUM LACTATE, POTASSIUM CHLORIDE, CALCIUM CHLORIDE 600; 310; 30; 20 MG/100ML; MG/100ML; MG/100ML; MG/100ML
INJECTION, SOLUTION INTRAVENOUS CONTINUOUS
Status: DISCONTINUED | OUTPATIENT
Start: 2023-04-06 | End: 2023-04-06 | Stop reason: HOSPADM

## 2023-04-06 RX ORDER — SODIUM CHLORIDE, SODIUM LACTATE, POTASSIUM CHLORIDE, CALCIUM CHLORIDE 600; 310; 30; 20 MG/100ML; MG/100ML; MG/100ML; MG/100ML
INJECTION, SOLUTION INTRAVENOUS CONTINUOUS
Status: CANCELLED | OUTPATIENT
Start: 2023-04-06

## 2023-04-06 RX ORDER — FENTANYL CITRATE 50 UG/ML
25 INJECTION, SOLUTION INTRAMUSCULAR; INTRAVENOUS
Status: CANCELLED | OUTPATIENT
Start: 2023-04-06

## 2023-04-06 RX ORDER — ACETAMINOPHEN 325 MG/1
975 TABLET ORAL ONCE
Status: CANCELLED | OUTPATIENT
Start: 2023-04-06 | End: 2023-04-06

## 2023-04-06 RX ORDER — ONDANSETRON 4 MG/1
4 TABLET, ORALLY DISINTEGRATING ORAL EVERY 30 MIN PRN
Status: CANCELLED | OUTPATIENT
Start: 2023-04-06

## 2023-04-06 RX ORDER — BUPIVACAINE HYDROCHLORIDE 2.5 MG/ML
INJECTION, SOLUTION INFILTRATION; PERINEURAL PRN
Status: DISCONTINUED | OUTPATIENT
Start: 2023-04-06 | End: 2023-04-06 | Stop reason: HOSPADM

## 2023-04-06 RX ORDER — LIDOCAINE 40 MG/G
CREAM TOPICAL
Status: DISCONTINUED | OUTPATIENT
Start: 2023-04-06 | End: 2023-04-06 | Stop reason: HOSPADM

## 2023-04-06 RX ORDER — PROPOFOL 10 MG/ML
INJECTION, EMULSION INTRAVENOUS CONTINUOUS PRN
Status: DISCONTINUED | OUTPATIENT
Start: 2023-04-06 | End: 2023-04-06

## 2023-04-06 RX ORDER — HYDROMORPHONE HCL IN WATER/PF 6 MG/30 ML
0.4 PATIENT CONTROLLED ANALGESIA SYRINGE INTRAVENOUS EVERY 5 MIN PRN
Status: CANCELLED | OUTPATIENT
Start: 2023-04-06

## 2023-04-06 RX ORDER — OXYCODONE HYDROCHLORIDE 5 MG/1
10 TABLET ORAL
Status: CANCELLED | OUTPATIENT
Start: 2023-04-06

## 2023-04-06 RX ORDER — OXYCODONE HYDROCHLORIDE 5 MG/1
5-10 TABLET ORAL EVERY 4 HOURS PRN
Qty: 3 TABLET | Refills: 0 | Status: SHIPPED | OUTPATIENT
Start: 2023-04-06 | End: 2023-05-12

## 2023-04-06 RX ADMIN — ACETAMINOPHEN 975 MG: 325 TABLET, FILM COATED ORAL at 11:09

## 2023-04-06 RX ADMIN — PROPOFOL 25 MCG/KG/MIN: 10 INJECTION, EMULSION INTRAVENOUS at 12:03

## 2023-04-06 RX ADMIN — DEXAMETHASONE SODIUM PHOSPHATE 4 MG: 4 INJECTION, SOLUTION INTRA-ARTICULAR; INTRALESIONAL; INTRAMUSCULAR; INTRAVENOUS; SOFT TISSUE at 12:16

## 2023-04-06 RX ADMIN — MIDAZOLAM HYDROCHLORIDE 1 MG: 1 INJECTION, SOLUTION INTRAMUSCULAR; INTRAVENOUS at 12:06

## 2023-04-06 RX ADMIN — LIDOCAINE HYDROCHLORIDE 0.1 ML: 10 INJECTION, SOLUTION EPIDURAL; INFILTRATION; INTRACAUDAL; PERINEURAL at 11:42

## 2023-04-06 RX ADMIN — SODIUM CHLORIDE, POTASSIUM CHLORIDE, SODIUM LACTATE AND CALCIUM CHLORIDE: 600; 310; 30; 20 INJECTION, SOLUTION INTRAVENOUS at 11:42

## 2023-04-06 RX ADMIN — MIDAZOLAM HYDROCHLORIDE 2 MG: 1 INJECTION, SOLUTION INTRAMUSCULAR; INTRAVENOUS at 12:08

## 2023-04-06 RX ADMIN — ONDANSETRON 4 MG: 2 INJECTION INTRAMUSCULAR; INTRAVENOUS at 12:16

## 2023-04-06 RX ADMIN — MIDAZOLAM HYDROCHLORIDE 2 MG: 1 INJECTION, SOLUTION INTRAMUSCULAR; INTRAVENOUS at 11:59

## 2023-04-06 RX ADMIN — FENTANYL CITRATE 50 MCG: 50 INJECTION, SOLUTION INTRAMUSCULAR; INTRAVENOUS at 12:10

## 2023-04-06 RX ADMIN — FENTANYL CITRATE 50 MCG: 50 INJECTION, SOLUTION INTRAMUSCULAR; INTRAVENOUS at 12:16

## 2023-04-06 ASSESSMENT — ACTIVITIES OF DAILY LIVING (ADL)
ADLS_ACUITY_SCORE: 35
ADLS_ACUITY_SCORE: 35

## 2023-04-06 NOTE — OP NOTE
"[facility] Gynecology Operative Note    Patient: Aury Cervantes  : 2000  MRN: [id number]    Date of Service: 2023    Pre-operative diagnosis:  - uterine polyps    Post-operative diagnosis:  - Same    Procedure:   - hysteroscopy, dilation and curettage, polypectomy      Surgeon: Stefanie Mcnair MD   Assistants: none    Anesthesia: MAC and local     EBL: 10 mL  Hysteroscope Fluid Deficit: 350cc    Specimens: uterine polyps  Complications: none apparent     Findings: Normal-sized anteverted uterus with no appreciable ovarian or fallopian tube enlargement. External genitalia, vagina and cervix all within normal limits to gross examination. Some vascular punctuation noted on the posterior cervix. Hysteroscopy with several sessile polyps.     Indications: Aury Cervantes is a 22 year old  with a pelvic US with 2x uterine polyps so I did recommend the planned procedure. Risks of damage to pelvic organs, bleeding and infection were discussed. The patient agreed to proceed, and signed written informed consent.     Procedure: The patient was taken to the operating room where she was placed in the dorsal lithotomy position. Sedation was administered until the patient was found to be comfortable. An exam under anesthesia was completed. The patient was then prepped and draped in the usual sterile fashion followed by a \"Time-Out\" to verify the correct patient and procedure. A speculum was inserted into the vagina and the cervix was visualized. 2cc of 1% lidocaine was injected into the anterior cervical lip and a single-toothed tenaculum was placed at 12 o'clock position. A paracervical block with 8 cc of 1% lidocaine was performed at 4 and 8 o'clock. The cervix was dilated using Hegar dilators to 8mm until a Hysteroscope could be passed through the cervix. The intrauterine cavity was inspected with visualization of the above noted findings. Myosure was used to morcellate the polyps. All " instruments were removed and the patient tolerated the procedure well and was taken to the recovery room in stable condition.     Stefanie Mcnair MD  4/6/2023 12:38 PM

## 2023-04-06 NOTE — ANESTHESIA CARE TRANSFER NOTE
Patient: Aury Cervantes    Procedure: Procedure(s):  HYSTEROSCOPY, WITH DILATION AND CURETTAGE OF UTERUS, polypectomy       Diagnosis: Uterine polyp [N84.0]  Diagnosis Additional Information: No value filed.    Anesthesia Type:   General     Note:    Oropharynx: oropharynx clear of all foreign objects and spontaneously breathing  Level of Consciousness: awake and drowsy  Oxygen Supplementation: face mask  Level of Supplemental Oxygen (L/min / FiO2): 8  Independent Airway: airway patency satisfactory and stable  Dentition: dentition unchanged  Vital Signs Stable: post-procedure vital signs reviewed and stable  Report to RN Given: handoff report given  Patient transferred to: Phase II    Handoff Report: Identifed the Patient, Identified the Reponsible Provider, Reviewed the pertinent medical history, Discussed the surgical course, Reviewed Intra-OP anesthesia mangement and issues during anesthesia, Set expectations for post-procedure period and Allowed opportunity for questions and acknowledgement of understanding      Vitals:  Vitals Value Taken Time   BP     Temp     Pulse     Resp     SpO2         Electronically Signed By: Spenser Buckley CRNA, CIRO GIRARD  April 6, 2023  12:38 PM

## 2023-04-06 NOTE — DISCHARGE INSTRUCTIONS
Same Day Surgery Discharge Instructions  Special Precautions After Surgery - Adult    It is not unusual to feel lightheaded or faint, up to 24 hours after surgery or while taking pain medication.  If you have these symptoms; sit for a few minutes before standing and have someone assist you when getting up.  You should rest and relax for the next 24 hours and must have someone stay with you for at least 24 hours after your discharge.  DO NOT DRIVE any vehicle or operate mechanical equipment for 24 hours following the end of your surgery.  DO NOT DRIVE while taking narcotic pain medications that have been prescribed by your physician.  If you had a limb operated on, you must be able to use it fully to drive.  DO NOT drink alcoholic beverages for 24 hours following surgery or while taking prescription pain medication.  Drink clear liquids (apple juice, ginger ale, broth, 7-Up, etc.).  Progress to your regular diet as you feel able.  Any questions call your physician and do not make important decisions for 24 hours.    Nausea and Vomiting: Nausea and vomiting can occur any time after receiving anesthesia. If you experience nausea and vomiting we encourage you to move to a clear liquid diet and advance your diet as tolerated. If nausea and vomiting do not improve within 12 hours please call the surgeon or present to the Emergency department.     Break-through Bleeding: If your experience bleeding from your surgical site apply pressure and additional dressing per nurse instruction. For simple problems such as a saturated dressing, you may need to reinforce the dressing with more gauze and tape and put slight pressure on the site. If bleeding does not subside contact the surgeon or present to the Emergency Department.    Post-op Infection: If you develop a fever of 100.4 or greater, have pus like drainage, redness, swelling or severe pain at the surgical site not alleviated with pain medications; please  contact the surgeon or present to the Emergency Department.     Medications:  Acetaminophen (Tylenol):  Next dose: 5:00pm.  Ibuprofen (Motrin, Advil):  Next dose: At any time.  Follow the instructions on the bottle.  __________________________________________________________________________________________________________________________________  IMPORTANT NUMBERS:    [facility] [place]  [phone], [phone]  Pharmacy:  [phone]  Same Day Surgery:  [phone], for general post-op questions call Monday - Thursday until 8:30 p.m., Fridays until 6:00 p.m.  Nurse Advice Line:  [phone]                                                                         OB Clinic:  [phone]

## 2023-04-06 NOTE — ANESTHESIA POSTPROCEDURE EVALUATION
Patient: Aury Cervantes    Procedure: Procedure(s):  HYSTEROSCOPY, WITH DILATION AND CURETTAGE OF UTERUS, polypectomy       Anesthesia Type:  General    Note:  Disposition: Outpatient   Postop Pain Control: Uneventful            Sign Out: Well controlled pain   PONV: No   Neuro/Psych: Uneventful            Sign Out: Acceptable/Baseline neuro status   Airway/Respiratory: Uneventful            Sign Out: Acceptable/Baseline resp. status   CV/Hemodynamics: Uneventful            Sign Out: Acceptable CV status; No obvious hypovolemia; No obvious fluid overload   Other NRE: NONE   DID A NON-ROUTINE EVENT OCCUR? No           Last vitals:  Vitals Value Taken Time   /62 04/06/23 1400   Temp 36.9  C (98.4  F) 04/06/23 1400   Pulse 95 04/06/23 1401   Resp 15 04/06/23 1400   SpO2 97 % 04/06/23 1400   Vitals shown include unvalidated device data.    Electronically Signed By: CIRO Wilkins CRNA  April 6, 2023  4:36 PM

## 2023-04-06 NOTE — H&P
[facility]  OB/GYN Clinic   Pre-Op H&P      HPI: Ms. Cervantes is a 22 year old  who presents for hysteroscopy, D&C and polypectomy. Nervous, but otherwise feeling fine. Pelvic US with uterine polyps.    ROS: A 10 pt ROS was completed and found to be otherwise negative unless mentioned in the HPI.     PMH:   Past Medical History:   Diagnosis Date     Anemia      Depression      Nexplanon in place        PSHx:   Past Surgical History:   Procedure Laterality Date     SURGICAL HISTORY OF -       PE tubes     TONSILLECTOMY, ADENOIDECTOMY ADULT, COMBINED Bilateral 3/19/2018    Procedure: COMBINED TONSILLECTOMY, ADENOIDECTOMY ADULT;  Bilateral Adenotonsillectomy;  Surgeon: Kevin Arias MD;  Location: [place] OR       OBHx:   OB History    Para Term  AB Living   0 0 0 0 0 0   SAB IAB Ectopic Multiple Live Births   0 0 0 0 0       Medications:   No current facility-administered medications on file prior to encounter.  EPINEPHrine (ADRENACLICK) 0.3 MG/0.3ML injection 2-pack, Inject 0.3 mLs (0.3 mg) into the muscle as needed for anaphylaxis  progesterone (PROMETRIUM) 100 MG capsule, Take 1 capsule (100 mg) by mouth daily  albuterol (PROAIR HFA/PROVENTIL HFA/VENTOLIN HFA) 108 (90 Base) MCG/ACT inhaler, Inhale 2 puffs into the lungs every 6 hours as needed for shortness of breath, wheezing or cough (Patient not taking: Reported on 2023)  letrozole (FEMARA) 2.5 MG tablet, Take 3 tablets (7.5 mg) by mouth daily (Patient not taking: Reported on 2023)  levothyroxine (SYNTHROID/LEVOTHROID) 75 MCG tablet, Take 1 tablet (75 mcg) by mouth daily  Prenatal Vit-Fe Fumarate-FA (PRENATAL MULTIVITAMIN W/IRON) 27-0.8 MG tablet, Take 1 tablet by mouth daily (Patient not taking: Reported on 2023)        Allergies:      Allergies   Allergen Reactions     Sulfa Drugs Other (See Comments)     Both parents are allergic         Social History:   Social History     Socioeconomic History     Marital  status: Single     Spouse name: Not on file     Number of children: Not on file     Years of education: Not on file     Highest education level: Not on file   Occupational History     Not on file   Tobacco Use     Smoking status: Never     Smokeless tobacco: Never   Vaping Use     Vaping status: Never Used   Substance and Sexual Activity     Alcohol use: Not Currently     Alcohol/week: 0.0 standard drinks of alcohol     Drug use: Never     Sexual activity: Yes     Partners: Male   Other Topics Concern     Parent/sibling w/ CABG, MI or angioplasty before 65F 55M? No   Social History Narrative     Not on file     Social Determinants of Health     Financial Resource Strain: Not on file   Food Insecurity: Not on file   Transportation Needs: Not on file   Physical Activity: Not on file   Stress: Not on file   Social Connections: Not on file   Intimate Partner Violence: Not on file   Housing Stability: Not on file     Social History     Socioeconomic History     Marital status: Single     Spouse name: None     Number of children: None     Years of education: None     Highest education level: None   Tobacco Use     Smoking status: Never     Smokeless tobacco: Never   Vaping Use     Vaping status: Never Used   Substance and Sexual Activity     Alcohol use: Not Currently     Alcohol/week: 0.0 standard drinks of alcohol     Drug use: Never     Sexual activity: Yes     Partners: Male   Other Topics Concern     Parent/sibling w/ CABG, MI or angioplasty before 65F 55M? No       Family History:   Family History   Problem Relation Age of Onset     Depression Mother      Depression Father      Depression Maternal Grandmother      Cancer Maternal Grandmother      Diabetes Maternal Grandfather      Heart Disease Maternal Grandfather      Hypertension Maternal Grandfather      Depression Paternal Grandmother         bipolar     Depression Paternal Grandfather         bipolar     Lung Cancer Maternal Grandmother      No Known Problems  "Mother      No Known Problems Father      No Known Problems Sister      No Known Problems Sister        Physical Exam:   Vitals:    04/06/23 1035   BP: 112/80   BP Location: Right arm   Pulse: 78   Temp: 98.7  F (37.1  C)   TempSrc: Oral   SpO2: 100%   Weight: 51.7 kg (114 lb)   Height: 1.664 m (5' 5.5\")      Estimated body mass index is 18.68 kg/m  as calculated from the following:    Height as of this encounter: 1.664 m (5' 5.5\").    Weight as of this encounter: 51.7 kg (114 lb).    Gen: Pleasant, talkative female in no apparent distress   Respiratory: breathing comfortably on room air   Cardiac: Regular rate, warm and well-perfused.   MSK: Grossly normal movement of all four extremities  Psych: flat and tearful     Labs/Imaging:   EXAM: US PELVIC TRANSABDOMINAL AND TRANSVAGINAL  LOCATION: [facility]  DATE/TIME: 3/31/2023 2:15 PM     INDICATION: Follicle   day 10 follow-up ovarian cyst  COMPARISON: 06/01/2022.  TECHNIQUE: Transabdominal scans were performed. Endovaginal ultrasound was performed to better visualize the adnexa.     FINDINGS:     UTERUS: 7.2 x 5.9 x 4.6 cm. Normal in size and position with no masses.     ENDOMETRIUM: 13 mm. There are focal areas of endometrial thickening at the uterine fundus, measuring 1.3 x 1.0 x 1.0 cm, a second smaller lesion measuring 0.2 x 0.3 x 0.3 cm, not present on prior exam likely within normal limits.     RIGHT OVARY: 4.3 x 3.1 x 3.3 cm. Normal.     LEFT OVARY: 3.9 x 2.8 x 1.9 cm. Normal.     No significant free fluid.                                                                      IMPRESSION:  1.  Negative pelvic ultrasound.          A&P: 23 yo G0 who presents for scheduled hysteroscopy, D&C, polypectomy.   I did discuss the risks of bleeding, infection and uterine injury. Written informed consent was signed and she is agreeable to a blood transfusion if indicated. She is medically-cleared for this minor procedure and has no major medical " co-morbidities.     Stefanie Mcnair MD  OB/GYN  4/6/2023

## 2023-04-10 LAB
PATH REPORT.COMMENTS IMP SPEC: NORMAL
PATH REPORT.COMMENTS IMP SPEC: NORMAL
PATH REPORT.FINAL DX SPEC: NORMAL
PATH REPORT.GROSS SPEC: NORMAL
PATH REPORT.MICROSCOPIC SPEC OTHER STN: NORMAL
PATH REPORT.RELEVANT HX SPEC: NORMAL
PHOTO IMAGE: NORMAL

## 2023-04-20 ENCOUNTER — PATIENT OUTREACH (OUTPATIENT)
Dept: CARE COORDINATION | Facility: CLINIC | Age: 23
End: 2023-04-20
Payer: COMMERCIAL

## 2023-04-24 ENCOUNTER — MYC MEDICAL ADVICE (OUTPATIENT)
Dept: FAMILY MEDICINE | Facility: CLINIC | Age: 23
End: 2023-04-24
Payer: COMMERCIAL

## 2023-04-24 DIAGNOSIS — N92.6 MENSTRUAL BLEEDING PROBLEM: Primary | ICD-10-CM

## 2023-04-25 RX ORDER — NORETHINDRONE ACETATE 5 MG
5 TABLET ORAL DAILY
Qty: 30 TABLET | Refills: 0 | Status: SHIPPED | OUTPATIENT
Start: 2023-04-25 | End: 2023-05-22

## 2023-05-12 ENCOUNTER — OFFICE VISIT (OUTPATIENT)
Dept: FAMILY MEDICINE | Facility: CLINIC | Age: 23
End: 2023-05-12
Payer: COMMERCIAL

## 2023-05-12 VITALS
BODY MASS INDEX: 18.93 KG/M2 | SYSTOLIC BLOOD PRESSURE: 92 MMHG | WEIGHT: 115.5 LBS | HEART RATE: 92 BPM | RESPIRATION RATE: 16 BRPM | TEMPERATURE: 98.7 F | OXYGEN SATURATION: 100 % | DIASTOLIC BLOOD PRESSURE: 60 MMHG

## 2023-05-12 DIAGNOSIS — Z87.2 HISTORY OF CYST OF BREAST: ICD-10-CM

## 2023-05-12 DIAGNOSIS — N64.4 PAIN OF BOTH BREASTS: Primary | ICD-10-CM

## 2023-05-12 PROCEDURE — 99213 OFFICE O/P EST LOW 20 MIN: CPT | Performed by: NURSE PRACTITIONER

## 2023-05-12 ASSESSMENT — PAIN SCALES - GENERAL: PAINLEVEL: NO PAIN (0)

## 2023-05-12 NOTE — PROGRESS NOTES
Assessment & Plan     Pain of both breasts    - REVIEW OF HEALTH MAINTENANCE PROTOCOL ORDERS  - US Breast Bilateral Limited 1-3 Quadrants; Future    History of cyst of breast  -patient complains of worsening breast pain, recommended to repeat US  -recommended to try Tylenol as needed for pain, may also try over the counter supplement evening primrose  -try to avoid caffeinated beverages and alcohol    - REVIEW OF HEALTH MAINTENANCE PROTOCOL ORDERS  - US Breast Bilateral Limited 1-3 Quadrants; Future    CIRO Kline CNP  [facility]    Sol Jeffers is a 22 year old, presenting for the following health issues: bilateral breast pain, history of breast cysts, patient reports pain is worse now.     Breast Pain          History of Present Illness       Reason for visit:  Cysts in breasts, painful    She eats 0-1 servings of fruits and vegetables daily.She consumes 1 sweetened beverage(s) daily.She exercises with enough effort to increase her heart rate 30 to 60 minutes per day.  She exercises with enough effort to increase her heart rate 3 or less days per week.   She is taking medications regularly.           Review of Systems   Constitutional, HEENT, cardiovascular, pulmonary, gi and gu systems are negative, except as otherwise noted.      Objective    BP 92/60 (BP Location: Left arm, Patient Position: Sitting, Cuff Size: Adult Regular)   Pulse 92   Temp 98.7  F (37.1  C) (Tympanic)   Resp 16   Wt 52.4 kg (115 lb 8 oz)   SpO2 100%   BMI 18.93 kg/m    Body mass index is 18.93 kg/m .  Physical Exam   GENERAL: healthy, alert and no distress  BREAST: no palpable axillary masses or adenopathy and bilateral lateral side tenderness   SKIN: no suspicious lesions or rashes  LYMPH: axillary: no adenopathy

## 2023-05-22 ENCOUNTER — MYC REFILL (OUTPATIENT)
Dept: FAMILY MEDICINE | Facility: CLINIC | Age: 23
End: 2023-05-22
Payer: COMMERCIAL

## 2023-05-22 DIAGNOSIS — N92.6 MENSTRUAL BLEEDING PROBLEM: ICD-10-CM

## 2023-05-23 RX ORDER — NORETHINDRONE ACETATE 5 MG
5 TABLET ORAL DAILY
Qty: 84 TABLET | Refills: 3 | Status: SHIPPED | OUTPATIENT
Start: 2023-05-23 | End: 2023-06-28

## 2023-05-23 NOTE — TELEPHONE ENCOUNTER
Pending Prescriptions:                       Disp   Refills    norethindrone (AYGESTIN) 5 MG tablet      30 tab*0            Sig: Take 1 tablet (5 mg) by mouth daily    Routing refill request to provider for review/approval because:  Drug not on the [facility] refill protocol       Jun Hermosillo RN

## 2023-05-31 ENCOUNTER — HOSPITAL ENCOUNTER (OUTPATIENT)
Dept: ULTRASOUND IMAGING | Facility: CLINIC | Age: 23
Discharge: HOME | End: 2023-05-31
Attending: OBSTETRICS & GYNECOLOGY | Admitting: OBSTETRICS & GYNECOLOGY
Payer: COMMERCIAL

## 2023-05-31 DIAGNOSIS — N97.9 FEMALE INFERTILITY: ICD-10-CM

## 2023-05-31 PROCEDURE — 76830 TRANSVAGINAL US NON-OB: CPT

## 2023-06-01 ENCOUNTER — MYC MEDICAL ADVICE (OUTPATIENT)
Dept: OBGYN | Facility: CLINIC | Age: 23
End: 2023-06-01
Payer: COMMERCIAL

## 2023-06-02 NOTE — TELEPHONE ENCOUNTER
Return call to patient.  Spoke with patient on the phone.    Patient advised US did show one follicle forming.  Dr. Ashford recommended trigger injection on Sunday with possible IUI on Monday.    Patient is out of town for the weekend and is not able to receive the injection trigger.  Patient did not use Femara this cycle as she was not sure if she was to re-start her medication.    Patient is unsure of cost for trigger and IUI and would like to check with her insurance before moving forward. Patient advised for this cycle to monitor with OPK's and have intercourse the day following a positive result and every other day after.     Patient will follow up with insurance and let us know how she would like to proceed going forward.     Pt in agreement and reports understanding.    Zunilda LUNA   Ob/Gyn Clinic

## 2023-06-21 ENCOUNTER — HOSPITAL ENCOUNTER (OUTPATIENT)
Dept: ULTRASOUND IMAGING | Facility: CLINIC | Age: 23
Discharge: HOME | End: 2023-06-21
Attending: NURSE PRACTITIONER
Payer: COMMERCIAL

## 2023-06-21 ENCOUNTER — HOSPITAL ENCOUNTER (OUTPATIENT)
Dept: MAMMOGRAPHY | Facility: CLINIC | Age: 23
Discharge: HOME | End: 2023-06-21
Attending: NURSE PRACTITIONER
Payer: COMMERCIAL

## 2023-06-21 DIAGNOSIS — N64.4 PAIN OF BOTH BREASTS: ICD-10-CM

## 2023-06-21 DIAGNOSIS — Z87.2 HISTORY OF CYST OF BREAST: ICD-10-CM

## 2023-06-21 PROCEDURE — 76642 ULTRASOUND BREAST LIMITED: CPT | Mod: 50

## 2023-06-21 PROCEDURE — 77062 BREAST TOMOSYNTHESIS BI: CPT

## 2023-06-27 NOTE — PROGRESS NOTES
Chief Complaint   Patient presents with     Sinusitis     Continues to have sinus problems with feeling nasal congestion, facial pressure- states symptoms are worse not better - continues to be denied by her insurance to get the surgery she needs     History of Present Illness  Aury Cervantes is a 23 year old female who presents today for follow-up.  I evaluated the patient on 11/2/2022 with nose and sinus issues.  We placed her on a medical regimen using saline irrigations and Flonase nasal spray.  I contacted her to check her symptoms she was having minimal improvement.  We obtained a CT scan of her sinuses and she returns today for follow-up.     I reviewed the patient's sinus CT performed 12/9/2022 shows a significant rightward nasal septal deviation.  The patient is moderate inferior turbinate hypertrophy.  She has narrowing of the left ostiomeatal unit.  On the right, she has significant hypoplastic appearance of the maxillary sinus with bowing of the lateral wall and uncinate laterally consistent with silent sinus syndrome.  The remainder the paranasal sinuses are clear without any significant evidence of acute or chronic inflammation.    Given the CT evidence of silent sinus syndrome, her history of sinus problems, significant rightward nasal septal deviation, and inferior turbinate hypertrophy, I recommended septoplasty endoscopic sinus surgery.  Unfortunately, her insurance denied her claim because our request did not conform to their policy requirement to be on several weeks of antibiotics prior to sinus surgery.  She returns today for follow-up.     From a symptom standpoint, the patient reports no significant improvement with medical management. She reports bilateral nasal obstruction and congestion that seems to alternate sides.  She denies any significant rhinorrhea or postnasal drainage but does occasionally have a sensation of phlegm in her throat.  No taste or smell disturbance.  She reports some  facial pressure in her malar and forehead areas.  She has this most days of the week.  She does have a history of migraine headache and was treated in her teens for migraine.  She has not had a history of nose or sinus surgery.  She previously underwent allergy skin testing which was negative for aeroallergens.  She previously underwent tonsillectomy.     Past Medical History  Patient Active Problem List   Diagnosis     Major depressive disorder, recurrent episode, severe (H)     S/P tonsillectomy and adenoidectomy     Anaphylaxis, subsequent encounter     Rhinoconjunctivitis     Iron deficiency anemia due to chronic blood loss     Menorrhagia with regular cycle     Dysmenorrhea     Female infertility     Nasal obstruction     Nasal septal deviation     Hypertrophy of both inferior nasal turbinates     Current Medications    Current Outpatient Medications:      albuterol (PROAIR HFA/PROVENTIL HFA/VENTOLIN HFA) 108 (90 Base) MCG/ACT inhaler, Inhale 2 puffs into the lungs every 6 hours as needed for shortness of breath, wheezing or cough, Disp: 18 g, Rfl: 1     doxycycline hyclate (VIBRAMYCIN) 100 MG capsule, Take 1 capsule (100 mg) by mouth 2 times daily for 10 days, Disp: 20 capsule, Rfl: 0     Prenatal Vit-Fe Fumarate-FA (PRENATAL MULTIVITAMIN W/IRON) 27-0.8 MG tablet, Take 1 tablet by mouth daily, Disp: , Rfl:      EPINEPHrine (ADRENACLICK) 0.3 MG/0.3ML injection 2-pack, Inject 0.3 mLs (0.3 mg) into the muscle as needed for anaphylaxis (Patient not taking: Reported on 5/12/2023), Disp: 0.6 mL, Rfl: 1     letrozole (FEMARA) 2.5 MG tablet, Take 3 tablets (7.5 mg) by mouth daily (Patient not taking: Reported on 6/28/2023), Disp: 15 tablet, Rfl: 12     levothyroxine (SYNTHROID/LEVOTHROID) 75 MCG tablet, Take 1 tablet (75 mcg) by mouth daily (Patient not taking: Reported on 6/28/2023), Disp: 90 tablet, Rfl: 3     progesterone (PROMETRIUM) 100 MG capsule, Take 1 capsule (100 mg) by mouth daily (Patient not taking:  Reported on 6/28/2023), Disp: 30 capsule, Rfl: 12    Allergies  Allergies   Allergen Reactions     Sulfa Antibiotics Other (See Comments)     Both parents are allergic         Social History  Social History     Socioeconomic History     Marital status: Single   Tobacco Use     Smoking status: Never     Smokeless tobacco: Never   Vaping Use     Vaping Use: Never used   Substance and Sexual Activity     Alcohol use: Not Currently     Alcohol/week: 0.0 standard drinks of alcohol     Drug use: Never     Sexual activity: Yes     Partners: Male   Other Topics Concern     Parent/sibling w/ CABG, MI or angioplasty before 65F 55M? No       Family History  Family History   Problem Relation Age of Onset     Depression Mother      Depression Father      Depression Maternal Grandmother      Cancer Maternal Grandmother      Lung Cancer Maternal Grandmother      Diabetes Maternal Grandfather      Heart Disease Maternal Grandfather      Hypertension Maternal Grandfather      Depression Paternal Grandmother         bipolar     Depression Paternal Grandfather         bipolar       Review of Systems  As per HPI and PMHx, otherwise 10 system review including the head and neck, constitutional, eyes, respiratory, GI, skin, neurologic, lymphatic, endocrine, and allergy systems is negative.    Physical Exam  BP 97/67 (BP Location: Right arm, Patient Position: Chair, Cuff Size: Adult Regular)   Pulse 80   Temp 97.7  F (36.5  C) (Tympanic)   Wt 52.2 kg (115 lb)   BMI 18.85 kg/m    GENERAL: Patient is a pleasant, cooperative 23 year old female in no acute distress.  HEAD: Normocephalic, atraumatic.  Hair and scalp are normal.  EYES: Pupils are equal, round, reactive to light and accommodation.  Extraocular movements are intact.  The sclera nonicteric without injection.  The extraocular structures are normal.  EARS: Normal shape and symmetry.  No tenderness when palpating the mastoid or tragal areas bilaterally.    NOSE: Nares are patent.   Nasal mucosa is boggy and inflamed with sticky, inflammatory mucus.  The patient had severe/marked inferior turbinate hypertrophy right greater than left.  The patient does have a rightward anterior and mid nasal septal deviation.  No nasal cavity masses, polyps, or mucopurulence on anterior rhinoscopy  ORAL CAVITY: Dentition is in good repair.  Mucous membranes are moist.  Tongue is mobile, protrudes to the midline.  Palate elevates symmetrically.  Tonsils are surgically absent.  No erythema or exudate.  No oral cavity or oropharyngeal masses, lesions, ulcerations, leukoplakia.  NECK: Supple, trachea is midline.  There no palpable cervical lymphadenopathy or masses bilaterally.  NEUROLOGIC: Cranial nerves II through XII are grossly intact.  Voice is strong.  Patient is House-Brackmann I/VI bilaterally.  CARDIOVASCULAR: Extremities are warm and well-perfused.  No significant peripheral edema.  RESPIRATORY: Patient has nonlabored breathing without cough, wheeze, stridor.  PSYCHIATRIC: Patient is alert and oriented.  Mood and affect appear normal.  SKIN: Warm and dry.  No scalp, face, or neck lesions noted.    Assessment and Plan     ICD-10-CM    1. Nasal obstruction  J34.89 doxycycline hyclate (VIBRAMYCIN) 100 MG capsule      2. Nasal septal deviation  J34.2 doxycycline hyclate (VIBRAMYCIN) 100 MG capsule      3. Chronic rhinitis  J31.0 doxycycline hyclate (VIBRAMYCIN) 100 MG capsule      4. Silent sinus syndrome  J34.89 doxycycline hyclate (VIBRAMYCIN) 100 MG capsule      5. Hypertrophy of both inferior nasal turbinates  J34.3 doxycycline hyclate (VIBRAMYCIN) 100 MG capsule         It was my pleasure seeing Aury Cervantes today in clinic.  She has signs of hypoplastic maxillary sinus on the right-hand side with silent sinus syndrome and some mucosal thickening.  She also has narrowing of her left maxillary sinus infundibula.  She has a fairly significant rightward nasal septal deviation and inferior turbinate  hypertrophy.  I do think the patient would benefit from endoscopic sinus surgery to help with sinus ventilation and drainage and septoplasty with turbinate reduction to help with nasal breathing and congestion.    We have already submitted to insurance and they have denied her claim because she was not placed on antibiotics.  She was on doxycycline in June 2022 and had no relief of symptoms.  While antibiotics are not a viable treatment for silent sinus syndrome and the structural and anatomic issues in her nose that we plan on fixing with surgery, we might have to place her on an antibiotic even though not clinically indicated to satisfy insurance criteria.    I will send her a course of doxycycline to the pharmacy.  I will contact her after the 10-day course to see if she had any benefit in her nose and sinus symptoms.  If she fails antibiotic treatment, we will resubmit to insurance since she has tried systemic steroids, topical steroid irrigations for several months, and would have been on at least 2 rounds of antibiotics without any clinical improvement.    We discussed the risks, benefits, alternatives, options of endonasal septoplasty, bilateral inferior turbinate reduction, bilateral endoscopic maxillary antrostomies and bilateral endoscopic anterior ethmoidectomies with image guidance including, but not limited to: Risk of bleeding, risk of infection, risk of postoperative pain, risk of septal hematoma, risk of septal perforation, risk of CSF leak, risk of injury to the orbital contents, risk of intranasal scarring or adhesions, risk of smell disturbance or smell loss, potential need for additional procedures, risk of general anesthesia.  The patient voiced understanding and is willing to proceed.  We discussed the postoperative course and convalescence including lifting and activity restrictions, placement of nasal splints, nasal saline irrigations postoperatively, postoperative debridement, and  follow-up.    Aram Grace MD  Department of Otolaryngology-Head and Neck Surgery  [facility]

## 2023-06-28 ENCOUNTER — OFFICE VISIT (OUTPATIENT)
Dept: OTOLARYNGOLOGY | Facility: CLINIC | Age: 23
End: 2023-06-28
Payer: COMMERCIAL

## 2023-06-28 VITALS
BODY MASS INDEX: 18.85 KG/M2 | WEIGHT: 115 LBS | TEMPERATURE: 97.7 F | DIASTOLIC BLOOD PRESSURE: 67 MMHG | HEART RATE: 80 BPM | SYSTOLIC BLOOD PRESSURE: 97 MMHG

## 2023-06-28 DIAGNOSIS — J34.3 HYPERTROPHY OF BOTH INFERIOR NASAL TURBINATES: ICD-10-CM

## 2023-06-28 DIAGNOSIS — J34.2 NASAL SEPTAL DEVIATION: ICD-10-CM

## 2023-06-28 DIAGNOSIS — J34.89 SILENT SINUS SYNDROME: ICD-10-CM

## 2023-06-28 DIAGNOSIS — J31.0 CHRONIC RHINITIS: ICD-10-CM

## 2023-06-28 DIAGNOSIS — J34.89 NASAL OBSTRUCTION: Primary | ICD-10-CM

## 2023-06-28 PROCEDURE — 99214 OFFICE O/P EST MOD 30 MIN: CPT | Performed by: OTOLARYNGOLOGY

## 2023-06-28 RX ORDER — DOXYCYCLINE 100 MG/1
100 CAPSULE ORAL 2 TIMES DAILY
Qty: 20 CAPSULE | Refills: 0 | Status: SHIPPED | OUTPATIENT
Start: 2023-06-28 | End: 2023-06-30

## 2023-06-28 ASSESSMENT — PAIN SCALES - GENERAL: PAINLEVEL: MILD PAIN (2)

## 2023-06-28 NOTE — NURSING NOTE
"Initial BP 97/67 (BP Location: Right arm, Patient Position: Chair, Cuff Size: Adult Regular)   Pulse 80   Temp 97.7  F (36.5  C) (Tympanic)   Wt 52.2 kg (115 lb)   BMI 18.85 kg/m   Estimated body mass index is 18.85 kg/m  as calculated from the following:    Height as of 4/6/23: 1.664 m (5' 5.5\").    Weight as of this encounter: 52.2 kg (115 lb). .    Debra Merrill LPN    "

## 2023-06-28 NOTE — LETTER
6/28/2023         RE: Aury Cervantes  [address] [address]        Dear Colleague,    Thank you for referring your patient, Aury Cervantes, to the [facility]. Please see a copy of my visit note below.    Chief Complaint   Patient presents with     Sinusitis     Continues to have sinus problems with feeling nasal congestion, facial pressure- states symptoms are worse not better - continues to be denied by her insurance to get the surgery she needs     History of Present Illness  Aury Cervantes is a 23 year old female who presents today for follow-up.  I evaluated the patient on 11/2/2022 with nose and sinus issues.  We placed her on a medical regimen using saline irrigations and Flonase nasal spray.  I contacted her to check her symptoms she was having minimal improvement.  We obtained a CT scan of her sinuses and she returns today for follow-up.     I reviewed the patient's sinus CT performed 12/9/2022 shows a significant rightward nasal septal deviation.  The patient is moderate inferior turbinate hypertrophy.  She has narrowing of the left ostiomeatal unit.  On the right, she has significant hypoplastic appearance of the maxillary sinus with bowing of the lateral wall and uncinate laterally consistent with silent sinus syndrome.  The remainder the paranasal sinuses are clear without any significant evidence of acute or chronic inflammation.    Given the CT evidence of silent sinus syndrome, her history of sinus problems, significant rightward nasal septal deviation, and inferior turbinate hypertrophy, I recommended septoplasty endoscopic sinus surgery.  Unfortunately, her insurance denied her claim because our request did not conform to their policy requirement to be on several weeks of antibiotics prior to sinus surgery.  She returns today for follow-up.     From a symptom standpoint, the patient reports no significant improvement with medical management. She reports bilateral  nasal obstruction and congestion that seems to alternate sides.  She denies any significant rhinorrhea or postnasal drainage but does occasionally have a sensation of phlegm in her throat.  No taste or smell disturbance.  She reports some facial pressure in her malar and forehead areas.  She has this most days of the week.  She does have a history of migraine headache and was treated in her teens for migraine.  She has not had a history of nose or sinus surgery.  She previously underwent allergy skin testing which was negative for aeroallergens.  She previously underwent tonsillectomy.     Past Medical History  Patient Active Problem List   Diagnosis     Major depressive disorder, recurrent episode, severe (H)     S/P tonsillectomy and adenoidectomy     Anaphylaxis, subsequent encounter     Rhinoconjunctivitis     Iron deficiency anemia due to chronic blood loss     Menorrhagia with regular cycle     Dysmenorrhea     Female infertility     Nasal obstruction     Nasal septal deviation     Hypertrophy of both inferior nasal turbinates     Current Medications    Current Outpatient Medications:      albuterol (PROAIR HFA/PROVENTIL HFA/VENTOLIN HFA) 108 (90 Base) MCG/ACT inhaler, Inhale 2 puffs into the lungs every 6 hours as needed for shortness of breath, wheezing or cough, Disp: 18 g, Rfl: 1     doxycycline hyclate (VIBRAMYCIN) 100 MG capsule, Take 1 capsule (100 mg) by mouth 2 times daily for 10 days, Disp: 20 capsule, Rfl: 0     Prenatal Vit-Fe Fumarate-FA (PRENATAL MULTIVITAMIN W/IRON) 27-0.8 MG tablet, Take 1 tablet by mouth daily, Disp: , Rfl:      EPINEPHrine (ADRENACLICK) 0.3 MG/0.3ML injection 2-pack, Inject 0.3 mLs (0.3 mg) into the muscle as needed for anaphylaxis (Patient not taking: Reported on 5/12/2023), Disp: 0.6 mL, Rfl: 1     letrozole (FEMARA) 2.5 MG tablet, Take 3 tablets (7.5 mg) by mouth daily (Patient not taking: Reported on 6/28/2023), Disp: 15 tablet, Rfl: 12     levothyroxine  (SYNTHROID/LEVOTHROID) 75 MCG tablet, Take 1 tablet (75 mcg) by mouth daily (Patient not taking: Reported on 6/28/2023), Disp: 90 tablet, Rfl: 3     progesterone (PROMETRIUM) 100 MG capsule, Take 1 capsule (100 mg) by mouth daily (Patient not taking: Reported on 6/28/2023), Disp: 30 capsule, Rfl: 12    Allergies  Allergies   Allergen Reactions     Sulfa Antibiotics Other (See Comments)     Both parents are allergic         Social History  Social History     Socioeconomic History     Marital status: Single   Tobacco Use     Smoking status: Never     Smokeless tobacco: Never   Vaping Use     Vaping Use: Never used   Substance and Sexual Activity     Alcohol use: Not Currently     Alcohol/week: 0.0 standard drinks of alcohol     Drug use: Never     Sexual activity: Yes     Partners: Male   Other Topics Concern     Parent/sibling w/ CABG, MI or angioplasty before 65F 55M? No       Family History  Family History   Problem Relation Age of Onset     Depression Mother      Depression Father      Depression Maternal Grandmother      Cancer Maternal Grandmother      Lung Cancer Maternal Grandmother      Diabetes Maternal Grandfather      Heart Disease Maternal Grandfather      Hypertension Maternal Grandfather      Depression Paternal Grandmother         bipolar     Depression Paternal Grandfather         bipolar       Review of Systems  As per HPI and PMHx, otherwise 10 system review including the head and neck, constitutional, eyes, respiratory, GI, skin, neurologic, lymphatic, endocrine, and allergy systems is negative.    Physical Exam  BP 97/67 (BP Location: Right arm, Patient Position: Chair, Cuff Size: Adult Regular)   Pulse 80   Temp 97.7  F (36.5  C) (Tympanic)   Wt 52.2 kg (115 lb)   BMI 18.85 kg/m    GENERAL: Patient is a pleasant, cooperative 23 year old female in no acute distress.  HEAD: Normocephalic, atraumatic.  Hair and scalp are normal.  EYES: Pupils are equal, round, reactive to light and  accommodation.  Extraocular movements are intact.  The sclera nonicteric without injection.  The extraocular structures are normal.  EARS: Normal shape and symmetry.  No tenderness when palpating the mastoid or tragal areas bilaterally.    NOSE: Nares are patent.  Nasal mucosa is boggy and inflamed with sticky, inflammatory mucus.  The patient had severe/marked inferior turbinate hypertrophy right greater than left.  The patient does have a rightward anterior and mid nasal septal deviation.  No nasal cavity masses, polyps, or mucopurulence on anterior rhinoscopy  ORAL CAVITY: Dentition is in good repair.  Mucous membranes are moist.  Tongue is mobile, protrudes to the midline.  Palate elevates symmetrically.  Tonsils are surgically absent.  No erythema or exudate.  No oral cavity or oropharyngeal masses, lesions, ulcerations, leukoplakia.  NECK: Supple, trachea is midline.  There no palpable cervical lymphadenopathy or masses bilaterally.  NEUROLOGIC: Cranial nerves II through XII are grossly intact.  Voice is strong.  Patient is House-Brackmann I/VI bilaterally.  CARDIOVASCULAR: Extremities are warm and well-perfused.  No significant peripheral edema.  RESPIRATORY: Patient has nonlabored breathing without cough, wheeze, stridor.  PSYCHIATRIC: Patient is alert and oriented.  Mood and affect appear normal.  SKIN: Warm and dry.  No scalp, face, or neck lesions noted.    Assessment and Plan     ICD-10-CM    1. Nasal obstruction  J34.89 doxycycline hyclate (VIBRAMYCIN) 100 MG capsule      2. Nasal septal deviation  J34.2 doxycycline hyclate (VIBRAMYCIN) 100 MG capsule      3. Chronic rhinitis  J31.0 doxycycline hyclate (VIBRAMYCIN) 100 MG capsule      4. Silent sinus syndrome  J34.89 doxycycline hyclate (VIBRAMYCIN) 100 MG capsule      5. Hypertrophy of both inferior nasal turbinates  J34.3 doxycycline hyclate (VIBRAMYCIN) 100 MG capsule         It was my pleasure seeing Aury Cervantes today in clinic.  She has signs of  hypoplastic maxillary sinus on the right-hand side with silent sinus syndrome and some mucosal thickening.  She also has narrowing of her left maxillary sinus infundibula.  She has a fairly significant rightward nasal septal deviation and inferior turbinate hypertrophy.  I do think the patient would benefit from endoscopic sinus surgery to help with sinus ventilation and drainage and septoplasty with turbinate reduction to help with nasal breathing and congestion.    We have already submitted to insurance and they have denied her claim because she was not placed on antibiotics.  She was on doxycycline in June 2022 and had no relief of symptoms.  While antibiotics are not a viable treatment for silent sinus syndrome and the structural and anatomic issues in her nose that we plan on fixing with surgery, we might have to place her on an antibiotic even though not clinically indicated to satisfy insurance criteria.    I will send her a course of doxycycline to the pharmacy.  I will contact her after the 10-day course to see if she had any benefit in her nose and sinus symptoms.  If she fails antibiotic treatment, we will resubmit to insurance since she has tried systemic steroids, topical steroid irrigations for several months, and would have been on at least 2 rounds of antibiotics without any clinical improvement.    We discussed the risks, benefits, alternatives, options of endonasal septoplasty, bilateral inferior turbinate reduction, bilateral endoscopic maxillary antrostomies and bilateral endoscopic anterior ethmoidectomies with image guidance including, but not limited to: Risk of bleeding, risk of infection, risk of postoperative pain, risk of septal hematoma, risk of septal perforation, risk of CSF leak, risk of injury to the orbital contents, risk of intranasal scarring or adhesions, risk of smell disturbance or smell loss, potential need for additional procedures, risk of general anesthesia.  The patient  voiced understanding and is willing to proceed.  We discussed the postoperative course and convalescence including lifting and activity restrictions, placement of nasal splints, nasal saline irrigations postoperatively, postoperative debridement, and follow-up.    Aram Grace MD  Department of Otolaryngology-Head and Neck Surgery  [facility]         Again, thank you for allowing me to participate in the care of your patient.        Sincerely,        Aram Grace MD

## 2023-06-30 DIAGNOSIS — J34.2 NASAL SEPTAL DEVIATION: ICD-10-CM

## 2023-06-30 DIAGNOSIS — J34.3 HYPERTROPHY OF BOTH INFERIOR NASAL TURBINATES: ICD-10-CM

## 2023-06-30 DIAGNOSIS — J34.89 SILENT SINUS SYNDROME: ICD-10-CM

## 2023-06-30 DIAGNOSIS — J31.0 CHRONIC RHINITIS: ICD-10-CM

## 2023-06-30 DIAGNOSIS — J34.89 NASAL OBSTRUCTION: ICD-10-CM

## 2023-06-30 RX ORDER — DOXYCYCLINE 100 MG/1
100 CAPSULE ORAL 2 TIMES DAILY
Qty: 20 CAPSULE | Refills: 0 | Status: SHIPPED | OUTPATIENT
Start: 2023-06-30 | End: 2023-08-16

## 2023-06-30 NOTE — TELEPHONE ENCOUNTER
New Medication Request        What medication are you requesting?: patient is on a restricted program with insurance Dr Grace sent rx for doxy however needs pcp approval.     doxycycline hyclate (VIBRAMYCIN) 100 MG capsule 20 capsule 0 6/28/2023 7/8/2023 --   Sig - Route: Take 1 capsule (100 mg) by mouth 2 times daily for 10 days - Oral   Sent to pharmacy as: Doxycycline Hyclate 100 MG Oral Capsule (VIBRAMYCIN)         Reason for medication request: Associated Diagnoses    Nasal obstruction [J34.89]  - Primary       Nasal septal deviation [J34.2]       Chronic rhinitis [J31.0]       Silent sinus syndrome [J34.89]       Hypertrophy of both inferior nasal turbinates [J34.3]             Have you taken this medication before?: Yes: for something different    Controlled Substance Agreement on file:   CSA -- Patient Level:    CSA: None found at the patient level.         Patient offered an appointment? No    Preferred Pharmacy:   [address]  [address] [place] [address]  Phone: [phone] Fax: [phone] Alternate Fax: [phone], [phone]        [facility] or would you prefer to receive a phone call?:   Patient would prefer a phone call   Okay to leave a detailed message?: Yes at Home number on file [phone] (home)

## 2023-07-25 ENCOUNTER — TELEPHONE (OUTPATIENT)
Dept: OTOLARYNGOLOGY | Facility: CLINIC | Age: 23
End: 2023-07-25
Payer: COMMERCIAL

## 2023-07-25 ENCOUNTER — HOSPITAL ENCOUNTER (OUTPATIENT)
Facility: CLINIC | Age: 23
End: 2023-07-25
Attending: OTOLARYNGOLOGY | Admitting: OTOLARYNGOLOGY
Payer: COMMERCIAL

## 2023-07-25 DIAGNOSIS — J34.3 HYPERTROPHY OF BOTH INFERIOR NASAL TURBINATES: ICD-10-CM

## 2023-07-25 DIAGNOSIS — J34.2 NASAL SEPTAL DEVIATION: ICD-10-CM

## 2023-07-25 DIAGNOSIS — J31.0 CHRONIC RHINITIS: ICD-10-CM

## 2023-07-25 DIAGNOSIS — J34.89 SILENT SINUS SYNDROME: ICD-10-CM

## 2023-07-25 DIAGNOSIS — J34.89 NASAL OBSTRUCTION: Primary | ICD-10-CM

## 2023-07-25 NOTE — TELEPHONE ENCOUNTER
Type of surgery: SINUS SURGERY, ENDOSCOPIC, USING OPTICAL TRACKING SYSTEM   SEPTOPLASTY, NOSE, WITH TURBINOPLASTY   Location of surgery: [facility]  Date and time of surgery: 9/29  Surgeon: Sanjay   Pre-Op Appt Date: 9/18  Post-Op Appt Date: 10/5 10/30   Packet sent out: Yes  Pre-cert/Authorization completed:  No  Date:

## 2023-07-27 ENCOUNTER — HOSPITAL ENCOUNTER (OUTPATIENT)
Facility: CLINIC | Age: 23
End: 2023-07-27
Attending: OTOLARYNGOLOGY | Admitting: OTOLARYNGOLOGY
Payer: COMMERCIAL

## 2023-07-27 PROBLEM — J34.89 NASAL OBSTRUCTION: Status: ACTIVE | Noted: 2022-12-22

## 2023-07-27 PROBLEM — J31.0 CHRONIC RHINITIS: Status: ACTIVE | Noted: 2023-07-25

## 2023-07-27 PROBLEM — J34.2 NASAL SEPTAL DEVIATION: Status: ACTIVE | Noted: 2022-12-22

## 2023-07-27 PROBLEM — J34.3 HYPERTROPHY OF BOTH INFERIOR NASAL TURBINATES: Status: ACTIVE | Noted: 2022-12-22

## 2023-07-27 PROBLEM — J34.89 SILENT SINUS SYNDROME: Status: ACTIVE | Noted: 2023-07-25

## 2023-07-27 NOTE — TELEPHONE ENCOUNTER
Patient has been canceled for 09/29/2023 and rescheduled for 09/05/2023 in [facility]  Preop 08/30/2023  Postop 09/11/2023

## 2023-08-10 ENCOUNTER — MYC MEDICAL ADVICE (OUTPATIENT)
Dept: FAMILY MEDICINE | Facility: CLINIC | Age: 23
End: 2023-08-10
Payer: COMMERCIAL

## 2023-08-11 ENCOUNTER — HOSPITAL ENCOUNTER (EMERGENCY)
Facility: CLINIC | Age: 23
Discharge: HOME | End: 2023-08-11
Attending: EMERGENCY MEDICINE | Admitting: EMERGENCY MEDICINE
Payer: COMMERCIAL

## 2023-08-11 VITALS
HEART RATE: 78 BPM | DIASTOLIC BLOOD PRESSURE: 80 MMHG | RESPIRATION RATE: 16 BRPM | OXYGEN SATURATION: 99 % | BODY MASS INDEX: 19.63 KG/M2 | TEMPERATURE: 97 F | SYSTOLIC BLOOD PRESSURE: 120 MMHG | HEIGHT: 64 IN | WEIGHT: 115 LBS

## 2023-08-11 DIAGNOSIS — R00.2 PALPITATIONS: ICD-10-CM

## 2023-08-11 LAB
ALBUMIN SERPL BCG-MCNC: 4.4 G/DL (ref 3.5–5.2)
ALP SERPL-CCNC: 71 U/L (ref 35–104)
ALT SERPL W P-5'-P-CCNC: 10 U/L (ref 0–50)
ANION GAP SERPL CALCULATED.3IONS-SCNC: 9 MMOL/L (ref 7–15)
AST SERPL W P-5'-P-CCNC: 18 U/L (ref 0–45)
BASOPHILS # BLD AUTO: 0 10E3/UL (ref 0–0.2)
BASOPHILS NFR BLD AUTO: 1 %
BILIRUB SERPL-MCNC: 0.3 MG/DL
BUN SERPL-MCNC: 7.7 MG/DL (ref 6–20)
CALCIUM SERPL-MCNC: 9.6 MG/DL (ref 8.6–10)
CHLORIDE SERPL-SCNC: 105 MMOL/L (ref 98–107)
CREAT SERPL-MCNC: 0.79 MG/DL (ref 0.51–0.95)
DEPRECATED HCO3 PLAS-SCNC: 24 MMOL/L (ref 22–29)
EOSINOPHIL # BLD AUTO: 0.1 10E3/UL (ref 0–0.7)
EOSINOPHIL NFR BLD AUTO: 2 %
ERYTHROCYTE [DISTWIDTH] IN BLOOD BY AUTOMATED COUNT: 12.7 % (ref 10–15)
GFR SERPL CREATININE-BSD FRML MDRD: >90 ML/MIN/1.73M2
GLUCOSE SERPL-MCNC: 83 MG/DL (ref 70–99)
HCG SERPL QL: NEGATIVE
HCT VFR BLD AUTO: 36.8 % (ref 35–47)
HGB BLD-MCNC: 12.2 G/DL (ref 11.7–15.7)
IMM GRANULOCYTES # BLD: 0 10E3/UL
IMM GRANULOCYTES NFR BLD: 0 %
LYMPHOCYTES # BLD AUTO: 1.2 10E3/UL (ref 0.8–5.3)
LYMPHOCYTES NFR BLD AUTO: 21 %
MAGNESIUM SERPL-MCNC: 1.9 MG/DL (ref 1.7–2.3)
MCH RBC QN AUTO: 26.6 PG (ref 26.5–33)
MCHC RBC AUTO-ENTMCNC: 33.2 G/DL (ref 31.5–36.5)
MCV RBC AUTO: 80 FL (ref 78–100)
MONOCYTES # BLD AUTO: 0.5 10E3/UL (ref 0–1.3)
MONOCYTES NFR BLD AUTO: 8 %
NEUTROPHILS # BLD AUTO: 3.8 10E3/UL (ref 1.6–8.3)
NEUTROPHILS NFR BLD AUTO: 68 %
NRBC # BLD AUTO: 0 10E3/UL
NRBC BLD AUTO-RTO: 0 /100
PLATELET # BLD AUTO: 176 10E3/UL (ref 150–450)
POTASSIUM SERPL-SCNC: 3.9 MMOL/L (ref 3.4–5.3)
PROT SERPL-MCNC: 6.9 G/DL (ref 6.4–8.3)
RBC # BLD AUTO: 4.58 10E6/UL (ref 3.8–5.2)
SODIUM SERPL-SCNC: 138 MMOL/L (ref 136–145)
TSH SERPL DL<=0.005 MIU/L-ACNC: 3.43 UIU/ML (ref 0.3–4.2)
WBC # BLD AUTO: 5.6 10E3/UL (ref 4–11)

## 2023-08-11 PROCEDURE — 93010 ELECTROCARDIOGRAM REPORT: CPT | Performed by: EMERGENCY MEDICINE

## 2023-08-11 PROCEDURE — 80053 COMPREHEN METABOLIC PANEL: CPT | Performed by: EMERGENCY MEDICINE

## 2023-08-11 PROCEDURE — 84443 ASSAY THYROID STIM HORMONE: CPT | Performed by: EMERGENCY MEDICINE

## 2023-08-11 PROCEDURE — 99284 EMERGENCY DEPT VISIT MOD MDM: CPT | Performed by: EMERGENCY MEDICINE

## 2023-08-11 PROCEDURE — 83735 ASSAY OF MAGNESIUM: CPT | Performed by: EMERGENCY MEDICINE

## 2023-08-11 PROCEDURE — 36415 COLL VENOUS BLD VENIPUNCTURE: CPT | Performed by: EMERGENCY MEDICINE

## 2023-08-11 PROCEDURE — 85025 COMPLETE CBC W/AUTO DIFF WBC: CPT | Performed by: EMERGENCY MEDICINE

## 2023-08-11 PROCEDURE — 93005 ELECTROCARDIOGRAM TRACING: CPT | Performed by: EMERGENCY MEDICINE

## 2023-08-11 PROCEDURE — 84703 CHORIONIC GONADOTROPIN ASSAY: CPT | Performed by: EMERGENCY MEDICINE

## 2023-08-11 PROCEDURE — 99284 EMERGENCY DEPT VISIT MOD MDM: CPT | Mod: 25 | Performed by: EMERGENCY MEDICINE

## 2023-08-11 ASSESSMENT — ENCOUNTER SYMPTOMS
JOINT SWELLING: 0
FEVER: 0
CHILLS: 0
DIZZINESS: 0
FREQUENCY: 0
SHORTNESS OF BREATH: 0
HEMATOCHEZIA: 0
HEADACHES: 0
SORE THROAT: 0
WEAKNESS: 0
NERVOUS/ANXIOUS: 0
EYE PAIN: 0
ABDOMINAL PAIN: 0
CONSTIPATION: 0
HEMATURIA: 0
DIARRHEA: 0
COUGH: 0
NAUSEA: 0
HEARTBURN: 0
ARTHRALGIAS: 0
PALPITATIONS: 1
DYSURIA: 0
MYALGIAS: 0
PARESTHESIAS: 0

## 2023-08-11 ASSESSMENT — ACTIVITIES OF DAILY LIVING (ADL): ADLS_ACUITY_SCORE: 35

## 2023-08-11 NOTE — DISCHARGE INSTRUCTIONS
Please call to schedule an outpatient Cardiac Monitoring study: [phone].  This has been ordered for you.

## 2023-08-11 NOTE — ED TRIAGE NOTES
Since Monday patient stated can feel frequent episodes of palpitations with SOA with slight pain      Triage Assessment       Row Name 08/11/23 1526       Triage Assessment (Adult)    Airway WDL WDL       Respiratory WDL    Respiratory WDL WDL       Skin Circulation/Temperature WDL    Skin Circulation/Temperature WDL WDL       Cardiac WDL    Cardiac WDL all    Pulse Rate & Regularity apical pulse regular  Palpitations

## 2023-08-11 NOTE — ED PROVIDER NOTES
"  History     Chief Complaint   Patient presents with    Palpitations     HPI  History per patient, review of [facility] EMR and Care Everywhere EMR, including review of multiple recent telephone encounters, clinic notes and prior laboratory evaluations.  Aury Cervantes is a 23-year-old female with history of depression years of brief intermittent palpitations with 4 days of more frequent palpitations without syncope or other concerning symptoms.  No prior work-up or evaluation of these.  She has been having very frequent brief palpitations similar to that which she has been experiencing in the past 2-3 years, but more frequently occurring, up to \"50 times a day\", described as a forceful heartbeat followed by a little flutter afterwards with up to 1-3 heartbeats in a row several times.  No other chest pain or discomfort, shortness of breath, lightheadedness or syncope.  The palpitations make it difficult for her to sleep and she reports feeling fatigued, weak and groggy.  She has discontinued any caffeine intake with no improvement in symptoms.  No other acute systemic signs or symptoms of illness, infection, PE/DVT or anemia.   No family history of unusual cardiac disease or sudden cardiac death.  No other pertinent history or acute complaints or concerns.      Allergies:  Allergies   Allergen Reactions    Sulfa Antibiotics Other (See Comments)     Both parents are allergic         Problem List:    Patient Active Problem List    Diagnosis Date Noted    Chronic rhinitis 07/25/2023     Priority: Medium    Silent sinus syndrome 07/25/2023     Priority: Medium    Nasal obstruction 12/22/2022     Priority: Medium     Added automatically from request for surgery [id number]      Nasal septal deviation 12/22/2022     Priority: Medium     Added automatically from request for surgery [id number]      Hypertrophy of both inferior nasal turbinates 12/22/2022     Priority: Medium     Added automatically from request for surgery [id number]      " Dysmenorrhea 05/16/2022     Priority: Medium    Female infertility 05/16/2022     Priority: Medium    Iron deficiency anemia due to chronic blood loss 02/07/2021     Priority: Medium    Menorrhagia with regular cycle 02/07/2021     Priority: Medium    Anaphylaxis, subsequent encounter 12/04/2018     Priority: Medium    Rhinoconjunctivitis 12/04/2018     Priority: Medium    S/P tonsillectomy and adenoidectomy 03/21/2018     Priority: Medium    Major depressive disorder, recurrent episode, severe (H) 11/19/2014     Priority: Medium        Past Medical History:    Past Medical History:   Diagnosis Date    Anemia     Depression     Nexplanon in place        Past Surgical History:    Past Surgical History:   Procedure Laterality Date    DILATION AND CURETTAGE, OPERATIVE HYSTEROSCOPY, COMBINED N/A 4/6/2023    Procedure: HYSTEROSCOPY, WITH DILATION AND CURETTAGE OF UTERUS, polypectomy;  Surgeon: Stefanie Mcnair MD;  Location: [facility] OR    SURGICAL HISTORY OF -       PE tubes    TONSILLECTOMY, ADENOIDECTOMY ADULT, COMBINED Bilateral 3/19/2018    Procedure: COMBINED TONSILLECTOMY, ADENOIDECTOMY ADULT;  Bilateral Adenotonsillectomy;  Surgeon: Kevin Arias MD;  Location: [facility] OR       Family History:    Family History   Problem Relation Age of Onset    Depression Mother     Depression Father     Depression Maternal Grandmother     Cancer Maternal Grandmother     Lung Cancer Maternal Grandmother     Diabetes Maternal Grandfather     Heart Disease Maternal Grandfather     Hypertension Maternal Grandfather     Depression Paternal Grandmother         bipolar    Depression Paternal Grandfather         bipolar       Social History:  Marital Status:  Single [1]  Social History     Tobacco Use    Smoking status: Never    Smokeless tobacco: Never   Vaping Use    Vaping Use: Never used   Substance Use Topics    Alcohol use: Yes     Comment: rare    Drug use: Never        Medications:    albuterol (PROAIR HFA/PROVENTIL  HFA/VENTOLIN HFA) 108 (90 Base) MCG/ACT inhaler  doxycycline hyclate (VIBRAMYCIN) 100 MG capsule  EPINEPHrine (ADRENACLICK) 0.3 MG/0.3ML injection 2-pack  letrozole (FEMARA) 2.5 MG tablet  levothyroxine (SYNTHROID/LEVOTHROID) 75 MCG tablet  Prenatal Vit-Fe Fumarate-FA (PRENATAL MULTIVITAMIN W/IRON) 27-0.8 MG tablet  progesterone (PROMETRIUM) 100 MG capsule        Review of Systems  No other pertinent history or acute complaints or concerns.    Physical Exam        Physical Exam  Vitals and nursing note reviewed.   Constitutional:       General: She is not in acute distress.     Appearance: Normal appearance. She is well-developed. She is not ill-appearing or diaphoretic.   HENT:      Head: Normocephalic and atraumatic.      Right Ear: External ear normal.      Left Ear: External ear normal.   Eyes:      General: No scleral icterus.     Extraocular Movements: Extraocular movements intact.      Conjunctiva/sclera: Conjunctivae normal.   Neck:      Thyroid: No thyroid mass or thyromegaly.      Trachea: Trachea and phonation normal. No tracheal deviation.   Cardiovascular:      Rate and Rhythm: Normal rate and regular rhythm.      Heart sounds: Normal heart sounds. No murmur heard.     No friction rub. No gallop.   Pulmonary:      Effort: Pulmonary effort is normal. No respiratory distress.      Breath sounds: Normal breath sounds. No wheezing, rhonchi or rales.   Musculoskeletal:         General: No swelling or tenderness. Normal range of motion.      Cervical back: Normal range of motion and neck supple.      Right lower leg: No edema.      Left lower leg: No edema.   Skin:     General: Skin is warm and dry.      Coloration: Skin is not pale.      Findings: No erythema or rash.   Neurological:      General: No focal deficit present.      Mental Status: She is alert and oriented to person, place, and time.   Psychiatric:         Behavior: Behavior normal.      Comments: Flat affect         ED Course            Procedures              EKG Interpretation:      Interpreted by Fransico Otero MD  Time reviewed: 4:07 PM  Symptoms at time of EKG: palpitations   Rhythm: normal sinus   Rate: normal  Axis: normal  Ectopy: none  Conduction: normal  ST Segments/ T Waves: No ST-T wave changes  Q Waves: none  Comparison to prior: Unchanged from 12/1/18  Clinical Impression: normal EKG         Results for orders placed or performed during the hospital encounter of 08/11/23 (from the past 24 hour(s))   CBC with platelets differential    Narrative    The following orders were created for panel order CBC with platelets differential.  Procedure                               Abnormality         Status                     ---------                               -----------         ------                     CBC with platelets and d...[[id number]]                      Final result                 Please view results for these tests on the individual orders.   Comprehensive metabolic panel   Result Value Ref Range    Sodium 138 136 - 145 mmol/L    Potassium 3.9 3.4 - 5.3 mmol/L    Chloride 105 98 - 107 mmol/L    Carbon Dioxide (CO2) 24 22 - 29 mmol/L    Anion Gap 9 7 - 15 mmol/L    Urea Nitrogen 7.7 6.0 - 20.0 mg/dL    Creatinine 0.79 0.51 - 0.95 mg/dL    Calcium 9.6 8.6 - 10.0 mg/dL    Glucose 83 70 - 99 mg/dL    Alkaline Phosphatase 71 35 - 104 U/L    AST 18 0 - 45 U/L    ALT 10 0 - 50 U/L    Protein Total 6.9 6.4 - 8.3 g/dL    Albumin 4.4 3.5 - 5.2 g/dL    Bilirubin Total 0.3 <=1.2 mg/dL    GFR Estimate >90 >60 mL/min/1.73m2   TSH with free T4 reflex   Result Value Ref Range    TSH 3.43 0.30 - 4.20 uIU/mL   Magnesium   Result Value Ref Range    Magnesium 1.9 1.7 - 2.3 mg/dL   HCG qualitative Blood   Result Value Ref Range    hCG Serum Qualitative Negative Negative   CBC with platelets and differential   Result Value Ref Range    WBC Count 5.6 4.0 - 11.0 10e3/uL    RBC Count 4.58 3.80 - 5.20 10e6/uL    Hemoglobin 12.2 11.7 - 15.7 g/dL     Hematocrit 36.8 35.0 - 47.0 %    MCV 80 78 - 100 fL    MCH 26.6 26.5 - 33.0 pg    MCHC 33.2 31.5 - 36.5 g/dL    RDW 12.7 10.0 - 15.0 %    Platelet Count 176 150 - 450 10e3/uL    % Neutrophils 68 %    % Lymphocytes 21 %    % Monocytes 8 %    % Eosinophils 2 %    % Basophils 1 %    % Immature Granulocytes 0 %    NRBCs per 100 WBC 0 <1 /100    Absolute Neutrophils 3.8 1.6 - 8.3 10e3/uL    Absolute Lymphocytes 1.2 0.8 - 5.3 10e3/uL    Absolute Monocytes 0.5 0.0 - 1.3 10e3/uL    Absolute Eosinophils 0.1 0.0 - 0.7 10e3/uL    Absolute Basophils 0.0 0.0 - 0.2 10e3/uL    Absolute Immature Granulocytes 0.0 <=0.4 10e3/uL    Absolute NRBCs 0.0 10e3/uL       Medications - No data to display    6:04 PM - no symptoms of palpitations or ectopy or dysrhythmia while monitored in the ED.  She appears stable and appropriate discharged home and is comfortable with today's evaluation disposition plan with plan for follow-up with her primary care provider in 3 days, Monday, 8/14/2023 and to have a 7-day Zio patch cardiac monitor study placed at that time.    Assessments & Plan (with Medical Decision Making)   23-year-old female with years of brief intermittent palpitations with 4 days of more frequent palpitations without syncope or other concerning symptoms.  No prior work-up or evaluation of these. EKG and laboratory evaluation was unremarkable.  She had no symptoms of palpitations or ectopy or dysrhythmia while monitored in the ED. She appears stable and appropriate discharged home and is comfortable with today's evaluation disposition plan with plan for follow-up with her primary care provider in 3 days, Monday, 8/14/2023 and to have a 7-day Zio patch cardiac monitor study placed at that time.  She return for worsening palpitations, syncope, shortness of breath or any new problems or concerns.    I have reviewed the nursing notes.    I have reviewed the findings, diagnosis, plan and need for follow up with the patient.    Medical  Decision Making: Moderate complexity  Hospitalization for observation and cardiac monitoring was considered and deferred. Currently appears stable and appropriate for outpatient management with close f/u.      New Prescriptions    No medications on file       Final diagnoses:   Palpitations       8/11/2023   [facility] EMERGENCY DEPT       Fransico Otero MD  08/11/23 [id number]

## 2023-08-11 NOTE — TELEPHONE ENCOUNTER
"Pt was called to triage.    S-(situation): worsening palpitations since Monday. 20-50x/day    B-(background): pt states she has had palpitations in the past 2 yrs but very infrequent. She cut out caffeine at that time. Since Monday she has been having 20-50x per day.  No lung hx or heart hx. Pt states its hard to tell if she is having extra beats or if its just a \"hard beat\".    A-(assessment): palpitations happen at rest & with activity. No swelling.   tates will have 1 \"hard beat\" then \"a little flutter afterward\", then back to normal. Has had 1-3 hard beats in a row a few times. States her chest hurst when this happens & her breathing feels weak right after & then she recovers. No dizziness/lightheadedness. Feels a \"pinch\" in there upper left back. When she has several hard beats in a row she feels weak & needs to sit down for a few minutes. Pt stopped caffeine on Monday. Having hard time sleeping as the palpitations wake her up. Pt more fatigued, groggy this weak, worse yesterday & today. Has been laying down more this week.    R-(recommendations): protocol advises be seen within 4 hrs. No appts available. pt advised to be seen in  today. Pt verbalized understanding & will be seen in [place] today.    Lisa Scott RN      "

## 2023-08-15 ENCOUNTER — MYC MEDICAL ADVICE (OUTPATIENT)
Dept: FAMILY MEDICINE | Facility: CLINIC | Age: 23
End: 2023-08-15
Payer: COMMERCIAL

## 2023-08-16 ENCOUNTER — HOSPITAL ENCOUNTER (OUTPATIENT)
Dept: CARDIOLOGY | Facility: CLINIC | Age: 23
Discharge: HOME | End: 2023-08-16
Attending: EMERGENCY MEDICINE | Admitting: EMERGENCY MEDICINE
Payer: COMMERCIAL

## 2023-08-16 ENCOUNTER — OFFICE VISIT (OUTPATIENT)
Dept: FAMILY MEDICINE | Facility: CLINIC | Age: 23
End: 2023-08-16
Payer: COMMERCIAL

## 2023-08-16 VITALS
OXYGEN SATURATION: 99 % | WEIGHT: 112.2 LBS | HEART RATE: 83 BPM | DIASTOLIC BLOOD PRESSURE: 70 MMHG | TEMPERATURE: 97.6 F | BODY MASS INDEX: 19.15 KG/M2 | SYSTOLIC BLOOD PRESSURE: 104 MMHG | RESPIRATION RATE: 16 BRPM | HEIGHT: 64 IN

## 2023-08-16 DIAGNOSIS — R00.2 PALPITATIONS: ICD-10-CM

## 2023-08-16 DIAGNOSIS — Z00.00 ANNUAL PHYSICAL EXAM: Primary | ICD-10-CM

## 2023-08-16 PROCEDURE — 93248 EXT ECG>7D<15D REV&INTERPJ: CPT | Performed by: INTERNAL MEDICINE

## 2023-08-16 PROCEDURE — 93242 EXT ECG>48HR<7D RECORDING: CPT

## 2023-08-16 PROCEDURE — 99395 PREV VISIT EST AGE 18-39: CPT | Performed by: NURSE PRACTITIONER

## 2023-08-16 ASSESSMENT — PATIENT HEALTH QUESTIONNAIRE - PHQ9
10. IF YOU CHECKED OFF ANY PROBLEMS, HOW DIFFICULT HAVE THESE PROBLEMS MADE IT FOR YOU TO DO YOUR WORK, TAKE CARE OF THINGS AT HOME, OR GET ALONG WITH OTHER PEOPLE: SOMEWHAT DIFFICULT
SUM OF ALL RESPONSES TO PHQ QUESTIONS 1-9: 4
SUM OF ALL RESPONSES TO PHQ QUESTIONS 1-9: 4

## 2023-08-16 ASSESSMENT — ENCOUNTER SYMPTOMS
COUGH: 0
PALPITATIONS: 1
CONSTIPATION: 0
SORE THROAT: 0
MYALGIAS: 0
DYSURIA: 0
HEADACHES: 0
NERVOUS/ANXIOUS: 0
SHORTNESS OF BREATH: 0
EYE PAIN: 0
HEMATOCHEZIA: 0
JOINT SWELLING: 0
NAUSEA: 0
FREQUENCY: 0
WEAKNESS: 0
FEVER: 0
DIZZINESS: 0
ARTHRALGIAS: 0
HEMATURIA: 0
DIARRHEA: 0
PARESTHESIAS: 0
CHILLS: 0
HEARTBURN: 0
ABDOMINAL PAIN: 0

## 2023-08-16 ASSESSMENT — PAIN SCALES - GENERAL: PAINLEVEL: NO PAIN (0)

## 2023-08-16 NOTE — TELEPHONE ENCOUNTER
Can you please contact patient's insurance. I think I need to sign approval form since patient is restricted to me.    CIRO Kline CNP

## 2023-08-16 NOTE — TELEPHONE ENCOUNTER
Marily  Pt sends mychart regarding approval for sinus surgery. Looks like you discussed at todays appt. Do we need to do any follow up?      Jun Hermosillo RN

## 2023-08-16 NOTE — PROGRESS NOTES
Answers submitted by the patient for this visit:  Patient Health Questionnaire (Submitted on 8/16/2023)  If you checked off any problems, how difficult have these problems made it for you to do your work, take care of things at home, or get along with other people?: Somewhat difficult  PHQ9 TOTAL SCORE: 4     SUBJECTIVE:   CC: Aury is an 23 year old who presents for preventive health visit.       8/16/2023     6:57 AM   Additional Questions   Roomed by april       Healthy Habits:     Getting at least 3 servings of Calcium per day:  Yes    Bi-annual eye exam:  Yes    Dental care twice a year:  NO    Sleep apnea or symptoms of sleep apnea:  None    Diet:  Regular (no restrictions)    Frequency of exercise:  2-3 days/week    Duration of exercise:  15-30 minutes    Taking medications regularly:  Yes    Medication side effects:  None    Additional concerns today:  Yes (Has not been sleeping well. Has had palpitations- holter monitor is on. Is having sinus surgery next month.)      Was seen in ER, feeling well now, palpitations resolved, still wearing Holter monitor    Also, states she need my approval for insurance for her sinus surgery, since she is restricted patient. I think we will need to send approval form to her insurance company     Have you ever done Advance Care Planning? (For example, a Health Directive, POLST, or a discussion with a medical provider or your loved ones about your wishes): No, advance care planning information given to patient to review.  Patient declined advance care planning discussion at this time.    Social History     Tobacco Use    Smoking status: Never    Smokeless tobacco: Never   Substance Use Topics    Alcohol use: Yes     Comment: Very occasionally             8/11/2023     5:21 PM   Alcohol Use   Prescreen: >3 drinks/day or >7 drinks/week? No     Reviewed orders with patient.  Reviewed health maintenance and updated orders accordingly - Yes  Labs reviewed in EPIC    Breast  "Cancer Screening:        History of abnormal Pap smear: NO - age 21-29 PAP every 3 years recommended      5/2/2022     4:43 PM   PAP / HPV   PAP Negative for Intraepithelial Lesion or Malignancy (NILM)      Reviewed and updated as needed this visit by clinical staff   Tobacco  Allergies  Meds              Reviewed and updated as needed this visit by Provider                     Review of Systems   Constitutional:  Negative for chills and fever.   HENT:  Negative for congestion, ear pain, hearing loss and sore throat.    Eyes:  Negative for pain and visual disturbance.   Respiratory:  Negative for cough and shortness of breath.    Cardiovascular:  Positive for palpitations. Negative for chest pain and peripheral edema.   Gastrointestinal:  Negative for abdominal pain, constipation, diarrhea, heartburn, hematochezia and nausea.   Genitourinary:  Negative for dysuria, frequency, genital sores, hematuria and urgency.   Musculoskeletal:  Negative for arthralgias, joint swelling and myalgias.   Skin:  Negative for rash.   Neurological:  Negative for dizziness, weakness, headaches and paresthesias.   Psychiatric/Behavioral:  Negative for mood changes. The patient is not nervous/anxious.           OBJECTIVE:   /70 (BP Location: Left arm, Patient Position: Sitting, Cuff Size: Adult Regular)   Pulse 83   Temp 97.6  F (36.4  C) (Tympanic)   Resp 16   Ht 1.626 m (5' 4\")   Wt 50.9 kg (112 lb 3.2 oz)   SpO2 99%   BMI 19.26 kg/m    Physical Exam  GENERAL: healthy, alert and no distress  EYES: Eyes grossly normal to inspection, PERRL and conjunctivae and sclerae normal  HENT: ear canals and TM's normal, nose and mouth without ulcers or lesions  NECK: no adenopathy, no asymmetry, masses, or scars and thyroid normal to palpation  RESP: lungs clear to auscultation - no rales, rhonchi or wheezes  CV: regular rate and rhythm, normal S1 S2, no S3 or S4, no murmur, click or rub, no peripheral edema and peripheral pulses " strong  MS: no gross musculoskeletal defects noted, no edema  SKIN: no suspicious lesions or rashes  NEURO: Normal strength and tone, mentation intact and speech normal  PSYCH: mentation appears normal, affect normal/bright        ASSESSMENT/PLAN:   (Z00.00) Annual physical exam  (primary encounter diagnosis)  Comment: normal exam, reviewed recent labs, all questions answered   Plan: continue to follow healthy lifestyle.  Holter study is still in process, advised patient that it might take about 2 weeks for results          COUNSELING:  Reviewed preventive health counseling, as reflected in patient instructions       Regular exercise       Healthy diet/nutrition        She reports that she has never smoked. She has never used smokeless tobacco.          CIRO Kline [facility]

## 2023-08-16 NOTE — TELEPHONE ENCOUNTER
Contacted patient to see if form is Managed Care Referral or to have his insurance fax needed form to our office.

## 2023-09-06 ENCOUNTER — APPOINTMENT (OUTPATIENT)
Dept: CT IMAGING | Facility: CLINIC | Age: 23
End: 2023-09-06
Attending: FAMILY MEDICINE
Payer: COMMERCIAL

## 2023-09-06 ENCOUNTER — HOSPITAL ENCOUNTER (EMERGENCY)
Facility: CLINIC | Age: 23
Discharge: HOME | End: 2023-09-06
Attending: FAMILY MEDICINE | Admitting: FAMILY MEDICINE
Payer: COMMERCIAL

## 2023-09-06 ENCOUNTER — MYC MEDICAL ADVICE (OUTPATIENT)
Dept: FAMILY MEDICINE | Facility: CLINIC | Age: 23
End: 2023-09-06

## 2023-09-06 VITALS
TEMPERATURE: 98 F | HEART RATE: 77 BPM | SYSTOLIC BLOOD PRESSURE: 107 MMHG | OXYGEN SATURATION: 100 % | BODY MASS INDEX: 19.16 KG/M2 | HEIGHT: 65 IN | DIASTOLIC BLOOD PRESSURE: 73 MMHG | WEIGHT: 115 LBS | RESPIRATION RATE: 18 BRPM

## 2023-09-06 DIAGNOSIS — N20.1 RIGHT URETERAL STONE: ICD-10-CM

## 2023-09-06 DIAGNOSIS — N20.0 NEPHROLITHIASIS: ICD-10-CM

## 2023-09-06 DIAGNOSIS — N20.1 RIGHT URETERAL STONE: Primary | ICD-10-CM

## 2023-09-06 LAB
ALBUMIN UR-MCNC: 100 MG/DL
APPEARANCE UR: ABNORMAL
BACTERIA #/AREA URNS HPF: ABNORMAL /HPF
BILIRUB UR QL STRIP: NEGATIVE
COLOR UR AUTO: YELLOW
GLUCOSE UR STRIP-MCNC: NEGATIVE MG/DL
HCG UR QL: NEGATIVE
HGB UR QL STRIP: ABNORMAL
KETONES UR STRIP-MCNC: NEGATIVE MG/DL
LEUKOCYTE ESTERASE UR QL STRIP: NEGATIVE
MUCOUS THREADS #/AREA URNS LPF: PRESENT /LPF
NITRATE UR QL: NEGATIVE
PH UR STRIP: 5 [PH] (ref 5–7)
RBC URINE: >182 /HPF
SP GR UR STRIP: 1.01 (ref 1–1.03)
SQUAMOUS EPITHELIAL: 3 /HPF
UROBILINOGEN UR STRIP-MCNC: NORMAL MG/DL
WBC URINE: 7 /HPF

## 2023-09-06 PROCEDURE — 76705 ECHO EXAM OF ABDOMEN: CPT | Mod: 26 | Performed by: FAMILY MEDICINE

## 2023-09-06 PROCEDURE — 81025 URINE PREGNANCY TEST: CPT | Performed by: FAMILY MEDICINE

## 2023-09-06 PROCEDURE — 76705 ECHO EXAM OF ABDOMEN: CPT | Performed by: FAMILY MEDICINE

## 2023-09-06 PROCEDURE — 99284 EMERGENCY DEPT VISIT MOD MDM: CPT | Performed by: FAMILY MEDICINE

## 2023-09-06 PROCEDURE — 250N000013 HC RX MED GY IP 250 OP 250 PS 637: Performed by: FAMILY MEDICINE

## 2023-09-06 PROCEDURE — 99285 EMERGENCY DEPT VISIT HI MDM: CPT | Mod: 25 | Performed by: FAMILY MEDICINE

## 2023-09-06 PROCEDURE — 81001 URINALYSIS AUTO W/SCOPE: CPT | Performed by: EMERGENCY MEDICINE

## 2023-09-06 PROCEDURE — 74176 CT ABD & PELVIS W/O CONTRAST: CPT

## 2023-09-06 PROCEDURE — 81001 URINALYSIS AUTO W/SCOPE: CPT | Performed by: FAMILY MEDICINE

## 2023-09-06 RX ORDER — OXYCODONE HYDROCHLORIDE 5 MG/1
5 TABLET ORAL EVERY 6 HOURS PRN
Qty: 6 TABLET | Refills: 0 | Status: SHIPPED | OUTPATIENT
Start: 2023-09-06 | End: 2023-09-06

## 2023-09-06 RX ORDER — ONDANSETRON 4 MG/1
4 TABLET, ORALLY DISINTEGRATING ORAL EVERY 8 HOURS PRN
Qty: 10 TABLET | Refills: 0 | Status: SHIPPED | OUTPATIENT
Start: 2023-09-06 | End: 2023-09-06

## 2023-09-06 RX ORDER — TAMSULOSIN HYDROCHLORIDE 0.4 MG/1
0.4 CAPSULE ORAL DAILY
Qty: 10 CAPSULE | Refills: 0 | Status: SHIPPED | OUTPATIENT
Start: 2023-09-06 | End: 2023-09-06

## 2023-09-06 RX ADMIN — IBUPROFEN 600 MG: 400 TABLET ORAL at 17:45

## 2023-09-06 ASSESSMENT — ENCOUNTER SYMPTOMS
ABDOMINAL PAIN: 0
WHEEZING: 0
CONSTIPATION: 0
HEADACHES: 0
DIARRHEA: 0
PALPITATIONS: 0
FREQUENCY: 0
FEVER: 0
NAUSEA: 0
DIAPHORESIS: 0
FLANK PAIN: 1
SORE THROAT: 0
COUGH: 0
BLOOD IN STOOL: 0
VOMITING: 0
SINUS PRESSURE: 0
CHILLS: 0
SHORTNESS OF BREATH: 0
DYSURIA: 0

## 2023-09-06 ASSESSMENT — ACTIVITIES OF DAILY LIVING (ADL): ADLS_ACUITY_SCORE: 35

## 2023-09-06 NOTE — ED PROVIDER NOTES
History     Chief Complaint   Patient presents with     Flank Pain     HPI  Aury Cervantes is a 23 year old female who presents with a history of ureteral stone onset earlier today of flank pain on the right side.  Has felt more diaphoretic today and pain has been coming in waves all day long took ibuprofen earlier today about 7 hours ago.  Denies current pregnancy but not on contraception.  No dysuria urgency frequency.  Did have hematuria today.  No associated fever.  Has no trauma.  Pain has been mostly at the flank.  No abdominal pain with this.  Normal stools.  No respiratory symptoms other illness.    Allergies:  Allergies   Allergen Reactions     Sulfa Antibiotics Other (See Comments)     Both parents are allergic         Problem List:    Patient Active Problem List    Diagnosis Date Noted     Chronic rhinitis 07/25/2023     Priority: Medium     Silent sinus syndrome 07/25/2023     Priority: Medium     Nasal obstruction 12/22/2022     Priority: Medium     Added automatically from request for surgery [id number]       Nasal septal deviation 12/22/2022     Priority: Medium     Added automatically from request for surgery [id number]       Hypertrophy of both inferior nasal turbinates 12/22/2022     Priority: Medium     Added automatically from request for surgery [id number]       Dysmenorrhea 05/16/2022     Priority: Medium     Female infertility 05/16/2022     Priority: Medium     Iron deficiency anemia due to chronic blood loss 02/07/2021     Priority: Medium     Menorrhagia with regular cycle 02/07/2021     Priority: Medium     Anaphylaxis, subsequent encounter 12/04/2018     Priority: Medium     Rhinoconjunctivitis 12/04/2018     Priority: Medium     S/P tonsillectomy and adenoidectomy 03/21/2018     Priority: Medium     Major depressive disorder, recurrent episode, severe (H) 11/19/2014     Priority: Medium        Past Medical History:    Past Medical History:   Diagnosis Date     Anemia      Depression       Nexplanon in place        Past Surgical History:    Past Surgical History:   Procedure Laterality Date     DILATION AND CURETTAGE, OPERATIVE HYSTEROSCOPY, COMBINED N/A 4/6/2023    Procedure: HYSTEROSCOPY, WITH DILATION AND CURETTAGE OF UTERUS, polypectomy;  Surgeon: Stefanie Mcnair MD;  Location: [facility]     SURGICAL HISTORY OF -       PE tubes     TONSILLECTOMY, ADENOIDECTOMY ADULT, COMBINED Bilateral 3/19/2018    Procedure: COMBINED TONSILLECTOMY, ADENOIDECTOMY ADULT;  Bilateral Adenotonsillectomy;  Surgeon: Kevin Arias MD;  Location: [facility] OR       Family History:    Family History   Problem Relation Age of Onset     Depression Mother      Depression Father      Depression Maternal Grandmother      Cancer Maternal Grandmother      Lung Cancer Maternal Grandmother      Diabetes Maternal Grandfather      Heart Disease Maternal Grandfather      Hypertension Maternal Grandfather      Depression Paternal Grandmother         bipolar     Depression Paternal Grandfather         bipolar       Social History:  Marital Status:  Single [1]  Social History     Tobacco Use     Smoking status: Never     Smokeless tobacco: Never   Vaping Use     Vaping Use: Never used   Substance Use Topics     Alcohol use: Yes     Comment: Very occasionally     Drug use: Never        Medications:    albuterol (PROAIR HFA/PROVENTIL HFA/VENTOLIN HFA) 108 (90 Base) MCG/ACT inhaler  EPINEPHrine (ADRENACLICK) 0.3 MG/0.3ML injection 2-pack  letrozole (FEMARA) 2.5 MG tablet  levothyroxine (SYNTHROID/LEVOTHROID) 75 MCG tablet  Prenatal Vit-Fe Fumarate-FA (PRENATAL MULTIVITAMIN W/IRON) 27-0.8 MG tablet  progesterone (PROMETRIUM) 100 MG capsule          Review of Systems   Constitutional:  Negative for chills, diaphoresis and fever.   HENT:  Negative for ear pain, sinus pressure and sore throat.    Eyes:  Negative for visual disturbance.   Respiratory:  Negative for cough, shortness of breath and wheezing.    Cardiovascular:  Negative for chest  "pain and palpitations.   Gastrointestinal:  Negative for abdominal pain, blood in stool, constipation, diarrhea, nausea and vomiting.   Genitourinary:  Positive for flank pain. Negative for dysuria, frequency and urgency.   Skin:  Negative for rash.   Neurological:  Negative for headaches.   All other systems reviewed and are negative.      Physical Exam   BP: 107/73  Pulse: 77  Temp: 98  F (36.7  C)  Resp: 18  Height: 165.1 cm (5' 5\")  Weight: 52.2 kg (115 lb)  SpO2: 100 %      Physical Exam  Constitutional:       General: She is in acute distress.      Appearance: She is not diaphoretic.   HENT:      Head: Atraumatic.   Eyes:      Conjunctiva/sclera: Conjunctivae normal.   Cardiovascular:      Rate and Rhythm: Normal rate and regular rhythm.      Heart sounds: No murmur heard.  Pulmonary:      Effort: Pulmonary effort is normal. No respiratory distress.      Breath sounds: Normal breath sounds. No stridor. No wheezing or rhonchi.   Abdominal:      General: There is no distension.      Palpations: There is no mass.      Tenderness: There is no abdominal tenderness. There is right CVA tenderness. There is no left CVA tenderness or guarding.   Musculoskeletal:      Cervical back: Neck supple.      Right lower leg: No edema.      Left lower leg: No edema.   Skin:     Coloration: Skin is not pale.      Findings: No rash.   Neurological:      General: No focal deficit present.      Mental Status: She is alert.      Motor: No weakness.         ED Course                 Procedures              Critical Care time:  none               Results for orders placed or performed during the hospital encounter of 09/06/23 (from the past 24 hour(s))   UA with Microscopic reflex to Culture    Specimen: Urine, Clean Catch   Result Value Ref Range    Color Urine Yellow Colorless, Straw, Light Yellow, Yellow    Appearance Urine Cloudy (A) Clear    Glucose Urine Negative Negative mg/dL    Bilirubin Urine Negative Negative    Ketones " Urine Negative Negative mg/dL    Specific Gravity Urine 1.013 1.003 - 1.035    Blood Urine Large (A) Negative    pH Urine 5.0 5.0 - 7.0    Protein Albumin Urine 100 (A) Negative mg/dL    Urobilinogen Urine Normal Normal, 2.0 mg/dL    Nitrite Urine Negative Negative    Leukocyte Esterase Urine Negative Negative    Bacteria Urine Few (A) None Seen /HPF    Mucus Urine Present (A) None Seen /LPF    RBC Urine >182 (H) <=2 /HPF    WBC Urine 7 (H) <=5 /HPF    Squamous Epithelials Urine 3 (H) <=1 /HPF    Narrative    Urine Culture not indicated   HCG qualitative urine   Result Value Ref Range    hCG Urine Qualitative Negative Negative   POC US ABDOMEN LIMITED    Impression    [facility] Procedure Note    Limited Bedside ED Renal Ultrasound:    PERFORMED BY: Dr. Roc Gregg MD  INDICATIONS:  Flank Pain  PROBE: Low frequency convex probe  BODY LOCATION:  Abdomen  FINDINGS:  The ultrasound was performed with longitudinal  views.   Right Kidney:   Hydronephrosis:  None   Renal cyst:  None  Left Kidney:   Hydronephrosis:  None   Renal cyst:  None  INTERPRETATION:  The evaluation of the kidneys was normal without evidence of hydronephrosis or cysts.  IMAGE DOCUMENTATION: Images were archived to PACs system.       Abd/pelvis CT - no contrast - Stone Protocol    Narrative    EXAM: CT ABDOMEN PELVIS W/O CONTRAST  LOCATION: [facility]  DATE: 9/6/2023    INDICATION: Right flank pain.  COMPARISON: None.  TECHNIQUE: CT scan of the abdomen and pelvis was performed without IV contrast. Multiplanar reformats were obtained. Dose reduction techniques were used.  CONTRAST: None.    FINDINGS:   LOWER CHEST: Normal.    HEPATOBILIARY: Normal.    PANCREAS: Normal.    SPLEEN: Normal.    ADRENAL GLANDS: Normal.    KIDNEYS/BLADDER: Mild right hydronephrosis secondary to a 4 x 4 x 3 mm calculus at the right UPJ. Additional 1 - 2 mm calcifications x2 mid right kidney and x1 mid left kidney. The bladder is  negative.    BOWEL: No obstruction or inflammatory change. Normal appendix.    LYMPH NODES: No lymphadenopathy.    VASCULATURE: No aortoiliac aneurysm.    PELVIC ORGANS: Normal uterus and ovaries.    MUSCULOSKELETAL: No suspicious osseous lesions.      Impression    IMPRESSION:   1.  Mild right hydronephrosis secondary to a 4 mm calculus right UPJ.  2.  Additional 1 - 2 mm renal calcifications x2 mid right kidney and x1 mid left kidney.         Medications   ibuprofen (ADVIL/MOTRIN) tablet 600 mg (600 mg Oral $Given 9/6/23 7035)       Assessments & Plan (with Medical Decision Making)     MDM: Aury Cervantes is a 23 year old female presents with right-sided flank pain no urinary tract symptoms other than hematuria flank pain right side prior ureteral stone denies current pregnancy negative pregnancy test.  Discussed empiric treatment for stone versus imaging.  I did perform bedside ultrasound does not show hydro.  Still highly suspicious for stone at the age 23 we discussed the risk of radiation exposure.  She would like to go forward for definitive imaging understands potential risk of radiation.  Ordered this along with ibuprofen orally.    We discussed results of the imaging.  There is a stone that should pass now 4 mm at the UPJ.  Some local obstruction.  Also with intrarenal stones.  Discussed symptomatic management.  No sign of UTI.  Precautions given for return.    Her medication prescriptions are limited to single provider.  Unfortunately that provider is not available today.  I did speak with Dr. Diaz who is on-call for their group - she is attempting to contact primary.   I have sent medications to the pharmacy and these will need to be approved by her primary provider.  patient is aware.      PDMP Review       Value Time User    State PDMP site checked  Yes 9/7/2023  2:44 AM Roc Gregg MD            I have reviewed the nursing notes.      I have reviewed the findings, diagnosis, plan and need for  follow up with the patient.           Medical Decision Making  The patient's presentation was of moderate complexity (an acute illness with systemic symptoms).    The patient's evaluation involved:  ordering and/or review of 3+ test(s) in this encounter (see separate area of note for details)    The patient's management necessitated moderate risk (prescription drug management including medications given in the ED).        New Prescriptions    No medications on file       Final diagnoses:   Right ureteral stone - take ibuprofen 600 mg every 6 hours.  take tylenol 1000 mg every 6 hours as needed.  take oxycodone for breakthrough.  take zofran as needed for nausea, vomiting.  take flomax daily until stone passesd.  return immed for signs infection,.  also incidentally on CT - extensive stool - the oxycodone if taken alex.l be constiopation - recommend bowel regimen   Nephrolithiasis - thi is your second symptomatic stone and you have 2 additional stones forming on the kidneys - recommend straining urine and follow-up urology.        9/6/2023   [facility] EMERGENCY DEPT       Roc Gregg MD  09/07/23 0246

## 2023-09-06 NOTE — ED TRIAGE NOTES
Pt drove herself here today with concerns for right flank pain, possible kidney infection. Pt has had stones before, and this feels somewhat similar.      Triage Assessment       Row Name 09/06/23 1113       Triage Assessment (Adult)    Airway WDL WDL       Respiratory WDL    Respiratory WDL WDL       Skin Circulation/Temperature WDL    Skin Circulation/Temperature WDL WDL       Cardiac WDL    Cardiac WDL WDL       Peripheral/Neurovascular WDL    Peripheral Neurovascular WDL WDL       Cognitive/Neuro/Behavioral WDL    Cognitive/Neuro/Behavioral WDL WDL

## 2023-09-07 ENCOUNTER — PATIENT OUTREACH (OUTPATIENT)
Dept: CARE COORDINATION | Facility: CLINIC | Age: 23
End: 2023-09-07
Payer: COMMERCIAL

## 2023-09-07 RX ORDER — ONDANSETRON 4 MG/1
4 TABLET, ORALLY DISINTEGRATING ORAL EVERY 8 HOURS PRN
Qty: 10 TABLET | Refills: 0 | Status: SHIPPED | OUTPATIENT
Start: 2023-09-07 | End: 2023-09-30

## 2023-09-07 RX ORDER — TAMSULOSIN HYDROCHLORIDE 0.4 MG/1
0.4 CAPSULE ORAL DAILY
Qty: 14 CAPSULE | Refills: 0 | Status: SHIPPED | OUTPATIENT
Start: 2023-09-07 | End: 2023-10-10

## 2023-09-07 RX ORDER — OXYCODONE HYDROCHLORIDE 5 MG/1
5 TABLET ORAL EVERY 6 HOURS PRN
Qty: 6 TABLET | Refills: 0 | Status: ON HOLD | OUTPATIENT
Start: 2023-09-07 | End: 2023-09-10

## 2023-09-07 NOTE — LETTER
Aury Cervantes  [address] [address]    Dear Aury Cervantes,      I am a team member within the [facility] with [facility]. I recently contacted you to ensure you were doing well following a recent visit within our health system. I also wanted to take this chance to introduce Clinic Care Coordination should you have any interest in this program in the future.    Below is a description of Clinic Care Coordination and how this team can further assist you:       The Clinic Care Coordination team is made up of a Registered Nurse, , Financial Resource Worker, and a Community Health Worker who understand and can help navigate the health care system. The goal of clinic care coordination is to help you manage your health, improve access to care, and achieve optimal health outcomes. They work alongside your provider to assist you in determining your health and social needs, obtain health care and community resources, and provide you with necessary information and education. Clinic Care Coordination can work with you through any barriers and develop a care plan that helps coordinate and strengthen the relationship between you and your care team.    If you wish to connect with the Clinic Care Coordination Team, please let your [facility] Primary Care Provider or Clinic Care Team know and they can place a referral. The Clinic Care Coordination team will then reach out by phone to further support you.    We are focused on providing you with the highest-quality healthcare experience possible.    Sincerely,   Your care team with [facility]

## 2023-09-07 NOTE — DISCHARGE INSTRUCTIONS
ICD-10-CM    1. Right ureteral stone  N20.1     take ibuprofen 600 mg every 6 hours.  take tylenol 1000 mg every 6 hours as needed.  take oxycodone for breakthrough.  take zofran as needed for nausea, vomiting.  take flomax daily until stone passesd.  return immed for signs infection,.  also incidentally on CT - extensive stool - the oxycodone if taken alex.l be constiopation - recommend bowel regimen      2. Nephrolithiasis  N20.0     thi is your second symptomatic stone and you have 2 additional stones forming on the kidneys - recommend straining urine and follow-up urology.         Bowel Regimen for Constipation:  Maintain 64 oz clear fluid per day indefinitely  Start with Fleets Enema x1 and hold for as long as possible (20-30 minutes)  Next take magnesium citrate 300 ml (one bottle) and stay close to bathroom for 6 hours  Next, on the next day start miralax 1 capful in 8 oz fluid daily for 7 days  Next, when better and while maintaining 64 oz fluid pre day, start fiber supplement, Citrucel or metamucil daily.

## 2023-09-07 NOTE — PROGRESS NOTES
Clinic Care Coordination Contact  Community Health Worker Initial Outreach    CHW Initial Information Gathering:  Referral Source: ED Follow-Up  CHW Additional Questions  MyChart active?: Yes    Patient accepts CC: No, patient declined at this time. Patient will be sent Care Coordination introduction letter for future reference.     Albina Jackson  Community Health Worker  [facility] Resource [facility]

## 2023-09-07 NOTE — PROGRESS NOTES
"Called by ER because patient is restricted to [facility] for all prescriptions. She has a right ureteral stone. Dr. Gregg from ER was unable to send prescriptions (flomax, zofan oxycodone). I tried as the on-call for [facility]. Pharmacy unable to fill unless signed by [facility]. I tried sending in EPIC with [facility] as prescribing provider and me as authorizing provider and this also did not go through.  I called nurse line and the only number they had listed for [facility] was her home number. That went to a voice mail that said \"this voicemail is not yet set up\". Unable to leave message or reach [facility] after hours. Notified Dr. Gregg. He has discussed alternate plan of ibuprofen and acetaminophen with patient. Will route to [facility] care team to address in AM.  "

## 2023-09-09 ENCOUNTER — HOSPITAL ENCOUNTER (OUTPATIENT)
Facility: CLINIC | Age: 23
Setting detail: OBSERVATION
Discharge: HOME | End: 2023-09-10
Attending: STUDENT IN AN ORGANIZED HEALTH CARE EDUCATION/TRAINING PROGRAM | Admitting: FAMILY MEDICINE
Payer: COMMERCIAL

## 2023-09-09 DIAGNOSIS — N20.0 NEPHROLITHIASIS: Primary | ICD-10-CM

## 2023-09-09 DIAGNOSIS — N23 URETERAL COLIC: ICD-10-CM

## 2023-09-09 DIAGNOSIS — N20.1 RIGHT URETERAL STONE: ICD-10-CM

## 2023-09-09 LAB
ALBUMIN UR-MCNC: 30 MG/DL
ANION GAP SERPL CALCULATED.3IONS-SCNC: 13 MMOL/L (ref 7–15)
APPEARANCE UR: ABNORMAL
BASOPHILS # BLD AUTO: 0 10E3/UL (ref 0–0.2)
BASOPHILS NFR BLD AUTO: 1 %
BILIRUB UR QL STRIP: NEGATIVE
BUN SERPL-MCNC: 11.5 MG/DL (ref 6–20)
CALCIUM SERPL-MCNC: 9.7 MG/DL (ref 8.6–10)
CAOX CRY #/AREA URNS HPF: ABNORMAL /HPF
CHLORIDE SERPL-SCNC: 105 MMOL/L (ref 98–107)
COLOR UR AUTO: YELLOW
CREAT SERPL-MCNC: 0.75 MG/DL (ref 0.51–0.95)
DEPRECATED HCO3 PLAS-SCNC: 21 MMOL/L (ref 22–29)
EGFRCR SERPLBLD CKD-EPI 2021: >90 ML/MIN/1.73M2
EOSINOPHIL # BLD AUTO: 0.1 10E3/UL (ref 0–0.7)
EOSINOPHIL NFR BLD AUTO: 1 %
ERYTHROCYTE [DISTWIDTH] IN BLOOD BY AUTOMATED COUNT: 12.6 % (ref 10–15)
GLUCOSE SERPL-MCNC: 96 MG/DL (ref 70–99)
GLUCOSE UR STRIP-MCNC: NEGATIVE MG/DL
HCT VFR BLD AUTO: 37.2 % (ref 35–47)
HGB BLD-MCNC: 12.1 G/DL (ref 11.7–15.7)
HGB UR QL STRIP: ABNORMAL
HOLD SPECIMEN: NORMAL
HOLD SPECIMEN: NORMAL
HYALINE CASTS: 3 /LPF
IMM GRANULOCYTES # BLD: 0 10E3/UL
IMM GRANULOCYTES NFR BLD: 0 %
KETONES UR STRIP-MCNC: 5 MG/DL
LEUKOCYTE ESTERASE UR QL STRIP: NEGATIVE
LYMPHOCYTES # BLD AUTO: 1.1 10E3/UL (ref 0.8–5.3)
LYMPHOCYTES NFR BLD AUTO: 14 %
MCH RBC QN AUTO: 25.7 PG (ref 26.5–33)
MCHC RBC AUTO-ENTMCNC: 32.5 G/DL (ref 31.5–36.5)
MCV RBC AUTO: 79 FL (ref 78–100)
MONOCYTES # BLD AUTO: 0.5 10E3/UL (ref 0–1.3)
MONOCYTES NFR BLD AUTO: 6 %
MUCOUS THREADS #/AREA URNS LPF: PRESENT /LPF
NEUTROPHILS # BLD AUTO: 6.1 10E3/UL (ref 1.6–8.3)
NEUTROPHILS NFR BLD AUTO: 78 %
NITRATE UR QL: NEGATIVE
NRBC # BLD AUTO: 0 10E3/UL
NRBC BLD AUTO-RTO: 0 /100
PH UR STRIP: 5 [PH] (ref 5–7)
PLATELET # BLD AUTO: 175 10E3/UL (ref 150–450)
POTASSIUM SERPL-SCNC: 3.5 MMOL/L (ref 3.4–5.3)
RBC # BLD AUTO: 4.7 10E6/UL (ref 3.8–5.2)
RBC URINE: >182 /HPF
SODIUM SERPL-SCNC: 139 MMOL/L (ref 136–145)
SP GR UR STRIP: 1.03 (ref 1–1.03)
SQUAMOUS EPITHELIAL: 2 /HPF
UROBILINOGEN UR STRIP-MCNC: NORMAL MG/DL
WBC # BLD AUTO: 7.8 10E3/UL (ref 4–11)
WBC URINE: 3 /HPF

## 2023-09-09 PROCEDURE — 258N000003 HC RX IP 258 OP 636: Performed by: STUDENT IN AN ORGANIZED HEALTH CARE EDUCATION/TRAINING PROGRAM

## 2023-09-09 PROCEDURE — 85025 COMPLETE CBC W/AUTO DIFF WBC: CPT | Performed by: STUDENT IN AN ORGANIZED HEALTH CARE EDUCATION/TRAINING PROGRAM

## 2023-09-09 PROCEDURE — 99285 EMERGENCY DEPT VISIT HI MDM: CPT | Mod: 25 | Performed by: STUDENT IN AN ORGANIZED HEALTH CARE EDUCATION/TRAINING PROGRAM

## 2023-09-09 PROCEDURE — 36415 COLL VENOUS BLD VENIPUNCTURE: CPT | Performed by: STUDENT IN AN ORGANIZED HEALTH CARE EDUCATION/TRAINING PROGRAM

## 2023-09-09 PROCEDURE — 96374 THER/PROPH/DIAG INJ IV PUSH: CPT | Performed by: STUDENT IN AN ORGANIZED HEALTH CARE EDUCATION/TRAINING PROGRAM

## 2023-09-09 PROCEDURE — 99285 EMERGENCY DEPT VISIT HI MDM: CPT | Performed by: STUDENT IN AN ORGANIZED HEALTH CARE EDUCATION/TRAINING PROGRAM

## 2023-09-09 PROCEDURE — 96361 HYDRATE IV INFUSION ADD-ON: CPT

## 2023-09-09 PROCEDURE — 96376 TX/PRO/DX INJ SAME DRUG ADON: CPT | Performed by: STUDENT IN AN ORGANIZED HEALTH CARE EDUCATION/TRAINING PROGRAM

## 2023-09-09 PROCEDURE — G0378 HOSPITAL OBSERVATION PER HR: HCPCS

## 2023-09-09 PROCEDURE — 96361 HYDRATE IV INFUSION ADD-ON: CPT | Performed by: STUDENT IN AN ORGANIZED HEALTH CARE EDUCATION/TRAINING PROGRAM

## 2023-09-09 PROCEDURE — 80048 BASIC METABOLIC PNL TOTAL CA: CPT | Performed by: STUDENT IN AN ORGANIZED HEALTH CARE EDUCATION/TRAINING PROGRAM

## 2023-09-09 PROCEDURE — 250N000011 HC RX IP 250 OP 636: Mod: JZ | Performed by: STUDENT IN AN ORGANIZED HEALTH CARE EDUCATION/TRAINING PROGRAM

## 2023-09-09 PROCEDURE — 96375 TX/PRO/DX INJ NEW DRUG ADDON: CPT | Performed by: STUDENT IN AN ORGANIZED HEALTH CARE EDUCATION/TRAINING PROGRAM

## 2023-09-09 PROCEDURE — 81001 URINALYSIS AUTO W/SCOPE: CPT | Performed by: STUDENT IN AN ORGANIZED HEALTH CARE EDUCATION/TRAINING PROGRAM

## 2023-09-09 RX ORDER — HYDROMORPHONE HYDROCHLORIDE 1 MG/ML
0.5 INJECTION, SOLUTION INTRAMUSCULAR; INTRAVENOUS; SUBCUTANEOUS
Status: COMPLETED | OUTPATIENT
Start: 2023-09-09 | End: 2023-09-09

## 2023-09-09 RX ORDER — ONDANSETRON 2 MG/ML
4 INJECTION INTRAMUSCULAR; INTRAVENOUS ONCE
Status: COMPLETED | OUTPATIENT
Start: 2023-09-09 | End: 2023-09-09

## 2023-09-09 RX ORDER — DIMENHYDRINATE 50 MG/ML
25 INJECTION, SOLUTION INTRAMUSCULAR; INTRAVENOUS ONCE
Status: COMPLETED | OUTPATIENT
Start: 2023-09-09 | End: 2023-09-09

## 2023-09-09 RX ORDER — KETOROLAC TROMETHAMINE 15 MG/ML
15 INJECTION, SOLUTION INTRAMUSCULAR; INTRAVENOUS ONCE
Status: COMPLETED | OUTPATIENT
Start: 2023-09-09 | End: 2023-09-09

## 2023-09-09 RX ORDER — FENTANYL CITRATE 50 UG/ML
50 INJECTION, SOLUTION INTRAMUSCULAR; INTRAVENOUS ONCE
Status: COMPLETED | OUTPATIENT
Start: 2023-09-09 | End: 2023-09-09

## 2023-09-09 RX ADMIN — SODIUM CHLORIDE 1000 ML: 9 INJECTION, SOLUTION INTRAVENOUS at 17:54

## 2023-09-09 RX ADMIN — PROCHLORPERAZINE EDISYLATE 10 MG: 5 INJECTION INTRAMUSCULAR; INTRAVENOUS at 21:24

## 2023-09-09 RX ADMIN — KETOROLAC TROMETHAMINE 15 MG: 15 INJECTION, SOLUTION INTRAMUSCULAR; INTRAVENOUS at 19:23

## 2023-09-09 RX ADMIN — ONDANSETRON 4 MG: 2 INJECTION INTRAMUSCULAR; INTRAVENOUS at 19:30

## 2023-09-09 RX ADMIN — HYDROMORPHONE HYDROCHLORIDE 0.5 MG: 1 INJECTION, SOLUTION INTRAMUSCULAR; INTRAVENOUS; SUBCUTANEOUS at 20:31

## 2023-09-09 RX ADMIN — DIMENHYDRINATE 25 MG: 50 INJECTION, SOLUTION INTRAMUSCULAR; INTRAVENOUS at 22:45

## 2023-09-09 RX ADMIN — HYDROMORPHONE HYDROCHLORIDE 0.5 MG: 1 INJECTION, SOLUTION INTRAMUSCULAR; INTRAVENOUS; SUBCUTANEOUS at 18:27

## 2023-09-09 RX ADMIN — ONDANSETRON 4 MG: 2 INJECTION INTRAMUSCULAR; INTRAVENOUS at 17:46

## 2023-09-09 RX ADMIN — FENTANYL CITRATE 50 MCG: 50 INJECTION, SOLUTION INTRAMUSCULAR; INTRAVENOUS at 17:55

## 2023-09-09 ASSESSMENT — ACTIVITIES OF DAILY LIVING (ADL)
ADLS_ACUITY_SCORE: 35

## 2023-09-09 NOTE — ED TRIAGE NOTES
Pt here with right flank pain, severe for 2 hours, known kidney stones     Triage Assessment       Row Name 09/09/23 8434       Triage Assessment (Adult)    Airway WDL WDL       Respiratory WDL    Respiratory WDL WDL       Skin Circulation/Temperature WDL    Skin Circulation/Temperature WDL WDL       Cardiac WDL    Cardiac WDL WDL       Peripheral/Neurovascular WDL    Peripheral Neurovascular WDL WDL       Cognitive/Neuro/Behavioral WDL    Cognitive/Neuro/Behavioral WDL WDL

## 2023-09-09 NOTE — ED PROVIDER NOTES
History     Chief Complaint   Patient presents with    Flank Pain     HPI  Aury Cervantes is a 23 year old female who has a history of ureteral stones and recent diagnosis 9/6 of a 4 mm-year-old stone on the right UPJ who presents to the emergency department with right flank pain.  Patient states that her symptoms became much more severe last 2 days, but became much more severe this afternoon.  She has been taking oxycodone without any relief of pain.  She states she took half a tablet twice and then a full tablet this afternoon without any improvement in pain.  She is also been taking Tylenol and ibuprofen.  She vomited once due to pain.  Denies fever or chills.  She has been able to urinate.  She denies dysuria.  She is also notes that the right flank pain is radiating to the right lower abdominal area and this is new today.  He denies chest pain or trouble breathing.  Denies issues with bowels.  She has never had surgery for stones before.  She is also been taking Zofran as needed and Flomax.    Allergies:  Allergies   Allergen Reactions    Sulfa Antibiotics Other (See Comments)     Both parents are allergic         Problem List:    Patient Active Problem List    Diagnosis Date Noted    Ureteral colic 09/09/2023     Priority: Medium    Chronic rhinitis 07/25/2023     Priority: Medium    Silent sinus syndrome 07/25/2023     Priority: Medium    Nasal obstruction 12/22/2022     Priority: Medium     Added automatically from request for surgery [id number]      Nasal septal deviation 12/22/2022     Priority: Medium     Added automatically from request for surgery [id number]      Hypertrophy of both inferior nasal turbinates 12/22/2022     Priority: Medium     Added automatically from request for surgery [id number]      Dysmenorrhea 05/16/2022     Priority: Medium    Female infertility 05/16/2022     Priority: Medium    Iron deficiency anemia due to chronic blood loss 02/07/2021     Priority: Medium    Menorrhagia with regular  cycle 02/07/2021     Priority: Medium    Anaphylaxis, subsequent encounter 12/04/2018     Priority: Medium    Rhinoconjunctivitis 12/04/2018     Priority: Medium    S/P tonsillectomy and adenoidectomy 03/21/2018     Priority: Medium    Major depressive disorder, recurrent episode, severe (H) 11/19/2014     Priority: Medium        Past Medical History:    Past Medical History:   Diagnosis Date    Anemia     Depression     Nexplanon in place        Past Surgical History:    Past Surgical History:   Procedure Laterality Date    DILATION AND CURETTAGE, OPERATIVE HYSTEROSCOPY, COMBINED N/A 4/6/2023    Procedure: HYSTEROSCOPY, WITH DILATION AND CURETTAGE OF UTERUS, polypectomy;  Surgeon: Stefanie Mcnair MD;  Location: [facility]    SURGICAL HISTORY OF -       PE tubes    TONSILLECTOMY, ADENOIDECTOMY ADULT, COMBINED Bilateral 3/19/2018    Procedure: COMBINED TONSILLECTOMY, ADENOIDECTOMY ADULT;  Bilateral Adenotonsillectomy;  Surgeon: Kevin Arias MD;  Location: [facility] OR       Family History:    Family History   Problem Relation Age of Onset    Depression Mother     Depression Father     Depression Maternal Grandmother     Cancer Maternal Grandmother     Lung Cancer Maternal Grandmother     Diabetes Maternal Grandfather     Heart Disease Maternal Grandfather     Hypertension Maternal Grandfather     Depression Paternal Grandmother         bipolar    Depression Paternal Grandfather         bipolar       Social History:  Marital Status:  Single [1]  Social History     Tobacco Use    Smoking status: Never    Smokeless tobacco: Never   Vaping Use    Vaping Use: Never used   Substance Use Topics    Alcohol use: Yes     Comment: Very occasionally    Drug use: Never        Medications:    albuterol (PROAIR HFA/PROVENTIL HFA/VENTOLIN HFA) 108 (90 Base) MCG/ACT inhaler  EPINEPHrine (ADRENACLICK) 0.3 MG/0.3ML injection 2-pack  letrozole (FEMARA) 2.5 MG tablet  levothyroxine (SYNTHROID/LEVOTHROID) 75 MCG tablet  ondansetron  (ZOFRAN ODT) 4 MG ODT tab  oxyCODONE (ROXICODONE) 5 MG tablet  Prenatal Vit-Fe Fumarate-FA (PRENATAL MULTIVITAMIN W/IRON) 27-0.8 MG tablet  progesterone (PROMETRIUM) 100 MG capsule  tamsulosin (FLOMAX) 0.4 MG capsule          Review of Systems  See HPI  Physical Exam   BP: 106/75  Pulse: 80  Temp: 98.1  F (36.7  C)  Weight: 52.2 kg (115 lb)  SpO2: 100 %      Physical Exam  BP 94/65   Pulse 89   Temp 98.1  F (36.7  C) (Tympanic)   Wt 52.2 kg (115 lb)   LMP 08/10/2023 (Exact Date)   SpO2 97%   BMI 19.14 kg/m    General: In acute distress, but alert and answering questions  Head: atraumatic  Nose: no rhinorrhea or epistaxis  Ears: no external auditory canal discharge or bleeding.    Eyes: Sclera nonicteric. Conjunctiva noninjected. PERRL, EOMI  Mouth: no tonsillar erythema, edema, or exudate.  Moist mucous membranes  Neck: supple, moving spontaneously no midline cervical tenderness  Lungs: No increased work of breathing.  Clear to auscultation bilaterally.  CV: RRR, peripheral pulses palpable and symmetric  Abdomen: soft, nt, nd, no guarding or rebound.  Right CVA tenderness  Extremities: Warm and well-perfused.  No edema or calf tenderness.  Skin: no rash or diaphoresis  Neuro: CN II-XII grossly intact, strength 5/5 in UE and LEs bilaterally, sensation intact to light touch in UE and LEs bilaterally;     ED Course              ED Course as of 09/09/23 2327   Sat Sep 09, 2023   1843 CBC with platelets differential(!)  Leukocytosis or anemia.   1843 Basic metabolic panel(!)  Bicarb is 21, otherwise BMP is completely normal with normal renal function.   1843 Patient still having pain after the fentanyl.  Dilaudid ordered.  Also add on Toradol   1910 Patient reassessed.  She is sleeping.  Vitals are stable.   2003 Reassessed.  Her pain has returned.  Just get the Toradol.  She vomited again and required more Zofran.  We will monitor the patient and may have to discuss with urology.  She did just provide a urine  sample.   2013 UA with Microscopic reflex to Culture(!)  No UTI.  Hematuria present likely due to known kidney stone.   2213 Continues to endorse severe pain and nausea.  She has now had 3 doses of opiates.  Compazine given for nausea given she has had 2 doses of Zofran.  At this time, patient is unlikely to pass the stone as an outpatient.  I will discuss with urology regarding next steps.   2217 The case with Dr. Kaiser of urology and he did accept the patient for consideration of a ureteroscopy or possible stent.  However, there are no beds open within the [facility] system so it was recommended that we look outside the system.  He stated no need for repeat imaging.  He did recommend trying Dramamine for persistent pain.   2306 Discussed the case with Dr. Elias with [facility].  He also stated that the patient likely needs to stent, but there are no beds available in their system.  I will discuss with our hospitalist to see if they would consider admitting the patient for pain control to see if she could pass the stone while she awaits urology.   2324 I discussed with the hospitalist [facility] CADE, who accepted for observation admission for pain control.      Procedures             Critical Care time:  none         Results for orders placed or performed during the hospital encounter of 09/09/23 (from the past 24 hour(s))   [place] Draw    Narrative    The following orders were created for panel order [place] Draw.  Procedure                               Abnormality         Status                     ---------                               -----------         ------                     Extra Blue Top Tube[[id number]]                              Final result               Extra Red Top Tube[[id number]]                               Final result               Extra Green Top (Lithium...[[id number]]                                                 Extra Purple Top Tube[[id number]]                                                          Please view results for these tests on the individual orders.   CBC with platelets differential    Narrative    The following orders were created for panel order CBC with platelets differential.  Procedure                               Abnormality         Status                     ---------                               -----------         ------                     CBC with platelets and d...[[id number]]  Abnormal            Final result                 Please view results for these tests on the individual orders.   Basic metabolic panel   Result Value Ref Range    Sodium 139 136 - 145 mmol/L    Potassium 3.5 3.4 - 5.3 mmol/L    Chloride 105 98 - 107 mmol/L    Carbon Dioxide (CO2) 21 (L) 22 - 29 mmol/L    Anion Gap 13 7 - 15 mmol/L    Urea Nitrogen 11.5 6.0 - 20.0 mg/dL    Creatinine 0.75 0.51 - 0.95 mg/dL    Calcium 9.7 8.6 - 10.0 mg/dL    Glucose 96 70 - 99 mg/dL    GFR Estimate >90 >60 mL/min/1.73m2   Extra Blue Top Tube   Result Value Ref Range    Hold Specimen JIC    Extra Red Top Tube   Result Value Ref Range    Hold Specimen JIC    CBC with platelets and differential   Result Value Ref Range    WBC Count 7.8 4.0 - 11.0 10e3/uL    RBC Count 4.70 3.80 - 5.20 10e6/uL    Hemoglobin 12.1 11.7 - 15.7 g/dL    Hematocrit 37.2 35.0 - 47.0 %    MCV 79 78 - 100 fL    MCH 25.7 (L) 26.5 - 33.0 pg    MCHC 32.5 31.5 - 36.5 g/dL    RDW 12.6 10.0 - 15.0 %    Platelet Count 175 150 - 450 10e3/uL    % Neutrophils 78 %    % Lymphocytes 14 %    % Monocytes 6 %    % Eosinophils 1 %    % Basophils 1 %    % Immature Granulocytes 0 %    NRBCs per 100 WBC 0 <1 /100    Absolute Neutrophils 6.1 1.6 - 8.3 10e3/uL    Absolute Lymphocytes 1.1 0.8 - 5.3 10e3/uL    Absolute Monocytes 0.5 0.0 - 1.3 10e3/uL    Absolute Eosinophils 0.1 0.0 - 0.7 10e3/uL    Absolute Basophils 0.0 0.0 - 0.2 10e3/uL    Absolute Immature Granulocytes 0.0 <=0.4 10e3/uL    Absolute NRBCs 0.0 10e3/uL   UA with Microscopic reflex to Culture     Specimen: Urine, Clean Catch   Result Value Ref Range    Color Urine Yellow Colorless, Straw, Light Yellow, Yellow    Appearance Urine Slightly Cloudy (A) Clear    Glucose Urine Negative Negative mg/dL    Bilirubin Urine Negative Negative    Ketones Urine 5 (A) Negative mg/dL    Specific Gravity Urine 1.026 1.003 - 1.035    Blood Urine Large (A) Negative    pH Urine 5.0 5.0 - 7.0    Protein Albumin Urine 30 (A) Negative mg/dL    Urobilinogen Urine Normal Normal, 2.0 mg/dL    Nitrite Urine Negative Negative    Leukocyte Esterase Urine Negative Negative    Mucus Urine Present (A) None Seen /LPF    Calcium Oxalate Crystals Urine Few (A) None Seen /HPF    RBC Urine >182 (H) <=2 /HPF    WBC Urine 3 <=5 /HPF    Squamous Epithelials Urine 2 (H) <=1 /HPF    Hyaline Casts Urine 3 (H) <=2 /LPF    Narrative    Urine Culture not indicated       Medications   ondansetron (ZOFRAN) injection 4 mg (4 mg Intravenous $Given 9/9/23 1746)   fentaNYL (PF) (SUBLIMAZE) injection 50 mcg (50 mcg Intravenous $Given 9/9/23 1755)   0.9% sodium chloride BOLUS ( Intravenous Rate/Dose Verify 9/9/23 1924)   HYDROmorphone (PF) (DILAUDID) injection 0.5 mg (0.5 mg Intravenous $Given 9/9/23 1827)   ketorolac (TORADOL) injection 15 mg (15 mg Intravenous $Given 9/9/23 1923)   ondansetron (ZOFRAN) injection 4 mg (4 mg Intravenous $Given 9/9/23 1930)   HYDROmorphone (PF) (DILAUDID) injection 0.5 mg (0.5 mg Intravenous $Given 9/9/23 2031)   prochlorperazine (COMPAZINE) injection 10 mg (10 mg Intravenous $Given 9/9/23 2124)   dimenhyDRINATE (DRAMAMINE) injection 25 mg (25 mg Intravenous $Given 9/9/23 2245)       Assessments & Plan (with Medical Decision Making)     I have reviewed the nursing notes.    I have reviewed the findings, diagnosis, plan and need for follow up with the patient.      Medical Decision Making  Aury Cervantes is a 23 year old female who has a history of ureteral stones and recent diagnosis 9/6 of a 4 mm-year-old stone on the right UPJ  who presents to the emergency department with right flank pain.  Vitals reviewed and reassuring.  She is afebrile.  Patient is very uncomfortable on exam and I suspect acute ureteral colic that is resistant to oral pain medications.  Symptoms are essentially unchanged from when she came in a couple of days ago, except for pain is worse.  Had a negative pregnancy test at that time.  I do not think repeat imaging is necessary at this time.  Will check UA, CBC, BMP.  We will give a fluid bolus and pain medications and Zofran.  Disposition pending.        New Prescriptions    No medications on file       Final diagnoses:   Ureteral colic       9/9/2023   [facility] EMERGENCY DEPT       Natan Mae MD  09/09/23 [id number]

## 2023-09-10 ENCOUNTER — TELEPHONE (OUTPATIENT)
Dept: FAMILY MEDICINE | Facility: CLINIC | Age: 23
End: 2023-09-10
Payer: COMMERCIAL

## 2023-09-10 VITALS
OXYGEN SATURATION: 100 % | BODY MASS INDEX: 19.14 KG/M2 | DIASTOLIC BLOOD PRESSURE: 66 MMHG | TEMPERATURE: 98.3 F | WEIGHT: 115 LBS | HEART RATE: 91 BPM | RESPIRATION RATE: 16 BRPM | SYSTOLIC BLOOD PRESSURE: 108 MMHG

## 2023-09-10 DIAGNOSIS — N20.0 NEPHROLITHIASIS: ICD-10-CM

## 2023-09-10 LAB
ANION GAP SERPL CALCULATED.3IONS-SCNC: 6 MMOL/L (ref 7–15)
BUN SERPL-MCNC: 11.9 MG/DL (ref 6–20)
CALCIUM SERPL-MCNC: 8.6 MG/DL (ref 8.6–10)
CHLORIDE SERPL-SCNC: 112 MMOL/L (ref 98–107)
CREAT SERPL-MCNC: 0.76 MG/DL (ref 0.51–0.95)
DEPRECATED HCO3 PLAS-SCNC: 24 MMOL/L (ref 22–29)
EGFRCR SERPLBLD CKD-EPI 2021: >90 ML/MIN/1.73M2
ERYTHROCYTE [DISTWIDTH] IN BLOOD BY AUTOMATED COUNT: 12.6 % (ref 10–15)
GLUCOSE SERPL-MCNC: 84 MG/DL (ref 70–99)
HCT VFR BLD AUTO: 30.3 % (ref 35–47)
HGB BLD-MCNC: 9.8 G/DL (ref 11.7–15.7)
MCH RBC QN AUTO: 25.9 PG (ref 26.5–33)
MCHC RBC AUTO-ENTMCNC: 32.3 G/DL (ref 31.5–36.5)
MCV RBC AUTO: 80 FL (ref 78–100)
PLATELET # BLD AUTO: 115 10E3/UL (ref 150–450)
POTASSIUM SERPL-SCNC: 3.6 MMOL/L (ref 3.4–5.3)
RBC # BLD AUTO: 3.78 10E6/UL (ref 3.8–5.2)
SODIUM SERPL-SCNC: 142 MMOL/L (ref 136–145)
WBC # BLD AUTO: 5.2 10E3/UL (ref 4–11)

## 2023-09-10 PROCEDURE — 250N000013 HC RX MED GY IP 250 OP 250 PS 637: Performed by: INTERNAL MEDICINE

## 2023-09-10 PROCEDURE — 96376 TX/PRO/DX INJ SAME DRUG ADON: CPT

## 2023-09-10 PROCEDURE — G0378 HOSPITAL OBSERVATION PER HR: HCPCS

## 2023-09-10 PROCEDURE — 250N000011 HC RX IP 250 OP 636: Mod: JZ | Performed by: INTERNAL MEDICINE

## 2023-09-10 PROCEDURE — 99207 PR NO BILLABLE SERVICE THIS VISIT: CPT | Performed by: STUDENT IN AN ORGANIZED HEALTH CARE EDUCATION/TRAINING PROGRAM

## 2023-09-10 PROCEDURE — 99207 PR NOT IN PERSON INPATIENT CONSULT STATISTICAL MARKER: CPT | Performed by: INTERNAL MEDICINE

## 2023-09-10 PROCEDURE — 96361 HYDRATE IV INFUSION ADD-ON: CPT

## 2023-09-10 PROCEDURE — 99235 HOSP IP/OBS SAME DATE MOD 70: CPT | Mod: 95 | Performed by: INTERNAL MEDICINE

## 2023-09-10 PROCEDURE — 258N000003 HC RX IP 258 OP 636: Performed by: INTERNAL MEDICINE

## 2023-09-10 PROCEDURE — 36415 COLL VENOUS BLD VENIPUNCTURE: CPT | Performed by: INTERNAL MEDICINE

## 2023-09-10 PROCEDURE — 82310 ASSAY OF CALCIUM: CPT | Performed by: INTERNAL MEDICINE

## 2023-09-10 PROCEDURE — 85027 COMPLETE CBC AUTOMATED: CPT | Performed by: INTERNAL MEDICINE

## 2023-09-10 RX ORDER — NALOXONE HYDROCHLORIDE 0.4 MG/ML
0.4 INJECTION, SOLUTION INTRAMUSCULAR; INTRAVENOUS; SUBCUTANEOUS
Status: DISCONTINUED | OUTPATIENT
Start: 2023-09-10 | End: 2023-09-10 | Stop reason: HOSPADM

## 2023-09-10 RX ORDER — AMOXICILLIN 250 MG
2 CAPSULE ORAL 2 TIMES DAILY
Status: DISCONTINUED | OUTPATIENT
Start: 2023-09-10 | End: 2023-09-10 | Stop reason: HOSPADM

## 2023-09-10 RX ORDER — OXYCODONE HYDROCHLORIDE 5 MG/1
5 TABLET ORAL EVERY 6 HOURS PRN
Qty: 10 TABLET | Refills: 0 | Status: SHIPPED | OUTPATIENT
Start: 2023-09-10 | End: 2023-09-11

## 2023-09-10 RX ORDER — NALOXONE HYDROCHLORIDE 0.4 MG/ML
0.2 INJECTION, SOLUTION INTRAMUSCULAR; INTRAVENOUS; SUBCUTANEOUS
Status: DISCONTINUED | OUTPATIENT
Start: 2023-09-10 | End: 2023-09-10 | Stop reason: HOSPADM

## 2023-09-10 RX ORDER — TAMSULOSIN HYDROCHLORIDE 0.4 MG/1
0.4 CAPSULE ORAL DAILY
Status: DISCONTINUED | OUTPATIENT
Start: 2023-09-10 | End: 2023-09-10 | Stop reason: HOSPADM

## 2023-09-10 RX ORDER — ONDANSETRON 4 MG/1
4 TABLET, ORALLY DISINTEGRATING ORAL EVERY 6 HOURS PRN
Status: DISCONTINUED | OUTPATIENT
Start: 2023-09-10 | End: 2023-09-10 | Stop reason: HOSPADM

## 2023-09-10 RX ORDER — ONDANSETRON 2 MG/ML
4 INJECTION INTRAMUSCULAR; INTRAVENOUS EVERY 6 HOURS PRN
Status: DISCONTINUED | OUTPATIENT
Start: 2023-09-10 | End: 2023-09-10 | Stop reason: HOSPADM

## 2023-09-10 RX ORDER — AMOXICILLIN 250 MG
1 CAPSULE ORAL 2 TIMES DAILY
Status: DISCONTINUED | OUTPATIENT
Start: 2023-09-10 | End: 2023-09-10 | Stop reason: HOSPADM

## 2023-09-10 RX ORDER — SODIUM CHLORIDE, SODIUM LACTATE, POTASSIUM CHLORIDE, CALCIUM CHLORIDE 600; 310; 30; 20 MG/100ML; MG/100ML; MG/100ML; MG/100ML
INJECTION, SOLUTION INTRAVENOUS CONTINUOUS
Status: DISCONTINUED | OUTPATIENT
Start: 2023-09-10 | End: 2023-09-10 | Stop reason: HOSPADM

## 2023-09-10 RX ORDER — KETOROLAC TROMETHAMINE 30 MG/ML
30 INJECTION, SOLUTION INTRAMUSCULAR; INTRAVENOUS EVERY 6 HOURS PRN
Status: DISCONTINUED | OUTPATIENT
Start: 2023-09-10 | End: 2023-09-10 | Stop reason: HOSPADM

## 2023-09-10 RX ORDER — HYDROMORPHONE HCL IN WATER/PF 6 MG/30 ML
0.4 PATIENT CONTROLLED ANALGESIA SYRINGE INTRAVENOUS
Status: DISCONTINUED | OUTPATIENT
Start: 2023-09-10 | End: 2023-09-10 | Stop reason: HOSPADM

## 2023-09-10 RX ORDER — ALBUTEROL SULFATE 90 UG/1
2 AEROSOL, METERED RESPIRATORY (INHALATION) EVERY 6 HOURS PRN
Status: DISCONTINUED | OUTPATIENT
Start: 2023-09-10 | End: 2023-09-10 | Stop reason: HOSPADM

## 2023-09-10 RX ORDER — BISACODYL 10 MG
10 SUPPOSITORY, RECTAL RECTAL DAILY PRN
Status: DISCONTINUED | OUTPATIENT
Start: 2023-09-10 | End: 2023-09-10 | Stop reason: HOSPADM

## 2023-09-10 RX ADMIN — SODIUM CHLORIDE, POTASSIUM CHLORIDE, SODIUM LACTATE AND CALCIUM CHLORIDE: 600; 310; 30; 20 INJECTION, SOLUTION INTRAVENOUS at 01:03

## 2023-09-10 RX ADMIN — KETOROLAC TROMETHAMINE 30 MG: 30 INJECTION INTRAMUSCULAR; INTRAVENOUS at 09:22

## 2023-09-10 RX ADMIN — HYDROMORPHONE HYDROCHLORIDE 0.4 MG: 0.2 INJECTION, SOLUTION INTRAMUSCULAR; INTRAVENOUS; SUBCUTANEOUS at 09:22

## 2023-09-10 RX ADMIN — HYDROMORPHONE HYDROCHLORIDE 0.4 MG: 0.2 INJECTION, SOLUTION INTRAMUSCULAR; INTRAVENOUS; SUBCUTANEOUS at 05:17

## 2023-09-10 RX ADMIN — TAMSULOSIN HYDROCHLORIDE 0.4 MG: 0.4 CAPSULE ORAL at 09:22

## 2023-09-10 ASSESSMENT — ACTIVITIES OF DAILY LIVING (ADL)
DOING_ERRANDS_INDEPENDENTLY_DIFFICULTY: NO
CONCENTRATING,_REMEMBERING_OR_MAKING_DECISIONS_DIFFICULTY: NO
HEARING_DIFFICULTY_OR_DEAF: NO
ADLS_ACUITY_SCORE: 18
WEAR_GLASSES_OR_BLIND: NO
ADLS_ACUITY_SCORE: 18
WALKING_OR_CLIMBING_STAIRS_DIFFICULTY: NO
CHANGE_IN_FUNCTIONAL_STATUS_SINCE_ONSET_OF_CURRENT_ILLNESS/INJURY: NO
ADLS_ACUITY_SCORE: 18
FALL_HISTORY_WITHIN_LAST_SIX_MONTHS: NO
TOILETING_ISSUES: NO
DIFFICULTY_EATING/SWALLOWING: NO
ADLS_ACUITY_SCORE: 18
DRESSING/BATHING_DIFFICULTY: NO
ADLS_ACUITY_SCORE: 18
ADLS_ACUITY_SCORE: 18
DIFFICULTY_COMMUNICATING: NO

## 2023-09-10 NOTE — PROGRESS NOTES
[facility] [facility] [place]    Patient admitted to room 2405 at approximately 0015 via wheel chair from emergency room. Patient was accompanied by nurse.     Verbal SBAR report received from CHERYL López prior to patient arrival.     Patient ambulated to bed independently. Patient alert and oriented X 3. Pain is controlled with current analgesics.  Medication(s) being used: acetaminophen, narcotic analgesics including hydromorphone (Dilaudid), and Dramamine.  . Admission vital signs: Blood pressure 108/59, pulse 61, temperature 98.2  F (36.8  C), temperature source Oral, resp. rate 16, weight 52.2 kg (115 lb), last menstrual period 08/10/2023, SpO2 98 %, not currently breastfeeding. Patient was oriented to plan of care, call light, bed controls, tv, telephone, bathroom, and visiting hours.     Risk Assessment    The following safety risks were identified during admission: none. Yellow risk band applied: NO.     Skin Initial Assessment    This writer admitted this patient and completed a full skin assessment and Joni score in the Adult PCS flowsheet. Appropriate interventions initiated as needed.       Joni Risk Assessment  Sensory Perception: 4-->no impairment  Moisture: 4-->rarely moist  Activity: 4-->walks frequently  Mobility: 4-->no limitation  Nutrition: 3-->adequate  Friction and Shear: 3-->no apparent problem  Joni Score: 22  Mattress: Standard gel/foam mattress (IsoFlex, Atmos Air, etc.)  Bed Frame: Standard width and length    Education    Patient has a [place] to Observation order: Yes  Observation education completed and documented: Yes      Lolly Tom RN

## 2023-09-10 NOTE — ED NOTES
[facility]   Admission Handoff    The patient is Aury Cervantes, 23 year old who arrived in the ED by CAR from home with a complaint of Flank Pain  . The patient's current symptoms are a recurrence of a past episode and during this time the symptoms have decreased. In the ED, patient was diagnosed with   Final diagnoses:   Ureteral colic         Needed?: No    Allergies:    Allergies   Allergen Reactions    Sulfa Antibiotics Other (See Comments)     Both parents are allergic         Past Medical Hx:   Past Medical History:   Diagnosis Date    Anemia     Depression     Nexplanon in place        Initial vitals were: BP: 106/75  Pulse: 80  Temp: 98.1  F (36.7  C)  Weight: 52.2 kg (115 lb)  SpO2: 100 %   Recent vital Signs: BP 94/65   Pulse 89   Temp 98.1  F (36.7  C) (Tympanic)   Wt 52.2 kg (115 lb)   LMP 08/10/2023 (Exact Date)   SpO2 97%   BMI 19.14 kg/m      Elimination Status: Continent: Yes     Activity Level: Independent    Fall Status: Reason for falls risk: High Risk Medications  nonskid shoes/slippers when out of bed, arm band in place, patient and family education, and assistive device/personal items within reach    Baseline Mental status: WDL  Current Mental Status changes: at basesline    Infection present or suspected this encounter: no  Sepsis suspected: No    Isolation type: NA    Bariatric equipment needed?: No    In the ED these meds were given:   Medications   ondansetron (ZOFRAN) injection 4 mg (4 mg Intravenous $Given 9/9/23 1746)   fentaNYL (PF) (SUBLIMAZE) injection 50 mcg (50 mcg Intravenous $Given 9/9/23 1755)   0.9% sodium chloride BOLUS ( Intravenous Rate/Dose Verify 9/9/23 1924)   HYDROmorphone (PF) (DILAUDID) injection 0.5 mg (0.5 mg Intravenous $Given 9/9/23 1827)   ketorolac (TORADOL) injection 15 mg (15 mg Intravenous $Given 9/9/23 1923)   ondansetron (ZOFRAN) injection 4 mg (4 mg Intravenous $Given 9/9/23 1930)   HYDROmorphone (PF) (DILAUDID)  injection 0.5 mg (0.5 mg Intravenous $Given 9/9/23 2031)   prochlorperazine (COMPAZINE) injection 10 mg (10 mg Intravenous $Given 9/9/23 2124)   dimenhyDRINATE (DRAMAMINE) injection 25 mg (25 mg Intravenous $Given 9/9/23 2245)       Drips running?  No    Home pump  No    Current LDAs: Peripheral IV: Site left ac; Gauge 20  none     Results:   Labs/Imaging  Ordered and Resulted from Time of ED Arrival Up to the Time of Departure from the ED  Results for orders placed or performed during the hospital encounter of 09/09/23 (from the past 24 hour(s))   [place] Draw    Narrative    The following orders were created for panel order [place] Draw.  Procedure                               Abnormality         Status                     ---------                               -----------         ------                     Extra Blue Top Tube[[id number]]                              Final result               Extra Red Top Tube[[id number]]                               Final result               Extra Green Top (Lithium...[[id number]]                                                 Extra Purple Top Tube[[id number]]                                                         Please view results for these tests on the individual orders.   CBC with platelets differential    Narrative    The following orders were created for panel order CBC with platelets differential.  Procedure                               Abnormality         Status                     ---------                               -----------         ------                     CBC with platelets and d...[[id number]]  Abnormal            Final result                 Please view results for these tests on the individual orders.   Basic metabolic panel   Result Value Ref Range    Sodium 139 136 - 145 mmol/L    Potassium 3.5 3.4 - 5.3 mmol/L    Chloride 105 98 - 107 mmol/L    Carbon Dioxide (CO2) 21 (L) 22 - 29 mmol/L    Anion Gap 13 7 - 15 mmol/L    Urea Nitrogen 11.5 6.0 - 20.0  mg/dL    Creatinine 0.75 0.51 - 0.95 mg/dL    Calcium 9.7 8.6 - 10.0 mg/dL    Glucose 96 70 - 99 mg/dL    GFR Estimate >90 >60 mL/min/1.73m2   Extra Blue Top Tube   Result Value Ref Range    Hold Specimen JIC    Extra Red Top Tube   Result Value Ref Range    Hold Specimen JIC    CBC with platelets and differential   Result Value Ref Range    WBC Count 7.8 4.0 - 11.0 10e3/uL    RBC Count 4.70 3.80 - 5.20 10e6/uL    Hemoglobin 12.1 11.7 - 15.7 g/dL    Hematocrit 37.2 35.0 - 47.0 %    MCV 79 78 - 100 fL    MCH 25.7 (L) 26.5 - 33.0 pg    MCHC 32.5 31.5 - 36.5 g/dL    RDW 12.6 10.0 - 15.0 %    Platelet Count 175 150 - 450 10e3/uL    % Neutrophils 78 %    % Lymphocytes 14 %    % Monocytes 6 %    % Eosinophils 1 %    % Basophils 1 %    % Immature Granulocytes 0 %    NRBCs per 100 WBC 0 <1 /100    Absolute Neutrophils 6.1 1.6 - 8.3 10e3/uL    Absolute Lymphocytes 1.1 0.8 - 5.3 10e3/uL    Absolute Monocytes 0.5 0.0 - 1.3 10e3/uL    Absolute Eosinophils 0.1 0.0 - 0.7 10e3/uL    Absolute Basophils 0.0 0.0 - 0.2 10e3/uL    Absolute Immature Granulocytes 0.0 <=0.4 10e3/uL    Absolute NRBCs 0.0 10e3/uL   UA with Microscopic reflex to Culture    Specimen: Urine, Clean Catch   Result Value Ref Range    Color Urine Yellow Colorless, Straw, Light Yellow, Yellow    Appearance Urine Slightly Cloudy (A) Clear    Glucose Urine Negative Negative mg/dL    Bilirubin Urine Negative Negative    Ketones Urine 5 (A) Negative mg/dL    Specific Gravity Urine 1.026 1.003 - 1.035    Blood Urine Large (A) Negative    pH Urine 5.0 5.0 - 7.0    Protein Albumin Urine 30 (A) Negative mg/dL    Urobilinogen Urine Normal Normal, 2.0 mg/dL    Nitrite Urine Negative Negative    Leukocyte Esterase Urine Negative Negative    Mucus Urine Present (A) None Seen /LPF    Calcium Oxalate Crystals Urine Few (A) None Seen /HPF    RBC Urine >182 (H) <=2 /HPF    WBC Urine 3 <=5 /HPF    Squamous Epithelials Urine 2 (H) <=1 /HPF    Hyaline Casts Urine 3 (H) <=2 /LPF     Narrative    Urine Culture not indicated       For the majority of the shift this patient's behavior was Green     Cardiac Rhythm: N/A  Pt needs tele? No  Skin/wound Issues:  None noted in limited ER focused assessments.    Code Status: Full Code    Pain control: fair    Nausea control: fair    Abnormal labs/tests/findings requiring intervention: See results.    Patient tested for COVID 19 prior to admission: NO     OBS brochure/video discussed/provided to patient/family: N/A     Family present during ED course? Yes     Family Comments/Social Situation comments: None.    Tasks needing completion: None    Noa Hopkins RN

## 2023-09-10 NOTE — H&P
[facility]    History and Physical - Hospitalist Service       Date of Admission:  9/9/2023    Assessment & Plan      Aury Cervantes is a 23 year old female admitted on 9/9/2023. She presented with nephrolithiasis.     1. Nephrolithiasis  She presents with flank pain and was found to have a 4mm stone. Urology stated that it should pass and that they recommended IVF and tamsulosin for now. If she is still symptomatic on Monday, they will consider intervention.   - admit to hospitalist service  - c/w tiered pain control  - c/w IVF  - c/w tamsulosin    2. Asthma  - c/w albuterol as needed       Diet: Regular Diet Adult    DVT Prophylaxis: Pneumatic Compression Devices  Loza Catheter: Not present  Lines: None     Code Status: Full Code      Clinically Significant Risk Factors Present on Admission                                  Disposition Plan      Expected Discharge Date: 09/10/2023                The patient's care was discussed with the Patient.        Isaiah Clark MD  [facility]  Securely message with the Vocera Web Console (learn more here)  Text page via [employer] Paging/Directory      Visit/Communication Style   Virtual (Video) communication was used to evaluate Aury.  Aury consented to the use of video communication: yes  Video START time: 0405, 9/10/2023  Video STOP time: 0420, 9/10/2023   Patient's location: [facility]   Provider's location during the visit: [facility] Tele-medicine site        ______________________________________________________________________    Chief Complaint   Flank pain    History is obtained from the patient    History of Present Illness   Aury Cervantes is a 23 year old female who has a known history of nephrolithiasis presented to the ED with flank pain that stared about 5 days ago. She was found to have a 4mm stone on the right UPJ on 9/6/2023 and was sent home with pain medications  (oxycodone). She states that she has been taking them as prescribed but that the pain is only controlled for an hour before returning again. She has also had some vomiting. She has been taking acetaminophen and ibuprofen too with no improvement. She denies any fevers, chills, but she has had some nausea and vomiting. She denies any dysuria but she has had some hematuria. She notes that she is menstruating currently.     Review of Systems    General: negative for fever, chills, sweats, weakness  Eyes: negative for blurred vision, loss of vision  Ear Nose and Throat: negative for pharyngitis, speech or swallowing difficulties  Respiratory:  negative for sputum production, wheezing, GOODEN, pleuritic pain, sob or cough  Cardiology:  negative for chest pain, palpitations, orthopnea, PND, edema, syncope   Gastrointestinal: positive for right flank pain, nausea and vomiting. Negative for abdominal pain, diarrhea, constipation, hematemesis, melena or hematochezia  Genitourinary: negative for frequency, urgency, dysuria, hematuria   Neurological: negative for focal weakness, paresthesia    Past Medical History    I have reviewed this patient's medical history and updated it with pertinent information if needed.   Past Medical History:   Diagnosis Date     Anemia      Depression      Nexplanon in place        Past Surgical History   I have reviewed this patient's surgical history and updated it with pertinent information if needed.  Past Surgical History:   Procedure Laterality Date     DILATION AND CURETTAGE, OPERATIVE HYSTEROSCOPY, COMBINED N/A 4/6/2023    Procedure: HYSTEROSCOPY, WITH DILATION AND CURETTAGE OF UTERUS, polypectomy;  Surgeon: Stefanie Mcnair MD;  Location: [facility]     SURGICAL HISTORY OF -       PE tubes     TONSILLECTOMY, ADENOIDECTOMY ADULT, COMBINED Bilateral 3/19/2018    Procedure: COMBINED TONSILLECTOMY, ADENOIDECTOMY ADULT;  Bilateral Adenotonsillectomy;  Surgeon: Kevin Arias MD;  Location:  [facility] OR       Social History   I have reviewed this patient's social history and updated it with pertinent information if needed.  Social History     Tobacco Use     Smoking status: Never     Smokeless tobacco: Never   Vaping Use     Vaping Use: Never used   Substance Use Topics     Alcohol use: Yes     Comment: Very occasionally     Drug use: Never       Family History   I have reviewed this patient's family history and updated it with pertinent information if needed.  Family History   Problem Relation Age of Onset     Depression Mother      Depression Father      Depression Maternal Grandmother      Cancer Maternal Grandmother      Lung Cancer Maternal Grandmother      Diabetes Maternal Grandfather      Heart Disease Maternal Grandfather      Hypertension Maternal Grandfather      Depression Paternal Grandmother         bipolar     Depression Paternal Grandfather         bipolar       Prior to Admission Medications   Prior to Admission Medications   Prescriptions Last Dose Informant Patient Reported? Taking?   albuterol (PROAIR HFA/PROVENTIL HFA/VENTOLIN HFA) 108 (90 Base) MCG/ACT inhaler   No No   Sig: Inhale 2 puffs into the lungs every 6 hours as needed for shortness of breath, wheezing or cough   ondansetron (ZOFRAN ODT) 4 MG ODT tab   No No   Sig: Take 1 tablet (4 mg) by mouth every 8 hours as needed for nausea   oxyCODONE (ROXICODONE) 5 MG tablet   No No   Sig: Take 1 tablet (5 mg) by mouth every 6 hours as needed for severe pain   tamsulosin (FLOMAX) 0.4 MG capsule   No No   Sig: Take 1 capsule (0.4 mg) by mouth daily      Facility-Administered Medications: None     Allergies   Allergies   Allergen Reactions     Sulfa Antibiotics Other (See Comments)     Both parents are allergic         Physical Exam   Vital Signs: Temp: 98.2  F (36.8  C) Temp src: Oral BP: 108/59 Pulse: 61   Resp: 16 SpO2: 98 % O2 Device: None (Room air)    Weight: 115 lbs 0 oz    Gen:  Well-developed, well-nourished, in no acute distress,  lying semi-supine in hospital stretcher  HEENT:  Anicteric sclera, PER, hearing intact to voice  Resp:  No accessory muscle use, breath sounds clear; no wheezes no rales no rhonchi  Card:  No murmur, normal S1, S2   Abd:  Soft per RN exam, no TTP, non-distended, normoactive bowel sounds are present  Musc:  Normal strength and movement of the major muscle groups without obvious deformity  Psych:  Good insight, oriented to person, place and time, not anxious, not agitated    Data     Recent Labs   Lab 09/09/23  1748   WBC 7.8   HGB 12.1   MCV 79         POTASSIUM 3.5   CHLORIDE 105   CO2 21*   BUN 11.5   CR 0.75   ANIONGAP 13   VICKIE 9.7   GLC 96         No results found for this or any previous visit (from the past 24 hour(s)).

## 2023-09-10 NOTE — PLAN OF CARE
[facility] DISCHARGE NOTE    Patient discharged to home at 11:19 AM via wheel chair. Accompanied by significant other and staff. Discharge instructions reviewed with patient and Fiance , opportunity offered to ask questions. Prescriptions filled and sent with patient upon discharge. All belongings sent with patient.    Ursula New RN

## 2023-09-10 NOTE — DISCHARGE SUMMARY
[facility]  Hospitalist Discharge Summary      Date of Admission:  9/9/2023  Date of Discharge:  9/10/2023 11:31 AM  Discharging Provider: Natan Olmedo DO  Discharge Service: Hospitalist Service    Discharge Diagnoses   Nephrolithiasis    Clinically Significant Risk Factors      None    Follow-ups Needed After Discharge   Follow-up Appointments      - PCP follow-up within 7 days  - Urology follow-up early this week if unresolved      Discharge Disposition   Discharged to home  Condition at discharge: Good    Hospital Course      Aury Cervantes is a 23 year old female admitted on 9/9/2023. She presented with nephrolithiasis.     Nephrolithiasis  She presented with flank pain and was found to have a 4mm stone. Urology was planning on intervention if unresolved on Monday. Patient wanting to go home today.  -Prescription sent for 2-day course of oxycodone and Flomax  -Encouraged patient to drink plenty of water at home.  -Encourage patient to also take scheduled Tylenol until the stone passes for added pain relief.  -Made urology kidney stone [place] referral    Asthma  - c/w albuterol as needed    Consultations This Hospital Stay   Urology    Code Status   Full Code    Time Spent on this Encounter   INatan DO, personally saw the patient today and spent less than or equal to 30 minutes discharging this patient.       DO [facility] MEDICAL SURGICAL  [address] [address]  Phone: [phone]  Fax: [phone]  ______________________________________________________________________    Physical Exam   Vital Signs: Temp: 98.3  F (36.8  C) Temp src: Oral BP: 108/66 Pulse: 91   Resp: 16 SpO2: 100 % O2 Device: None (Room air)    Weight: 115 lbs 0 oz  Constitutional: awake, alert, cooperative, no apparent distress, and appears stated age  Respiratory: No increased work of breathing, good air exchange, clear to auscultation bilaterally, no  crackles or wheezing  Cardiovascular: Normal apical impulse, regular rate and rhythm, normal S1 and S2, no S3 or S4, and no murmur noted  GI: No scars, normal bowel sounds, soft, non-distended, non-tender, no masses palpated, no hepatosplenomegally  Skin: normal skin color, texture, turgor       Primary Care Physician   Marily Crane    Discharge Orders      Adult Urology  Referral      Reason for your hospital stay    Nephrolithiasis     Follow-up and recommended labs and tests     - PCP follow-up within 7 days  - Urology follow-up early this week if unresolved     Activity    Your activity upon discharge: activity as tolerated     Diet    Follow this diet upon discharge:  Regular Diet Adult       Significant Results and Procedures   Most Recent 3 BMP's:  Recent Labs   Lab Test 09/10/23  0504 09/09/23  1748 08/11/23  1632    139 138   POTASSIUM 3.6 3.5 3.9   CHLORIDE 112* 105 105   CO2 24 21* 24   BUN 11.9 11.5 7.7   CR 0.76 0.75 0.79   ANIONGAP 6* 13 9   VICKIE 8.6 9.7 9.6   GLC 84 96 83   ,   Results for orders placed or performed during the hospital encounter of 09/06/23   POC US ABDOMEN LIMITED    [facility] Procedure Note    Limited Bedside ED Renal Ultrasound:    PERFORMED BY: Dr. Roc Gregg MD  INDICATIONS:  Flank Pain  PROBE: Low frequency convex probe  BODY LOCATION:  Abdomen  FINDINGS:  The ultrasound was performed with longitudinal  views.   Right Kidney:   Hydronephrosis:  None   Renal cyst:  None  Left Kidney:   Hydronephrosis:  None   Renal cyst:  None  INTERPRETATION:  The evaluation of the kidneys was normal without evidence of hydronephrosis or cysts.  IMAGE DOCUMENTATION: Images were archived to PACs system.       Abd/pelvis CT - no contrast - Stone Protocol    Narrative    EXAM: CT ABDOMEN PELVIS W/O CONTRAST  LOCATION: [facility]  DATE: 9/6/2023    INDICATION: Right flank pain.  COMPARISON: None.  TECHNIQUE: CT scan of the  abdomen and pelvis was performed without IV contrast. Multiplanar reformats were obtained. Dose reduction techniques were used.  CONTRAST: None.    FINDINGS:   LOWER CHEST: Normal.    HEPATOBILIARY: Normal.    PANCREAS: Normal.    SPLEEN: Normal.    ADRENAL GLANDS: Normal.    KIDNEYS/BLADDER: Mild right hydronephrosis secondary to a 4 x 4 x 3 mm calculus at the right UPJ. Additional 1 - 2 mm calcifications x2 mid right kidney and x1 mid left kidney. The bladder is negative.    BOWEL: No obstruction or inflammatory change. Normal appendix.    LYMPH NODES: No lymphadenopathy.    VASCULATURE: No aortoiliac aneurysm.    PELVIC ORGANS: Normal uterus and ovaries.    MUSCULOSKELETAL: No suspicious osseous lesions.      Impression    IMPRESSION:   1.  Mild right hydronephrosis secondary to a 4 mm calculus right UPJ.  2.  Additional 1 - 2 mm renal calcifications x2 mid right kidney and x1 mid left kidney.         Discharge Medications   Discharge Medication List as of 9/10/2023 10:31 AM        CONTINUE these medications which have CHANGED    Details   oxyCODONE (ROXICODONE) 5 MG tablet Take 1 tablet (5 mg) by mouth every 6 hours as needed for pain, Disp-10 tablet, R-0, Local Print           CONTINUE these medications which have NOT CHANGED    Details   albuterol (PROAIR HFA/PROVENTIL HFA/VENTOLIN HFA) 108 (90 Base) MCG/ACT inhaler Inhale 2 puffs into the lungs every 6 hours as needed for shortness of breath, wheezing or cough, Disp-18 g, R-1, E-PrescribePharmacy may dispense brand covered by insurance (Proair, or proventil or ventolin or generic albuterol inhaler)      ondansetron (ZOFRAN ODT) 4 MG ODT tab Take 1 tablet (4 mg) by mouth every 8 hours as needed for nausea, Disp-10 tablet, R-0, E-Prescribe      tamsulosin (FLOMAX) 0.4 MG capsule Take 1 capsule (0.4 mg) by mouth daily, Disp-14 capsule, R-0, E-Prescribe           Allergies   Allergies   Allergen Reactions    Sulfa Antibiotics Other (See Comments)     Both parents  are allergic

## 2023-09-10 NOTE — PROGRESS NOTES
I spoke with Dr. Mae at [facility] regarding this patient.  Briefly she is a 23-year-old with a right 4 mm obstructing UPJ stone previously seen on September 6 for this who he presented today with intractable pain, nausea and vomiting.    Given her multiple trips to the ER and uncontrolled pain, nausea, vomiting, I would recommend transfer for consideration of right ureteral stent versus primary ureteroscopy (depending on laser ability).  There is no open beds at this point, however will keep an eye out for her to see if and when she gets transferred.  She is unable to get transferred through the weekend, Dr. Orellana is at [facility] on Tuesday and could evaluate her.

## 2023-09-10 NOTE — TELEPHONE ENCOUNTER
Patient was prescribed #10 Oxycodone 5mg tablets by Dr Natan Olmedo over the weekend but she is locked into you as a prescriber therefore we are not able to dispense the medication. Patient asked me to send a request to you to send a new Rx.    Thank you,  Arabella Nesbitt - Pharmacy Technician  [facility]  [phone]

## 2023-09-11 ENCOUNTER — MYC MEDICAL ADVICE (OUTPATIENT)
Dept: FAMILY MEDICINE | Facility: CLINIC | Age: 23
End: 2023-09-11

## 2023-09-11 RX ORDER — OXYCODONE HYDROCHLORIDE 5 MG/1
5 TABLET ORAL EVERY 6 HOURS PRN
Qty: 10 TABLET | Refills: 0 | Status: SHIPPED | OUTPATIENT
Start: 2023-09-11 | End: 2023-10-10

## 2023-09-12 ENCOUNTER — PATIENT OUTREACH (OUTPATIENT)
Dept: CARE COORDINATION | Facility: CLINIC | Age: 23
End: 2023-09-12

## 2023-09-12 ENCOUNTER — TELEPHONE (OUTPATIENT)
Dept: UROLOGY | Facility: CLINIC | Age: 23
End: 2023-09-12

## 2023-09-12 ENCOUNTER — VIRTUAL VISIT (OUTPATIENT)
Dept: UROLOGY | Facility: CLINIC | Age: 23
End: 2023-09-12
Payer: COMMERCIAL

## 2023-09-12 DIAGNOSIS — N20.0 NEPHROLITHIASIS: ICD-10-CM

## 2023-09-12 DIAGNOSIS — N20.1 OBSTRUCTION OF RIGHT URETEROPELVIC JUNCTION (UPJ) DUE TO STONE: Primary | ICD-10-CM

## 2023-09-12 PROBLEM — Z97.5 NEXPLANON IN PLACE: Status: ACTIVE | Noted: 2023-09-12

## 2023-09-12 PROCEDURE — 99203 OFFICE O/P NEW LOW 30 MIN: CPT | Mod: VID | Performed by: NURSE PRACTITIONER

## 2023-09-12 RX ORDER — ACETAMINOPHEN 500 MG
500-1000 TABLET ORAL EVERY 6 HOURS PRN
COMMUNITY

## 2023-09-12 RX ORDER — IBUPROFEN 200 MG
200 TABLET ORAL EVERY 4 HOURS PRN
COMMUNITY
End: 2023-10-16

## 2023-09-12 NOTE — PROGRESS NOTES
Urology Video Office Visit    Video-Visit Details    Type of service:  Video Visit    Video Start Time: 0955    Video End Time:10:07 AM    Originating Location (pt. Location): Home    Distant Location (provider location):  Off-site     Platform used for Video Visit: Kim           Assessment and Plan:     Assessment: 23 year old female with a right 4mm UPJ stone    Plan:  -Reviewed CT scan with patient.    -We discussed treatment options including MET x 2-3 weeks vs. Right ureteral stent placement vs ureteroscopy and laser lithotripsy. I counseled the patient regarding the potential need for a ureteral stent after treatment and the necessity of removing the stent after surgery. I also discussed the possibility of additional procedures to render the patient stone free.  After discussing risks, benefits, and alternatives to treatment, patient elects to proceed with right URS/LL.  -Recommend to continue with acetaminophen, ibuprofen, dimenhydrinate, and oxycodone for pain control.  -If having severe flank pain, fevers, chills, nausea, or vomiting please notify Urology clinic or be seen in the ER.     Tia Dumont CNP  Department of Urology  September 12, 2023    I spent a total of 35 minutes spent on the date of the encounter doing chart review, history and exam, documentation, and further activities as noted above.          Chief Complaint:   Right UPJ  Stone         History of Present Illness:    Aury Cervantes is a pleasant 23 year old female who presents with concerns of a right UPJ stone.    Ms. Cervantes notes she started with right flank pain with gross hematuria about 1 weeks ago. She notes pain increased in severity and was seen in the ER on 09/06/23.     CT Scan performed on 09/06/23 (images personally reviewed) revealed a 4mm right UPJ stone with hydronephrosis and an additional non obstructing 4mm mid pole stone. Noted left non-obstructing punctate stone without hydronephrosis.     She was seen again in  the ER on 09/09/23 due to severe right flank pain. She was admitted overnight due to intractable pain. Urology was consulted with option of possible right stent or ureteroscopy however no beds available at that time within or outside system. Pain was stabilized and she was discharged on 09/10/23.     She continues to have flank pain and with increase pain despite OTC analgesics and oxycodone. She notes she will feel a rise in her temperature. She continues to have nausea/vomiting. Is able to tolerate fluids/foods however difficulty as she is also having significant constipation. Continues to have gross hematuria. Denies any dysuria at this time.     This is her first stone. No noted family history of nephrolithiasis.        Past Medical History:     Past Medical History:   Diagnosis Date    Anemia     Depression     Nephrolithiasis     Nexplanon in place             Past Surgical History:     Past Surgical History:   Procedure Laterality Date    DILATION AND CURETTAGE, OPERATIVE HYSTEROSCOPY, COMBINED N/A 4/6/2023    Procedure: HYSTEROSCOPY, WITH DILATION AND CURETTAGE OF UTERUS, polypectomy;  Surgeon: Stefanie Mcnair MD;  Location: [facility] OR    SURGICAL HISTORY OF -       PE tubes    TONSILLECTOMY, ADENOIDECTOMY ADULT, COMBINED Bilateral 3/19/2018    Procedure: COMBINED TONSILLECTOMY, ADENOIDECTOMY ADULT;  Bilateral Adenotonsillectomy;  Surgeon: Kevin Arias MD;  Location: [facility] OR            Medications     Current Outpatient Medications   Medication    albuterol (PROAIR HFA/PROVENTIL HFA/VENTOLIN HFA) 108 (90 Base) MCG/ACT inhaler    ondansetron (ZOFRAN ODT) 4 MG ODT tab    oxyCODONE (ROXICODONE) 5 MG tablet    tamsulosin (FLOMAX) 0.4 MG capsule     No current facility-administered medications for this visit.            Family History:     Family History   Problem Relation Age of Onset    Depression Mother     Depression Father     Depression Maternal Grandmother     Cancer Maternal Grandmother     Lung  Cancer Maternal Grandmother     Diabetes Maternal Grandfather     Heart Disease Maternal Grandfather     Hypertension Maternal Grandfather     Depression Paternal Grandmother         bipolar    Depression Paternal Grandfather         bipolar            Social History:     Social History     Socioeconomic History    Marital status: Single     Spouse name: Not on file    Number of children: Not on file    Years of education: Not on file    Highest education level: Not on file   Occupational History    Not on file   Tobacco Use    Smoking status: Never    Smokeless tobacco: Never   Vaping Use    Vaping Use: Never used   Substance and Sexual Activity    Alcohol use: Yes     Comment: Very occasionally    Drug use: Never    Sexual activity: Yes     Partners: Male     Birth control/protection: None   Other Topics Concern    Parent/sibling w/ CABG, MI or angioplasty before 65F 55M? No   Social History Narrative    Not on file     Social Determinants of Health     Financial Resource Strain: Not on file   Food Insecurity: Not on file   Transportation Needs: Not on file   Physical Activity: Not on file   Stress: Not on file   Social Connections: Not on file   Intimate Partner Violence: Not on file   Housing Stability: Not on file            Allergies:   Sulfa antibiotics         Review of Systems:  From intake questionnaire   Negative 14 system review except as noted on HPI, nurse's note.         Physical Exam:   General Appearance: Well groomed, hygenic  Eyes: No redness, discharge  Respiratory: No cough, no respiratory distress or labored breathing  Musculoskeletal: Grossly normal, full range of motion in upper extremities, no gross deficits  Skin: No discoloration or apparent rashes  Neurologic - No tremors  Psychiatric - Alert and oriented  The rest of a comprehensive physical examination is deferred due to video visit restrictions        Labs:    I personally reviewed all applicable laboratory data and went over findings  with patient  Significant for:    CBC RESULTS:  Recent Labs   Lab Test 09/10/23  0504 09/09/23  1748 08/11/23  1632 02/21/23  1414   WBC 5.2 7.8 5.6 6.1   HGB 9.8* 12.1 12.2 12.2   * 175 176 170        BMP RESULTS:  Recent Labs   Lab Test 09/10/23  0504 09/09/23  1748 08/11/23  1632 07/20/22  2257 02/02/21  1110 02/02/21  1110 12/15/20  1155 02/08/19  1559 11/29/18  1955    139 138 141   < > 142 142 141 143   POTASSIUM 3.6 3.5 3.9 3.6   < > 4.0 3.3* 3.6 3.8   CHLORIDE 112* 105 105 108   < > 108* 114* 106 110   CO2 24 21* 24 28   < > 26 22 29 24   ANIONGAP 6* 13 9 5   < > 8 6 6 9   GLC 84 96 83 75   < > 87 78 66* 106*   BUN 11.9 11.5 7.7 9   < > 10 7 12 13   CR 0.76 0.75 0.79 0.62   < > 0.73 0.63 0.65 0.63   GFRESTIMATED >90 >90 >90 >90  --  >60 >90 >90 >90   GFRESTBLACK  --   --   --   --   --  >60 >90 >90 >90   VICKIE 8.6 9.7 9.6 8.9   < > 9.1 7.6* 9.0 8.7    < > = values in this interval not displayed.       UA RESULTS:   Recent Labs   Lab Test 09/09/23  1949 09/06/23  1319 07/20/22 2028   SG 1.026 1.013 1.030   URINEPH 5.0 5.0 6.0   NITRITE Negative Negative Negative   RBCU >182* >182* 3*   WBCU 3 7* 2       CALCIUM RESULTS  Lab Results   Component Value Date    VICKIE 8.6 09/10/2023    VICKIE 9.7 09/09/2023    VICKIE 9.6 08/11/2023    VICKIE 7.6 12/15/2020    VICKIE 9.0 02/08/2019    VICKIE 8.7 11/29/2018           Imaging:    I personally reviewed all applicable imaging and went over the below findings with patient.    Results for orders placed or performed during the hospital encounter of 09/06/23   POC US ABDOMEN LIMITED    Impression    [facility] Procedure Note    Limited Bedside ED Renal Ultrasound:    PERFORMED BY: Dr. Roc Gregg MD  INDICATIONS:  Flank Pain  PROBE: Low frequency convex probe  BODY LOCATION:  Abdomen  FINDINGS:  The ultrasound was performed with longitudinal  views.   Right Kidney:   Hydronephrosis:  None   Renal cyst:  None  Left Kidney:   Hydronephrosis:  None   Renal cyst:   None  INTERPRETATION:  The evaluation of the kidneys was normal without evidence of hydronephrosis or cysts.  IMAGE DOCUMENTATION: Images were archived to PACs system.       Abd/pelvis CT - no contrast - Stone Protocol    Narrative    EXAM: CT ABDOMEN PELVIS W/O CONTRAST  LOCATION: [facility]  DATE: 9/6/2023    INDICATION: Right flank pain.  COMPARISON: None.  TECHNIQUE: CT scan of the abdomen and pelvis was performed without IV contrast. Multiplanar reformats were obtained. Dose reduction techniques were used.  CONTRAST: None.    FINDINGS:   LOWER CHEST: Normal.    HEPATOBILIARY: Normal.    PANCREAS: Normal.    SPLEEN: Normal.    ADRENAL GLANDS: Normal.    KIDNEYS/BLADDER: Mild right hydronephrosis secondary to a 4 x 4 x 3 mm calculus at the right UPJ. Additional 1 - 2 mm calcifications x2 mid right kidney and x1 mid left kidney. The bladder is negative.    BOWEL: No obstruction or inflammatory change. Normal appendix.    LYMPH NODES: No lymphadenopathy.    VASCULATURE: No aortoiliac aneurysm.    PELVIC ORGANS: Normal uterus and ovaries.    MUSCULOSKELETAL: No suspicious osseous lesions.      Impression    IMPRESSION:   1.  Mild right hydronephrosis secondary to a 4 mm calculus right UPJ.  2.  Additional 1 - 2 mm renal calcifications x2 mid right kidney and x1 mid left kidney.

## 2023-09-12 NOTE — PROGRESS NOTES
[facility]    Background: Transitional Care Management program identified per system criteria and reviewed by [facility] Resource [place] team for possible outreach.    Assessment: Upon chart review, CCR Team member will not proceed with patient outreach related to this episode of Transitional Care Management program due to reason below:    Patient has active communication with a nurse, provider or care team for reason of post-hospital follow up plan.  Outreach call by CCRC team not indicated to minimize duplicative efforts.     Plan: Transitional Care Management episode addressed appropriately per reason noted above.      GET [place]  [facility]    *Connected Care Resource Team does NOT follow patient ongoing. Referrals are identified based on internal discharge reports and the outreach is to ensure patient has an understanding of their discharge instructions.

## 2023-09-12 NOTE — TELEPHONE ENCOUNTER
Patient had pre-op physical as requested.  Spoke with patient and completed surgery teaching.  Reviewed time, date and address of procedure.    Length of procedure and what to expect after, phone number given for any questions.  Went over no eating drinking, may have clear liquids 3 hours prior to check in.  Shower in morning before procedure.  Patient will need a ride home and someone to stay overnight.  Lisa Viera RN

## 2023-09-12 NOTE — TELEPHONE ENCOUNTER
Spoke with: Patient       Date of surgery: Friday Sept 15 2023       Location: [facility]      Informed patient they will need a adult : YES      Pre op with provider: - Dr Du will update DOS      H&P Scheduled in PAC- NA        Pre procedure covid :Not required       Additional imaging: NA        Surgery Packet : Sent via [employer]       Additional comments: Please call for surgery teaching

## 2023-09-12 NOTE — PATIENT INSTRUCTIONS
UROLOGY CLINIC VISIT PATIENT INSTRUCTIONS    -Will schedule you for a right ureteroscopy  -Please refer to the following link for information about Dr. Du and to learn about the ureteroscopy procedure:    [email or web address]     -If having severe flank pain, fevers, chills, nausea, or vomiting please notify Urology clinic or be seen in the ER.       If you have any issues, questions or concerns in the meantime, do not hesitate to contact us at [facility] at [phone] or via [employer].     Tia Dumont CNP  Department of Urology     Medicines to Control Your Kidney Stone Symptoms    Control Pain: First Line Treatment    Dramamine (Please use the drowsy version, nongeneric formulation)  Available over the counter  **This medicine will cause increased drowsiness. DON T DRIVE OR OPERATE MACHINERY FOR 6 HOURS**    How to take:   Take 50 mg at bedtime every night until the stone passes  In addition, take 50 mg every 6 hours as needed    What it does:  Decreases spasm of the ureter  Decreases recurrence of pain for next 24 hours  Decreases severe pain  Decreases nausea  Will help you sleep    Ibuprofen (Advil or Motrin)  Available over the counter  **Please do not take if advise to avoid NSAIDS, history of stomach ulcers/bleeding issues, blood thinners, or already on NSAIDS**    How to take:   Take 2 to 4 (200 mg) tablets every 6 hours for the first 48 hours. After that, use only as needed    What it does:  Decreases pain  Prevents spasm of the ureter    Acetaminophen (Tylenol)   Available over the counter    How to Take:  Take 2 (500 mg) tablets every 6 hours as needed. Do not exceed 8 tablets (4,000 mg total) in 24 hours    What it does:  Highly effective in controlling pain      Control pain: second line treatment (if you still have severe pain 1 hour after trying all of the above)    Narcotics (oxycodone)     How to take   Take 1 to 2 tablets every 4-6 hours as  needed    Narcotics have major side effects:   Confusion, disorientation and sleepiness. DO NOT DRIVE OR OPERATE MACHINERY WITHIN 24 HOURS.   Nausea. Take Dramamine, Zofran or Haldol to help control this.   May cause constipation (hard, dry stools). Start over-the-counter Miralax (1/2 to 1 capful) as needed if experiencing constipation.   Trouble sleeping    Nausea:    Ondansetron (Zofran)    Take 4 mg every 6 hours as needed   Use only for severe nausea    Other medicines we may give you:    Tamsulosin (Flomax): Take 0.4 mg daily with food     What it does:   May decrease stone pain   May help stones pass faster   May make surgery more successful by improving access to stone   May decrease discomfort from ureteral stent, if used    Possible side effects:   Lightheadedness when standing too quickly (especially in older people)   Stuffy nose

## 2023-09-12 NOTE — NURSING NOTE
Is the patient currently in the state of [place]? YES    Visit mode:VIDEO    If the visit is dropped, the patient can be reconnected by: VIDEO VISIT: Text to cell phone:   Telephone Information:   Mobile [phone]       Will anyone else be joining the visit? NO  (If patient encounters technical issues they should call [phone]    How would you like to obtain your AVS? MyChart    Are changes needed to the allergy or medication list? No    Reason for visit: Consult    Pt. Reports pain at a level of 8 currently located in right kidney. States the pain kept her up overnight and reached levels of 9-10.     Mary MILLERF

## 2023-09-13 NOTE — TELEPHONE ENCOUNTER
Please schedule patient for follow up after her appt with urologist and before 9/29 th (her ENT surgery)    CIRO Kline CNP

## 2023-09-14 ENCOUNTER — ANESTHESIA EVENT (OUTPATIENT)
Dept: SURGERY | Facility: AMBULATORY SURGERY CENTER | Age: 23
End: 2023-09-14
Payer: COMMERCIAL

## 2023-09-14 NOTE — TELEPHONE ENCOUNTER
Marily  She does see you this Monday for a pre op. Do you want a separate appt for an ER follow up after the pre op?      Jun Hermosillo RN

## 2023-09-15 ENCOUNTER — ANESTHESIA (OUTPATIENT)
Dept: SURGERY | Facility: AMBULATORY SURGERY CENTER | Age: 23
End: 2023-09-15
Payer: COMMERCIAL

## 2023-09-15 ENCOUNTER — HOSPITAL ENCOUNTER (OUTPATIENT)
Facility: AMBULATORY SURGERY CENTER | Age: 23
Discharge: HOME | End: 2023-09-15
Attending: UROLOGY
Payer: COMMERCIAL

## 2023-09-15 VITALS
BODY MASS INDEX: 19.14 KG/M2 | TEMPERATURE: 97.3 F | HEART RATE: 80 BPM | DIASTOLIC BLOOD PRESSURE: 95 MMHG | SYSTOLIC BLOOD PRESSURE: 155 MMHG | WEIGHT: 115 LBS | RESPIRATION RATE: 16 BRPM | OXYGEN SATURATION: 99 %

## 2023-09-15 DIAGNOSIS — Z97.5 NEXPLANON IN PLACE: Primary | ICD-10-CM

## 2023-09-15 DIAGNOSIS — N20.0 NEPHROLITHIASIS: ICD-10-CM

## 2023-09-15 DIAGNOSIS — N20.1 RIGHT URETERAL STONE: ICD-10-CM

## 2023-09-15 DIAGNOSIS — N20.1 OBSTRUCTION OF RIGHT URETEROPELVIC JUNCTION (UPJ) DUE TO STONE: ICD-10-CM

## 2023-09-15 PROCEDURE — 82365 CALCULUS SPECTROSCOPY: CPT | Mod: 90 | Performed by: UROLOGY

## 2023-09-15 PROCEDURE — 99000 SPECIMEN HANDLING OFFICE-LAB: CPT | Performed by: UROLOGY

## 2023-09-15 PROCEDURE — 74420 UROGRAPHY RTRGR +-KUB: CPT | Mod: 26 | Performed by: UROLOGY

## 2023-09-15 PROCEDURE — 52356 CYSTO/URETERO W/LITHOTRIPSY: CPT | Mod: RT | Performed by: UROLOGY

## 2023-09-15 DEVICE — STENT, URETERAL, 4.8FR X 24CM, HYDROPLUS COATING, WITHOUT WIRE, PERCUFLEX PLUS: Type: IMPLANTABLE DEVICE | Site: URETER | Status: FUNCTIONAL

## 2023-09-15 RX ORDER — FENTANYL CITRATE 50 UG/ML
INJECTION, SOLUTION INTRAMUSCULAR; INTRAVENOUS PRN
Status: DISCONTINUED | OUTPATIENT
Start: 2023-09-15 | End: 2023-09-15

## 2023-09-15 RX ORDER — CEFAZOLIN SODIUM 2 G/100ML
2 INJECTION, SOLUTION INTRAVENOUS SEE ADMIN INSTRUCTIONS
Status: DISCONTINUED | OUTPATIENT
Start: 2023-09-15 | End: 2023-09-16 | Stop reason: HOSPADM

## 2023-09-15 RX ORDER — HYDROMORPHONE HCL IN WATER/PF 6 MG/30 ML
0.4 PATIENT CONTROLLED ANALGESIA SYRINGE INTRAVENOUS EVERY 5 MIN PRN
Status: DISCONTINUED | OUTPATIENT
Start: 2023-09-15 | End: 2023-09-16 | Stop reason: HOSPADM

## 2023-09-15 RX ORDER — KETOROLAC TROMETHAMINE 30 MG/ML
INJECTION, SOLUTION INTRAMUSCULAR; INTRAVENOUS PRN
Status: DISCONTINUED | OUTPATIENT
Start: 2023-09-15 | End: 2023-09-15

## 2023-09-15 RX ORDER — OXYCODONE HYDROCHLORIDE 10 MG/1
10 TABLET ORAL
Status: DISCONTINUED | OUTPATIENT
Start: 2023-09-15 | End: 2023-09-16 | Stop reason: HOSPADM

## 2023-09-15 RX ORDER — ONDANSETRON 2 MG/ML
4 INJECTION INTRAMUSCULAR; INTRAVENOUS EVERY 30 MIN PRN
Status: DISCONTINUED | OUTPATIENT
Start: 2023-09-15 | End: 2023-09-16 | Stop reason: HOSPADM

## 2023-09-15 RX ORDER — CEFAZOLIN SODIUM 2 G/100ML
2 INJECTION, SOLUTION INTRAVENOUS
Status: COMPLETED | OUTPATIENT
Start: 2023-09-15 | End: 2023-09-15

## 2023-09-15 RX ORDER — HYDROMORPHONE HCL IN WATER/PF 6 MG/30 ML
0.2 PATIENT CONTROLLED ANALGESIA SYRINGE INTRAVENOUS EVERY 5 MIN PRN
Status: DISCONTINUED | OUTPATIENT
Start: 2023-09-15 | End: 2023-09-16 | Stop reason: HOSPADM

## 2023-09-15 RX ORDER — PROPOFOL 10 MG/ML
INJECTION, EMULSION INTRAVENOUS PRN
Status: DISCONTINUED | OUTPATIENT
Start: 2023-09-15 | End: 2023-09-15

## 2023-09-15 RX ORDER — KETOROLAC TROMETHAMINE 15 MG/ML
15 INJECTION, SOLUTION INTRAMUSCULAR; INTRAVENOUS
Status: DISCONTINUED | OUTPATIENT
Start: 2023-09-15 | End: 2023-09-16 | Stop reason: HOSPADM

## 2023-09-15 RX ORDER — MEPERIDINE HYDROCHLORIDE 25 MG/ML
12.5 INJECTION INTRAMUSCULAR; INTRAVENOUS; SUBCUTANEOUS EVERY 5 MIN PRN
Status: DISCONTINUED | OUTPATIENT
Start: 2023-09-15 | End: 2023-09-16 | Stop reason: HOSPADM

## 2023-09-15 RX ORDER — SODIUM CHLORIDE, SODIUM LACTATE, POTASSIUM CHLORIDE, CALCIUM CHLORIDE 600; 310; 30; 20 MG/100ML; MG/100ML; MG/100ML; MG/100ML
INJECTION, SOLUTION INTRAVENOUS CONTINUOUS
Status: DISCONTINUED | OUTPATIENT
Start: 2023-09-15 | End: 2023-09-16 | Stop reason: HOSPADM

## 2023-09-15 RX ORDER — ONDANSETRON 2 MG/ML
INJECTION INTRAMUSCULAR; INTRAVENOUS PRN
Status: DISCONTINUED | OUTPATIENT
Start: 2023-09-15 | End: 2023-09-15

## 2023-09-15 RX ORDER — FENTANYL CITRATE 0.05 MG/ML
25 INJECTION, SOLUTION INTRAMUSCULAR; INTRAVENOUS EVERY 5 MIN PRN
Status: DISCONTINUED | OUTPATIENT
Start: 2023-09-15 | End: 2023-09-16 | Stop reason: HOSPADM

## 2023-09-15 RX ORDER — ONDANSETRON 4 MG/1
4 TABLET, ORALLY DISINTEGRATING ORAL EVERY 30 MIN PRN
Status: DISCONTINUED | OUTPATIENT
Start: 2023-09-15 | End: 2023-09-16 | Stop reason: HOSPADM

## 2023-09-15 RX ORDER — LIDOCAINE HYDROCHLORIDE 20 MG/ML
INJECTION, SOLUTION INFILTRATION; PERINEURAL PRN
Status: DISCONTINUED | OUTPATIENT
Start: 2023-09-15 | End: 2023-09-15

## 2023-09-15 RX ORDER — GLYCOPYRROLATE 0.2 MG/ML
INJECTION, SOLUTION INTRAMUSCULAR; INTRAVENOUS PRN
Status: DISCONTINUED | OUTPATIENT
Start: 2023-09-15 | End: 2023-09-15

## 2023-09-15 RX ORDER — LIDOCAINE 40 MG/G
CREAM TOPICAL
Status: DISCONTINUED | OUTPATIENT
Start: 2023-09-15 | End: 2023-09-16 | Stop reason: HOSPADM

## 2023-09-15 RX ORDER — DEXAMETHASONE SODIUM PHOSPHATE 10 MG/ML
INJECTION, SOLUTION INTRAMUSCULAR; INTRAVENOUS PRN
Status: DISCONTINUED | OUTPATIENT
Start: 2023-09-15 | End: 2023-09-15

## 2023-09-15 RX ORDER — ACETAMINOPHEN 325 MG/1
975 TABLET ORAL ONCE
Status: COMPLETED | OUTPATIENT
Start: 2023-09-15 | End: 2023-09-15

## 2023-09-15 RX ORDER — FENTANYL CITRATE 0.05 MG/ML
50 INJECTION, SOLUTION INTRAMUSCULAR; INTRAVENOUS EVERY 5 MIN PRN
Status: DISCONTINUED | OUTPATIENT
Start: 2023-09-15 | End: 2023-09-16 | Stop reason: HOSPADM

## 2023-09-15 RX ORDER — OXYCODONE HYDROCHLORIDE 5 MG/1
5 TABLET ORAL
Status: DISCONTINUED | OUTPATIENT
Start: 2023-09-15 | End: 2023-09-16 | Stop reason: HOSPADM

## 2023-09-15 RX ADMIN — SODIUM CHLORIDE, SODIUM LACTATE, POTASSIUM CHLORIDE, CALCIUM CHLORIDE: 600; 310; 30; 20 INJECTION, SOLUTION INTRAVENOUS at 14:12

## 2023-09-15 RX ADMIN — FENTANYL CITRATE 100 MCG: 50 INJECTION, SOLUTION INTRAMUSCULAR; INTRAVENOUS at 13:58

## 2023-09-15 RX ADMIN — ONDANSETRON 4 MG: 2 INJECTION INTRAMUSCULAR; INTRAVENOUS at 13:58

## 2023-09-15 RX ADMIN — PROPOFOL 200 MCG/KG/MIN: 10 INJECTION, EMULSION INTRAVENOUS at 14:00

## 2023-09-15 RX ADMIN — DEXAMETHASONE SODIUM PHOSPHATE 8 MG: 10 INJECTION, SOLUTION INTRAMUSCULAR; INTRAVENOUS at 13:58

## 2023-09-15 RX ADMIN — GLYCOPYRROLATE 0.2 MG: 0.2 INJECTION, SOLUTION INTRAMUSCULAR; INTRAVENOUS at 13:58

## 2023-09-15 RX ADMIN — CEFAZOLIN SODIUM 2 G: 2 INJECTION, SOLUTION INTRAVENOUS at 13:53

## 2023-09-15 RX ADMIN — LIDOCAINE HYDROCHLORIDE 60 MG: 20 INJECTION, SOLUTION INFILTRATION; PERINEURAL at 13:58

## 2023-09-15 RX ADMIN — PROPOFOL 150 MG: 10 INJECTION, EMULSION INTRAVENOUS at 13:58

## 2023-09-15 RX ADMIN — KETOROLAC TROMETHAMINE 15 MG: 30 INJECTION, SOLUTION INTRAMUSCULAR; INTRAVENOUS at 14:17

## 2023-09-15 RX ADMIN — SODIUM CHLORIDE, SODIUM LACTATE, POTASSIUM CHLORIDE, CALCIUM CHLORIDE: 600; 310; 30; 20 INJECTION, SOLUTION INTRAVENOUS at 13:14

## 2023-09-15 RX ADMIN — ACETAMINOPHEN 975 MG: 325 TABLET ORAL at 13:15

## 2023-09-15 NOTE — ANESTHESIA CARE TRANSFER NOTE
Patient: Aury Cervantes    Procedure: Procedure(s):  Cystoscopy, Right Ureteroscopy, Right Holmium Laser Lithotripsy, Right Retrograde Pyelogram, Right Ureteral Stent Placement       Diagnosis: Nephrolithiasis [N20.0]  Obstruction of right ureteropelvic junction (UPJ) due to stone [N20.1]  Diagnosis Additional Information: No value filed.    Anesthesia Type:   General     Note:    Oropharynx: oropharynx clear of all foreign objects  Level of Consciousness: drowsy  Oxygen Supplementation: face mask  Level of Supplemental Oxygen (L/min / FiO2): 8  Independent Airway: airway patency satisfactory and stable  Dentition: dentition unchanged  Vital Signs Stable: post-procedure vital signs reviewed and stable  Report to RN Given: handoff report given  Patient transferred to: PACU    Handoff Report: Identifed the Patient, Identified the Reponsible Provider, Reviewed the pertinent medical history, Discussed the surgical course, Reviewed Intra-OP anesthesia mangement and issues during anesthesia, Set expectations for post-procedure period and Allowed opportunity for questions and acknowledgement of understanding      Vitals:  Vitals Value Taken Time   /85 09/15/23 1439   Temp 97.3  F (36.3  C) 09/15/23 1439   Pulse 108 09/15/23 1439   Resp 16 09/15/23 1439   SpO2 100 % 09/15/23 1439       Electronically Signed By: CIRO Mccarthy CRNA  September 15, 2023  2:41 PM

## 2023-09-15 NOTE — OP NOTE
PREOPERATIVE DIAGNOSIS:  Right ureteral stone  POSTOPERATIVE DIAGNOSIS: Same  PROCEDURES PERFORMED:    Cystoscopy  Right ureteroscopy thulium laser lithotripsy and stone basket extraction  Right retrograde pyelogram with interpretation of imaging  Right ureteral stent placement    STAFF SURGEON: Stefan Du MD  RESIDENT(S) none    ANESTHESIA: General  ESTIMATED BLOOD LOSS: 1 ml  COMPLICATIONS: None  SPECIMEN: Right ureteral stone for analysis  URETERAL STENT(S): 4.8 x 24 double-J right ureteral stent    SIGNIFICANT FINDINGS: Distal ureteral narrowing, right mid ureteral stone, right papillary tip calcifications    BRIEF OPERATIVE INDICATIONS: Patient presented with symptomatic obstructing right ureteral stone.  After a discussion of all risks, benefits, and alternatives, the patient elected to proceed with definitive stone management. The patient understands the potential need for more than one procedure to eliminate all stone burden.      DESCRIPTION OF PROCEDURE:  After informed consent was obtained, the patient was transported to the operating room & placed supine on the table. After adequate anesthesia was induced, the patient was placed in lithotomy and prepped and draped in the usual sterile fashion. A timeout was taken to confirm correct patient, procedure and laterality. Pre-operative IV antibiotics were administered.     We started the procedure by performing cystoscopy.  The bladder was unremarkable.  The right ureteral orifice was identified and cannulated with a sensor wire.  The wire passed easily to the kidney.  We gently dilated the ureteral orifice with an 8 and 10 dilator.  We next passed the flexible ureteroscope adjacent to the wire identifying the ureteral stone in the mid ureter.  We assembled the thulium laser 200  m fiber and proceeded to perform laser lithotripsy at a setting of 0.6 J and 6 Hz.  We fragmented the stone into several small pieces which were subsequently able to basket  extract.  The distal ureter did appear to be slightly edematous and it was somewhat narrow and difficult to pass back-and-forth without a wire.  Once the ureteral stone had been cleared we next passed the flexible scope to the kidney.  We performed nephroscopy identifying an upper pole papilla with dense papillary calcifications.  We used the thulium laser again at a setting of 0.4 J and 20 Hz to systematically dust the overlying calcium stones.  They fragmented and dusted quite easily and were freed from the papilla.  We next performed a retrograde pyelogram confirming a moderate amount of hydronephrosis with no extravasation.  Pullback ureteroscopy was unremarkable aside from the distal ureteral edema from the stone.  We subsequently replaced our wire and positioned a 4.8 x 24 double-J right ureteral stent.  We emptied the bladder confirming that the stent was in good position.  We did leave a string on the stent to facilitate removal in 1 week    The bladder was drained and the patient was awoken from anesthesia and transported to the postanesthesia care unit in stable condition.     POSTOP PLAN:  -Stent removal via string in 1 week  -Follow-up with imaging in 6 weeks

## 2023-09-15 NOTE — ANESTHESIA POSTPROCEDURE EVALUATION
Patient: Aury Cervantes    Procedure: Procedure(s):  Cystoscopy, Right Ureteroscopy, Right Holmium Laser Lithotripsy, Right Retrograde Pyelogram, Right Ureteral Stent Placement       Anesthesia Type:  General    Note:  Disposition: Outpatient   Postop Pain Control: Uneventful            Sign Out: Well controlled pain   PONV: No   Neuro/Psych: Uneventful            Sign Out: Acceptable/Baseline neuro status   Airway/Respiratory: Uneventful            Sign Out: Acceptable/Baseline resp. status   CV/Hemodynamics: Uneventful            Sign Out: Acceptable CV status; No obvious hypovolemia; No obvious fluid overload   Other NRE: NONE   DID A NON-ROUTINE EVENT OCCUR? No           Last vitals:  Vitals Value Taken Time   /90 09/15/23 1500   Temp 97.3  F (36.3  C) 09/15/23 1439   Pulse 96 09/15/23 1500   Resp 16 09/15/23 1500   SpO2 98 % 09/15/23 1500       Electronically Signed By: Mychal Gerber MD  September 15, 2023  3:04 PM

## 2023-09-15 NOTE — ANESTHESIA PREPROCEDURE EVALUATION
Anesthesia Pre-Procedure Evaluation    Patient: Aury Cervantes   MRN: [id number] : 2000        Procedure : Procedure(s):  Cystoscopy, Right Ureteroscopy, Right Holmium Laser Lithotripsy, Right Retrograde Pyelogram, Possible Right Ureteral Stent Placement (Right: Ureter)      Past Medical History:   Diagnosis Date    Anemia     Depression     Nephrolithiasis       Past Surgical History:   Procedure Laterality Date    DILATION AND CURETTAGE, OPERATIVE HYSTEROSCOPY, COMBINED N/A 2023    Procedure: HYSTEROSCOPY, WITH DILATION AND CURETTAGE OF UTERUS, polypectomy;  Surgeon: Stefanie Mcnair MD;  Location: [facility] OR    SURGICAL HISTORY OF -       PE tubes    TONSILLECTOMY, ADENOIDECTOMY ADULT, COMBINED Bilateral 2018    Procedure: COMBINED TONSILLECTOMY, ADENOIDECTOMY ADULT;  Bilateral Adenotonsillectomy;  Surgeon: Kevin Arias MD;  Location: [facility] OR    wisdom teeth Bilateral       Allergies   Allergen Reactions    Sulfa Antibiotics Other (See Comments)     Both parents are allergic        Social History     Tobacco Use    Smoking status: Never    Smokeless tobacco: Never   Substance Use Topics    Alcohol use: Yes     Comment: Very occasionally      Wt Readings from Last 1 Encounters:   23 52.2 kg (115 lb)        Anesthesia Evaluation   Pt has had prior anesthetic. Type: General.        ROS/MED HX  ENT/Pulmonary:  - neg pulmonary ROS     Neurologic:  - neg neurologic ROS     Cardiovascular:  - neg cardiovascular ROS     METS/Exercise Tolerance: >4 METS    Hematologic:  - neg hematologic  ROS   (+)      anemia,          Musculoskeletal:  - neg musculoskeletal ROS     GI/Hepatic:  - neg GI/hepatic ROS     Renal/Genitourinary: Comment: Kidney stones  - neg Renal ROS     Endo:  - neg endo ROS     Psychiatric/Substance Use:  - neg psychiatric ROS   (+) psychiatric history depression       Infectious Disease:  - neg infectious disease ROS     Malignancy:  - neg malignancy ROS     Other:  - neg  other ROS          Physical Exam    Airway        Mallampati: I   TM distance: > 3 FB   Neck ROM: full   Mouth opening: > 3 cm    Respiratory Devices and Support         Dental       (+) Minor Abnormalities - some fillings, tiny chips      Cardiovascular   cardiovascular exam normal          Pulmonary   pulmonary exam normal                OUTSIDE LABS:  CBC:   Lab Results   Component Value Date    WBC 5.2 09/10/2023    WBC 7.8 09/09/2023    HGB 9.8 (L) 09/10/2023    HGB 12.1 09/09/2023    HCT 30.3 (L) 09/10/2023    HCT 37.2 09/09/2023     (L) 09/10/2023     09/09/2023     BMP:   Lab Results   Component Value Date     09/10/2023     09/09/2023    POTASSIUM 3.6 09/10/2023    POTASSIUM 3.5 09/09/2023    CHLORIDE 112 (H) 09/10/2023    CHLORIDE 105 09/09/2023    CO2 24 09/10/2023    CO2 21 (L) 09/09/2023    BUN 11.9 09/10/2023    BUN 11.5 09/09/2023    CR 0.76 09/10/2023    CR 0.75 09/09/2023    GLC 84 09/10/2023    GLC 96 09/09/2023     COAGS:   Lab Results   Component Value Date    PTT 31 02/02/2021    INR 1.15 (H) 02/02/2021     POC:   Lab Results   Component Value Date    BGM 95 03/22/2018    HCG Negative 09/06/2023    HCGS Negative 08/11/2023     HEPATIC:   Lab Results   Component Value Date    ALBUMIN 4.4 08/11/2023    PROTTOTAL 6.9 08/11/2023    ALT 10 08/11/2023    AST 18 08/11/2023    ALKPHOS 71 08/11/2023    BILITOTAL 0.3 08/11/2023     OTHER:   Lab Results   Component Value Date    VICKIE 8.6 09/10/2023    MAG 1.9 08/11/2023    LIPASE 209 07/20/2022    TSH 3.43 08/11/2023    T4 1.36 09/29/2022    CRP 50.6 (H) 03/17/2015    SED 19 (H) 03/17/2015       Anesthesia Plan    ASA Status:  1    NPO Status:  NPO Appropriate    Anesthesia Type: General.     - Airway: LMA   Induction: Intravenous.   Maintenance: TIVA.        Consents    Anesthesia Plan(s) and associated risks, benefits, and realistic alternatives discussed. Questions answered and patient/representative(s) expressed  understanding.     - Discussed:     - Discussed with:  Patient            Postoperative Care       PONV prophylaxis: Ondansetron (or other 5HT-3), Dexamethasone or Solumedrol, Background Propofol Infusion     Comments:                Mychal Gerber MD

## 2023-09-15 NOTE — PROGRESS NOTES
Offered patient pregnancy test.  Explained there are risks associated with anesthesia and pregnancy.  Patient verbalizes understanding, denies possibility of pregnancy and declines pregnancy test.    Norm Churchill RN on 9/15/2023 at 1:36 PM

## 2023-09-15 NOTE — DISCHARGE INSTRUCTIONS
You have received 975 mg of Acetaminophen (Tylenol) at 1:15 PM. Please do not take an additional dose of Tylenol until after 7:15 PM     Do not exceed 4,000 mg of acetaminophen during a 24 hour period and keep in mind that acetaminophen can also be found in many over-the-counter cold medications as well as narcotics that may be given for pain.      You received a dose of IV Toradol at 2:17 PM. Please do not take any additional Ibuprofen/NSAID products (Aleve/Advil/Motrin/Naproxen/Celebrex) until after 8:17 PM.    If you have any questions or concerns regarding your procedure, please contact Dr. Du, his office number is [phone] or [phone].      Going Home With a Ureteral Stent    What is it?  A stent is a soft, plastic tube that helps urine (pee) drain into the bladder.  During the surgery, it is placed in the ureter the tube that connects the kidney to the bladder.  A thin curl at each end of the stent keeps one end in your kidney and the other in your bladder.  The stent can not be seen from outside of the body.    Why Do I Have It?  Some sort of blockage is not letting pee drain into your bladder.  This could be from a stone, certain surgeries or kidney infection.    What Should I Expect?   Stents often cause some discomfort.  You may have:    The need to pee suddenly    Pain when you pee    A dull backache, which may get worse when you pee  Blood in your pee (color of fruit punch) and some clots, which may increase with physical activity.     What Can I Do To Feel Better?    Drink a little more fluids than usual.  You can eat your normal diet.    Enjoy a warm bath.  Decrease your activity.  Some people find bending or twisting movement cause discomfort or increased blood in the urine.  Even so, you will not harm yourself.                                                         Kidney Stone [place]                                                        Stent Removal With String    -Take  "Tylenol or Ibuprofen and drink fluids 1 hour prior to removing your stent at home.    -Remove the stent in the morning on the specific day as directed by your doctor.  Gently pull on the string in the shower while urinating until the stent is completely removed.    -Call [facility] if you have questions or concerns [phone]    -What To Expect After Your Stent Is Removed    -After stent removal, urine may be bloody and possibly have some bloody clots.    -You may experience an \"achy\" pain due to urethral spasms.  This generally only lasts a few hours, but should resolve over the next 2-3 days     [facility]   Adult Discharge Orders & Instructions     For 24 hours after surgery    Get plenty of rest.  A responsible adult must stay with you for at least 24 hours after you leave the hospital.   Do not drive or use heavy equipment.  If you have weakness or tingling, don't drive or use heavy equipment until this feeling goes away.  Do not drink alcohol.  Avoid strenuous or risky activities.  Ask for help when climbing stairs.   You may feel lightheaded.  IF so, sit for a few minutes before standing.  Have someone help you get up.   If you have nausea (feel sick to your stomach): Drink only clear liquids such as apple juice, ginger ale, broth or 7-Up.  Rest may also help.  Be sure to drink enough fluids.  Move to a regular diet as you feel able.  You may have a slight fever. Call the doctor if your fever is over 100 F (37.7 C) (taken under the tongue) or lasts longer than 24 hours.  You may have a dry mouth, a sore throat, muscle aches or trouble sleeping.  These should go away after 24 hours.  Do not make important or legal decisions.   Call your doctor for any of the followin.  Signs of infection (fever, growing tenderness at the surgery site, a large amount of drainage or bleeding, severe pain, foul-smelling drainage, redness, swelling).    2. It has been over 8 to 10 hours since surgery and " you are still not able to urinate (pass water).    3.  Headache for over 24 hours.

## 2023-09-15 NOTE — ANESTHESIA PROCEDURE NOTES
Airway       Patient location during procedure: OR  Staff -        CRNA: Ro Martinez APRN CRNA       Performed By: CRNA  Consent for Airway        Urgency: elective  Indications and Patient Condition       Indications for airway management: loan-procedural       Induction type:intravenous       Mask difficulty assessment: 0 - not attempted    Final Airway Details       Final airway type: supraglottic airway    Supraglottic Airway Details        Type: LMA       LMA size: 4    Post intubation assessment        Placement verified by: capnometry, equal breath sounds and chest rise        Number of attempts at approach: 1       Number of other approaches attempted: 0       Secured with: silk tape       Ease of procedure: easy       Dentition: Intact

## 2023-09-15 NOTE — PROGRESS NOTES
I personally met with Aury Cervantes and discussed their current medical situation.     We reviewed the nature of planned surgery, the risks benefits and complications and treatment alternatives.      Shared decision made to proceed with right ureteroscopic stone treatment    We also reviewed the following financial disclosures potentially applicable to their surgery  I informed the patient that I do have a financial consulting relationship with [employer], a medical device company that makes products which could be used during the procedure  I presented an opportunity to ask questions  and informed them that they were capable of rescinding their consent or not proceeding without penalty if they desire to do so.

## 2023-09-18 ENCOUNTER — OFFICE VISIT (OUTPATIENT)
Dept: FAMILY MEDICINE | Facility: CLINIC | Age: 23
End: 2023-09-18
Payer: COMMERCIAL

## 2023-09-18 VITALS
WEIGHT: 117.6 LBS | TEMPERATURE: 97.4 F | BODY MASS INDEX: 18.9 KG/M2 | DIASTOLIC BLOOD PRESSURE: 68 MMHG | RESPIRATION RATE: 20 BRPM | SYSTOLIC BLOOD PRESSURE: 102 MMHG | HEIGHT: 66 IN | HEART RATE: 57 BPM | OXYGEN SATURATION: 100 %

## 2023-09-18 DIAGNOSIS — Z01.818 PREOP GENERAL PHYSICAL EXAM: Primary | ICD-10-CM

## 2023-09-18 DIAGNOSIS — Z87.442 HISTORY OF KIDNEY STONES: ICD-10-CM

## 2023-09-18 DIAGNOSIS — Z86.2 HISTORY OF ANEMIA: ICD-10-CM

## 2023-09-18 DIAGNOSIS — J34.89 NASAL OBSTRUCTION: ICD-10-CM

## 2023-09-18 LAB
ALBUMIN UR-MCNC: 100 MG/DL
ANION GAP SERPL CALCULATED.3IONS-SCNC: 9 MMOL/L (ref 7–15)
APPEARANCE UR: ABNORMAL
BACTERIA #/AREA URNS HPF: ABNORMAL /HPF
BILIRUB UR QL STRIP: NEGATIVE
BUN SERPL-MCNC: 9.1 MG/DL (ref 6–20)
CALCIUM SERPL-MCNC: 9.2 MG/DL (ref 8.6–10)
CHLORIDE SERPL-SCNC: 106 MMOL/L (ref 98–107)
COLOR UR AUTO: ABNORMAL
CREAT SERPL-MCNC: 0.65 MG/DL (ref 0.51–0.95)
DEPRECATED HCO3 PLAS-SCNC: 26 MMOL/L (ref 22–29)
EGFRCR SERPLBLD CKD-EPI 2021: >90 ML/MIN/1.73M2
ERYTHROCYTE [DISTWIDTH] IN BLOOD BY AUTOMATED COUNT: 12.4 % (ref 10–15)
GLUCOSE SERPL-MCNC: 89 MG/DL (ref 70–99)
GLUCOSE UR STRIP-MCNC: NEGATIVE MG/DL
HCG UR QL: NEGATIVE
HCT VFR BLD AUTO: 34.6 % (ref 35–47)
HGB BLD-MCNC: 10.9 G/DL (ref 11.7–15.7)
HGB UR QL STRIP: ABNORMAL
KETONES UR STRIP-MCNC: NEGATIVE MG/DL
LEUKOCYTE ESTERASE UR QL STRIP: ABNORMAL
MCH RBC QN AUTO: 25.3 PG (ref 26.5–33)
MCHC RBC AUTO-ENTMCNC: 31.5 G/DL (ref 31.5–36.5)
MCV RBC AUTO: 80 FL (ref 78–100)
MUCOUS THREADS #/AREA URNS LPF: PRESENT /LPF
NITRATE UR QL: NEGATIVE
PH UR STRIP: 5.5 [PH] (ref 5–7)
PLATELET # BLD AUTO: 184 10E3/UL (ref 150–450)
POTASSIUM SERPL-SCNC: 3.8 MMOL/L (ref 3.4–5.3)
RBC # BLD AUTO: 4.31 10E6/UL (ref 3.8–5.2)
RBC #/AREA URNS AUTO: >100 /HPF
SODIUM SERPL-SCNC: 141 MMOL/L (ref 136–145)
SP GR UR STRIP: 1.01 (ref 1–1.03)
SQUAMOUS #/AREA URNS AUTO: ABNORMAL /LPF
UROBILINOGEN UR STRIP-ACNC: 1 E.U./DL
WBC # BLD AUTO: 5.4 10E3/UL (ref 4–11)
WBC #/AREA URNS AUTO: ABNORMAL /HPF

## 2023-09-18 PROCEDURE — 85027 COMPLETE CBC AUTOMATED: CPT | Performed by: NURSE PRACTITIONER

## 2023-09-18 PROCEDURE — 99214 OFFICE O/P EST MOD 30 MIN: CPT | Performed by: NURSE PRACTITIONER

## 2023-09-18 PROCEDURE — 81025 URINE PREGNANCY TEST: CPT | Performed by: NURSE PRACTITIONER

## 2023-09-18 PROCEDURE — 36415 COLL VENOUS BLD VENIPUNCTURE: CPT | Performed by: NURSE PRACTITIONER

## 2023-09-18 PROCEDURE — 80048 BASIC METABOLIC PNL TOTAL CA: CPT | Performed by: NURSE PRACTITIONER

## 2023-09-18 PROCEDURE — 81001 URINALYSIS AUTO W/SCOPE: CPT | Performed by: NURSE PRACTITIONER

## 2023-09-18 ASSESSMENT — PATIENT HEALTH QUESTIONNAIRE - PHQ9
SUM OF ALL RESPONSES TO PHQ QUESTIONS 1-9: 2
10. IF YOU CHECKED OFF ANY PROBLEMS, HOW DIFFICULT HAVE THESE PROBLEMS MADE IT FOR YOU TO DO YOUR WORK, TAKE CARE OF THINGS AT HOME, OR GET ALONG WITH OTHER PEOPLE: SOMEWHAT DIFFICULT
SUM OF ALL RESPONSES TO PHQ QUESTIONS 1-9: 2

## 2023-09-18 ASSESSMENT — PAIN SCALES - GENERAL: PAINLEVEL: MODERATE PAIN (4)

## 2023-09-18 NOTE — H&P (VIEW-ONLY)
[facility] [place]  [address] [address]  Phone: [phone]  Primary Provider: Twin Crane  Pre-op Performing Provider: TWIN CRANE    PREOPERATIVE EVALUATION:  Today's date: 9/18/2023    Aury Cervantes is a 23 year old female who presents for a preoperative evaluation.      9/18/2023     8:17 AM   Additional Questions   Roomed by Efraín YU CMA   Accompanied by self       Surgical Information:  Surgery/Procedure:   SINUS SURGERY, ENDOSCOPIC, USING OPTICAL TRACKING SYSTEM Bilateral General   SEPTOPLASTY, NOSE, WITH TURBINOPLASTY N/A General     Surgery Location: [facility]   Surgeon: Dr. Grace   Surgery Date: 9/29/23  Time of Surgery: 10:25  Where patient plans to recover: At home with family  Fax number for surgical facility: Note does not need to be faxed, will be available electronically in Epic.    Assessment & Plan     The proposed surgical procedure is considered INTERMEDIATE risk.    Preop general physical exam    - CBC with platelets; Future  - Basic metabolic panel  (Ca, Cl, CO2, Creat, Gluc, K, Na, BUN); Future  - HCG Qual, Urine ([id number])  - CBC with platelets  - Basic metabolic panel  (Ca, Cl, CO2, Creat, Gluc, K, Na, BUN)    Nasal obstruction  -cleared for surgery, can proceed with scheduled surgery as long as Hemoglobin level stable  Lab Results   Component Value Date    HGB 10.9 09/18/2023    HGB 8.9 01/08/2021    -plan to recheck Hb in 1 week    History of kidney stones  -following urology, still have hematuria due to recent stent placement  -recheck UA in 1 week   - UA with Microscopic reflex to Culture - lab collect; Future  - CBC with platelets; Future  - Basic metabolic panel  (Ca, Cl, CO2, Creat, Gluc, K, Na, BUN); Future  - UA with Microscopic reflex to Culture - lab collect  - CBC with platelets  - Basic metabolic panel  (Ca, Cl, CO2, Creat, Gluc, K, Na, BUN)  - UA Microscopic with Reflex to Culture    History of  anemia  -improving   - CBC with platelets; Future  - CBC with platelets  - Hemoglobin; Future         - No identified additional risk factors other than previously addressed    Antiplatelet or Anticoagulation Medication Instructions:   - Patient is on no antiplatelet or anticoagulation medications.    Additional Medication Instructions:  Patient is on no additional chronic medications    RECOMMENDATION:  APPROVAL GIVEN to proceed with proposed procedure, without further diagnostic evaluation.      Subjective       HPI related to upcoming procedure: nasal obstruction, septum deviation         9/18/2023     8:12 AM   Preop Questions   1. Have you ever had a heart attack or stroke? No   2. Have you ever had surgery on your heart or blood vessels, such as a stent placement, a coronary artery bypass, or surgery on an artery in your head, neck, heart, or legs? No   3. Do you have chest pain with activity? No   4. Do you have a history of  heart failure? No   5. Do you currently have a cold, bronchitis or symptoms of other infection? No   6. Do you have a cough, shortness of breath, or wheezing? No   7. Do you or anyone in your family have previous history of blood clots? No   8. Do you or does anyone in your family have a serious bleeding problem such as prolonged bleeding following surgeries or cuts? No   9. Have you ever had problems with anemia or been told to take iron pills? YES   10. Have you had any abnormal blood loss such as black, tarry or bloody stools, or abnormal vaginal bleeding? No   11. Have you ever had a blood transfusion? No   12. Are you willing to have a blood transfusion if it is medically needed before, during, or after your surgery? Yes   13. Have you or any of your relatives ever had problems with anesthesia? No   14. Do you have sleep apnea, excessive snoring or daytime drowsiness? No   15. Do you have any artifical heart valves or other implanted medical devices like a pacemaker, defibrillator,  or continuous glucose monitor? No   16. Do you have artificial joints? No   17. Are you allergic to latex? No   18. Is there any chance that you may be pregnant? No       Health Care Directive:  Patient does not have a Health Care Directive or Living Will: Discussed advance care planning with patient; information given to patient to review.    Preoperative Review of :   reviewed - controlled substances reflected in medication list.        Review of Systems  Constitutional, neuro, ENT, endocrine, pulmonary, cardiac, gastrointestinal, genitourinary, musculoskeletal, integument and psychiatric systems are negative, except as otherwise noted.    Patient Active Problem List    Diagnosis Date Noted    Obstruction of right ureteropelvic junction (UPJ) due to stone 09/12/2023     Priority: Medium    Nexplanon in place 09/12/2023     Priority: Medium    Nephrolithiasis 09/10/2023     Priority: Medium    Ureteral colic 09/09/2023     Priority: Medium    Chronic rhinitis 07/25/2023     Priority: Medium    Silent sinus syndrome 07/25/2023     Priority: Medium    Nasal obstruction 12/22/2022     Priority: Medium     Added automatically from request for surgery [id number]      Nasal septal deviation 12/22/2022     Priority: Medium     Added automatically from request for surgery [id number]      Hypertrophy of both inferior nasal turbinates 12/22/2022     Priority: Medium     Added automatically from request for surgery [id number]      Dysmenorrhea 05/16/2022     Priority: Medium    Female infertility 05/16/2022     Priority: Medium    Iron deficiency anemia due to chronic blood loss 02/07/2021     Priority: Medium    Menorrhagia with regular cycle 02/07/2021     Priority: Medium    Anaphylaxis, subsequent encounter 12/04/2018     Priority: Medium    Rhinoconjunctivitis 12/04/2018     Priority: Medium    S/P tonsillectomy and adenoidectomy 03/21/2018     Priority: Medium    Major depressive disorder, recurrent episode, severe (H)  11/19/2014     Priority: Medium      Past Medical History:   Diagnosis Date    Anemia     Depression     Nephrolithiasis      Past Surgical History:   Procedure Laterality Date    COMBINED CYSTOSCOPY, RETROGRADES, URETEROSCOPY, LASER HOLMIUM LITHOTRIPSY URETER(S), INSERT STENT Right 9/15/2023    Procedure: Cystoscopy, Right Ureteroscopy, Right Holmium Laser Lithotripsy, Right Retrograde Pyelogram, Right Ureteral Stent Placement;  Surgeon: Stefan Du MD;  Location: [facility] OR    DILATION AND CURETTAGE, OPERATIVE HYSTEROSCOPY, COMBINED N/A 04/06/2023    Procedure: HYSTEROSCOPY, WITH DILATION AND CURETTAGE OF UTERUS, polypectomy;  Surgeon: Stefanei Mcnair MD;  Location: [facility] OR    SURGICAL HISTORY OF -       PE tubes    TONSILLECTOMY, ADENOIDECTOMY ADULT, COMBINED Bilateral 03/19/2018    Procedure: COMBINED TONSILLECTOMY, ADENOIDECTOMY ADULT;  Bilateral Adenotonsillectomy;  Surgeon: Kevin Arias MD;  Location: [facility] OR    wisdom teeth Bilateral      Current Outpatient Medications   Medication Sig Dispense Refill    acetaminophen (TYLENOL) 500 MG tablet Take 500-1,000 mg by mouth every 6 hours as needed for mild pain      albuterol (PROAIR HFA/PROVENTIL HFA/VENTOLIN HFA) 108 (90 Base) MCG/ACT inhaler Inhale 2 puffs into the lungs every 6 hours as needed for shortness of breath, wheezing or cough 18 g 1    ibuprofen (ADVIL/MOTRIN) 200 MG tablet Take 200 mg by mouth every 4 hours as needed for pain      ondansetron (ZOFRAN ODT) 4 MG ODT tab Take 1 tablet (4 mg) by mouth every 8 hours as needed for nausea 10 tablet 0    oxyCODONE (ROXICODONE) 5 MG tablet Take 1 tablet (5 mg) by mouth every 6 hours as needed for pain 10 tablet 0    tamsulosin (FLOMAX) 0.4 MG capsule Take 1 capsule (0.4 mg) by mouth daily 14 capsule 0       Allergies   Allergen Reactions    Sulfa Antibiotics Other (See Comments)     Both parents are allergic          Social History     Tobacco Use    Smoking status: Never     "Smokeless tobacco: Never   Substance Use Topics    Alcohol use: Yes     Comment: Very occasionally       History   Drug Use Unknown         Objective     /68 (BP Location: Left arm, Patient Position: Sitting, Cuff Size: Adult Regular)   Pulse 57   Temp 97.4  F (36.3  C) (Tympanic)   Resp 20   Ht 1.67 m (5' 5.75\")   Wt 53.3 kg (117 lb 9.6 oz)   LMP 09/12/2023 (Exact Date)   SpO2 100%   BMI 19.13 kg/m      Physical Exam    GENERAL APPEARANCE: healthy, alert and no distress     EYES: EOMI, PERRL     HENT: ear canals and TM's normal and nose and mouth without ulcers or lesions     NECK: no adenopathy, no asymmetry, masses, or scars and thyroid normal to palpation     RESP: lungs clear to auscultation - no rales, rhonchi or wheezes     CV: regular rates and rhythm, normal S1 S2, no S3 or S4 and no murmur, click or rub     ABDOMEN:  soft, nontender, no HSM or masses and bowel sounds normal     MS: extremities normal- no gross deformities noted, no evidence of inflammation in joints, FROM in all extremities.     SKIN: no suspicious lesions or rashes     NEURO: Normal strength and tone, sensory exam grossly normal, mentation intact and speech normal     PSYCH: mentation appears normal. and affect normal/bright     LYMPHATICS: No cervical adenopathy    Recent Labs   Lab Test 09/10/23  0504 09/09/23  1748   HGB 9.8* 12.1   * 175    139   POTASSIUM 3.6 3.5   CR 0.76 0.75        Diagnostics:  Recent Results (from the past 24 hour(s))   HCG Qual, Urine ([id number])    Collection Time: 09/18/23  8:44 AM   Result Value Ref Range    hCG Urine Qualitative Negative Negative   UA with Microscopic reflex to Culture - lab collect    Collection Time: 09/18/23  8:44 AM    Specimen: Urine, Clean Catch   Result Value Ref Range    Color Urine Red (A) Colorless, Straw, Light Yellow, Yellow    Appearance Urine Cloudy (A) Clear    Glucose Urine Negative Negative mg/dL    Bilirubin Urine Negative Negative    Ketones Urine " Negative Negative mg/dL    Specific Gravity Urine 1.015 1.003 - 1.035    Blood Urine Large (A) Negative    pH Urine 5.5 5.0 - 7.0    Protein Albumin Urine 100 (A) Negative mg/dL    Urobilinogen Urine 1.0 0.2, 1.0 E.U./dL    Nitrite Urine Negative Negative    Leukocyte Esterase Urine Small (A) Negative   CBC with platelets    Collection Time: 09/18/23  8:44 AM   Result Value Ref Range    WBC Count 5.4 4.0 - 11.0 10e3/uL    RBC Count 4.31 3.80 - 5.20 10e6/uL    Hemoglobin 10.9 (L) 11.7 - 15.7 g/dL    Hematocrit 34.6 (L) 35.0 - 47.0 %    MCV 80 78 - 100 fL    MCH 25.3 (L) 26.5 - 33.0 pg    MCHC 31.5 31.5 - 36.5 g/dL    RDW 12.4 10.0 - 15.0 %    Platelet Count 184 150 - 450 10e3/uL   Basic metabolic panel  (Ca, Cl, CO2, Creat, Gluc, K, Na, BUN)    Collection Time: 09/18/23  8:44 AM   Result Value Ref Range    Sodium 141 136 - 145 mmol/L    Potassium 3.8 3.4 - 5.3 mmol/L    Chloride 106 98 - 107 mmol/L    Carbon Dioxide (CO2) 26 22 - 29 mmol/L    Anion Gap 9 7 - 15 mmol/L    Urea Nitrogen 9.1 6.0 - 20.0 mg/dL    Creatinine 0.65 0.51 - 0.95 mg/dL    Calcium 9.2 8.6 - 10.0 mg/dL    Glucose 89 70 - 99 mg/dL    GFR Estimate >90 >60 mL/min/1.73m2   UA Microscopic with Reflex to Culture    Collection Time: 09/18/23  8:44 AM   Result Value Ref Range    Bacteria Urine Few (A) None Seen /HPF    RBC Urine >100 (A) 0-2 /HPF /HPF    WBC Urine 0-5 0-5 /HPF /HPF    Squamous Epithelials Urine Few (A) None Seen /LPF    Mucus Urine Present (A) None Seen /LPF      No EKG required, no history of coronary heart disease, significant arrhythmia, peripheral arterial disease or other structural heart disease.    Revised Cardiac Risk Index (RCRI):  The patient has the following serious cardiovascular risks for perioperative complications:   - No serious cardiac risks = 0 points     RCRI Interpretation: 0 points: Class I (very low risk - 0.4% complication rate)         Signed Electronically by: CIRO Kline CNP  Copy of this evaluation  report is provided to requesting physician.

## 2023-09-18 NOTE — PROGRESS NOTES
[facility]  [address] [address]  Phone: [phone]  Primary Provider: Twin Crane  Pre-op Performing Provider: TWIN CRANE    PREOPERATIVE EVALUATION:  Today's date: 9/18/2023    Aury Cervantes is a 23 year old female who presents for a preoperative evaluation.      9/18/2023     8:17 AM   Additional Questions   Roomed by Efraín YU CMA   Accompanied by self       Surgical Information:  Surgery/Procedure:   SINUS SURGERY, ENDOSCOPIC, USING OPTICAL TRACKING SYSTEM Bilateral General   SEPTOPLASTY, NOSE, WITH TURBINOPLASTY N/A General     Surgery Location: [facility]   Surgeon: Dr. Grace   Surgery Date: 9/29/23  Time of Surgery: 10:25  Where patient plans to recover: At home with family  Fax number for surgical facility: Note does not need to be faxed, will be available electronically in Epic.    Assessment & Plan     The proposed surgical procedure is considered INTERMEDIATE risk.    Preop general physical exam    - CBC with platelets; Future  - Basic metabolic panel  (Ca, Cl, CO2, Creat, Gluc, K, Na, BUN); Future  - HCG Qual, Urine ([id number])  - CBC with platelets  - Basic metabolic panel  (Ca, Cl, CO2, Creat, Gluc, K, Na, BUN)    Nasal obstruction  -cleared for surgery, can proceed with scheduled surgery as long as Hemoglobin level stable  Lab Results   Component Value Date    HGB 10.9 09/18/2023    HGB 8.9 01/08/2021    -plan to recheck Hb in 1 week    History of kidney stones  -following urology, still have hematuria due to recent stent placement  -recheck UA in 1 week   - UA with Microscopic reflex to Culture - lab collect; Future  - CBC with platelets; Future  - Basic metabolic panel  (Ca, Cl, CO2, Creat, Gluc, K, Na, BUN); Future  - UA with Microscopic reflex to Culture - lab collect  - CBC with platelets  - Basic metabolic panel  (Ca, Cl, CO2, Creat, Gluc, K, Na, BUN)  - UA Microscopic with Reflex to Culture    History of  anemia  -improving   - CBC with platelets; Future  - CBC with platelets  - Hemoglobin; Future         - No identified additional risk factors other than previously addressed    Antiplatelet or Anticoagulation Medication Instructions:   - Patient is on no antiplatelet or anticoagulation medications.    Additional Medication Instructions:  Patient is on no additional chronic medications    RECOMMENDATION:  APPROVAL GIVEN to proceed with proposed procedure, without further diagnostic evaluation.      Subjective       HPI related to upcoming procedure: nasal obstruction, septum deviation         9/18/2023     8:12 AM   Preop Questions   1. Have you ever had a heart attack or stroke? No   2. Have you ever had surgery on your heart or blood vessels, such as a stent placement, a coronary artery bypass, or surgery on an artery in your head, neck, heart, or legs? No   3. Do you have chest pain with activity? No   4. Do you have a history of  heart failure? No   5. Do you currently have a cold, bronchitis or symptoms of other infection? No   6. Do you have a cough, shortness of breath, or wheezing? No   7. Do you or anyone in your family have previous history of blood clots? No   8. Do you or does anyone in your family have a serious bleeding problem such as prolonged bleeding following surgeries or cuts? No   9. Have you ever had problems with anemia or been told to take iron pills? YES   10. Have you had any abnormal blood loss such as black, tarry or bloody stools, or abnormal vaginal bleeding? No   11. Have you ever had a blood transfusion? No   12. Are you willing to have a blood transfusion if it is medically needed before, during, or after your surgery? Yes   13. Have you or any of your relatives ever had problems with anesthesia? No   14. Do you have sleep apnea, excessive snoring or daytime drowsiness? No   15. Do you have any artifical heart valves or other implanted medical devices like a pacemaker, defibrillator,  or continuous glucose monitor? No   16. Do you have artificial joints? No   17. Are you allergic to latex? No   18. Is there any chance that you may be pregnant? No       Health Care Directive:  Patient does not have a Health Care Directive or Living Will: Discussed advance care planning with patient; information given to patient to review.    Preoperative Review of :   reviewed - controlled substances reflected in medication list.        Review of Systems  Constitutional, neuro, ENT, endocrine, pulmonary, cardiac, gastrointestinal, genitourinary, musculoskeletal, integument and psychiatric systems are negative, except as otherwise noted.    Patient Active Problem List    Diagnosis Date Noted    Obstruction of right ureteropelvic junction (UPJ) due to stone 09/12/2023     Priority: Medium    Nexplanon in place 09/12/2023     Priority: Medium    Nephrolithiasis 09/10/2023     Priority: Medium    Ureteral colic 09/09/2023     Priority: Medium    Chronic rhinitis 07/25/2023     Priority: Medium    Silent sinus syndrome 07/25/2023     Priority: Medium    Nasal obstruction 12/22/2022     Priority: Medium     Added automatically from request for surgery [id number]      Nasal septal deviation 12/22/2022     Priority: Medium     Added automatically from request for surgery [id number]      Hypertrophy of both inferior nasal turbinates 12/22/2022     Priority: Medium     Added automatically from request for surgery [id number]      Dysmenorrhea 05/16/2022     Priority: Medium    Female infertility 05/16/2022     Priority: Medium    Iron deficiency anemia due to chronic blood loss 02/07/2021     Priority: Medium    Menorrhagia with regular cycle 02/07/2021     Priority: Medium    Anaphylaxis, subsequent encounter 12/04/2018     Priority: Medium    Rhinoconjunctivitis 12/04/2018     Priority: Medium    S/P tonsillectomy and adenoidectomy 03/21/2018     Priority: Medium    Major depressive disorder, recurrent episode, severe (H)  11/19/2014     Priority: Medium      Past Medical History:   Diagnosis Date    Anemia     Depression     Nephrolithiasis      Past Surgical History:   Procedure Laterality Date    COMBINED CYSTOSCOPY, RETROGRADES, URETEROSCOPY, LASER HOLMIUM LITHOTRIPSY URETER(S), INSERT STENT Right 9/15/2023    Procedure: Cystoscopy, Right Ureteroscopy, Right Holmium Laser Lithotripsy, Right Retrograde Pyelogram, Right Ureteral Stent Placement;  Surgeon: Stefan Du MD;  Location: [facility] OR    DILATION AND CURETTAGE, OPERATIVE HYSTEROSCOPY, COMBINED N/A 04/06/2023    Procedure: HYSTEROSCOPY, WITH DILATION AND CURETTAGE OF UTERUS, polypectomy;  Surgeon: Stefanie Mcnair MD;  Location: [facility] OR    SURGICAL HISTORY OF -       PE tubes    TONSILLECTOMY, ADENOIDECTOMY ADULT, COMBINED Bilateral 03/19/2018    Procedure: COMBINED TONSILLECTOMY, ADENOIDECTOMY ADULT;  Bilateral Adenotonsillectomy;  Surgeon: Kevin Arias MD;  Location: [facility] OR    wisdom teeth Bilateral      Current Outpatient Medications   Medication Sig Dispense Refill    acetaminophen (TYLENOL) 500 MG tablet Take 500-1,000 mg by mouth every 6 hours as needed for mild pain      albuterol (PROAIR HFA/PROVENTIL HFA/VENTOLIN HFA) 108 (90 Base) MCG/ACT inhaler Inhale 2 puffs into the lungs every 6 hours as needed for shortness of breath, wheezing or cough 18 g 1    ibuprofen (ADVIL/MOTRIN) 200 MG tablet Take 200 mg by mouth every 4 hours as needed for pain      ondansetron (ZOFRAN ODT) 4 MG ODT tab Take 1 tablet (4 mg) by mouth every 8 hours as needed for nausea 10 tablet 0    oxyCODONE (ROXICODONE) 5 MG tablet Take 1 tablet (5 mg) by mouth every 6 hours as needed for pain 10 tablet 0    tamsulosin (FLOMAX) 0.4 MG capsule Take 1 capsule (0.4 mg) by mouth daily 14 capsule 0       Allergies   Allergen Reactions    Sulfa Antibiotics Other (See Comments)     Both parents are allergic          Social History     Tobacco Use    Smoking status: Never     "Smokeless tobacco: Never   Substance Use Topics    Alcohol use: Yes     Comment: Very occasionally       History   Drug Use Unknown         Objective     /68 (BP Location: Left arm, Patient Position: Sitting, Cuff Size: Adult Regular)   Pulse 57   Temp 97.4  F (36.3  C) (Tympanic)   Resp 20   Ht 1.67 m (5' 5.75\")   Wt 53.3 kg (117 lb 9.6 oz)   LMP 09/12/2023 (Exact Date)   SpO2 100%   BMI 19.13 kg/m      Physical Exam    GENERAL APPEARANCE: healthy, alert and no distress     EYES: EOMI, PERRL     HENT: ear canals and TM's normal and nose and mouth without ulcers or lesions     NECK: no adenopathy, no asymmetry, masses, or scars and thyroid normal to palpation     RESP: lungs clear to auscultation - no rales, rhonchi or wheezes     CV: regular rates and rhythm, normal S1 S2, no S3 or S4 and no murmur, click or rub     ABDOMEN:  soft, nontender, no HSM or masses and bowel sounds normal     MS: extremities normal- no gross deformities noted, no evidence of inflammation in joints, FROM in all extremities.     SKIN: no suspicious lesions or rashes     NEURO: Normal strength and tone, sensory exam grossly normal, mentation intact and speech normal     PSYCH: mentation appears normal. and affect normal/bright     LYMPHATICS: No cervical adenopathy    Recent Labs   Lab Test 09/10/23  0504 09/09/23  1748   HGB 9.8* 12.1   * 175    139   POTASSIUM 3.6 3.5   CR 0.76 0.75        Diagnostics:  Recent Results (from the past 24 hour(s))   HCG Qual, Urine ([id number])    Collection Time: 09/18/23  8:44 AM   Result Value Ref Range    hCG Urine Qualitative Negative Negative   UA with Microscopic reflex to Culture - lab collect    Collection Time: 09/18/23  8:44 AM    Specimen: Urine, Clean Catch   Result Value Ref Range    Color Urine Red (A) Colorless, Straw, Light Yellow, Yellow    Appearance Urine Cloudy (A) Clear    Glucose Urine Negative Negative mg/dL    Bilirubin Urine Negative Negative    Ketones Urine " Negative Negative mg/dL    Specific Gravity Urine 1.015 1.003 - 1.035    Blood Urine Large (A) Negative    pH Urine 5.5 5.0 - 7.0    Protein Albumin Urine 100 (A) Negative mg/dL    Urobilinogen Urine 1.0 0.2, 1.0 E.U./dL    Nitrite Urine Negative Negative    Leukocyte Esterase Urine Small (A) Negative   CBC with platelets    Collection Time: 09/18/23  8:44 AM   Result Value Ref Range    WBC Count 5.4 4.0 - 11.0 10e3/uL    RBC Count 4.31 3.80 - 5.20 10e6/uL    Hemoglobin 10.9 (L) 11.7 - 15.7 g/dL    Hematocrit 34.6 (L) 35.0 - 47.0 %    MCV 80 78 - 100 fL    MCH 25.3 (L) 26.5 - 33.0 pg    MCHC 31.5 31.5 - 36.5 g/dL    RDW 12.4 10.0 - 15.0 %    Platelet Count 184 150 - 450 10e3/uL   Basic metabolic panel  (Ca, Cl, CO2, Creat, Gluc, K, Na, BUN)    Collection Time: 09/18/23  8:44 AM   Result Value Ref Range    Sodium 141 136 - 145 mmol/L    Potassium 3.8 3.4 - 5.3 mmol/L    Chloride 106 98 - 107 mmol/L    Carbon Dioxide (CO2) 26 22 - 29 mmol/L    Anion Gap 9 7 - 15 mmol/L    Urea Nitrogen 9.1 6.0 - 20.0 mg/dL    Creatinine 0.65 0.51 - 0.95 mg/dL    Calcium 9.2 8.6 - 10.0 mg/dL    Glucose 89 70 - 99 mg/dL    GFR Estimate >90 >60 mL/min/1.73m2   UA Microscopic with Reflex to Culture    Collection Time: 09/18/23  8:44 AM   Result Value Ref Range    Bacteria Urine Few (A) None Seen /HPF    RBC Urine >100 (A) 0-2 /HPF /HPF    WBC Urine 0-5 0-5 /HPF /HPF    Squamous Epithelials Urine Few (A) None Seen /LPF    Mucus Urine Present (A) None Seen /LPF      No EKG required, no history of coronary heart disease, significant arrhythmia, peripheral arterial disease or other structural heart disease.    Revised Cardiac Risk Index (RCRI):  The patient has the following serious cardiovascular risks for perioperative complications:   - No serious cardiac risks = 0 points     RCRI Interpretation: 0 points: Class I (very low risk - 0.4% complication rate)         Signed Electronically by: CIRO Kline CNP  Copy of this evaluation  report is provided to requesting physician.

## 2023-09-19 LAB
APPEARANCE STONE: NORMAL
COMPN STONE: NORMAL
SPECIMEN WT: 28 MG

## 2023-09-22 ENCOUNTER — ALLIED HEALTH/NURSE VISIT (OUTPATIENT)
Dept: UROLOGY | Facility: CLINIC | Age: 23
End: 2023-09-22
Payer: COMMERCIAL

## 2023-09-22 DIAGNOSIS — N20.1 OBSTRUCTION OF RIGHT URETEROPELVIC JUNCTION (UPJ) DUE TO STONE: Primary | ICD-10-CM

## 2023-09-22 PROCEDURE — 99207 PR NO CHARGE NURSE ONLY: CPT

## 2023-09-22 NOTE — PROGRESS NOTES
Patient here to have her stent on a string removed by the nurse.  Stent was extracted via strings intact and patient tolerated procedure well.  After stent removal symptoms discussed with patient, who will call as needed with any questions or issues.  Lisa Viera RN

## 2023-09-27 ENCOUNTER — ANESTHESIA EVENT (OUTPATIENT)
Dept: SURGERY | Facility: CLINIC | Age: 23
End: 2023-09-27
Payer: COMMERCIAL

## 2023-09-27 NOTE — ANESTHESIA PREPROCEDURE EVALUATION
Anesthesia Pre-Procedure Evaluation    Patient: Aury Cervantes   MRN: [id number] : 2000        Procedure : Procedure(s):  SINUS SURGERY, ENDOSCOPIC, USING OPTICAL TRACKING SYSTEM  SEPTOPLASTY, NOSE, WITH TURBINOPLASTY          Past Medical History:   Diagnosis Date     Anemia      Depression      Nephrolithiasis       Past Surgical History:   Procedure Laterality Date     COMBINED CYSTOSCOPY, RETROGRADES, URETEROSCOPY, LASER HOLMIUM LITHOTRIPSY URETER(S), INSERT STENT Right 9/15/2023    Procedure: Cystoscopy, Right Ureteroscopy, Right Holmium Laser Lithotripsy, Right Retrograde Pyelogram, Right Ureteral Stent Placement;  Surgeon: Stefan Du MD;  Location: [facility] OR     DILATION AND CURETTAGE, OPERATIVE HYSTEROSCOPY, COMBINED N/A 2023    Procedure: HYSTEROSCOPY, WITH DILATION AND CURETTAGE OF UTERUS, polypectomy;  Surgeon: Stefanie Mcnair MD;  Location: [facility] OR     SURGICAL HISTORY OF -       PE tubes     TONSILLECTOMY, ADENOIDECTOMY ADULT, COMBINED Bilateral 2018    Procedure: COMBINED TONSILLECTOMY, ADENOIDECTOMY ADULT;  Bilateral Adenotonsillectomy;  Surgeon: Kevin Arias MD;  Location: [facility] OR     wisdom teeth Bilateral       Allergies   Allergen Reactions     Sulfa Antibiotics Other (See Comments)     Both parents are allergic        Social History     Tobacco Use     Smoking status: Never     Smokeless tobacco: Never   Substance Use Topics     Alcohol use: Yes     Comment: Very occasionally      Wt Readings from Last 1 Encounters:   23 53.3 kg (117 lb 9.6 oz)        Anesthesia Evaluation   Pt has had prior anesthetic. Type: General and MAC.        ROS/MED HX  ENT/Pulmonary:     (+)           allergic rhinitis,                            Neurologic:       Cardiovascular: Comment: Silent sinus syndrome   - neg cardiovascular ROS   (+)  - -   -  - -                                 Previous cardiac testing   Echo: Date: Results:    Stress Test:  Date:  Results:    ECG Reviewed:  Date: 8/11/23 Results:  Sinus Rhythm   Diffuse low voltage.    ABNORMAL  Cath:  Date: Results:      METS/Exercise Tolerance:     Hematologic:     (+)      anemia,          Musculoskeletal:  - neg musculoskeletal ROS     GI/Hepatic:  - neg GI/hepatic ROS     Renal/Genitourinary:     (+)       Nephrolithiasis ,       Endo:       Psychiatric/Substance Use:     (+) psychiatric history depression       Infectious Disease:  - neg infectious disease ROS     Malignancy:       Other:            Physical Exam    Airway        Mallampati: II   TM distance: > 3 FB   Neck ROM: full   Mouth opening: > 3 cm    Respiratory Devices and Support         Dental    unable to assess        Cardiovascular   cardiovascular exam normal          Pulmonary               OUTSIDE LABS:  CBC:   Lab Results   Component Value Date    WBC 5.4 09/18/2023    WBC 5.2 09/10/2023    HGB 10.9 (L) 09/18/2023    HGB 9.8 (L) 09/10/2023    HCT 34.6 (L) 09/18/2023    HCT 30.3 (L) 09/10/2023     09/18/2023     (L) 09/10/2023     BMP:   Lab Results   Component Value Date     09/18/2023     09/10/2023    POTASSIUM 3.8 09/18/2023    POTASSIUM 3.6 09/10/2023    CHLORIDE 106 09/18/2023    CHLORIDE 112 (H) 09/10/2023    CO2 26 09/18/2023    CO2 24 09/10/2023    BUN 9.1 09/18/2023    BUN 11.9 09/10/2023    CR 0.65 09/18/2023    CR 0.76 09/10/2023    GLC 89 09/18/2023    GLC 84 09/10/2023     COAGS:   Lab Results   Component Value Date    PTT 31 02/02/2021    INR 1.15 (H) 02/02/2021     POC:   Lab Results   Component Value Date    BGM 95 03/22/2018    HCG Negative 09/18/2023    HCGS Negative 08/11/2023     HEPATIC:   Lab Results   Component Value Date    ALBUMIN 4.4 08/11/2023    PROTTOTAL 6.9 08/11/2023    ALT 10 08/11/2023    AST 18 08/11/2023    ALKPHOS 71 08/11/2023    BILITOTAL 0.3 08/11/2023     OTHER:   Lab Results   Component Value Date    VICKIE 9.2 09/18/2023    MAG 1.9 08/11/2023    LIPASE 209 07/20/2022     TSH 3.43 08/11/2023    T4 1.36 09/29/2022    CRP 50.6 (H) 03/17/2015    SED 19 (H) 03/17/2015       Anesthesia Plan    ASA Status:  1    NPO Status:  NPO Appropriate    Anesthesia Type: General.     - Airway: ETT   Induction: Intravenous.   Maintenance: Balanced.        Consents    Anesthesia Plan(s) and associated risks, benefits, and realistic alternatives discussed. Questions answered and patient/representative(s) expressed understanding.     - Discussed: Risks, Benefits and Alternatives for BOTH SEDATION and the PROCEDURE were discussed     - Discussed with:  Patient            Postoperative Care    Pain management: IV analgesics, Oral pain medications, Multi-modal analgesia.   PONV prophylaxis: Ondansetron (or other 5HT-3), Dexamethasone or Solumedrol, Background Propofol Infusion     Comments:              CIRO Toth CRNA

## 2023-09-29 ENCOUNTER — HOSPITAL ENCOUNTER (OUTPATIENT)
Facility: CLINIC | Age: 23
Discharge: HOME | End: 2023-09-29
Attending: OTOLARYNGOLOGY | Admitting: OTOLARYNGOLOGY
Payer: COMMERCIAL

## 2023-09-29 ENCOUNTER — ANESTHESIA (OUTPATIENT)
Dept: SURGERY | Facility: CLINIC | Age: 23
End: 2023-09-29
Payer: COMMERCIAL

## 2023-09-29 ENCOUNTER — HOSPITAL ENCOUNTER (EMERGENCY)
Facility: CLINIC | Age: 23
Discharge: HOME | End: 2023-09-30
Attending: EMERGENCY MEDICINE | Admitting: EMERGENCY MEDICINE
Payer: COMMERCIAL

## 2023-09-29 ENCOUNTER — NURSE TRIAGE (OUTPATIENT)
Dept: NURSING | Facility: CLINIC | Age: 23
End: 2023-09-29
Payer: COMMERCIAL

## 2023-09-29 VITALS
WEIGHT: 117.6 LBS | RESPIRATION RATE: 161 BRPM | HEIGHT: 66 IN | TEMPERATURE: 98.3 F | OXYGEN SATURATION: 98 % | SYSTOLIC BLOOD PRESSURE: 103 MMHG | DIASTOLIC BLOOD PRESSURE: 70 MMHG | HEART RATE: 74 BPM | BODY MASS INDEX: 18.9 KG/M2

## 2023-09-29 DIAGNOSIS — Z98.890 POSTSURGICAL EPISTAXIS: ICD-10-CM

## 2023-09-29 DIAGNOSIS — R04.0 POSTSURGICAL EPISTAXIS: ICD-10-CM

## 2023-09-29 DIAGNOSIS — Z98.890 STATUS POST FUNCTIONAL ENDOSCOPIC SINUS SURGERY: Primary | ICD-10-CM

## 2023-09-29 LAB
ANION GAP SERPL CALCULATED.3IONS-SCNC: 13 MMOL/L (ref 7–15)
BASOPHILS # BLD AUTO: 0 10E3/UL (ref 0–0.2)
BASOPHILS NFR BLD AUTO: 0 %
BUN SERPL-MCNC: 9.3 MG/DL (ref 6–20)
CALCIUM SERPL-MCNC: 9.6 MG/DL (ref 8.6–10)
CHLORIDE SERPL-SCNC: 104 MMOL/L (ref 98–107)
CREAT SERPL-MCNC: 0.65 MG/DL (ref 0.51–0.95)
DEPRECATED HCO3 PLAS-SCNC: 23 MMOL/L (ref 22–29)
EGFRCR SERPLBLD CKD-EPI 2021: >90 ML/MIN/1.73M2
EOSINOPHIL # BLD AUTO: 0 10E3/UL (ref 0–0.7)
EOSINOPHIL NFR BLD AUTO: 0 %
ERYTHROCYTE [DISTWIDTH] IN BLOOD BY AUTOMATED COUNT: 13.5 % (ref 10–15)
GLUCOSE SERPL-MCNC: 112 MG/DL (ref 70–99)
HCT VFR BLD AUTO: 35.7 % (ref 35–47)
HGB BLD-MCNC: 11.8 G/DL (ref 11.7–15.7)
IMM GRANULOCYTES # BLD: 0 10E3/UL
IMM GRANULOCYTES NFR BLD: 0 %
LYMPHOCYTES # BLD AUTO: 0.8 10E3/UL (ref 0.8–5.3)
LYMPHOCYTES NFR BLD AUTO: 6 %
MCH RBC QN AUTO: 26 PG (ref 26.5–33)
MCHC RBC AUTO-ENTMCNC: 33.1 G/DL (ref 31.5–36.5)
MCV RBC AUTO: 79 FL (ref 78–100)
MONOCYTES # BLD AUTO: 0.7 10E3/UL (ref 0–1.3)
MONOCYTES NFR BLD AUTO: 6 %
NEUTROPHILS # BLD AUTO: 10.8 10E3/UL (ref 1.6–8.3)
NEUTROPHILS NFR BLD AUTO: 88 %
NRBC # BLD AUTO: 0 10E3/UL
NRBC BLD AUTO-RTO: 0 /100
PLATELET # BLD AUTO: 189 10E3/UL (ref 150–450)
POTASSIUM SERPL-SCNC: 3.9 MMOL/L (ref 3.4–5.3)
RBC # BLD AUTO: 4.54 10E6/UL (ref 3.8–5.2)
SODIUM SERPL-SCNC: 140 MMOL/L (ref 135–145)
WBC # BLD AUTO: 12.4 10E3/UL (ref 4–11)

## 2023-09-29 PROCEDURE — 99284 EMERGENCY DEPT VISIT MOD MDM: CPT | Mod: 25 | Performed by: EMERGENCY MEDICINE

## 2023-09-29 PROCEDURE — 31256 EXPLORATION MAXILLARY SINUS: CPT | Mod: RT | Performed by: OTOLARYNGOLOGY

## 2023-09-29 PROCEDURE — 999N000141 HC STATISTIC PRE-PROCEDURE NURSING ASSESSMENT: Performed by: OTOLARYNGOLOGY

## 2023-09-29 PROCEDURE — 370N000017 HC ANESTHESIA TECHNICAL FEE, PER MIN: Performed by: OTOLARYNGOLOGY

## 2023-09-29 PROCEDURE — 61782 SCAN PROC CRANIAL EXTRA: CPT | Performed by: OTOLARYNGOLOGY

## 2023-09-29 PROCEDURE — 258N000003 HC RX IP 258 OP 636: Performed by: NURSE ANESTHETIST, CERTIFIED REGISTERED

## 2023-09-29 PROCEDURE — 36415 COLL VENOUS BLD VENIPUNCTURE: CPT | Performed by: EMERGENCY MEDICINE

## 2023-09-29 PROCEDURE — 99284 EMERGENCY DEPT VISIT MOD MDM: CPT | Performed by: EMERGENCY MEDICINE

## 2023-09-29 PROCEDURE — 96375 TX/PRO/DX INJ NEW DRUG ADDON: CPT | Performed by: EMERGENCY MEDICINE

## 2023-09-29 PROCEDURE — 30140 RESECT INFERIOR TURBINATE: CPT | Mod: 50 | Performed by: OTOLARYNGOLOGY

## 2023-09-29 PROCEDURE — 80048 BASIC METABOLIC PNL TOTAL CA: CPT | Performed by: EMERGENCY MEDICINE

## 2023-09-29 PROCEDURE — 250N000011 HC RX IP 250 OP 636

## 2023-09-29 PROCEDURE — 250N000009 HC RX 250: Performed by: EMERGENCY MEDICINE

## 2023-09-29 PROCEDURE — 250N000011 HC RX IP 250 OP 636: Mod: JZ | Performed by: EMERGENCY MEDICINE

## 2023-09-29 PROCEDURE — 710N000012 HC RECOVERY PHASE 2, PER MINUTE: Performed by: OTOLARYNGOLOGY

## 2023-09-29 PROCEDURE — 30999 UNLISTED PROCEDURE NOSE: CPT | Performed by: OTOLARYNGOLOGY

## 2023-09-29 PROCEDURE — 258N000003 HC RX IP 258 OP 636: Performed by: EMERGENCY MEDICINE

## 2023-09-29 PROCEDURE — 272N000001 HC OR GENERAL SUPPLY STERILE: Performed by: OTOLARYNGOLOGY

## 2023-09-29 PROCEDURE — 96374 THER/PROPH/DIAG INJ IV PUSH: CPT | Mod: 59 | Performed by: EMERGENCY MEDICINE

## 2023-09-29 PROCEDURE — 250N000009 HC RX 250: Performed by: NURSE ANESTHETIST, CERTIFIED REGISTERED

## 2023-09-29 PROCEDURE — 250N000013 HC RX MED GY IP 250 OP 250 PS 637: Performed by: OTOLARYNGOLOGY

## 2023-09-29 PROCEDURE — 250N000009 HC RX 250: Performed by: OTOLARYNGOLOGY

## 2023-09-29 PROCEDURE — 250N000009 HC RX 250

## 2023-09-29 PROCEDURE — 360N000079 HC SURGERY LEVEL 6, PER MIN: Performed by: OTOLARYNGOLOGY

## 2023-09-29 PROCEDURE — 250N000011 HC RX IP 250 OP 636: Mod: JZ | Performed by: NURSE ANESTHETIST, CERTIFIED REGISTERED

## 2023-09-29 PROCEDURE — 96361 HYDRATE IV INFUSION ADD-ON: CPT | Performed by: EMERGENCY MEDICINE

## 2023-09-29 PROCEDURE — 710N000009 HC RECOVERY PHASE 1, LEVEL 1, PER MIN: Performed by: OTOLARYNGOLOGY

## 2023-09-29 PROCEDURE — 250N000011 HC RX IP 250 OP 636: Performed by: OTOLARYNGOLOGY

## 2023-09-29 PROCEDURE — 85025 COMPLETE CBC W/AUTO DIFF WBC: CPT | Performed by: EMERGENCY MEDICINE

## 2023-09-29 RX ORDER — FENTANYL CITRATE 50 UG/ML
50 INJECTION, SOLUTION INTRAMUSCULAR; INTRAVENOUS EVERY 5 MIN PRN
Status: DISCONTINUED | OUTPATIENT
Start: 2023-09-29 | End: 2023-09-29 | Stop reason: HOSPADM

## 2023-09-29 RX ORDER — NALOXONE HYDROCHLORIDE 0.4 MG/ML
0.4 INJECTION, SOLUTION INTRAMUSCULAR; INTRAVENOUS; SUBCUTANEOUS
Status: DISCONTINUED | OUTPATIENT
Start: 2023-09-29 | End: 2023-09-29 | Stop reason: HOSPADM

## 2023-09-29 RX ORDER — LIDOCAINE 40 MG/G
CREAM TOPICAL
Status: DISCONTINUED | OUTPATIENT
Start: 2023-09-29 | End: 2023-09-29 | Stop reason: HOSPADM

## 2023-09-29 RX ORDER — IBUPROFEN 200 MG
600 TABLET ORAL EVERY 6 HOURS PRN
Status: DISCONTINUED | OUTPATIENT
Start: 2023-09-29 | End: 2023-09-29 | Stop reason: HOSPADM

## 2023-09-29 RX ORDER — DICLOXACILLIN SODIUM 500 MG
500 CAPSULE ORAL 2 TIMES DAILY
Qty: 14 CAPSULE | Refills: 0 | Status: SHIPPED | OUTPATIENT
Start: 2023-09-29 | End: 2023-10-06

## 2023-09-29 RX ORDER — SODIUM CHLORIDE, SODIUM LACTATE, POTASSIUM CHLORIDE, CALCIUM CHLORIDE 600; 310; 30; 20 MG/100ML; MG/100ML; MG/100ML; MG/100ML
INJECTION, SOLUTION INTRAVENOUS CONTINUOUS
Status: DISCONTINUED | OUTPATIENT
Start: 2023-09-29 | End: 2023-09-29 | Stop reason: HOSPADM

## 2023-09-29 RX ORDER — OXYCODONE HYDROCHLORIDE 5 MG/1
5 TABLET ORAL
Status: DISCONTINUED | OUTPATIENT
Start: 2023-09-29 | End: 2023-09-29 | Stop reason: HOSPADM

## 2023-09-29 RX ORDER — ACETAMINOPHEN 325 MG/1
325-650 TABLET ORAL EVERY 6 HOURS PRN
Qty: 200 TABLET | Refills: 1 | Status: SHIPPED | OUTPATIENT
Start: 2023-09-29 | End: 2023-10-16

## 2023-09-29 RX ORDER — DEXAMETHASONE SODIUM PHOSPHATE 4 MG/ML
INJECTION, SOLUTION INTRA-ARTICULAR; INTRALESIONAL; INTRAMUSCULAR; INTRAVENOUS; SOFT TISSUE PRN
Status: DISCONTINUED | OUTPATIENT
Start: 2023-09-29 | End: 2023-09-29

## 2023-09-29 RX ORDER — ONDANSETRON 2 MG/ML
4 INJECTION INTRAMUSCULAR; INTRAVENOUS EVERY 30 MIN PRN
Status: DISCONTINUED | OUTPATIENT
Start: 2023-09-29 | End: 2023-09-29 | Stop reason: HOSPADM

## 2023-09-29 RX ORDER — LIDOCAINE HYDROCHLORIDE AND EPINEPHRINE 10; 10 MG/ML; UG/ML
INJECTION, SOLUTION INFILTRATION; PERINEURAL PRN
Status: DISCONTINUED | OUTPATIENT
Start: 2023-09-29 | End: 2023-09-29 | Stop reason: HOSPADM

## 2023-09-29 RX ORDER — CEFAZOLIN SODIUM/WATER 2 G/20 ML
2 SYRINGE (ML) INTRAVENOUS
Status: COMPLETED | OUTPATIENT
Start: 2023-09-29 | End: 2023-09-29

## 2023-09-29 RX ORDER — HYDROMORPHONE HCL IN WATER/PF 6 MG/30 ML
0.2 PATIENT CONTROLLED ANALGESIA SYRINGE INTRAVENOUS EVERY 5 MIN PRN
Status: DISCONTINUED | OUTPATIENT
Start: 2023-09-29 | End: 2023-09-29 | Stop reason: HOSPADM

## 2023-09-29 RX ORDER — ONDANSETRON 4 MG/1
4 TABLET, ORALLY DISINTEGRATING ORAL EVERY 30 MIN PRN
Status: DISCONTINUED | OUTPATIENT
Start: 2023-09-29 | End: 2023-09-29 | Stop reason: HOSPADM

## 2023-09-29 RX ORDER — NALOXONE HYDROCHLORIDE 0.4 MG/ML
0.2 INJECTION, SOLUTION INTRAMUSCULAR; INTRAVENOUS; SUBCUTANEOUS
Status: DISCONTINUED | OUTPATIENT
Start: 2023-09-29 | End: 2023-09-29 | Stop reason: HOSPADM

## 2023-09-29 RX ORDER — OXYCODONE HYDROCHLORIDE 5 MG/1
5 TABLET ORAL EVERY 6 HOURS PRN
Qty: 10 TABLET | Refills: 0 | Status: SHIPPED | OUTPATIENT
Start: 2023-09-29 | End: 2023-10-10

## 2023-09-29 RX ORDER — PROPOFOL 10 MG/ML
INJECTION, EMULSION INTRAVENOUS CONTINUOUS PRN
Status: DISCONTINUED | OUTPATIENT
Start: 2023-09-29 | End: 2023-09-29

## 2023-09-29 RX ORDER — GABAPENTIN 300 MG/1
300 CAPSULE ORAL
Status: DISCONTINUED | OUTPATIENT
Start: 2023-09-29 | End: 2023-09-29

## 2023-09-29 RX ORDER — CEFAZOLIN SODIUM/WATER 2 G/20 ML
2 SYRINGE (ML) INTRAVENOUS SEE ADMIN INSTRUCTIONS
Status: DISCONTINUED | OUTPATIENT
Start: 2023-09-29 | End: 2023-09-29 | Stop reason: HOSPADM

## 2023-09-29 RX ORDER — HYDROMORPHONE HCL IN WATER/PF 6 MG/30 ML
0.4 PATIENT CONTROLLED ANALGESIA SYRINGE INTRAVENOUS EVERY 5 MIN PRN
Status: DISCONTINUED | OUTPATIENT
Start: 2023-09-29 | End: 2023-09-29 | Stop reason: HOSPADM

## 2023-09-29 RX ORDER — OXYCODONE HYDROCHLORIDE 5 MG/1
5-10 TABLET ORAL EVERY 6 HOURS PRN
Status: DISCONTINUED | OUTPATIENT
Start: 2023-09-29 | End: 2023-09-29 | Stop reason: HOSPADM

## 2023-09-29 RX ORDER — IBUPROFEN 200 MG
400 TABLET ORAL EVERY 6 HOURS PRN
Qty: 200 TABLET | Refills: 1 | Status: SHIPPED | OUTPATIENT
Start: 2023-09-29

## 2023-09-29 RX ORDER — PROPOFOL 10 MG/ML
INJECTION, EMULSION INTRAVENOUS PRN
Status: DISCONTINUED | OUTPATIENT
Start: 2023-09-29 | End: 2023-09-29

## 2023-09-29 RX ORDER — LIDOCAINE HYDROCHLORIDE 20 MG/ML
INJECTION, SOLUTION INFILTRATION; PERINEURAL PRN
Status: DISCONTINUED | OUTPATIENT
Start: 2023-09-29 | End: 2023-09-29

## 2023-09-29 RX ORDER — HYDROMORPHONE HCL IN WATER/PF 6 MG/30 ML
0.2 PATIENT CONTROLLED ANALGESIA SYRINGE INTRAVENOUS
Status: DISCONTINUED | OUTPATIENT
Start: 2023-09-29 | End: 2023-09-30 | Stop reason: HOSPADM

## 2023-09-29 RX ORDER — OXYMETAZOLINE HYDROCHLORIDE 0.05 G/100ML
SPRAY NASAL PRN
Status: DISCONTINUED | OUTPATIENT
Start: 2023-09-29 | End: 2023-09-29 | Stop reason: HOSPADM

## 2023-09-29 RX ORDER — ACETAMINOPHEN 325 MG/1
975 TABLET ORAL ONCE
Status: COMPLETED | OUTPATIENT
Start: 2023-09-29 | End: 2023-09-29

## 2023-09-29 RX ORDER — ONDANSETRON 2 MG/ML
INJECTION INTRAMUSCULAR; INTRAVENOUS PRN
Status: DISCONTINUED | OUTPATIENT
Start: 2023-09-29 | End: 2023-09-29

## 2023-09-29 RX ORDER — FENTANYL CITRATE 50 UG/ML
25 INJECTION, SOLUTION INTRAMUSCULAR; INTRAVENOUS EVERY 5 MIN PRN
Status: DISCONTINUED | OUTPATIENT
Start: 2023-09-29 | End: 2023-09-29 | Stop reason: HOSPADM

## 2023-09-29 RX ORDER — FENTANYL CITRATE 50 UG/ML
INJECTION, SOLUTION INTRAMUSCULAR; INTRAVENOUS PRN
Status: DISCONTINUED | OUTPATIENT
Start: 2023-09-29 | End: 2023-09-29

## 2023-09-29 RX ORDER — ONDANSETRON 4 MG/1
4 TABLET, ORALLY DISINTEGRATING ORAL EVERY 6 HOURS PRN
Status: DISCONTINUED | OUTPATIENT
Start: 2023-09-29 | End: 2023-09-29 | Stop reason: HOSPADM

## 2023-09-29 RX ORDER — ACETAMINOPHEN 325 MG/1
975 TABLET ORAL ONCE
Status: DISCONTINUED | OUTPATIENT
Start: 2023-09-29 | End: 2023-09-29

## 2023-09-29 RX ORDER — OXYCODONE HYDROCHLORIDE 5 MG/1
10 TABLET ORAL
Status: DISCONTINUED | OUTPATIENT
Start: 2023-09-29 | End: 2023-09-29 | Stop reason: HOSPADM

## 2023-09-29 RX ADMIN — LIDOCAINE HYDROCHLORIDE 0.1 ML: 10 INJECTION, SOLUTION EPIDURAL; INFILTRATION; INTRACAUDAL; PERINEURAL at 09:47

## 2023-09-29 RX ADMIN — MIDAZOLAM 2 MG: 1 INJECTION INTRAMUSCULAR; INTRAVENOUS at 10:45

## 2023-09-29 RX ADMIN — SODIUM CHLORIDE, POTASSIUM CHLORIDE, SODIUM LACTATE AND CALCIUM CHLORIDE 1000 ML: 600; 310; 30; 20 INJECTION, SOLUTION INTRAVENOUS at 09:46

## 2023-09-29 RX ADMIN — ACETAMINOPHEN 975 MG: 325 TABLET, FILM COATED ORAL at 09:28

## 2023-09-29 RX ADMIN — PROPOFOL 200 MCG/KG/MIN: 10 INJECTION, EMULSION INTRAVENOUS at 10:57

## 2023-09-29 RX ADMIN — FENTANYL CITRATE 100 MCG: 50 INJECTION, SOLUTION INTRAMUSCULAR; INTRAVENOUS at 10:52

## 2023-09-29 RX ADMIN — SUGAMMADEX 200 MG: 100 INJECTION, SOLUTION INTRAVENOUS at 12:15

## 2023-09-29 RX ADMIN — SODIUM CHLORIDE, POTASSIUM CHLORIDE, SODIUM LACTATE AND CALCIUM CHLORIDE: 600; 310; 30; 20 INJECTION, SOLUTION INTRAVENOUS at 11:45

## 2023-09-29 RX ADMIN — HYDROMORPHONE HYDROCHLORIDE 0.5 MG: 1 INJECTION, SOLUTION INTRAMUSCULAR; INTRAVENOUS; SUBCUTANEOUS at 11:51

## 2023-09-29 RX ADMIN — FENTANYL CITRATE 25 MCG: 50 INJECTION INTRAMUSCULAR; INTRAVENOUS at 13:02

## 2023-09-29 RX ADMIN — DEXAMETHASONE SODIUM PHOSPHATE 4 MG: 4 INJECTION, SOLUTION INTRA-ARTICULAR; INTRALESIONAL; INTRAMUSCULAR; INTRAVENOUS; SOFT TISSUE at 11:05

## 2023-09-29 RX ADMIN — HYDROMORPHONE HYDROCHLORIDE 0.5 MG: 1 INJECTION, SOLUTION INTRAMUSCULAR; INTRAVENOUS; SUBCUTANEOUS at 11:38

## 2023-09-29 RX ADMIN — ROCURONIUM BROMIDE 50 MG: 50 INJECTION, SOLUTION INTRAVENOUS at 10:54

## 2023-09-29 RX ADMIN — SODIUM CHLORIDE, POTASSIUM CHLORIDE, SODIUM LACTATE AND CALCIUM CHLORIDE 1000 ML: 600; 310; 30; 20 INJECTION, SOLUTION INTRAVENOUS at 23:41

## 2023-09-29 RX ADMIN — ONDANSETRON 4 MG: 2 INJECTION INTRAMUSCULAR; INTRAVENOUS at 11:55

## 2023-09-29 RX ADMIN — TRANEXAMIC ACID 1 G: 1 INJECTION, SOLUTION INTRAVENOUS at 11:05

## 2023-09-29 RX ADMIN — LIDOCAINE HYDROCHLORIDE 60 MG: 20 INJECTION, SOLUTION INFILTRATION; PERINEURAL at 10:52

## 2023-09-29 RX ADMIN — ONDANSETRON 4 MG: 4 TABLET, ORALLY DISINTEGRATING ORAL at 14:53

## 2023-09-29 RX ADMIN — FENTANYL CITRATE 25 MCG: 50 INJECTION INTRAMUSCULAR; INTRAVENOUS at 12:56

## 2023-09-29 RX ADMIN — TRANEXAMIC ACID 1 G: 100 INJECTION INTRAVENOUS at 23:57

## 2023-09-29 RX ADMIN — PHENYLEPHRINE HYDROCHLORIDE 100 MCG: 10 INJECTION INTRAVENOUS at 11:22

## 2023-09-29 RX ADMIN — HYDROMORPHONE HYDROCHLORIDE 0.2 MG: 0.2 INJECTION, SOLUTION INTRAMUSCULAR; INTRAVENOUS; SUBCUTANEOUS at 23:42

## 2023-09-29 RX ADMIN — PROPOFOL 120 MG: 10 INJECTION, EMULSION INTRAVENOUS at 10:54

## 2023-09-29 RX ADMIN — Medication 2 G: at 10:54

## 2023-09-29 ASSESSMENT — ACTIVITIES OF DAILY LIVING (ADL)
ADLS_ACUITY_SCORE: 33
ADLS_ACUITY_SCORE: 35
ADLS_ACUITY_SCORE: 33

## 2023-09-29 NOTE — ANESTHESIA PROCEDURE NOTES
Airway       Patient location during procedure: OR       Procedure Start/Stop Times: 9/29/2023 10:56 AM  Staff -        CRNA: Little Brumfield APRN CRNA       Other Anesthesia Staff: Carolina Rojas       Performed By: SRNA  Consent for Airway        Urgency: elective  Indications and Patient Condition       Indications for airway management: loan-procedural       Induction type:intravenous       Mask difficulty assessment: 1 - vent by mask    Final Airway Details       Final airway type: endotracheal airway       Successful airway: ETT - single, Oral and NICOLASA  Endotracheal Airway Details        ETT size (mm): 7.0       Cuffed: yes       Cuff volume (mL): 4       Successful intubation technique: video laryngoscopy       VL Blade Size: Broussard 3       Grade View of Cords: 1       Adjucts: stylet       Position: Center       Bite block used: None    Post intubation assessment        Placement verified by: capnometry, equal breath sounds and chest rise        Number of attempts at approach: 1       Number of other approaches attempted: 0       Secured with: plastic tape       Ease of procedure: easy       Dentition: Intact and Unchanged    Medication(s) Administered   Medication Administration Time: 9/29/2023 10:56 AM

## 2023-09-29 NOTE — INTERVAL H&P NOTE
I have reviewed the surgical (or preoperative) H&P that is linked to this encounter, and examined the patient. There are no significant changes    Clinical Conditions Present on Arrival:  Clinically Significant Risk Factors Present on Admission                 # Thrombocytopenia: Lowest platelets = 115 in last 30 days, will monitor for bleeding

## 2023-09-29 NOTE — PROGRESS NOTES
Chief Complaint   Patient presents with    Post-op Visit     History of Present Illness  Aury Cervantes is a 23 year old female who presents today for follow-up.  The patient went to the operating room on 9/29/2023 for endonasal septoplasty, bilateral inferior turbinate reduction, and right endoscopic sinus surgery for silent sinus syndrome.  The patient has done well postoperatively with minimal pain.  The patient does report some congestion but no significant nasal bleeding.  Some mild continuous oozing from her nose since surgery.  It was more significant on day 1 when she went to the emergency department for evaluation but after the TXA its been only dripping blood.  They presents today for nasal splint removal.    Past Medical History  Patient Active Problem List   Diagnosis    Major depressive disorder, recurrent episode, severe (H)    S/P tonsillectomy and adenoidectomy    Anaphylaxis, subsequent encounter    Rhinoconjunctivitis    Iron deficiency anemia due to chronic blood loss    Menorrhagia with regular cycle    Dysmenorrhea    Female infertility    Nasal obstruction    Nasal septal deviation    Hypertrophy of both inferior nasal turbinates    Chronic rhinitis    Silent sinus syndrome    Ureteral colic    Nephrolithiasis    Obstruction of right ureteropelvic junction (UPJ) due to stone    Nexplanon in place     Current Medications    Current Outpatient Medications:     albuterol (PROAIR HFA/PROVENTIL HFA/VENTOLIN HFA) 108 (90 Base) MCG/ACT inhaler, Inhale 2 puffs into the lungs every 6 hours as needed for shortness of breath, wheezing or cough, Disp: 18 g, Rfl: 1    dicloxacillin (DYNAPEN) 500 MG capsule, Take 1 capsule (500 mg) by mouth 2 times daily for 7 days, Disp: 14 capsule, Rfl: 0    ondansetron (ZOFRAN ODT) 4 MG ODT tab, Take 1 tablet (4 mg) by mouth every 8 hours as needed for nausea, Disp: 10 tablet, Rfl: 0    tamsulosin (FLOMAX) 0.4 MG capsule, Take 1 capsule (0.4 mg) by mouth daily, Disp: 14  capsule, Rfl: 0    acetaminophen (TYLENOL) 325 MG tablet, Take 1-2 tablets (325-650 mg) by mouth every 6 hours as needed for fever or pain (Patient not taking: Reported on 10/5/2023), Disp: 200 tablet, Rfl: 1    acetaminophen (TYLENOL) 500 MG tablet, Take 500-1,000 mg by mouth every 6 hours as needed for mild pain (Patient not taking: Reported on 10/5/2023), Disp: , Rfl:     ibuprofen (ADVIL/MOTRIN) 200 MG tablet, Take 2 tablets (400 mg) by mouth every 6 hours as needed for moderate pain (Patient not taking: Reported on 10/5/2023), Disp: 200 tablet, Rfl: 1    ibuprofen (ADVIL/MOTRIN) 200 MG tablet, Take 200 mg by mouth every 4 hours as needed for pain (Patient not taking: Reported on 10/5/2023), Disp: , Rfl:     oxyCODONE (ROXICODONE) 5 MG tablet, Take 1 tablet (5 mg) by mouth every 6 hours as needed for severe pain (Patient not taking: Reported on 10/5/2023), Disp: 10 tablet, Rfl: 0    oxyCODONE (ROXICODONE) 5 MG tablet, Take 1 tablet (5 mg) by mouth every 6 hours as needed for pain (Patient not taking: Reported on 10/5/2023), Disp: 10 tablet, Rfl: 0    Allergies  Allergies   Allergen Reactions    Sulfa Antibiotics Other (See Comments)     Both parents are allergic         Social History  Social History     Socioeconomic History    Marital status: Single   Tobacco Use    Smoking status: Never    Smokeless tobacco: Never   Vaping Use    Vaping Use: Never used   Substance and Sexual Activity    Alcohol use: Yes     Comment: Very occasionally    Drug use: Never    Sexual activity: Yes     Partners: Male     Birth control/protection: None   Other Topics Concern    Parent/sibling w/ CABG, MI or angioplasty before 65F 55M? No       Family History  Family History   Problem Relation Age of Onset    Depression Mother     Depression Father     Depression Maternal Grandmother     Cancer Maternal Grandmother     Lung Cancer Maternal Grandmother     Diabetes Maternal Grandfather     Heart Disease Maternal Grandfather      Hypertension Maternal Grandfather     Depression Paternal Grandmother         bipolar    Depression Paternal Grandfather         bipolar       Review of Systems  As per HPI and PMHx, otherwise 10 system review including the head and neck, constitutional, eyes, respiratory, GI, skin, neurologic, lymphatic, endocrine, and allergy systems is negative.    Physical Exam  /77 (BP Location: Right arm, Patient Position: Chair, Cuff Size: Adult Regular)   Pulse 98   Temp 98.6  F (37  C) (Tympanic)   Wt 53.1 kg (117 lb)   LMP 09/12/2023 (Exact Date)   SpO2 100%   BMI 19.03 kg/m    GENERAL: Patient is a pleasant, cooperative 23 year old female in no acute distress.  HEAD: Normocephalic, atraumatic.  Hair and scalp are normal.  EYES: Pupils are equal, round, reactive to light and accommodation.  Extraocular movements are intact.  The sclera nonicteric without injection.  The extraocular structures are normal.  EARS: Normal shape and symmetry.  No tenderness when palpating the mastoid or tragal areas bilaterally.    NOSE: Palmer splints are in good placement.  The stitch and Palmer splints are removed.  The nasal septum is midline without any evidence of septal hematoma.  The inferior turbinates are well lateralized.  The bilateral nasal cavities were suctioned free.  The patient noticed immediate improvement in the breathing.  There was a couple small areas that were oozing on the right-hand side that I did address with silver nitrate cautery after topical application of lidocaine.  I did place some Surgicel over the oozing areas.  The patient tolerated the procedure well.  NEUROLOGIC: Cranial nerves II through XII are grossly intact.  Voice is strong.  Patient is House-Brackman I/VI bilaterally.  CARDIOVASCULAR: Extremities are warm and well-perfused.  No significant peripheral edema.  RESPIRATORY: Patient has nonlabored breathing without cough, wheeze, stridor.  PSYCHIATRIC: Patient is alert and oriented.  Mood and  affect appear normal.  SKIN: Warm and dry.  No scalp, face, or neck lesions noted.    Assessment and Plan     ICD-10-CM    1. Silent sinus syndrome  J34.89       2. Chronic rhinitis  J31.0       3. Nasal obstruction  J34.89       4. Nasal septal deviation  J34.2       5. Hypertrophy of both inferior nasal turbinates  J34.3       6. Status post functional endoscopic sinus surgery  Z98.890       7. Status post nasal septoplasty  Z98.890       8. Postoperative state  Z98.890          It was my pleasure seeing Aury Cervantes today in clinic.  The patient is healing well after their septoplasty endoscopic sinus surgery.  We discussed an additional 1 week of nose blowing and activity restrictions.  We will have them start/continue nasal saline irrigation.  They can return to use of nasal sprays.  I would like to see the patient back in 1 to 2 weeks for follow-up and debridement.  Patient knows to contact me sooner with any problems or concerns.    Aram Grace MD  Department of Otolaryngology-Head and Neck Surgery  [facility]

## 2023-09-29 NOTE — DISCHARGE INSTRUCTIONS
Postoperative Instructions Following Nose and Sinus Surgery    Recovery - Everyone recovers differently from a general anesthetic.  Symptoms such as fatigue, nausea, light-headedness, and sometimes a low grade fever (up to 101 degrees) are not unusual.  As your body removes the anesthetic drugs from circulation, these symptoms will resolve.  Your nose will be sore after surgery, and you may even have symptoms similar to a sinus infection with headache, congestion, and pressure.  These symptoms are normal and will resolve with healing.  For a few days you may experience bloody drainage from the nose, please use the drip pad as necessary for this.  If you are soaking a drip pad more frequently than once every 20 minutes, please call the office during business hours or the on call ENT physician after hours.  There are no diet restrictions after sinus surgery, and you can resume your home medications.  Please do not blow your nose for two weeks after surgery. You may wipe your nose gently. Limit your activity to no strenuous activities or exercise until I see you for the first follow-up visit.      Medications - You were sent home with narcotic pain medication.  If you can tolerate the discomfort during your recovery by using just plain Tylenol, please do so.  However, do not hesitate to use the stronger pain medication if needed.  If you were sent home with an antibiotic, it is primarily used to help the healing process.  If it causes loose bowel movements or other signs of intolerance, it is appropriate to discontinue it.      Nasal Irrigations: By far the most important measure you can take to speed recovery, and maximize the chances of long term success of sinus surgery is using the sinus rinses at least two times per day for the first month after surgery.  There may be small plastic pieces placed inside your nose during surgery (splints). These help to promote the septum into healing in its straightened position,  and will be removed at the follow up visit.  The splints might make nasal irrigations difficult.  If you are unable to rinse with the splints in place, we will resume irrigations following splint removal at your first follow up visit.  You should rinse if able starting the day following surgery.    Complications - Problems related to sinus surgery almost always are detected during the operation, and special instructions will be given in that situation.  However, unexpected things can happen, and are all related to the structures around the sinus cavities.  Symptoms that should alert you to a possible problem include: severe eye pain or eye swelling, persistent heavy bleeding from the nose, and high fevers with headache and neck pain.  Any of these symptoms should be called into my office or to the on call ENT if after hours.  The most common non-emergency complication of sinus surgery is the formation of scar tissue which can re-block the sinuses.  This is addressed below.      Follow up - Splints will be removed at the 1 week follow up visit. This is simple and takes just a few seconds afterwards, the improvement you will expereince in breathing from the septoplasty is usually dramatic and immediate.  I will then see you around 2 weeks after surgery to clean out your nose.  This is done to aggressively manage the most common complication of this procedure, which is scar tissue blocking the sinuses.  These visits will require the examination of your nose and possibly removal of crusts of dry mucous and blood, with possible removal of early scar tissue.  Please prepare for these visits by using your sinus rinses.    If there are any questions or issues with the above, or if there are other issues that concern you, always feel free to call the clinic and I am happy to speak with you as soon as feasible.    Aram Grace MD  Department of Otolaryngology-Head and Neck Surgery  [facility]  [phone] or  [phone] After hours, [facility] option                      Same Day Surgery Discharge Instructions  Special Precautions After Surgery - Adult    It is not unusual to feel lightheaded or faint, up to 24 hours after surgery or while taking pain medication.  If you have these symptoms; sit for a few minutes before standing and have someone assist you when getting up.  You should rest and relax for the next 24 hours and must have someone stay with you for at least 24 hours after your discharge.  DO NOT DRIVE any vehicle or operate mechanical equipment for 24 hours following the end of your surgery.  DO NOT DRIVE while taking narcotic pain medications that have been prescribed by your physician.  If you had a limb operated on, you must be able to use it fully to drive.  DO NOT drink alcoholic beverages for 24 hours following surgery or while taking prescription pain medication.  Drink clear liquids (apple juice, ginger ale, broth, 7-Up, etc.).  Progress to your regular diet as you feel able.  Any questions call your physician and do not make important decisions for 24 hours.          __________________________________________________________________________________________________________________________________  IMPORTANT NUMBERS:    [facility] Main Number:  [phone], [phone]  Pharmacy:  [phone]  Same Day Surgery:  [phone], Monday - Friday until 8:30 p.m.  Urgent Care:  [phone]  Emergency Room:  [phone]      [facility]:  [phone]                                                                             [facility]:  [phone] option 1  [facility]:  [phone]     [facility]:  [phone]   [facility]:  [phone]   Home Medical Equipment: [phone]  [facility]:  [phone]

## 2023-09-29 NOTE — ANESTHESIA CARE TRANSFER NOTE
Patient: Aury Cervantes    Procedure: Procedure(s):  SINUS SURGERY, ENDOSCOPIC, USING OPTICAL TRACKING SYSTEM  SEPTOPLASTY, NOSE, WITH TURBINOPLASTY       Diagnosis: Nasal obstruction [J34.89]  Nasal septal deviation [J34.2]  Chronic rhinitis [J31.0]  Silent sinus syndrome [J34.89]  Hypertrophy of both inferior nasal turbinates [J34.3]  Diagnosis Additional Information: No value filed.    Anesthesia Type:   General     Note:    Oropharynx: oropharynx clear of all foreign objects  Level of Consciousness: drowsy  Oxygen Supplementation: face mask    Independent Airway: airway patency satisfactory and stable    Vital Signs Stable: post-procedure vital signs reviewed and stable  Report to RN Given: handoff report given  Patient transferred to: PACU    Handoff Report: Identifed the Patient, Identified the Reponsible Provider, Reviewed the pertinent medical history, Discussed the surgical course, Reviewed Intra-OP anesthesia mangement and issues during anesthesia, Set expectations for post-procedure period and Allowed opportunity for questions and acknowledgement of understanding      Vitals:  Vitals Value Taken Time   BP     Temp     Pulse     Resp     SpO2 100 % 09/29/23 1238   Vitals shown include unvalidated device data.    Electronically Signed By: Carolina Rojas  September 29, 2023  12:39 PM

## 2023-09-29 NOTE — ANESTHESIA POSTPROCEDURE EVALUATION
Patient: Aury Cervantes    Procedure: Procedure(s):  SINUS SURGERY, ENDOSCOPIC, USING OPTICAL TRACKING SYSTEM  SEPTOPLASTY, NOSE, WITH TURBINOPLASTY       Anesthesia Type:  General    Note:  Disposition: Outpatient   Postop Pain Control: Uneventful            Sign Out: Well controlled pain   PONV: No   Neuro/Psych: Uneventful            Sign Out: Acceptable/Baseline neuro status   Airway/Respiratory: Uneventful            Sign Out: Acceptable/Baseline resp. status   CV/Hemodynamics: Uneventful            Sign Out: Acceptable CV status; No obvious hypovolemia; No obvious fluid overload   Other NRE: NONE   DID A NON-ROUTINE EVENT OCCUR? No       Last vitals:  Vitals Value Taken Time   /68 09/29/23 1320   Temp 36.8  C (98.3  F) 09/29/23 1320   Pulse 68 09/29/23 1320   Resp 11 09/29/23 1320   SpO2 100 % 09/29/23 1320   Vitals shown include unvalidated device data.    Electronically Signed By: CIRO Crabtree CRNA  September 29, 2023  1:32 PM

## 2023-09-29 NOTE — OP NOTE
PREOPERATIVE DIAGNOSES: Right chronic sinusitis with associated silent sinus syndrome, nasal obstruction, nasal septal deviation, inferior turbinate hypertrophy.     POSTOPERATIVE DIAGNOSES: Same.     PROCEDURE PERFORMED:   Endonasal septoplasty   Bilateral submucous resection of the inferior turbinates  Right endoscopic maxillary antrostomy  Medtronic Fusion Image Guidance     SURGEON: Aram Grace MD      ASSISTANTS: None.     BLOOD LOSS: 30 mL.     COMPLICATIONS: None.      SPECIMENS: None.     ANESTHESIA: General.     GRAFTS, IMPLANTS, DRAINS: None.     INDICATIONS: Improve symptoms.      FINDINGS:   1.  Significant lateralization of the uncinate and lateral nasal wall into the maxillary sinus and abutting the orbit consistent with silent sinus syndrome  2.  Rightward anterior and mid nasal septal deviation  3.  Severe/marked inferior turban hypertrophy bilaterally     OPERATIVE TECHNIQUE: The patient was brought to the operating room and identified by name clinic number.  They were placed supinely on the operating room table and general endotracheal anesthesia was induced by the anesthesia service.  The patient was prepped and draped in standard fashion.  Examination of the nose revealed rightward anterior and superior nasal septal deviation with a spur more posteriorly. Afrin-soaked pledgets were placed in the nasal cavities bilaterally.  The nose was injected with 1% lidocaine 1:100,000 epinephrine.  After standard surgical pause, the 0 degree endoscope was used to visualize the nasal cavity.  The Acclarent nasal septal balloon was obtained.  Starting on the right, the balloon was placed adjacent to the septal deviation with spur and the body of the inferior turbinate.  The balloon was inflated to 12 sidney and left for 2 minutes.  An additional anterior area was addressed with the balloon at 8 sidney for 2 minute.  Attention was then turned to the left-hand side.  The balloon again was placed adjacent to the  superior body of the inferior turbinate and inflated to 8 sidney to assist in mobilization of the inferior turbinate bone.  The balloon was left inflated for 2 minutes. Deviated portions of cartilage and bone were mobilized by the balloon.  The Larimer elevator was used to place the septum at the midline.  The septum was midline and the choana were visible bilaterally with anterior rhinoscopy. No tears were noted in the nasal septal flaps bilaterally.     Next, the Aeropostale Fusion Image Guidance was placed and registered.  Image guidance was used due to distorted sinus anatomy given the silent sinus syndrome with disease abutting the orbit The 0 and 30 degree endoscopes were used to visualize the right nasal cavity.  Using the maxillary sinus seeker, the uncinate was brought forward.  Uncinate was quite lateralized and up onto the inferior portion of the orbit.  The uncinate was then taken up to its origin.  Using through cutting instrumentation, 12 degree 4 mm shaver, and the 30 degree endoscope, the maxillary sinus opening was taken to the posterior limit of the maxillary sinus.  A wide maxillary antrostomy was fashioned respecting the orbit and inferior turbinate.      Next, attention was turned to the inferior turbinates.  Bilateral nasal cavities were examined using the 0 degree 4 mm endoscope.  There was obvious bilateral inferior turbinate hypertrophy.  The turbinates were injected with 1% lidocaine with 1:100,000 epinephrine.  Attention was turned the right.  A 5 mm stab incision was made in the head of the inferior turbinate.  Using the 2.9 mm turbinate blade, submucous resection of the inferior turbinate was performed by dissecting of the bone and removing the submucosa with a shaver.  The inferior turbinate was then outfractured with a [place] and the Larimer elevator.  Attention was turned to the left.  A 5 mm stab incision was made in the head of the inferior turbinate.  Using the 2.9 mm turbinate blade,  submucous resection of the inferior turbinate was performed by dissecting of the bone and removing the submucosa with a shaver. The inferior turbinate was then outfractured with a [place] and the [place] elevator.  The stab incisions were left to heal by secondary intention.     The bilateral nasal cavities were irrigated and suctioned.  Hemostasis was assured. PosisepX was placed in the right middle meatus to medialize the middle turbinate and assist with hemostasis.       The nasoseptal space was irrigated and suctioned.  Hemostasis was assured.  Palmer splints were placed bilaterally and secured with a 3-0 nylon mattress suture.  The stomach was suctioned.    This marked the end of the procedure.  The patient was then turned over to anesthesia for recovery where they were awakened, extubated, and transferred to the PACU in excellent condition.  There were no complications.  There was minimal blood loss.  All standard operating protocol universal precautions were used throughout the procedure.     Aram Grace MD  Department of Otolaryngology-Head and Neck Surgery  [facility]

## 2023-09-30 VITALS
DIASTOLIC BLOOD PRESSURE: 64 MMHG | BODY MASS INDEX: 19.18 KG/M2 | WEIGHT: 117.9 LBS | SYSTOLIC BLOOD PRESSURE: 103 MMHG | RESPIRATION RATE: 16 BRPM | TEMPERATURE: 99.2 F | OXYGEN SATURATION: 100 % | HEART RATE: 95 BPM

## 2023-09-30 PROCEDURE — 96375 TX/PRO/DX INJ NEW DRUG ADDON: CPT | Performed by: EMERGENCY MEDICINE

## 2023-09-30 PROCEDURE — 250N000011 HC RX IP 250 OP 636: Mod: JZ

## 2023-09-30 PROCEDURE — 250N000011 HC RX IP 250 OP 636: Mod: JZ | Performed by: EMERGENCY MEDICINE

## 2023-09-30 RX ORDER — ONDANSETRON 4 MG/1
4 TABLET, ORALLY DISINTEGRATING ORAL EVERY 8 HOURS PRN
Qty: 10 TABLET | Refills: 0 | Status: SHIPPED | OUTPATIENT
Start: 2023-09-30 | End: 2023-10-16

## 2023-09-30 RX ORDER — DROPERIDOL 2.5 MG/ML
2.5 INJECTION, SOLUTION INTRAMUSCULAR; INTRAVENOUS ONCE
Status: COMPLETED | OUTPATIENT
Start: 2023-09-30 | End: 2023-09-30

## 2023-09-30 RX ORDER — ONDANSETRON 2 MG/ML
INJECTION INTRAMUSCULAR; INTRAVENOUS
Status: COMPLETED
Start: 2023-09-30 | End: 2023-09-30

## 2023-09-30 RX ORDER — ONDANSETRON 2 MG/ML
4 INJECTION INTRAMUSCULAR; INTRAVENOUS ONCE
Status: COMPLETED | OUTPATIENT
Start: 2023-09-30 | End: 2023-09-30

## 2023-09-30 RX ADMIN — ONDANSETRON 4 MG: 2 INJECTION INTRAMUSCULAR; INTRAVENOUS at 00:03

## 2023-09-30 RX ADMIN — DROPERIDOL 2.5 MG: 2.5 INJECTION, SOLUTION INTRAMUSCULAR; INTRAVENOUS at 00:25

## 2023-09-30 ASSESSMENT — ENCOUNTER SYMPTOMS
FEVER: 0
NAUSEA: 1
NERVOUS/ANXIOUS: 1
VOMITING: 0
ABDOMINAL PAIN: 0
APPETITE CHANGE: 1
SHORTNESS OF BREATH: 0
COUGH: 0
BRUISES/BLEEDS EASILY: 0
LIGHT-HEADEDNESS: 1
CHEST TIGHTNESS: 0

## 2023-09-30 ASSESSMENT — ACTIVITIES OF DAILY LIVING (ADL): ADLS_ACUITY_SCORE: 35

## 2023-09-30 NOTE — ED PROVIDER NOTES
History     Chief Complaint   Patient presents with    Epistaxis    Post-op Problem     HPI  Aury Cervantes is a 23 year old female with a history of recent nasal surgery this morning presenting for evaluation of persistent epistaxis.  Patient had surgery this morning around 11 AM with Dr. Grace.  She reports that since then she has had ongoing bleeding from her nose.  She reports constant dripping from the front of the nose as well as back down her throat.  Has had some nausea but no vomiting.  Was feeling somewhat lightheaded tonight and had continued bleeding so came in for evaluation.  Denies feeling near syncopal.  Denies any chest pain or difficulty breathing.  Denies trauma to the nose since surgery.  Denies history of easy bruising or bleeding.  Not on any blood thinners.    Allergies:  Allergies   Allergen Reactions    Sulfa Antibiotics Other (See Comments)     Both parents are allergic         Problem List:    Patient Active Problem List    Diagnosis Date Noted    Obstruction of right ureteropelvic junction (UPJ) due to stone 09/12/2023     Priority: Medium    Nexplanon in place 09/12/2023     Priority: Medium    Nephrolithiasis 09/10/2023     Priority: Medium    Ureteral colic 09/09/2023     Priority: Medium    Chronic rhinitis 07/25/2023     Priority: Medium    Silent sinus syndrome 07/25/2023     Priority: Medium    Nasal obstruction 12/22/2022     Priority: Medium     Added automatically from request for surgery [id number]      Nasal septal deviation 12/22/2022     Priority: Medium     Added automatically from request for surgery [id number]      Hypertrophy of both inferior nasal turbinates 12/22/2022     Priority: Medium     Added automatically from request for surgery [id number]      Dysmenorrhea 05/16/2022     Priority: Medium    Female infertility 05/16/2022     Priority: Medium    Iron deficiency anemia due to chronic blood loss 02/07/2021     Priority: Medium    Menorrhagia with regular cycle 02/07/2021      Priority: Medium    Anaphylaxis, subsequent encounter 12/04/2018     Priority: Medium    Rhinoconjunctivitis 12/04/2018     Priority: Medium    S/P tonsillectomy and adenoidectomy 03/21/2018     Priority: Medium    Major depressive disorder, recurrent episode, severe (H) 11/19/2014     Priority: Medium        Past Medical History:    Past Medical History:   Diagnosis Date    Anemia     Depression     Nephrolithiasis        Past Surgical History:    Past Surgical History:   Procedure Laterality Date    COMBINED CYSTOSCOPY, RETROGRADES, URETEROSCOPY, LASER HOLMIUM LITHOTRIPSY URETER(S), INSERT STENT Right 9/15/2023    Procedure: Cystoscopy, Right Ureteroscopy, Right Holmium Laser Lithotripsy, Right Retrograde Pyelogram, Right Ureteral Stent Placement;  Surgeon: Stefan Du MD;  Location: [facility] OR    DILATION AND CURETTAGE, OPERATIVE HYSTEROSCOPY, COMBINED N/A 04/06/2023    Procedure: HYSTEROSCOPY, WITH DILATION AND CURETTAGE OF UTERUS, polypectomy;  Surgeon: Stefanie Mcnair MD;  Location: [facility] OR    SURGICAL HISTORY OF -       PE tubes    TONSILLECTOMY, ADENOIDECTOMY ADULT, COMBINED Bilateral 03/19/2018    Procedure: COMBINED TONSILLECTOMY, ADENOIDECTOMY ADULT;  Bilateral Adenotonsillectomy;  Surgeon: Kevin Arias MD;  Location: [facility] OR    wisdom teeth Bilateral        Family History:    Family History   Problem Relation Age of Onset    Depression Mother     Depression Father     Depression Maternal Grandmother     Cancer Maternal Grandmother     Lung Cancer Maternal Grandmother     Diabetes Maternal Grandfather     Heart Disease Maternal Grandfather     Hypertension Maternal Grandfather     Depression Paternal Grandmother         bipolar    Depression Paternal Grandfather         bipolar       Social History:  Marital Status:  Single [1]  Social History     Tobacco Use    Smoking status: Never    Smokeless tobacco: Never   Vaping Use    Vaping Use: Never used   Substance Use Topics     Alcohol use: Yes     Comment: Very occasionally    Drug use: Never        Medications:    ondansetron (ZOFRAN ODT) 4 MG ODT tab  acetaminophen (TYLENOL) 325 MG tablet  acetaminophen (TYLENOL) 500 MG tablet  albuterol (PROAIR HFA/PROVENTIL HFA/VENTOLIN HFA) 108 (90 Base) MCG/ACT inhaler  dicloxacillin (DYNAPEN) 500 MG capsule  ibuprofen (ADVIL/MOTRIN) 200 MG tablet  ibuprofen (ADVIL/MOTRIN) 200 MG tablet  oxyCODONE (ROXICODONE) 5 MG tablet  oxyCODONE (ROXICODONE) 5 MG tablet  tamsulosin (FLOMAX) 0.4 MG capsule          Review of Systems   Constitutional:  Positive for appetite change (Decreased appetite this afternoon and evening). Negative for fever.   HENT:  Positive for nosebleeds. Negative for congestion.    Respiratory:  Negative for cough, chest tightness and shortness of breath.    Cardiovascular:  Negative for chest pain.   Gastrointestinal:  Positive for nausea. Negative for abdominal pain and vomiting.   Neurological:  Positive for light-headedness.   Hematological:  Does not bruise/bleed easily.   Psychiatric/Behavioral:  The patient is nervous/anxious.    All other systems reviewed and are negative.      Physical Exam   BP: 108/75  Pulse: 96  Temp: 99.2  F (37.3  C)  Resp: 18  Weight: 53.5 kg (117 lb 14.4 oz)  SpO2: 100 %      Physical Exam  Vitals and nursing note reviewed.   Constitutional:       Appearance: Normal appearance.   HENT:      Head:      Comments: Gauze bandage over both nares with cotton from pledgets in each nare.  Cotton partially saturated with blood and there is a small amount of blood on the 4 x 4 over the naris     Nose:      Comments: After removal of the cotton pledgets, there is some active slow venous oozing from within the nasal cavity.  No active or brisk bleeding seen.  Eyes:      Conjunctiva/sclera: Conjunctivae normal.   Cardiovascular:      Rate and Rhythm: Normal rate and regular rhythm.      Pulses: Normal pulses.   Pulmonary:      Effort: Pulmonary effort is normal.    Skin:     General: Skin is warm and dry.      Capillary Refill: Capillary refill takes less than 2 seconds.      Coloration: Skin is not pale.   Neurological:      Mental Status: She is alert and oriented to person, place, and time.   Psychiatric:         Mood and Affect: Mood normal.         ED Course                 Procedures              Results for orders placed or performed during the hospital encounter of 09/29/23 (from the past 24 hour(s))   CBC with platelets differential    Narrative    The following orders were created for panel order CBC with platelets differential.  Procedure                               Abnormality         Status                     ---------                               -----------         ------                     CBC with platelets and d...[[id number]]  Abnormal            Final result                 Please view results for these tests on the individual orders.   Basic metabolic panel   Result Value Ref Range    Sodium 140 135 - 145 mmol/L    Potassium 3.9 3.4 - 5.3 mmol/L    Chloride 104 98 - 107 mmol/L    Carbon Dioxide (CO2) 23 22 - 29 mmol/L    Anion Gap 13 7 - 15 mmol/L    Urea Nitrogen 9.3 6.0 - 20.0 mg/dL    Creatinine 0.65 0.51 - 0.95 mg/dL    GFR Estimate >90 >60 mL/min/1.73m2    Calcium 9.6 8.6 - 10.0 mg/dL    Glucose 112 (H) 70 - 99 mg/dL   CBC with platelets and differential   Result Value Ref Range    WBC Count 12.4 (H) 4.0 - 11.0 10e3/uL    RBC Count 4.54 3.80 - 5.20 10e6/uL    Hemoglobin 11.8 11.7 - 15.7 g/dL    Hematocrit 35.7 35.0 - 47.0 %    MCV 79 78 - 100 fL    MCH 26.0 (L) 26.5 - 33.0 pg    MCHC 33.1 31.5 - 36.5 g/dL    RDW 13.5 10.0 - 15.0 %    Platelet Count 189 150 - 450 10e3/uL    % Neutrophils 88 %    % Lymphocytes 6 %    % Monocytes 6 %    % Eosinophils 0 %    % Basophils 0 %    % Immature Granulocytes 0 %    NRBCs per 100 WBC 0 <1 /100    Absolute Neutrophils 10.8 (H) 1.6 - 8.3 10e3/uL    Absolute Lymphocytes 0.8 0.8 - 5.3 10e3/uL    Absolute  Monocytes 0.7 0.0 - 1.3 10e3/uL    Absolute Eosinophils 0.0 0.0 - 0.7 10e3/uL    Absolute Basophils 0.0 0.0 - 0.2 10e3/uL    Absolute Immature Granulocytes 0.0 <=0.4 10e3/uL    Absolute NRBCs 0.0 10e3/uL       Medications   lactated ringers BOLUS 1,000 mL (0 mLs Intravenous Stopped 9/30/23 0101)   tranexamic acid (CYKLOKAPRON) bolus 1 g vial attach to NaCl 50 or 100 mL bag ADULT (1 g Intravenous $Given 9/29/23 8000)   ondansetron (ZOFRAN) injection 4 mg (4 mg Intravenous $Given 9/30/23 0003)   droPERidol (INAPSINE) injection 2.5 mg (2.5 mg Intravenous $Given 9/30/23 0025)     11:27 PM: DIscussed with Dr Sheehan, ENT. Recommends a trial of IV TXA and observation.  If this is unsuccessful at stopping the bleeding, recommend removal of splint and apply bilateral Merocel sponges.     11:50 PM: Patient updated regarding plan.  IV TXA given.  Patient gently blow her nose to remove some clots and is leaning forward to allow any ongoing light bleeding to flow out of her nostrils and not into her throat.  Pain and nausea well controlled currently.  Counseled patient on the need to maintain a forward position to allow for drainage of the blood through her nostrils instead of into her stomach.    12:59 AM Patient re-assessed: Patient resting company.  No active bleeding seen.  The 4 x 4 that was lightly taped on her nostrils has a small amount of blood but this has been over the last half an hour or so.  Estimated blood loss of less than 1 cc.  Nausea well controlled.  Discussed with significant other regarding plan for conservative management as bleeding appears to have stopped.    Assessments & Plan (with Medical Decision Making)  23-year-old who is postop day 0 presenting for evaluation of postoperative epistaxis after sinus surgery this morning.  Patient well-appearing but anxious.  Heart rate slightly elevated in the low 90s.  Blood pressure normal.  Not having symptoms of lightheadedness or near syncope.  Patient reports  continuous blood loss all day and spitting up blood regularly.  Patient well-appearing in the ED in no distress.  Screening blood work obtained shows an improvement in her hemoglobin since her preop level.  Discussed with ENT who recommended a trial of IV TXA which did seem to alleviate her ongoing slow bleeding.  After period of observation, bleeding appeared to have stopped spontaneously.  Counseled significant other regarding conservative management including sleeping in a semireclined/upright position, minimizing irritation and any trauma to the nose and plan for close follow-up as needed.     I have reviewed the nursing notes.    I have reviewed the findings, diagnosis, plan and need for follow up with the patient.         Discharge Medication List as of 9/30/2023  1:02 AM          Final diagnoses:   Postsurgical epistaxis       9/29/2023   [facility] EMERGENCY DEPT       Smith, Isidoro Hughes MD  09/30/23 0515

## 2023-09-30 NOTE — TELEPHONE ENCOUNTER
Caller:   Murali, significant other    Situation:   Patient is still bleeding post nasal surgery; blood is running down the back of her throat. Murali says blood is bright red. Patient has vomiting x 2 b/c she is laying back. She feels shaky/weak. She feels dizzy sitting; feels more dizzy when she stands up.   6-7/10 pain level    [phone] - Murali       Background:  Sinus surgery with Dr. Grace      Assessment  Surgeon to be called for further advise but Murali says patient just blew a lot of snot out and he would prefer that she be seen.        Recommendation:  Disposition: Murali says he will take her to the ED instead of waiting for call back.          Sue Nazario RN, BSN  Triage Nurse Advisor      Reason for Disposition   Dressing soaked with blood or body fluid (e.g., drainage)    Additional Information   Negative: [1] Major abdominal surgical incision AND [2] wound gaping open AND [3] visible internal organs   Negative: Sounds like a life-threatening emergency to the triager   Negative: [1] Bleeding from incision AND [2] won't stop after 10 minutes of direct pressure   Negative: [1] Bleeding (more than a few drops) from incision AND [2] tracheostomy or blood vessel surgery (e.g., carotid endarterectomy, femoral bypass graft, kidney dialysis fistula)   Negative: [1] Widespread rash AND [2] bright red, sunburn-like   Negative: Severe pain in the incision   Negative: [1] Incision gaping open AND [2] < 48 hours since wound re-opened   Negative: [1] Incision gaping open AND [2] length of opening > 2 inches (5 cm)   Negative: Patient sounds very sick or weak to the triager   Negative: Sounds like a serious complication to the triager   Negative: [1] Post-op pain AND [2] not controlled with pain medications    Protocols used: Post-Op Incision Symptoms and [id number]

## 2023-09-30 NOTE — ED TRIAGE NOTES
Pt presents with ongoing nose bleeding after having sinus surgery with Dr Grace today. Attempted to reach out to his office with no callback.      Triage Assessment       Row Name 09/29/23 6215       Triage Assessment (Adult)    Airway WDL WDL       Respiratory WDL    Respiratory WDL WDL       Skin Circulation/Temperature WDL    Skin Circulation/Temperature WDL WDL       Cardiac WDL    Cardiac WDL WDL       Peripheral/Neurovascular WDL    Peripheral Neurovascular WDL WDL       Cognitive/Neuro/Behavioral WDL    Cognitive/Neuro/Behavioral WDL WDL       [place] Coma Scale    Best Eye Response 4-->(E4) spontaneous    Best Motor Response 6-->(M6) obeys commands    Best Verbal Response 5-->(V5) oriented    [place] Coma Scale Score 15

## 2023-09-30 NOTE — ED NOTES
Pt reports continued nausea, appears anxious, taping her foot and fidgeting in bed. MD notified. Orders given for droperidol - orders carried out.

## 2023-10-02 ENCOUNTER — PATIENT OUTREACH (OUTPATIENT)
Dept: CARE COORDINATION | Facility: CLINIC | Age: 23
End: 2023-10-02
Payer: COMMERCIAL

## 2023-10-02 NOTE — PROGRESS NOTES
Clinic Care Coordination Contact  Community Health Worker Initial Outreach    CHW Initial Information Gathering:  Referral Source: ED Follow-Up  CHW Additional Questions  MyChart active?: Yes    Patient accepts CC: No, patient declined at this time. Patient will be sent Care Coordination introduction letter for future reference.     Albina Jackson  Community Health Worker  [facility] Care Resource [facility]

## 2023-10-02 NOTE — LETTER
Aury Cervantes  [address] [address]    Dear Aury Cervantes,      I am a team member within the [facility] with [facility]. I recently contacted you to ensure you were doing well following a recent visit within our health system. I also wanted to take this chance to introduce Clinic Care Coordination should you have any interest in this program in the future.    Below is a description of Clinic Care Coordination and how this team can further assist you:       The Clinic Care Coordination team is made up of a Registered Nurse, , Financial Resource Worker, and a Community Health Worker who understand and can help navigate the health care system. The goal of clinic care coordination is to help you manage your health, improve access to care, and achieve optimal health outcomes. They work alongside your provider to assist you in determining your health and social needs, obtain health care and community resources, and provide you with necessary information and education. Clinic Care Coordination can work with you through any barriers and develop a care plan that helps coordinate and strengthen the relationship between you and your care team.    If you wish to connect with the Clinic Care Coordination Team, please let your [facility] Primary Care Provider or Clinic Care Team know and they can place a referral. The Clinic Care Coordination team will then reach out by phone to further support you.    We are focused on providing you with the highest-quality healthcare experience possible.    Sincerely,   Your care team with [facility]

## 2023-10-05 ENCOUNTER — OFFICE VISIT (OUTPATIENT)
Dept: OTOLARYNGOLOGY | Facility: CLINIC | Age: 23
End: 2023-10-05
Payer: COMMERCIAL

## 2023-10-05 VITALS
WEIGHT: 117 LBS | HEART RATE: 98 BPM | DIASTOLIC BLOOD PRESSURE: 77 MMHG | BODY MASS INDEX: 19.03 KG/M2 | OXYGEN SATURATION: 100 % | SYSTOLIC BLOOD PRESSURE: 108 MMHG | TEMPERATURE: 98.6 F

## 2023-10-05 DIAGNOSIS — Z98.890 STATUS POST NASAL SEPTOPLASTY: ICD-10-CM

## 2023-10-05 DIAGNOSIS — J31.0 CHRONIC RHINITIS: ICD-10-CM

## 2023-10-05 DIAGNOSIS — Z98.890 STATUS POST FUNCTIONAL ENDOSCOPIC SINUS SURGERY: ICD-10-CM

## 2023-10-05 DIAGNOSIS — Z98.890 POSTOPERATIVE STATE: ICD-10-CM

## 2023-10-05 DIAGNOSIS — J34.89 SILENT SINUS SYNDROME: Primary | ICD-10-CM

## 2023-10-05 DIAGNOSIS — J34.89 NASAL OBSTRUCTION: ICD-10-CM

## 2023-10-05 DIAGNOSIS — J34.3 HYPERTROPHY OF BOTH INFERIOR NASAL TURBINATES: ICD-10-CM

## 2023-10-05 DIAGNOSIS — J34.2 NASAL SEPTAL DEVIATION: ICD-10-CM

## 2023-10-05 PROCEDURE — 99024 POSTOP FOLLOW-UP VISIT: CPT | Performed by: OTOLARYNGOLOGY

## 2023-10-05 ASSESSMENT — PAIN SCALES - GENERAL: PAINLEVEL: NO PAIN (0)

## 2023-10-05 NOTE — NURSING NOTE
"Initial /77 (BP Location: Right arm, Patient Position: Chair, Cuff Size: Adult Regular)   Pulse 98   Temp 98.6  F (37  C) (Tympanic)   Wt 53.1 kg (117 lb)   LMP 09/12/2023 (Exact Date)   SpO2 100%   BMI 19.03 kg/m   Estimated body mass index is 19.03 kg/m  as calculated from the following:    Height as of 9/29/23: 1.67 m (5' 5.75\").    Weight as of this encounter: 53.1 kg (117 lb). .  Geoffrey Castillo on 10/5/2023 at 12:44 PM    "

## 2023-10-05 NOTE — LETTER
10/5/2023         RE: Aury Cervantes  [address] [facility] [address]        Dear Colleague,    Thank you for referring your patient, Aury Cervantes, to the [facility]. Please see a copy of my visit note below.    Chief Complaint   Patient presents with     Post-op Visit     History of Present Illness  Aury Cervantes is a 23 year old female who presents today for follow-up.  The patient went to the operating room on 9/29/2023 for endonasal septoplasty, bilateral inferior turbinate reduction, and right endoscopic sinus surgery for silent sinus syndrome.  The patient has done well postoperatively with minimal pain.  The patient does report some congestion but no significant nasal bleeding.  Some mild continuous oozing from her nose since surgery.  It was more significant on day 1 when she went to the emergency department for evaluation but after the TXA its been only dripping blood.  They presents today for nasal splint removal.    Past Medical History  Patient Active Problem List   Diagnosis     Major depressive disorder, recurrent episode, severe (H)     S/P tonsillectomy and adenoidectomy     Anaphylaxis, subsequent encounter     Rhinoconjunctivitis     Iron deficiency anemia due to chronic blood loss     Menorrhagia with regular cycle     Dysmenorrhea     Female infertility     Nasal obstruction     Nasal septal deviation     Hypertrophy of both inferior nasal turbinates     Chronic rhinitis     Silent sinus syndrome     Ureteral colic     Nephrolithiasis     Obstruction of right ureteropelvic junction (UPJ) due to stone     Nexplanon in place     Current Medications    Current Outpatient Medications:      albuterol (PROAIR HFA/PROVENTIL HFA/VENTOLIN HFA) 108 (90 Base) MCG/ACT inhaler, Inhale 2 puffs into the lungs every 6 hours as needed for shortness of breath, wheezing or cough, Disp: 18 g, Rfl: 1     dicloxacillin (DYNAPEN) 500 MG capsule, Take 1 capsule (500 mg) by mouth 2 times daily  for 7 days, Disp: 14 capsule, Rfl: 0     ondansetron (ZOFRAN ODT) 4 MG ODT tab, Take 1 tablet (4 mg) by mouth every 8 hours as needed for nausea, Disp: 10 tablet, Rfl: 0     tamsulosin (FLOMAX) 0.4 MG capsule, Take 1 capsule (0.4 mg) by mouth daily, Disp: 14 capsule, Rfl: 0     acetaminophen (TYLENOL) 325 MG tablet, Take 1-2 tablets (325-650 mg) by mouth every 6 hours as needed for fever or pain (Patient not taking: Reported on 10/5/2023), Disp: 200 tablet, Rfl: 1     acetaminophen (TYLENOL) 500 MG tablet, Take 500-1,000 mg by mouth every 6 hours as needed for mild pain (Patient not taking: Reported on 10/5/2023), Disp: , Rfl:      ibuprofen (ADVIL/MOTRIN) 200 MG tablet, Take 2 tablets (400 mg) by mouth every 6 hours as needed for moderate pain (Patient not taking: Reported on 10/5/2023), Disp: 200 tablet, Rfl: 1     ibuprofen (ADVIL/MOTRIN) 200 MG tablet, Take 200 mg by mouth every 4 hours as needed for pain (Patient not taking: Reported on 10/5/2023), Disp: , Rfl:      oxyCODONE (ROXICODONE) 5 MG tablet, Take 1 tablet (5 mg) by mouth every 6 hours as needed for severe pain (Patient not taking: Reported on 10/5/2023), Disp: 10 tablet, Rfl: 0     oxyCODONE (ROXICODONE) 5 MG tablet, Take 1 tablet (5 mg) by mouth every 6 hours as needed for pain (Patient not taking: Reported on 10/5/2023), Disp: 10 tablet, Rfl: 0    Allergies  Allergies   Allergen Reactions     Sulfa Antibiotics Other (See Comments)     Both parents are allergic         Social History  Social History     Socioeconomic History     Marital status: Single   Tobacco Use     Smoking status: Never     Smokeless tobacco: Never   Vaping Use     Vaping Use: Never used   Substance and Sexual Activity     Alcohol use: Yes     Comment: Very occasionally     Drug use: Never     Sexual activity: Yes     Partners: Male     Birth control/protection: None   Other Topics Concern     Parent/sibling w/ CABG, MI or angioplasty before 65F 55M? No       Family  History  Family History   Problem Relation Age of Onset     Depression Mother      Depression Father      Depression Maternal Grandmother      Cancer Maternal Grandmother      Lung Cancer Maternal Grandmother      Diabetes Maternal Grandfather      Heart Disease Maternal Grandfather      Hypertension Maternal Grandfather      Depression Paternal Grandmother         bipolar     Depression Paternal Grandfather         bipolar       Review of Systems  As per HPI and PMHx, otherwise 10 system review including the head and neck, constitutional, eyes, respiratory, GI, skin, neurologic, lymphatic, endocrine, and allergy systems is negative.    Physical Exam  /77 (BP Location: Right arm, Patient Position: Chair, Cuff Size: Adult Regular)   Pulse 98   Temp 98.6  F (37  C) (Tympanic)   Wt 53.1 kg (117 lb)   LMP 09/12/2023 (Exact Date)   SpO2 100%   BMI 19.03 kg/m    GENERAL: Patient is a pleasant, cooperative 23 year old female in no acute distress.  HEAD: Normocephalic, atraumatic.  Hair and scalp are normal.  EYES: Pupils are equal, round, reactive to light and accommodation.  Extraocular movements are intact.  The sclera nonicteric without injection.  The extraocular structures are normal.  EARS: Normal shape and symmetry.  No tenderness when palpating the mastoid or tragal areas bilaterally.    NOSE: Palmer splints are in good placement.  The stitch and Palmer splints are removed.  The nasal septum is midline without any evidence of septal hematoma.  The inferior turbinates are well lateralized.  The bilateral nasal cavities were suctioned free.  The patient noticed immediate improvement in the breathing.  There was a couple small areas that were oozing on the right-hand side that I did address with silver nitrate cautery after topical application of lidocaine.  I did place some Surgicel over the oozing areas.  The patient tolerated the procedure well.  NEUROLOGIC: Cranial nerves II through XII are grossly  intact.  Voice is strong.  Patient is House-Brackman I/VI bilaterally.  CARDIOVASCULAR: Extremities are warm and well-perfused.  No significant peripheral edema.  RESPIRATORY: Patient has nonlabored breathing without cough, wheeze, stridor.  PSYCHIATRIC: Patient is alert and oriented.  Mood and affect appear normal.  SKIN: Warm and dry.  No scalp, face, or neck lesions noted.    Assessment and Plan     ICD-10-CM    1. Silent sinus syndrome  J34.89       2. Chronic rhinitis  J31.0       3. Nasal obstruction  J34.89       4. Nasal septal deviation  J34.2       5. Hypertrophy of both inferior nasal turbinates  J34.3       6. Status post functional endoscopic sinus surgery  Z98.890       7. Status post nasal septoplasty  Z98.890       8. Postoperative state  Z98.890          It was my pleasure seeing Aury Cervantes today in clinic.  The patient is healing well after their septoplasty endoscopic sinus surgery.  We discussed an additional 1 week of nose blowing and activity restrictions.  We will have them start/continue nasal saline irrigation.  They can return to use of nasal sprays.  I would like to see the patient back in 1 to 2 weeks for follow-up and debridement.  Patient knows to contact me sooner with any problems or concerns.    Aram Grace MD  Department of Otolaryngology-Head and Neck Surgery  [facility]       Again, thank you for allowing me to participate in the care of your patient.        Sincerely,        Aram Grace MD

## 2023-10-09 ENCOUNTER — MYC MEDICAL ADVICE (OUTPATIENT)
Dept: FAMILY MEDICINE | Facility: CLINIC | Age: 23
End: 2023-10-09
Payer: COMMERCIAL

## 2023-10-09 ENCOUNTER — HOSPITAL ENCOUNTER (EMERGENCY)
Facility: CLINIC | Age: 23
Discharge: HOME | End: 2023-10-09
Admitting: EMERGENCY MEDICINE
Payer: COMMERCIAL

## 2023-10-09 VITALS
HEART RATE: 93 BPM | BODY MASS INDEX: 18.59 KG/M2 | HEIGHT: 65 IN | WEIGHT: 111.6 LBS | RESPIRATION RATE: 16 BRPM | DIASTOLIC BLOOD PRESSURE: 65 MMHG | TEMPERATURE: 98.3 F | OXYGEN SATURATION: 100 % | SYSTOLIC BLOOD PRESSURE: 115 MMHG

## 2023-10-09 DIAGNOSIS — R10.9 LEFT FLANK PAIN: ICD-10-CM

## 2023-10-09 DIAGNOSIS — N20.1 LEFT URETERAL STONE: ICD-10-CM

## 2023-10-09 DIAGNOSIS — N20.1 RIGHT URETERAL STONE: ICD-10-CM

## 2023-10-09 LAB
ALBUMIN UR-MCNC: NEGATIVE MG/DL
APPEARANCE UR: ABNORMAL
BACTERIA #/AREA URNS HPF: ABNORMAL /HPF
BILIRUB UR QL STRIP: NEGATIVE
COLOR UR AUTO: YELLOW
GLUCOSE UR STRIP-MCNC: NEGATIVE MG/DL
HGB UR QL STRIP: ABNORMAL
HYALINE CASTS: 3 /LPF
KETONES UR STRIP-MCNC: NEGATIVE MG/DL
LEUKOCYTE ESTERASE UR QL STRIP: NEGATIVE
MUCOUS THREADS #/AREA URNS LPF: PRESENT /LPF
NITRATE UR QL: NEGATIVE
PH UR STRIP: 5 [PH] (ref 5–7)
RBC URINE: 42 /HPF
SP GR UR STRIP: 1.02 (ref 1–1.03)
SQUAMOUS EPITHELIAL: 1 /HPF
UROBILINOGEN UR STRIP-MCNC: 2 MG/DL
WBC URINE: 3 /HPF

## 2023-10-09 PROCEDURE — 99284 EMERGENCY DEPT VISIT MOD MDM: CPT | Performed by: EMERGENCY MEDICINE

## 2023-10-09 PROCEDURE — 81001 URINALYSIS AUTO W/SCOPE: CPT | Performed by: PHYSICIAN ASSISTANT

## 2023-10-09 PROCEDURE — 99283 EMERGENCY DEPT VISIT LOW MDM: CPT | Performed by: EMERGENCY MEDICINE

## 2023-10-09 RX ORDER — TAMSULOSIN HYDROCHLORIDE 0.4 MG/1
0.4 CAPSULE ORAL DAILY
Qty: 14 CAPSULE | Refills: 0 | Status: SHIPPED | OUTPATIENT
Start: 2023-10-09 | End: 2023-10-16

## 2023-10-09 ASSESSMENT — ACTIVITIES OF DAILY LIVING (ADL): ADLS_ACUITY_SCORE: 35

## 2023-10-09 NOTE — ED PROVIDER NOTES
ED Provider Note  [facility]      History     Chief Complaint   Patient presents with    Flank Pain     HPI  Aury Cervantes is a 23 year old female who presents to the emergency department with concerns regarding left-sided flank pain.  This began during the day today.  Does have history of prior kidney stones.  I reviewed recent ureteral stone procedure that was performed last month as documented below.  I also personally reviewed CT images from September 6.  Patient had right-sided ureteral stone, with additional right-sided intrarenal stones.  Also had left a 1 to 2 mm intrarenal stone.  Patient did have right-sided ureteral stent which was placed, kept in place for 1 week, and patient subsequently removed.  She denies any fever.  Has had urinary urgency.  Originally was seen in urgent care, and sent to the emergency department for further evaluation.          PREOPERATIVE DIAGNOSIS:         Right ureteral stone  POSTOPERATIVE DIAGNOSIS:      Same  PROCEDURES PERFORMED:    Cystoscopy  Right ureteroscopy thulium laser lithotripsy and stone basket extraction  Right retrograde pyelogram with interpretation of imaging  Right ureteral stent placement    STAFF SURGEON: Stefan Du MD  RESIDENT(S) none    ANESTHESIA: General  ESTIMATED BLOOD LOSS: 1 ml  COMPLICATIONS: None  SPECIMEN: Right ureteral stone for analysis  URETERAL STENT(S): 4.8 x 24 double-J right ureteral stent    SIGNIFICANT FINDINGS: Distal ureteral narrowing, right mid ureteral stone, right papillary tip calcifications    BRIEF OPERATIVE INDICATIONS: Patient presented with symptomatic obstructing right ureteral stone.  After a discussion of all risks, benefits, and alternatives, the patient elected to proceed with definitive stone management. The patient understands the potential need for more than one procedure to eliminate all stone burden.        Independent Historian:        Review of External Notes:  As above         Allergies:  Allergies   Allergen Reactions    Sulfa Antibiotics Other (See Comments)     Both parents are allergic         Problem List:    Patient Active Problem List    Diagnosis Date Noted    Obstruction of right ureteropelvic junction (UPJ) due to stone 09/12/2023     Priority: Medium    Nexplanon in place 09/12/2023     Priority: Medium    Nephrolithiasis 09/10/2023     Priority: Medium    Ureteral colic 09/09/2023     Priority: Medium    Chronic rhinitis 07/25/2023     Priority: Medium    Silent sinus syndrome 07/25/2023     Priority: Medium    Nasal obstruction 12/22/2022     Priority: Medium     Added automatically from request for surgery [id number]      Nasal septal deviation 12/22/2022     Priority: Medium     Added automatically from request for surgery [id number]      Hypertrophy of both inferior nasal turbinates 12/22/2022     Priority: Medium     Added automatically from request for surgery [id number]      Dysmenorrhea 05/16/2022     Priority: Medium    Female infertility 05/16/2022     Priority: Medium    Iron deficiency anemia due to chronic blood loss 02/07/2021     Priority: Medium    Menorrhagia with regular cycle 02/07/2021     Priority: Medium    Anaphylaxis, subsequent encounter 12/04/2018     Priority: Medium    Rhinoconjunctivitis 12/04/2018     Priority: Medium    S/P tonsillectomy and adenoidectomy 03/21/2018     Priority: Medium    Major depressive disorder, recurrent episode, severe (H) 11/19/2014     Priority: Medium        Past Medical History:    Past Medical History:   Diagnosis Date    Anemia     Depression     Nephrolithiasis        Past Surgical History:    Past Surgical History:   Procedure Laterality Date    COMBINED CYSTOSCOPY, RETROGRADES, URETEROSCOPY, LASER HOLMIUM LITHOTRIPSY URETER(S), INSERT STENT Right 9/15/2023    Procedure: Cystoscopy, Right Ureteroscopy, Right Holmium Laser Lithotripsy, Right Retrograde Pyelogram, Right Ureteral Stent Placement;  Surgeon: Florian  Stefan SAUL MD;  Location: [facility] OR    DILATION AND CURETTAGE, OPERATIVE HYSTEROSCOPY, COMBINED N/A 04/06/2023    Procedure: HYSTEROSCOPY, WITH DILATION AND CURETTAGE OF UTERUS, polypectomy;  Surgeon: Stefanie Mcnair MD;  Location: [facility] OR    SURGICAL HISTORY OF -       PE tubes    TONSILLECTOMY, ADENOIDECTOMY ADULT, COMBINED Bilateral 03/19/2018    Procedure: COMBINED TONSILLECTOMY, ADENOIDECTOMY ADULT;  Bilateral Adenotonsillectomy;  Surgeon: Kevin Arias MD;  Location: [facility] OR    wisdom teeth Bilateral        Family History:    Family History   Problem Relation Age of Onset    Depression Mother     Depression Father     Depression Maternal Grandmother     Cancer Maternal Grandmother     Lung Cancer Maternal Grandmother     Diabetes Maternal Grandfather     Heart Disease Maternal Grandfather     Hypertension Maternal Grandfather     Depression Paternal Grandmother         bipolar    Depression Paternal Grandfather         bipolar       Social History:  Marital Status:  Single [1]  Social History     Tobacco Use    Smoking status: Never    Smokeless tobacco: Never   Vaping Use    Vaping Use: Never used   Substance Use Topics    Alcohol use: Yes     Comment: Very occasionally    Drug use: Never        Medications:    tamsulosin (FLOMAX) 0.4 MG capsule  acetaminophen (TYLENOL) 325 MG tablet  acetaminophen (TYLENOL) 500 MG tablet  albuterol (PROAIR HFA/PROVENTIL HFA/VENTOLIN HFA) 108 (90 Base) MCG/ACT inhaler  ibuprofen (ADVIL/MOTRIN) 200 MG tablet  ibuprofen (ADVIL/MOTRIN) 200 MG tablet  ondansetron (ZOFRAN ODT) 4 MG ODT tab  oxyCODONE (ROXICODONE) 5 MG tablet  oxyCODONE (ROXICODONE) 5 MG tablet  tamsulosin (FLOMAX) 0.4 MG capsule          Review of Systems  A medically appropriate review of systems was performed with pertinent positives and negatives noted in the HPI, and all other systems negative.    Physical Exam   Patient Vitals for the past 24 hrs:   BP Temp Temp src Pulse Resp SpO2 Height  "Weight   10/09/23 1512 115/65 98.3  F (36.8  C) Oral 93 16 100 % 1.651 m (5' 5\") 50.6 kg (111 lb 9.6 oz)          Physical Exam  General: alert and in no acute distress on arrival  Head: atraumatic, normocephalic  Lungs:  nonlabored  CV:  extremities warm and perfused  Abd: nondistended  Skin: no rashes, no diaphoresis and skin color normal  Neuro: Patient awake, alert, speech is fluent,   Psychiatric: affect/mood normal,        ED Course                 Procedures                           Results for orders placed or performed during the hospital encounter of 10/09/23 (from the past 24 hour(s))   UA with Microscopic reflex to Culture    Specimen: Urine, Clean Catch   Result Value Ref Range    Color Urine Yellow Colorless, Straw, Light Yellow, Yellow    Appearance Urine Slightly Cloudy (A) Clear    Glucose Urine Negative Negative mg/dL    Bilirubin Urine Negative Negative    Ketones Urine Negative Negative mg/dL    Specific Gravity Urine 1.016 1.003 - 1.035    Blood Urine Large (A) Negative    pH Urine 5.0 5.0 - 7.0    Protein Albumin Urine Negative Negative mg/dL    Urobilinogen Urine 2.0 Normal, 2.0 mg/dL    Nitrite Urine Negative Negative    Leukocyte Esterase Urine Negative Negative    Bacteria Urine Few (A) None Seen /HPF    Mucus Urine Present (A) None Seen /LPF    RBC Urine 42 (H) <=2 /HPF    WBC Urine 3 <=5 /HPF    Squamous Epithelials Urine 1 <=1 /HPF    Hyaline Casts Urine 3 (H) <=2 /LPF    Narrative    Urine Culture not indicated       MEDICATIONS GIVEN IN THE EMERGENCY DEPARTMENT:  Medications - No data to display        Independent Interpretation (X-rays, CTs, rhythm strip):  As above    Consultations/Discussion of Management or Tests:         Social Determinants of Health affecting care:         Assessments & Plan (with Medical Decision Making)  23 year old female who presents to the Emergency Department for evaluation of left-sided flank pain.  Symptoms began during the day today with urinary " urgency.  I reviewed recent imaging procedure that showed 1 to 2 mm left-sided intrarenal stone.  I feel that this is likely the contributing factor to patient's current symptoms of urinary hesitancy, and urgency.  She does have hematuria, with red blood cells present in the urine.  I do feel that this represents kidney stone type pain, with likely left-sided ureteral stone.  Given the fact that patient has recent CT imaging that shows left-sided kidney stone, I do feel that this is the contributing factor to patient's current pains.  She will be prescribed Flomax.  There is no signs of infection on urinalysis.  Patient without any systemic symptoms of infection.    I did review urinalysis that was obtained initially in urgent care.  Patient was sent from urgent care for ED evaluation.  At this point, I discussed with patient that I will change her back to urgent care status as patient did not require any additional testing or additional treatment at this point.  Return instructions discussed.  Patient seen as urgent care visit.       I have reviewed the nursing notes.    I have reviewed the findings, diagnosis, plan and need for follow up with the patient.       Critical Care time:  none      NEW PRESCRIPTIONS STARTED AT TODAY'S ER VISIT  New Prescriptions    TAMSULOSIN (FLOMAX) 0.4 MG CAPSULE    Take 1 capsule (0.4 mg) by mouth daily       Final diagnoses:   Left flank pain   Left ureteral stone       10/9/2023   [facility] EMERGENCY DEPT       Tomas Gonzalez MD  10/09/23 [id number]

## 2023-10-09 NOTE — ED NOTES
Pt states took ibuprofen last about 1.5 hours ago with good relief of pain, states does not need further analgesics at this time.

## 2023-10-09 NOTE — ED NOTES
Patient presented to  for concern of UTI with flank pain and concern for kidney stone.  Urine analysis is not convincing for infection at this time.  Discussed available evaluation options in the urgent care today and patient elects to be seen in the emergency department for further evaluation.     Ulysses Cardoso, CIRO CNP  10/09/23 9979

## 2023-10-09 NOTE — ED TRIAGE NOTES
Pt presents with left sided flank pain that began today. Reported UTI sx a few days ago. Hx of kidney stones with removal around a month ago on the right side. Was noted to have stones on the left side.      Triage Assessment       Row Name 10/09/23 6234       Triage Assessment (Adult)    Airway WDL WDL       Respiratory WDL    Respiratory WDL WDL       Skin Circulation/Temperature WDL    Skin Circulation/Temperature WDL WDL       Cardiac WDL    Cardiac WDL WDL       Peripheral/Neurovascular WDL    Peripheral Neurovascular WDL WDL       Cognitive/Neuro/Behavioral WDL    Cognitive/Neuro/Behavioral WDL WDL

## 2023-10-09 NOTE — TELEPHONE ENCOUNTER
Patient requesting refill of Tamsulosin, went to ED, no infection, patient thinks it is kidney stones. Janette Hill on 10/9/2023 at 4:21 PM

## 2023-10-10 RX ORDER — TAMSULOSIN HYDROCHLORIDE 0.4 MG/1
0.4 CAPSULE ORAL DAILY
Qty: 14 CAPSULE | Refills: 0 | Status: SHIPPED | OUTPATIENT
Start: 2023-10-10 | End: 2023-10-16

## 2023-10-13 ENCOUNTER — MYC MEDICAL ADVICE (OUTPATIENT)
Dept: FAMILY MEDICINE | Facility: CLINIC | Age: 23
End: 2023-10-13
Payer: COMMERCIAL

## 2023-10-13 DIAGNOSIS — E03.8 SUBCLINICAL HYPOTHYROIDISM: Primary | ICD-10-CM

## 2023-10-15 NOTE — PROGRESS NOTES
Chief Complaint   Patient presents with    Follow Up     Sinus surgery in 9/2022- no concerns today     History of Present Illness  Aury Cervantes is a 23 year old female who presents today for follow-up.  The patient went to the operating room on 9/29/2023 for endonasal septoplasty, bilateral inferior turbinate reduction, and right endoscopic sinus surgery for silent sinus syndrome.  The patient was seen for follow up and splint removal on 10/5/2023. She returns for follow up and debridement.     From a symptom standpoint, the patient reports she is breathing well through her nose.  She had some mild congestion but overall is doing well without significant pain.  She was having some tooth discomfort after splint removal that has resolved.  The patient is otherwise doing well and has not ENT related concerns.     Past Medical History  Patient Active Problem List   Diagnosis    Major depressive disorder, recurrent episode, severe (H)    S/P tonsillectomy and adenoidectomy    Anaphylaxis, subsequent encounter    Rhinoconjunctivitis    Iron deficiency anemia due to chronic blood loss    Menorrhagia with regular cycle    Dysmenorrhea    Female infertility    Nasal obstruction    Nasal septal deviation    Hypertrophy of both inferior nasal turbinates    Chronic rhinitis    Silent sinus syndrome    Ureteral colic    Nephrolithiasis    Obstruction of right ureteropelvic junction (UPJ) due to stone    Nexplanon in place     Current Medications    Current Outpatient Medications:     acetaminophen (TYLENOL) 500 MG tablet, Take 500-1,000 mg by mouth every 6 hours as needed for mild pain (Patient not taking: Reported on 10/5/2023), Disp: , Rfl:     albuterol (PROAIR HFA/PROVENTIL HFA/VENTOLIN HFA) 108 (90 Base) MCG/ACT inhaler, Inhale 2 puffs into the lungs every 6 hours as needed for shortness of breath, wheezing or cough, Disp: 18 g, Rfl: 1    ibuprofen (ADVIL/MOTRIN) 200 MG tablet, Take 2 tablets (400 mg) by mouth every 6  hours as needed for moderate pain (Patient not taking: Reported on 10/5/2023), Disp: 200 tablet, Rfl: 1    Allergies  Allergies   Allergen Reactions    Sulfa Antibiotics Other (See Comments)     Both parents are allergic         Social History  Social History     Socioeconomic History    Marital status: Single   Tobacco Use    Smoking status: Never    Smokeless tobacco: Never   Vaping Use    Vaping Use: Never used   Substance and Sexual Activity    Alcohol use: Yes     Comment: Very occasionally    Drug use: Never    Sexual activity: Yes     Partners: Male     Birth control/protection: None   Other Topics Concern    Parent/sibling w/ CABG, MI or angioplasty before 65F 55M? No       Family History  Family History   Problem Relation Age of Onset    Depression Mother     Depression Father     Depression Maternal Grandmother     Cancer Maternal Grandmother     Lung Cancer Maternal Grandmother     Diabetes Maternal Grandfather     Heart Disease Maternal Grandfather     Hypertension Maternal Grandfather     Depression Paternal Grandmother         bipolar    Depression Paternal Grandfather         bipolar       Review of Systems  As per HPI and PMHx, otherwise 10 system review including the head and neck, constitutional, eyes, respiratory, GI, skin, neurologic, lymphatic, endocrine, and allergy systems is negative.    Physical Exam  /71 (BP Location: Right arm, Patient Position: Chair, Cuff Size: Adult Regular)   Pulse 75   Temp 98.2  F (36.8  C) (Tympanic)   Wt 51.7 kg (114 lb)   LMP 09/12/2023 (Exact Date)   BMI 18.97 kg/m    GENERAL: Patient is a pleasant, cooperative 23 year old female in no acute distress.  HEAD: Normocephalic, atraumatic.  Hair and scalp are normal.  EYES: Pupils are equal, round, reactive to light and accommodation.  Extraocular movements are intact.  The sclera nonicteric without injection.  The extraocular structures are normal.  EARS: Normal shape and symmetry.  No tenderness when  palpating the mastoid or tragal areas bilaterally.    NOSE: Nares are patent.  Nasal mucosa is pink and moist.  Nasal septum is midline.  The inferior turbinates are well lateralized and reduced.  No nasal cavity masses, polyps, or mucopurulence on anterior rhinoscopy.  NEUROLOGIC: Cranial nerves II through XII are grossly intact.  Voice is strong.  Patient is House-Brackman I/VI bilaterally.  CARDIOVASCULAR: Extremities are warm and well-perfused.  No significant peripheral edema.  RESPIRATORY: Patient has nonlabored breathing without cough, wheeze, stridor.  PSYCHIATRIC: Patient is alert and oriented.  Mood and affect appear normal.  SKIN: Warm and dry.  No scalp, face, or neck lesions noted.    Procedure: Nasal sinus debridement   Indication: Status post endoscopic sinus surgery    To improve medication delivery and assist with healing following sinus surgery, the nasal cavities were examined and debrided.  I proceeded with rigid nasal endoscopic examination and debridement.  The right nasal cavity was anesthetized with 4% lidocaine and phenylephrine spray.  The right nasal cavity was visualized with a 0 degree rigid endoscope.  The right nasal cavity was debrided with a rigid suction, bayonet forceps, and Blakesley forceps.  The patient had a widely patent maxillary antrostomy. The sphenoethmoid recess was clear.  The frontal sinus outflow area was clear.  The middle turbinate was well medialized without adhesion.  The scope was removed.  The patient tolerated the procedure well.    Assessment and Plan     ICD-10-CM    1. Silent sinus syndrome  J34.89 NASAL/SINUS SCOPE WITH BIOPSY/POLYPECT/DEBRIDE,ENT      2. Chronic rhinitis  J31.0 NASAL/SINUS SCOPE WITH BIOPSY/POLYPECT/DEBRIDE,ENT      3. Nasal obstruction  J34.89 NASAL/SINUS SCOPE WITH BIOPSY/POLYPECT/DEBRIDE,ENT      4. Nasal septal deviation  J34.2 NASAL/SINUS SCOPE WITH BIOPSY/POLYPECT/DEBRIDE,ENT      5. Hypertrophy of both inferior nasal turbinates   J34.3 NASAL/SINUS SCOPE WITH BIOPSY/POLYPECT/DEBRIDE,ENT      6. Status post functional endoscopic sinus surgery  Z98.890 NASAL/SINUS SCOPE WITH BIOPSY/POLYPECT/DEBRIDE,ENT      7. Status post nasal septoplasty  Z98.890 NASAL/SINUS SCOPE WITH BIOPSY/POLYPECT/DEBRIDE,ENT      8. Postoperative state  Z98.890 NASAL/SINUS SCOPE WITH BIOPSY/POLYPECT/DEBRIDE,ENT         It was my pleasure seeing Aury Cervantes today in clinic.  The patient is healing well after their septoplasty endoscopic sinus surgery.  We discussed stopping nose blowing and activity restrictions.  We will have her continue nasal saline irrigation and use of nasal sprays.  The patient can return in 2-3 months for follow up. The patient knows to contact me sooner with any problems or concerns.    Aram Grace MD  Department of Otolaryngology-Head and Neck Surgery  [facility]

## 2023-10-16 ENCOUNTER — OFFICE VISIT (OUTPATIENT)
Dept: OTOLARYNGOLOGY | Facility: CLINIC | Age: 23
End: 2023-10-16
Payer: COMMERCIAL

## 2023-10-16 VITALS
BODY MASS INDEX: 18.97 KG/M2 | HEART RATE: 75 BPM | SYSTOLIC BLOOD PRESSURE: 103 MMHG | TEMPERATURE: 98.2 F | WEIGHT: 114 LBS | DIASTOLIC BLOOD PRESSURE: 71 MMHG

## 2023-10-16 DIAGNOSIS — J34.89 SILENT SINUS SYNDROME: Primary | ICD-10-CM

## 2023-10-16 DIAGNOSIS — Z98.890 STATUS POST NASAL SEPTOPLASTY: ICD-10-CM

## 2023-10-16 DIAGNOSIS — J31.0 CHRONIC RHINITIS: ICD-10-CM

## 2023-10-16 DIAGNOSIS — J34.89 NASAL OBSTRUCTION: ICD-10-CM

## 2023-10-16 DIAGNOSIS — J34.2 NASAL SEPTAL DEVIATION: ICD-10-CM

## 2023-10-16 DIAGNOSIS — Z98.890 STATUS POST FUNCTIONAL ENDOSCOPIC SINUS SURGERY: ICD-10-CM

## 2023-10-16 DIAGNOSIS — J34.3 HYPERTROPHY OF BOTH INFERIOR NASAL TURBINATES: ICD-10-CM

## 2023-10-16 DIAGNOSIS — Z98.890 POSTOPERATIVE STATE: ICD-10-CM

## 2023-10-16 PROCEDURE — 99024 POSTOP FOLLOW-UP VISIT: CPT | Performed by: OTOLARYNGOLOGY

## 2023-10-16 PROCEDURE — 31237 NSL/SINS NDSC SURG BX POLYPC: CPT | Mod: 58 | Performed by: OTOLARYNGOLOGY

## 2023-10-16 ASSESSMENT — PAIN SCALES - GENERAL: PAINLEVEL: NO PAIN (0)

## 2023-10-16 NOTE — LETTER
10/16/2023         RE: Aury Cervantes  [address] [address]        Dear Colleague,    Thank you for referring your patient, Aury Cervantes, to the [facility]. Please see a copy of my visit note below.    Chief Complaint   Patient presents with     Follow Up     Sinus surgery in 9/2022- no concerns today     History of Present Illness  Aury Cervantes is a 23 year old female who presents today for follow-up.  The patient went to the operating room on 9/29/2023 for endonasal septoplasty, bilateral inferior turbinate reduction, and right endoscopic sinus surgery for silent sinus syndrome.  The patient was seen for follow up and splint removal on 10/5/2023. She returns for follow up and debridement.     From a symptom standpoint, the patient reports she is breathing well through her nose.  She had some mild congestion but overall is doing well without significant pain.  She was having some tooth discomfort after splint removal that has resolved.  The patient is otherwise doing well and has not ENT related concerns.     Past Medical History  Patient Active Problem List   Diagnosis     Major depressive disorder, recurrent episode, severe (H)     S/P tonsillectomy and adenoidectomy     Anaphylaxis, subsequent encounter     Rhinoconjunctivitis     Iron deficiency anemia due to chronic blood loss     Menorrhagia with regular cycle     Dysmenorrhea     Female infertility     Nasal obstruction     Nasal septal deviation     Hypertrophy of both inferior nasal turbinates     Chronic rhinitis     Silent sinus syndrome     Ureteral colic     Nephrolithiasis     Obstruction of right ureteropelvic junction (UPJ) due to stone     Nexplanon in place     Current Medications    Current Outpatient Medications:      acetaminophen (TYLENOL) 500 MG tablet, Take 500-1,000 mg by mouth every 6 hours as needed for mild pain (Patient not taking: Reported on 10/5/2023), Disp: , Rfl:      albuterol (PROAIR  HFA/PROVENTIL HFA/VENTOLIN HFA) 108 (90 Base) MCG/ACT inhaler, Inhale 2 puffs into the lungs every 6 hours as needed for shortness of breath, wheezing or cough, Disp: 18 g, Rfl: 1     ibuprofen (ADVIL/MOTRIN) 200 MG tablet, Take 2 tablets (400 mg) by mouth every 6 hours as needed for moderate pain (Patient not taking: Reported on 10/5/2023), Disp: 200 tablet, Rfl: 1    Allergies  Allergies   Allergen Reactions     Sulfa Antibiotics Other (See Comments)     Both parents are allergic         Social History  Social History     Socioeconomic History     Marital status: Single   Tobacco Use     Smoking status: Never     Smokeless tobacco: Never   Vaping Use     Vaping Use: Never used   Substance and Sexual Activity     Alcohol use: Yes     Comment: Very occasionally     Drug use: Never     Sexual activity: Yes     Partners: Male     Birth control/protection: None   Other Topics Concern     Parent/sibling w/ CABG, MI or angioplasty before 65F 55M? No       Family History  Family History   Problem Relation Age of Onset     Depression Mother      Depression Father      Depression Maternal Grandmother      Cancer Maternal Grandmother      Lung Cancer Maternal Grandmother      Diabetes Maternal Grandfather      Heart Disease Maternal Grandfather      Hypertension Maternal Grandfather      Depression Paternal Grandmother         bipolar     Depression Paternal Grandfather         bipolar       Review of Systems  As per HPI and PMHx, otherwise 10 system review including the head and neck, constitutional, eyes, respiratory, GI, skin, neurologic, lymphatic, endocrine, and allergy systems is negative.    Physical Exam  /71 (BP Location: Right arm, Patient Position: Chair, Cuff Size: Adult Regular)   Pulse 75   Temp 98.2  F (36.8  C) (Tympanic)   Wt 51.7 kg (114 lb)   LMP 09/12/2023 (Exact Date)   BMI 18.97 kg/m    GENERAL: Patient is a pleasant, cooperative 23 year old female in no acute distress.  HEAD: Normocephalic,  atraumatic.  Hair and scalp are normal.  EYES: Pupils are equal, round, reactive to light and accommodation.  Extraocular movements are intact.  The sclera nonicteric without injection.  The extraocular structures are normal.  EARS: Normal shape and symmetry.  No tenderness when palpating the mastoid or tragal areas bilaterally.    NOSE: Nares are patent.  Nasal mucosa is pink and moist.  Nasal septum is midline.  The inferior turbinates are well lateralized and reduced.  No nasal cavity masses, polyps, or mucopurulence on anterior rhinoscopy.  NEUROLOGIC: Cranial nerves II through XII are grossly intact.  Voice is strong.  Patient is House-Brackman I/VI bilaterally.  CARDIOVASCULAR: Extremities are warm and well-perfused.  No significant peripheral edema.  RESPIRATORY: Patient has nonlabored breathing without cough, wheeze, stridor.  PSYCHIATRIC: Patient is alert and oriented.  Mood and affect appear normal.  SKIN: Warm and dry.  No scalp, face, or neck lesions noted.    Procedure: Nasal sinus debridement   Indication: Status post endoscopic sinus surgery    To improve medication delivery and assist with healing following sinus surgery, the nasal cavities were examined and debrided.  I proceeded with rigid nasal endoscopic examination and debridement.  The right nasal cavity was anesthetized with 4% lidocaine and phenylephrine spray.  The right nasal cavity was visualized with a 0 degree rigid endoscope.  The right nasal cavity was debrided with a rigid suction, bayonet forceps, and Blakesley forceps.  The patient had a widely patent maxillary antrostomy. The sphenoethmoid recess was clear.  The frontal sinus outflow area was clear.  The middle turbinate was well medialized without adhesion.  The scope was removed.  The patient tolerated the procedure well.    Assessment and Plan     ICD-10-CM    1. Silent sinus syndrome  J34.89 NASAL/SINUS SCOPE WITH BIOPSY/POLYPECT/DEBRIDE,ENT      2. Chronic rhinitis  J31.0  NASAL/SINUS SCOPE WITH BIOPSY/POLYPECT/DEBRIDE,ENT      3. Nasal obstruction  J34.89 NASAL/SINUS SCOPE WITH BIOPSY/POLYPECT/DEBRIDE,ENT      4. Nasal septal deviation  J34.2 NASAL/SINUS SCOPE WITH BIOPSY/POLYPECT/DEBRIDE,ENT      5. Hypertrophy of both inferior nasal turbinates  J34.3 NASAL/SINUS SCOPE WITH BIOPSY/POLYPECT/DEBRIDE,ENT      6. Status post functional endoscopic sinus surgery  Z98.890 NASAL/SINUS SCOPE WITH BIOPSY/POLYPECT/DEBRIDE,ENT      7. Status post nasal septoplasty  Z98.890 NASAL/SINUS SCOPE WITH BIOPSY/POLYPECT/DEBRIDE,ENT      8. Postoperative state  Z98.890 NASAL/SINUS SCOPE WITH BIOPSY/POLYPECT/DEBRIDE,ENT         It was my pleasure seeing Aury Cervantes today in clinic.  The patient is healing well after their septoplasty endoscopic sinus surgery.  We discussed stopping nose blowing and activity restrictions.  We will have her continue nasal saline irrigation and use of nasal sprays.  The patient can return in 2-3 months for follow up. The patient knows to contact me sooner with any problems or concerns.    Aram Grace MD  Department of Otolaryngology-Head and Neck Surgery  [facility]       Again, thank you for allowing me to participate in the care of your patient.        Sincerely,        Aram Grace MD

## 2023-10-16 NOTE — TELEPHONE ENCOUNTER
Marily Hagen has lab appt scheduled tomorrow. Would like to have thyroid level checked.       Jun Hermosillo RN

## 2023-10-16 NOTE — NURSING NOTE
"Initial /71 (BP Location: Right arm, Patient Position: Chair, Cuff Size: Adult Regular)   Pulse 75   Temp 98.2  F (36.8  C) (Tympanic)   Wt 51.7 kg (114 lb)   LMP 09/12/2023 (Exact Date)   BMI 18.97 kg/m   Estimated body mass index is 18.97 kg/m  as calculated from the following:    Height as of 10/9/23: 1.651 m (5' 5\").    Weight as of this encounter: 51.7 kg (114 lb). .  Debra Merrill LPN    "

## 2023-10-19 ENCOUNTER — MYC MEDICAL ADVICE (OUTPATIENT)
Dept: OTOLARYNGOLOGY | Facility: CLINIC | Age: 23
End: 2023-10-19
Payer: COMMERCIAL

## 2023-10-19 DIAGNOSIS — J31.0 CHRONIC RHINITIS: Primary | ICD-10-CM

## 2023-10-19 DIAGNOSIS — J34.89 SILENT SINUS SYNDROME: ICD-10-CM

## 2023-10-19 DIAGNOSIS — J34.89 NASAL OBSTRUCTION: ICD-10-CM

## 2023-10-19 NOTE — TELEPHONE ENCOUNTER
Called pt and she reports that her right side (surgical side) of nose has started to feel more soreness with blowing her nose or touching it.  The pain is immediate, which she rates 3-4/10.    She is having more congestion with drainage in the back of her throat, allergies have been bothering her more as well.  No noted swelling going on thus far.    Pt was made aware that Dr. Grace is out of the office until 10-30-23.  She was given the following options if cannot wait until Dr. Grace returns.    1) She can be seen by Dr. Sheehan on Friday the 27th of October here at [facility] clinic.  2) She can be seen by one of Dr. Grace's partners at the [facility].  3) She can be seen by her pcp if needed.  4) She can wait until Dr. Grace is back in the office on October the 30th.  5) If becomes more urgent she is to be seen in the  clinic.    Pt believes she can wait until Dr. Grace returns to clinic on October the 30th.    Belkys Paul  [facility] RN

## 2023-10-23 RX ORDER — PREDNISONE 10 MG/1
30 TABLET ORAL DAILY
Qty: 15 TABLET | Refills: 0 | Status: SHIPPED | OUTPATIENT
Start: 2023-10-23 | End: 2023-10-28

## 2023-10-24 ENCOUNTER — LAB (OUTPATIENT)
Dept: LAB | Facility: CLINIC | Age: 23
End: 2023-10-24
Payer: COMMERCIAL

## 2023-10-24 ENCOUNTER — PRE VISIT (OUTPATIENT)
Dept: UROLOGY | Facility: CLINIC | Age: 23
End: 2023-10-24

## 2023-10-24 ENCOUNTER — TELEPHONE (OUTPATIENT)
Dept: UROLOGY | Facility: CLINIC | Age: 23
End: 2023-10-24

## 2023-10-24 DIAGNOSIS — Z86.2 HISTORY OF ANEMIA: ICD-10-CM

## 2023-10-24 DIAGNOSIS — E03.8 SUBCLINICAL HYPOTHYROIDISM: ICD-10-CM

## 2023-10-24 LAB
HGB BLD-MCNC: 11.8 G/DL (ref 11.7–15.7)
TSH SERPL DL<=0.005 MIU/L-ACNC: 2.76 UIU/ML (ref 0.3–4.2)

## 2023-10-24 PROCEDURE — 85018 HEMOGLOBIN: CPT

## 2023-10-24 PROCEDURE — 36415 COLL VENOUS BLD VENIPUNCTURE: CPT

## 2023-10-24 PROCEDURE — 84443 ASSAY THYROID STIM HORMONE: CPT

## 2023-10-24 NOTE — TELEPHONE ENCOUNTER
----- Message from Thu Valdez, EMT sent at 10/24/2023  1:42 PM CDT -----  Regarding: imaging  Hi,    This patient needs to have a SWAPNIL and KUB before her appointment with Dr. Nam on 10/31 next week if possible.     Thanks!     Thu

## 2023-10-24 NOTE — TELEPHONE ENCOUNTER
Spoke with imaging an patient to schedule imaging. Lost call, tried to call back. Will try pt again.

## 2023-10-24 NOTE — TELEPHONE ENCOUNTER
Reason for visit: repeat kidney stone     Dx/Hx/Sx: kidney stone, s/p cystoureteroscopy w/ stent placement, right     Records/imaging/labs/orders: SWAPNIL + KUB ordered, message sent to coordinators to schedule    At Rooming: standard

## 2023-10-30 ENCOUNTER — HOSPITAL ENCOUNTER (OUTPATIENT)
Dept: GENERAL RADIOLOGY | Facility: CLINIC | Age: 23
Discharge: HOME | End: 2023-10-30
Attending: UROLOGY
Payer: COMMERCIAL

## 2023-10-30 ENCOUNTER — HOSPITAL ENCOUNTER (OUTPATIENT)
Dept: ULTRASOUND IMAGING | Facility: CLINIC | Age: 23
Discharge: HOME | End: 2023-10-30
Attending: UROLOGY
Payer: COMMERCIAL

## 2023-10-30 DIAGNOSIS — N20.0 NEPHROLITHIASIS: ICD-10-CM

## 2023-10-30 PROCEDURE — 74019 RADEX ABDOMEN 2 VIEWS: CPT

## 2023-10-30 PROCEDURE — 76770 US EXAM ABDO BACK WALL COMP: CPT

## 2023-10-31 ENCOUNTER — TELEPHONE (OUTPATIENT)
Dept: UROLOGY | Facility: CLINIC | Age: 23
End: 2023-10-31

## 2023-10-31 NOTE — TELEPHONE ENCOUNTER
Spoke with patient who will follow up with her PCP as needed for prevention,  they can refer her to someone in network.  Spoke with patient that goal is prevention and that 24 hour testing may benefit her in future.  She can always call back with questions.  Updated that her imaging results showed nothing concerning at this time.  Lisa Viera RN

## 2023-10-31 NOTE — TELEPHONE ENCOUNTER
[facility] Call Center    Phone Message    May a detailed message be left on voicemail: yes     Reason for Call: Pt had to cancel today's appt 10/31 at 11 due to insurance not covering. Pt is wondering if she should still speak to provider or if there is anything she can do as it's a surgery follow up. Please call pt to discuss. Thank you    Action Taken: Message routed to:  [facility]    Travel Screening: Not Applicable

## 2023-11-08 ENCOUNTER — OFFICE VISIT (OUTPATIENT)
Dept: FAMILY MEDICINE | Facility: CLINIC | Age: 23
End: 2023-11-08
Payer: COMMERCIAL

## 2023-11-08 VITALS
OXYGEN SATURATION: 99 % | HEART RATE: 87 BPM | SYSTOLIC BLOOD PRESSURE: 98 MMHG | TEMPERATURE: 97.1 F | RESPIRATION RATE: 16 BRPM | DIASTOLIC BLOOD PRESSURE: 70 MMHG | BODY MASS INDEX: 18.24 KG/M2 | WEIGHT: 109.6 LBS

## 2023-11-08 DIAGNOSIS — J02.9 ACUTE PHARYNGITIS, UNSPECIFIED ETIOLOGY: Primary | ICD-10-CM

## 2023-11-08 LAB
DEPRECATED S PYO AG THROAT QL EIA: NEGATIVE
GROUP A STREP BY PCR: NOT DETECTED

## 2023-11-08 PROCEDURE — 99213 OFFICE O/P EST LOW 20 MIN: CPT | Performed by: NURSE PRACTITIONER

## 2023-11-08 PROCEDURE — 87651 STREP A DNA AMP PROBE: CPT | Performed by: NURSE PRACTITIONER

## 2023-11-08 ASSESSMENT — PAIN SCALES - GENERAL: PAINLEVEL: NO PAIN (0)

## 2023-11-08 NOTE — PROGRESS NOTES
Assessment & Plan     Acute pharyngitis, unspecified etiology  -etiology is likely due to viral illness, discussed symptomatic treatment, recommended to rule out strep   - Streptococcus A Rapid Screen w/Reflex to PCR - Clinic Collect      CIRO Kline CNP [facility]    Sol Jeffers is a 23 year old, presenting for the following health issues: sore throat, symptoms started 2-3 days ago, denies fevers, chills, cough.         11/8/2023     4:16 PM   Additional Questions   Roomed by april   Accompanied by Boyfriend          11/8/2023     4:16 PM   Patient Reported Additional Medications   Patient reports taking the following new medications none       History of Present Illness       Reason for visit:  Sore throat, swollen uvula, blood  Symptom onset:  1-3 days ago  Symptoms include:  Sore throat,as above  Symptom intensity:  Moderate  Symptom progression:  Worsening  Had these symptoms before:  Yes  Has tried/received treatment for these symptoms:  No  What makes it worse:  No  What makes it better:  No    She eats 0-1 servings of fruits and vegetables daily.She consumes 1 sweetened beverage(s) daily.She exercises with enough effort to increase her heart rate 60 or more minutes per day.  She exercises with enough effort to increase her heart rate 3 or less days per week.   She is taking medications regularly.       Review of Systems   Constitutional, HEENT, cardiovascular, pulmonary, gi and gu systems are negative, except as otherwise noted.      Objective    BP 98/70   Pulse 87   Temp 97.1  F (36.2  C) (Tympanic)   Resp 16   Wt 49.7 kg (109 lb 9.6 oz)   LMP 09/12/2023 (Exact Date)   SpO2 99%   BMI 18.24 kg/m    Body mass index is 18.24 kg/m .  Physical Exam   GENERAL: healthy, alert and no distress  EYES: Eyes grossly normal to inspection, PERRL and conjunctivae and sclerae normal  HENT: normal cephalic/atraumatic and oropharynx mild bilateral erythema, there are  couple tiny blisters on the right side, slightly swollen uvula   NECK: no adenopathy, no asymmetry, masses, or scars and thyroid normal to palpation  Skin: normal, no rashes   PSYCH: mentation appears normal, affect normal/bright

## 2023-11-18 ENCOUNTER — MYC REFILL (OUTPATIENT)
Dept: FAMILY MEDICINE | Facility: CLINIC | Age: 23
End: 2023-11-18
Payer: COMMERCIAL

## 2023-11-18 DIAGNOSIS — R05.8 DRY COUGH: ICD-10-CM

## 2023-11-18 DIAGNOSIS — R06.02 SOB (SHORTNESS OF BREATH): ICD-10-CM

## 2023-11-20 RX ORDER — ALBUTEROL SULFATE 90 UG/1
2 AEROSOL, METERED RESPIRATORY (INHALATION) EVERY 6 HOURS PRN
Qty: 18 G | Refills: 3 | Status: SHIPPED | OUTPATIENT
Start: 2023-11-20 | End: 2024-02-19

## 2023-11-20 NOTE — TELEPHONE ENCOUNTER
Prescription approved per [facility] Refill Protocol     Cheryle Jamison     RN MSN

## 2024-02-07 ENCOUNTER — MYC MEDICAL ADVICE (OUTPATIENT)
Dept: FAMILY MEDICINE | Facility: CLINIC | Age: 24
End: 2024-02-07
Payer: MEDICAID

## 2024-02-08 NOTE — TELEPHONE ENCOUNTER
See [employer] message, routing to referral team for review.    Eliana Melchor RN  [facility]

## 2024-02-09 NOTE — TELEPHONE ENCOUNTER
I think you guys have to contact insurance and get form for referral to specialty clinic, or surgery center. I think on the same form I can write referral for Zio patch study which was completed by ER doctor.    CIRO Kline CNP

## 2024-02-19 ENCOUNTER — MYC REFILL (OUTPATIENT)
Dept: FAMILY MEDICINE | Facility: CLINIC | Age: 24
End: 2024-02-19
Payer: MEDICAID

## 2024-02-19 DIAGNOSIS — R05.8 DRY COUGH: ICD-10-CM

## 2024-02-19 DIAGNOSIS — R06.02 SOB (SHORTNESS OF BREATH): ICD-10-CM

## 2024-02-19 RX ORDER — ALBUTEROL SULFATE 90 UG/1
2 AEROSOL, METERED RESPIRATORY (INHALATION) EVERY 6 HOURS PRN
Qty: 18 G | Refills: 3 | Status: SHIPPED | OUTPATIENT
Start: 2024-02-19 | End: 2024-09-14

## 2024-02-19 NOTE — TELEPHONE ENCOUNTER
Prescription approved per [facility] Refill Protocol.    Pending Prescriptions:                       Disp   Refills    albuterol (PROAIR HFA/PROVENTIL HFA/STEPHEN*18 g   3            Sig: Inhale 2 puffs into the lungs every 6 hours as           needed for shortness of breath, wheezing or cough      Requested Prescriptions   Pending Prescriptions Disp Refills    albuterol (PROAIR HFA/PROVENTIL HFA/VENTOLIN HFA) 108 (90 Base) MCG/ACT inhaler 18 g 3     Sig: Inhale 2 puffs into the lungs every 6 hours as needed for shortness of breath, wheezing or cough       Asthma Maintenance Inhalers - Anticholinergics Passed - 2/19/2024  2:53 PM        Passed - Patient is age 12 years or older        Passed - Recent (12 mo) or future (30 days) visit within the authorizing provider's specialty     The patient must have completed an in-person or virtual visit within the past 12 months or has a future visit scheduled within the next 90 days with the authorizing provider s specialty.  Urgent care and e-visits do not quality as an office visit for this protocol.          Passed - Medication is active on med list       Short-Acting Beta Agonist Inhalers Protocol  Passed - 2/19/2024  2:53 PM        Passed - Patient is age 12 or older        Passed - Recent (12 mo) or future (30 days) visit within the authorizing provider's specialty     The patient must have completed an in-person or virtual visit within the past 12 months or has a future visit scheduled within the next 90 days with the authorizing provider s specialty.  Urgent care and e-visits do not quality as an office visit for this protocol.          Passed - Medication is active on med list           Manuela Lala RN on 2/19/2024 at 3:15 PM

## 2024-02-22 ENCOUNTER — OFFICE VISIT (OUTPATIENT)
Dept: FAMILY MEDICINE | Facility: CLINIC | Age: 24
End: 2024-02-22
Payer: MEDICAID

## 2024-02-22 VITALS
WEIGHT: 112 LBS | RESPIRATION RATE: 20 BRPM | OXYGEN SATURATION: 99 % | DIASTOLIC BLOOD PRESSURE: 70 MMHG | SYSTOLIC BLOOD PRESSURE: 110 MMHG | TEMPERATURE: 99.1 F | HEIGHT: 65 IN | BODY MASS INDEX: 18.66 KG/M2 | HEART RATE: 82 BPM

## 2024-02-22 DIAGNOSIS — G89.29 CHRONIC PAIN OF BOTH KNEES: ICD-10-CM

## 2024-02-22 DIAGNOSIS — R10.13 EPIGASTRIC PAIN: ICD-10-CM

## 2024-02-22 DIAGNOSIS — N92.6 IRREGULAR PERIODS: ICD-10-CM

## 2024-02-22 DIAGNOSIS — F33.2 SEVERE EPISODE OF RECURRENT MAJOR DEPRESSIVE DISORDER, WITHOUT PSYCHOTIC FEATURES (H): ICD-10-CM

## 2024-02-22 DIAGNOSIS — D50.9 IRON DEFICIENCY ANEMIA, UNSPECIFIED IRON DEFICIENCY ANEMIA TYPE: Primary | ICD-10-CM

## 2024-02-22 DIAGNOSIS — M25.562 CHRONIC PAIN OF BOTH KNEES: ICD-10-CM

## 2024-02-22 DIAGNOSIS — N63.24 MASS OF LOWER INNER QUADRANT OF LEFT BREAST: ICD-10-CM

## 2024-02-22 DIAGNOSIS — M25.561 CHRONIC PAIN OF BOTH KNEES: ICD-10-CM

## 2024-02-22 PROBLEM — D68.00 VON WILLEBRAND'S DISEASE (H): Status: ACTIVE | Noted: 2024-02-22

## 2024-02-22 PROCEDURE — 99214 OFFICE O/P EST MOD 30 MIN: CPT | Performed by: NURSE PRACTITIONER

## 2024-02-22 RX ORDER — PANTOPRAZOLE SODIUM 40 MG/1
40 TABLET, DELAYED RELEASE ORAL DAILY
Qty: 30 TABLET | Refills: 0 | Status: SHIPPED | OUTPATIENT
Start: 2024-02-22 | End: 2024-04-05

## 2024-02-22 ASSESSMENT — PAIN SCALES - GENERAL: PAINLEVEL: MILD PAIN (2)

## 2024-02-22 NOTE — PROGRESS NOTES
Assessment & Plan     History of iron deficiency anemia   -check CBC and ferritin level   - Ferritin; Future    Severe episode of recurrent major depressive disorder, without psychotic features (H)  -stable, no new concerns, doing well now     Mass of lower inner quadrant of left breast  -noted new lump in her left lower breast area   - MA Diagnostic Left w/ Timothy; Future  - US Breast Left Limited 1-3 Quadrants; Future    Epigastric pain    - CBC with platelets; Future  - start pantoprazole (PROTONIX) 40 MG EC tablet; Take 1 tablet (40 mg) by mouth daily  -if no improvement will plan to repeat upper endoscopy in 3-4 weeks     Irregular periods  -recommended to check thyroid levels and follow up with OB GYN   - CBC with platelets; Future  - TSH with free T4 reflex; Future  - Ob/Gyn  Referral; Future    Chronic pain of both knees  -exam normal, recommended physical therapy   - Physical Therapy  Referral; Future        Subjective   Aury is a 23 year old, presenting for the following health issues:  Breast Concern , Thyroid Problem (Would like her thyroid checked ), and Abdominal Pain        2/22/2024     2:45 PM   Additional Questions   Roomed by Katey YU     History of Present Illness       Reason for visit:  Lump in breast, check thyroid, stomach issues    She eats 0-1 servings of fruits and vegetables daily.She consumes 1 sweetened beverage(s) daily.She exercises with enough effort to increase her heart rate 60 or more minutes per day.  She exercises with enough effort to increase her heart rate 4 days per week.   She is taking medications regularly.       Concern - Lump in Breast   Onset: 3 days   Description: Left medial breast, pea sized lump   Intensity: moderate  Progression of Symptoms:  same and constant  Accompanying Signs & Symptoms: sore to the touch  Previous history of similar problem: Has a history of breast cysts   Precipitating factors:        Worsened by: None  Alleviating  "factors:        Improved by: None  Therapies tried and outcome: none      Pain History:  When did you first notice your pain? 2 weeks    Have you seen anyone else for your pain? No  How has your pain affected your ability to work? Has been affecting her ability to work  Where in your body do you have pain? Epigastric pain  Onset/Duration: 2 weeks   Description:   Character: Dull ache and Burning  Location: upper mid abdominal area   Radiation: None  Intensity: 2/10  Progression of Symptoms:  same and intermittent  Accompanying Signs & Symptoms:  Fever/Chills: No  Gas/Bloating: No  Nausea: YES- when eating  Vomitting: No  Diarrhea: No  Constipation: No  Dysuria or Hematuria: No  History:   Trauma: No  Previous similar pain: YES- About 1 year ago had HPylori and the pain is similar   Previous tests done: none  Precipitating factors:   Does the pain change with:     Food: YES- makes the pain much worse     Bowel Movement: No    Urination: No   Other factors:  No  Therapies tried and outcome: Tums has not helped   Patient's last menstrual period was 02/19/2024 (exact date).          Review of Systems  Constitutional, HEENT, cardiovascular, pulmonary, gi and gu systems are negative, except as otherwise noted.      Objective    /70   Pulse 82   Temp 99.1  F (37.3  C) (Tympanic)   Resp 20   Ht 1.651 m (5' 5\")   Wt 50.8 kg (112 lb)   LMP 02/19/2024 (Exact Date)   SpO2 99%   BMI 18.64 kg/m    Body mass index is 18.64 kg/m .  Physical Exam   GENERAL: alert and no distress  EYES: Eyes grossly normal to inspection, PERRL and conjunctivae and sclerae normal  NECK: no adenopathy, no asymmetry, masses, or scars  RESP: lungs clear to auscultation - no rales, rhonchi or wheezes  BREAST: no palpable axillary masses or adenopathy and left lower breast area, small non-tender palpable lump   CV: regular rate and rhythm, normal S1 S2, no S3 or S4, no murmur, click or rub, no peripheral edema   ABDOMEN: tenderness epigastric " and no organomegaly or masses  MS: no gross musculoskeletal defects noted, no edema  SKIN: no suspicious lesions or rashes  NEURO: Normal strength and tone, mentation intact and speech normal  PSYCH: mentation appears normal, affect normal/bright        Signed Electronically by: CIRO Kline CNP

## 2024-02-23 PROBLEM — D68.00 VON WILLEBRAND'S DISEASE (H): Status: RESOLVED | Noted: 2024-02-22 | Resolved: 2024-02-23

## 2024-03-16 ENCOUNTER — MYC MEDICAL ADVICE (OUTPATIENT)
Dept: FAMILY MEDICINE | Facility: CLINIC | Age: 24
End: 2024-03-16
Payer: MEDICAID

## 2024-03-16 DIAGNOSIS — Z91.030 BEE STING ALLERGY: Primary | ICD-10-CM

## 2024-03-18 RX ORDER — EPINEPHRINE 0.3 MG/.3ML
0.3 INJECTION SUBCUTANEOUS PRN
Qty: 2 EACH | Refills: 0 | Status: SHIPPED | OUTPATIENT
Start: 2024-03-18

## 2024-04-03 ENCOUNTER — HOSPITAL ENCOUNTER (EMERGENCY)
Facility: CLINIC | Age: 24
Discharge: HOME | End: 2024-04-03
Attending: PHYSICIAN ASSISTANT | Admitting: PHYSICIAN ASSISTANT
Payer: COMMERCIAL

## 2024-04-03 ENCOUNTER — APPOINTMENT (OUTPATIENT)
Dept: GENERAL RADIOLOGY | Facility: CLINIC | Age: 24
End: 2024-04-03
Attending: PHYSICIAN ASSISTANT
Payer: COMMERCIAL

## 2024-04-03 ENCOUNTER — LAB (OUTPATIENT)
Dept: LAB | Facility: CLINIC | Age: 24
End: 2024-04-03
Payer: COMMERCIAL

## 2024-04-03 VITALS
RESPIRATION RATE: 20 BRPM | DIASTOLIC BLOOD PRESSURE: 74 MMHG | OXYGEN SATURATION: 100 % | HEART RATE: 94 BPM | SYSTOLIC BLOOD PRESSURE: 113 MMHG | TEMPERATURE: 98.8 F

## 2024-04-03 DIAGNOSIS — Z31.41 FERTILITY TESTING: ICD-10-CM

## 2024-04-03 DIAGNOSIS — M79.652 PAIN OF LEFT THIGH: ICD-10-CM

## 2024-04-03 DIAGNOSIS — N92.6 IRREGULAR PERIODS: ICD-10-CM

## 2024-04-03 DIAGNOSIS — R10.13 EPIGASTRIC PAIN: ICD-10-CM

## 2024-04-03 DIAGNOSIS — M54.50 ACUTE LEFT-SIDED LOW BACK PAIN WITHOUT SCIATICA: ICD-10-CM

## 2024-04-03 DIAGNOSIS — D50.9 IRON DEFICIENCY ANEMIA, UNSPECIFIED IRON DEFICIENCY ANEMIA TYPE: ICD-10-CM

## 2024-04-03 LAB
ERYTHROCYTE [DISTWIDTH] IN BLOOD BY AUTOMATED COUNT: 14.6 % (ref 10–15)
FERRITIN SERPL-MCNC: 8 NG/ML (ref 6–175)
HCT VFR BLD AUTO: 39.3 % (ref 35–47)
HGB BLD-MCNC: 12.4 G/DL (ref 11.7–15.7)
MCH RBC QN AUTO: 24.6 PG (ref 26.5–33)
MCHC RBC AUTO-ENTMCNC: 31.6 G/DL (ref 31.5–36.5)
MCV RBC AUTO: 78 FL (ref 78–100)
PLATELET # BLD AUTO: 200 10E3/UL (ref 150–450)
RBC # BLD AUTO: 5.05 10E6/UL (ref 3.8–5.2)
TSH SERPL DL<=0.005 MIU/L-ACNC: 3.83 UIU/ML (ref 0.3–4.2)
WBC # BLD AUTO: 4.4 10E3/UL (ref 4–11)

## 2024-04-03 PROCEDURE — 84146 ASSAY OF PROLACTIN: CPT

## 2024-04-03 PROCEDURE — 82306 VITAMIN D 25 HYDROXY: CPT

## 2024-04-03 PROCEDURE — 83002 ASSAY OF GONADOTROPIN (LH): CPT

## 2024-04-03 PROCEDURE — G0463 HOSPITAL OUTPT CLINIC VISIT: HCPCS | Performed by: PHYSICIAN ASSISTANT

## 2024-04-03 PROCEDURE — 84443 ASSAY THYROID STIM HORMONE: CPT

## 2024-04-03 PROCEDURE — 82728 ASSAY OF FERRITIN: CPT

## 2024-04-03 PROCEDURE — 72100 X-RAY EXAM L-S SPINE 2/3 VWS: CPT

## 2024-04-03 PROCEDURE — 36415 COLL VENOUS BLD VENIPUNCTURE: CPT

## 2024-04-03 PROCEDURE — 85027 COMPLETE CBC AUTOMATED: CPT

## 2024-04-03 PROCEDURE — 80061 LIPID PANEL: CPT

## 2024-04-03 PROCEDURE — 73552 X-RAY EXAM OF FEMUR 2/>: CPT | Mod: LT

## 2024-04-03 PROCEDURE — 99213 OFFICE O/P EST LOW 20 MIN: CPT | Performed by: PHYSICIAN ASSISTANT

## 2024-04-03 ASSESSMENT — COLUMBIA-SUICIDE SEVERITY RATING SCALE - C-SSRS
2. HAVE YOU ACTUALLY HAD ANY THOUGHTS OF KILLING YOURSELF IN THE PAST MONTH?: NO
1. IN THE PAST MONTH, HAVE YOU WISHED YOU WERE DEAD OR WISHED YOU COULD GO TO SLEEP AND NOT WAKE UP?: NO
6. HAVE YOU EVER DONE ANYTHING, STARTED TO DO ANYTHING, OR PREPARED TO DO ANYTHING TO END YOUR LIFE?: NO

## 2024-04-03 ASSESSMENT — ACTIVITIES OF DAILY LIVING (ADL): ADLS_ACUITY_SCORE: 35

## 2024-04-03 NOTE — ED PROVIDER NOTES
History     Chief Complaint   Patient presents with    Leg Pain     Left leg injury , onset yesterday     Back Pain     Left low back pain radiates to left knee  Injury occurred slipping on the floor , onset yesterday      HPI  Aury Cervantes is a 23 year old female who presents to urgent care with concern over left leg and back pain after injury yesterday.  Patient states that she was walking through the store when she slipped on floor doing partial splits and falling on her lateral left thigh.  Since then she has complained of pain, tenderness to palpation, difficulty walking due to discomfort.  She is unaware of any ecchymosis, lacerations or abrasions.  She did have numbness/paresthesias of her leg immediately following injury but states it has resolved.  She denies any nausea, vomiting, abdominal pain, dysuria, hematuria, change in bowel or bladder control or saddle numbness or paresthesias.      Allergies:  Allergies   Allergen Reactions    Sulfa Antibiotics Other (See Comments)     Both parents are allergic         Problem List:    Patient Active Problem List    Diagnosis Date Noted    Obstruction of right ureteropelvic junction (UPJ) due to stone 09/12/2023     Priority: Medium    Nexplanon in place 09/12/2023     Priority: Medium    Nephrolithiasis 09/10/2023     Priority: Medium    Ureteral colic 09/09/2023     Priority: Medium    Chronic rhinitis 07/25/2023     Priority: Medium    Silent sinus syndrome 07/25/2023     Priority: Medium    Nasal obstruction 12/22/2022     Priority: Medium     Added automatically from request for surgery [id number]      Nasal septal deviation 12/22/2022     Priority: Medium     Added automatically from request for surgery [id number]      Hypertrophy of both inferior nasal turbinates 12/22/2022     Priority: Medium     Added automatically from request for surgery [id number]      Dysmenorrhea 05/16/2022     Priority: Medium    Female infertility 05/16/2022     Priority: Medium    Iron  deficiency anemia due to chronic blood loss 02/07/2021     Priority: Medium    Menorrhagia with regular cycle 02/07/2021     Priority: Medium    Anaphylaxis, subsequent encounter 12/04/2018     Priority: Medium    Rhinoconjunctivitis 12/04/2018     Priority: Medium    S/P tonsillectomy and adenoidectomy 03/21/2018     Priority: Medium        Past Medical History:    Past Medical History:   Diagnosis Date    Anemia     Depression     Nephrolithiasis     Von Willebrand's disease (H) 2/22/2024       Past Surgical History:    Past Surgical History:   Procedure Laterality Date    COMBINED CYSTOSCOPY, RETROGRADES, URETEROSCOPY, LASER HOLMIUM LITHOTRIPSY URETER(S), INSERT STENT Right 9/15/2023    Procedure: Cystoscopy, Right Ureteroscopy, Right Holmium Laser Lithotripsy, Right Retrograde Pyelogram, Right Ureteral Stent Placement;  Surgeon: Stefan Du MD;  [facility]: [facility]    DILATION AND CURETTAGE, OPERATIVE HYSTEROSCOPY, COMBINED N/A 04/06/2023    Procedure: HYSTEROSCOPY, WITH DILATION AND CURETTAGE OF UTERUS, polypectomy;  Surgeon: Stefanie Mcnair MD;  Location: [facility] OR    OPTICAL TRACKING SYSTEM ENDOSCOPIC SINUS SURGERY Bilateral 9/29/2023    Procedure: SINUS SURGERY, ENDOSCOPIC, USING OPTICAL TRACKING SYSTEM;  Surgeon: Aram Grace MD;  Location: [facility] OR    SEPTOPLASTY, TURBINOPLASTY, COMBINED N/A 9/29/2023    Procedure: SEPTOPLASTY, NOSE, WITH TURBINOPLASTY;  Surgeon: Aram Grace MD;  Location: [facility] OR    SURGICAL HISTORY OF -       PE tubes    TONSILLECTOMY, ADENOIDECTOMY ADULT, COMBINED Bilateral 03/19/2018    Procedure: COMBINED TONSILLECTOMY, ADENOIDECTOMY ADULT;  Bilateral Adenotonsillectomy;  Surgeon: Kevin Arias MD;  Location: [facility] OR    wisdom teeth Bilateral        Family History:    Family History   Problem Relation Age of Onset    Depression Mother     Depression Father     Depression Maternal Grandmother     Cancer Maternal Grandmother     Lung Cancer Maternal Grandmother      Diabetes Maternal Grandfather     Heart Disease Maternal Grandfather     Hypertension Maternal Grandfather     Depression Paternal Grandmother         bipolar    Depression Paternal Grandfather         bipolar       Social History:  Marital Status:  Single [1]  Social History     Tobacco Use    Smoking status: Never    Smokeless tobacco: Never   Vaping Use    Vaping Use: Never used   Substance Use Topics    Alcohol use: Yes     Comment: Very occasionally    Drug use: Never        Medications:    acetaminophen (TYLENOL) 500 MG tablet  albuterol (PROAIR HFA/PROVENTIL HFA/VENTOLIN HFA) 108 (90 Base) MCG/ACT inhaler  EPINEPHrine (ANY BX GENERIC EQUIV) 0.3 MG/0.3ML injection 2-pack  ibuprofen (ADVIL/MOTRIN) 200 MG tablet  pantoprazole (PROTONIX) 40 MG EC tablet      Review of Systems   Constitutional:  Negative for chills and fever.   Respiratory:  Negative for cough, shortness of breath and wheezing.    Cardiovascular:  Negative for chest pain.   Gastrointestinal:  Negative for abdominal pain, diarrhea, nausea and vomiting.   Genitourinary:  Negative for flank pain.   Musculoskeletal:  Positive for arthralgias, back pain and gait problem. Negative for neck pain and neck stiffness.   Skin:  Negative for color change, rash and wound.   Neurological:  Negative for dizziness, weakness and numbness.     Physical Exam   BP: 113/74  Pulse: 94  Temp: 98.8  F (37.1  C)  Resp: 20  SpO2: 100 %  Physical Exam  Constitutional:       General: She is not in acute distress.     Appearance: She is not ill-appearing or toxic-appearing.   HENT:      Head: Normocephalic and atraumatic.   Cardiovascular:      Rate and Rhythm: Normal rate and regular rhythm.      Heart sounds: No murmur heard.     No friction rub. No gallop.   Pulmonary:      Effort: Pulmonary effort is normal. No respiratory distress.      Breath sounds: Normal breath sounds. No wheezing, rhonchi or rales.   Musculoskeletal:      Lumbar back: Tenderness present. No  swelling, deformity or lacerations. Decreased range of motion.      Left hip: Tenderness present. No deformity, lacerations, bony tenderness or crepitus. Decreased range of motion.      Left upper leg: Tenderness present. No swelling, edema, deformity or lacerations.      Left knee: Normal.   Skin:     General: Skin is warm and dry.      Findings: No abrasion, ecchymosis, erythema, laceration or rash.   Neurological:      Mental Status: She is alert and oriented to person, place, and time.      Sensory: No sensory deficit.       ED Course        Procedures       Critical Care time:  none        Results for orders placed or performed during the hospital encounter of 04/03/24   Lumbar spine XR, 2-3 views     Status: None    Narrative    EXAM: XR LUMBAR SPINE 2/3 VIEWS  4/3/2024 2:13 PM     HISTORY: pain after fall yesterday    COMPARISON: None.       Impression    IMPRESSION: Preserved vertebral body heights and alignment. Preserved  intervertebral disc spaces for patient age. No acute finding in the  visualized extraspinal structures.    FADI CONCEPCION DO         SYSTEM ID:  [id number]   XR Femur Left 2 Views     Status: None    Narrative    Examination:  XR FEMUR LEFT 2 VIEWS    Date:  4/3/2024 2:09 PM     Clinical Information: Left leg pain after a fall.    Comparison: none.      Impression    Impression:    1.  Normal left femur. No fracture. Normal hip and knee joint  alignment.    MEL DUNBAR MD         SYSTEM ID:  [id number]     Medications - No data to display    Assessments & Plan (with Medical Decision Making)     I have reviewed the nursing notes.  I have reviewed the findings, diagnosis, plan and need for follow up with the patient.       Discharge Medication List as of 4/3/2024  2:29 PM        Final diagnoses:   Acute left-sided low back pain without sciatica   Pain of left thigh     23-year-old female presents urgent care with concern over left thigh back and buttock pain after she sustained a  fall yesterday.  She had stable vital signs upon arrival.  Physical exam findings significant for decreased range of motion of the lumbar spine secondary to discomfort, tenderness palpation or soft tissue.  No significant swelling, ecchymosis.  She had x-ray of her femur and lumbar spine which were negative for acute bony abnormality.  Symptoms consistent with contusion will consider secondary to muscle spasm, strain.  She was discharged home stable with instructions for symptomatic treatment with rest, ice/heat, Tylenol/ibuprofen.  Follow up with PCP if no improvement in 7-10 days.  Worrisome reasons to return to ER/UC sooner discussed.     Disclaimer: This note consists of symbols derived from keyboarding, dictation, and/or voice recognition software. As a result, there may be errors in the script that have gone undetected.  Please consider this when interpreting information found in the chart.    4/3/2024   [facility] EMERGENCY DEPT       Eliana Willett PA-C  04/06/24 [id number]

## 2024-04-05 ENCOUNTER — OFFICE VISIT (OUTPATIENT)
Dept: OBGYN | Facility: CLINIC | Age: 24
End: 2024-04-05
Payer: COMMERCIAL

## 2024-04-05 ENCOUNTER — TELEPHONE (OUTPATIENT)
Dept: FAMILY MEDICINE | Facility: CLINIC | Age: 24
End: 2024-04-05

## 2024-04-05 VITALS
DIASTOLIC BLOOD PRESSURE: 75 MMHG | WEIGHT: 114.8 LBS | HEART RATE: 87 BPM | RESPIRATION RATE: 14 BRPM | SYSTOLIC BLOOD PRESSURE: 109 MMHG | HEIGHT: 65 IN | BODY MASS INDEX: 19.13 KG/M2 | TEMPERATURE: 98.3 F

## 2024-04-05 DIAGNOSIS — Z31.41 FERTILITY TESTING: ICD-10-CM

## 2024-04-05 DIAGNOSIS — Z31.41 FERTILITY TESTING: Primary | ICD-10-CM

## 2024-04-05 LAB
CHOLEST SERPL-MCNC: 164 MG/DL
HBA1C MFR BLD: 5.1 % (ref 0–5.6)
HDLC SERPL-MCNC: 71 MG/DL
LDLC SERPL CALC-MCNC: 76 MG/DL
LH SERPL-ACNC: 11.8 MIU/ML
NONHDLC SERPL-MCNC: 93 MG/DL
PROLACTIN SERPL 3RD IS-MCNC: 14 NG/ML (ref 5–23)
TRIGL SERPL-MCNC: 86 MG/DL
VIT D+METAB SERPL-MCNC: 21 NG/ML (ref 20–50)

## 2024-04-05 PROCEDURE — 99214 OFFICE O/P EST MOD 30 MIN: CPT | Performed by: OBSTETRICS & GYNECOLOGY

## 2024-04-05 PROCEDURE — 82627 DEHYDROEPIANDROSTERONE: CPT | Performed by: OBSTETRICS & GYNECOLOGY

## 2024-04-05 PROCEDURE — 82166 ASSAY ANTI-MULLERIAN HORM: CPT | Performed by: OBSTETRICS & GYNECOLOGY

## 2024-04-05 PROCEDURE — 82670 ASSAY OF TOTAL ESTRADIOL: CPT | Performed by: OBSTETRICS & GYNECOLOGY

## 2024-04-05 PROCEDURE — 99000 SPECIMEN HANDLING OFFICE-LAB: CPT | Performed by: OBSTETRICS & GYNECOLOGY

## 2024-04-05 PROCEDURE — 84403 ASSAY OF TOTAL TESTOSTERONE: CPT | Performed by: OBSTETRICS & GYNECOLOGY

## 2024-04-05 PROCEDURE — 83001 ASSAY OF GONADOTROPIN (FSH): CPT | Performed by: OBSTETRICS & GYNECOLOGY

## 2024-04-05 PROCEDURE — 84143 ASSAY OF 17-HYDROXYPREGNENO: CPT | Mod: 90 | Performed by: OBSTETRICS & GYNECOLOGY

## 2024-04-05 PROCEDURE — 36415 COLL VENOUS BLD VENIPUNCTURE: CPT | Performed by: OBSTETRICS & GYNECOLOGY

## 2024-04-05 PROCEDURE — 83036 HEMOGLOBIN GLYCOSYLATED A1C: CPT | Performed by: OBSTETRICS & GYNECOLOGY

## 2024-04-05 PROCEDURE — 84270 ASSAY OF SEX HORMONE GLOBUL: CPT | Performed by: OBSTETRICS & GYNECOLOGY

## 2024-04-05 RX ORDER — LETROZOLE 2.5 MG/1
2.5 TABLET, FILM COATED ORAL DAILY
Qty: 5 TABLET | Refills: 12 | Status: SHIPPED | OUTPATIENT
Start: 2024-04-05 | End: 2024-04-10

## 2024-04-05 NOTE — PROGRESS NOTES
"Initial /75 (BP Location: Right arm, Patient Position: Chair, Cuff Size: Adult Regular)   Pulse 87   Temp 98.3  F (36.8  C) (Tympanic)   Resp 14   Ht 1.651 m (5' 5\")   Wt 52.1 kg (114 lb 12.8 oz)   LMP 03/27/2024 (Exact Date)   BMI 19.10 kg/m   Estimated body mass index is 19.1 kg/m  as calculated from the following:    Height as of this encounter: 1.651 m (5' 5\").    Weight as of this encounter: 52.1 kg (114 lb 12.8 oz). .  "

## 2024-04-05 NOTE — PROGRESS NOTES
[facility]  OB/GYN Clinic    CC: Infertility    Subjective:  Aury Cervantes is a 23 year old  with primary infertility of approximately 3 years duration.  S/p normal HSG. Hx of ovarian cysts; possible endometriomas, ovaries appear polycystic on US. Underwent hysteroscopy, polypectomy with me on 2023, with removal of several sessile polyps.   Has venofer infusions for significant DAMIR.     Period cycle length is 15-45 days cycles.   Bleed for 5 days, periods are painful and heavy, a little better after the surgery, but now more heavy and painful.   Having unprotected sex for the past year, and still not pregnant.   Has acne, no facial hair growth and ovaries appear polycystic on US.     As far as her infertility treatments; concern for anovulatory infertility. We have been uptitrating letrazole. Two months she did the 7.5mg letrazole, 1x ovulated, 1x did not. Using progesterone throughout the entire cycle.   SA done, looked normal.   Normal baseline pelvic US and HSG.   Off thyriod medication, but labs have been normal.   Patient's last menstrual period was 2024 (exact date). Cycle day #10.     Has a hx of hyperstimming from letrazole.     Of note, has a hx of significant dysmenorrhea, ?endometriosis.     OB Hx:       Female infertility factors: hx of chlamydia - treated     General health   Pertinent PMH: Body mass index is 19.1 kg/m .,     Male infertility factors normal SA    Past Medical History:   Diagnosis Date    Anemia     Depression     Nephrolithiasis     Von Willebrand's disease (H) 2024      Past Surgical History:   Procedure Laterality Date    COMBINED CYSTOSCOPY, RETROGRADES, URETEROSCOPY, LASER HOLMIUM LITHOTRIPSY URETER(S), INSERT STENT Right 9/15/2023    Procedure: Cystoscopy, Right Ureteroscopy, Right Holmium Laser Lithotripsy, Right Retrograde Pyelogram, Right Ureteral Stent Placement;  Surgeon: Stefan Du MD;  Location: [facility]     DILATION AND CURETTAGE, OPERATIVE HYSTEROSCOPY, COMBINED N/A 04/06/2023    Procedure: HYSTEROSCOPY, WITH DILATION AND CURETTAGE OF UTERUS, polypectomy;  Surgeon: Stefanie Mcnair MD;  Location: [facility] OR    OPTICAL TRACKING SYSTEM ENDOSCOPIC SINUS SURGERY Bilateral 9/29/2023    Procedure: SINUS SURGERY, ENDOSCOPIC, USING OPTICAL TRACKING SYSTEM;  Surgeon: Aram Grace MD;  Location: [facility] OR    SEPTOPLASTY, TURBINOPLASTY, COMBINED N/A 9/29/2023    Procedure: SEPTOPLASTY, NOSE, WITH TURBINOPLASTY;  Surgeon: Aram Grace MD;  Location: [facility] OR    SURGICAL HISTORY OF -       PE tubes    TONSILLECTOMY, ADENOIDECTOMY ADULT, COMBINED Bilateral 03/19/2018    Procedure: COMBINED TONSILLECTOMY, ADENOIDECTOMY ADULT;  Bilateral Adenotonsillectomy;  Surgeon: Kevin Arias MD;  Location: [facility] OR    wisdom teeth Bilateral       Current Outpatient Medications   Medication Sig Dispense Refill    ibuprofen (ADVIL/MOTRIN) 200 MG tablet Take 2 tablets (400 mg) by mouth every 6 hours as needed for moderate pain 200 tablet 1    Prenatal MV & Min w/FA-DHA (PRENATAL GUMMIES) 0.18-25 MG CHEW       acetaminophen (TYLENOL) 500 MG tablet Take 500-1,000 mg by mouth every 6 hours as needed for mild pain (Patient not taking: Reported on 4/5/2024)      albuterol (PROAIR HFA/PROVENTIL HFA/VENTOLIN HFA) 108 (90 Base) MCG/ACT inhaler Inhale 2 puffs into the lungs every 6 hours as needed for shortness of breath, wheezing or cough (Patient not taking: Reported on 4/5/2024) 18 g 3    EPINEPHrine (ANY BX GENERIC EQUIV) 0.3 MG/0.3ML injection 2-pack Inject 0.3 mLs (0.3 mg) into the muscle as needed for anaphylaxis May repeat one time in 5-15 minutes if response to initial dose is inadequate. (Patient not taking: Reported on 4/5/2024) 2 each 0     No current facility-administered medications for this visit.     Allergies   Allergen Reactions    Sulfa Antibiotics Other (See Comments)     Both parents are allergic       Family History   Problem  "Relation Age of Onset    Depression Mother     Depression Father     Depression Maternal Grandmother     Cancer Maternal Grandmother     Lung Cancer Maternal Grandmother     Diabetes Maternal Grandfather     Heart Disease Maternal Grandfather     Hypertension Maternal Grandfather     Depression Paternal Grandmother         bipolar    Depression Paternal Grandfather         bipolar     Social History     Tobacco Use    Smoking status: Never    Smokeless tobacco: Never   Vaping Use    Vaping Use: Never used   Substance Use Topics    Alcohol use: Yes     Comment: Very occasionally    Drug use: Never       ROS: A 10 pt ROS was completed and found to be negative unless mentioned in the HPI.     Objective:   VS: /75 (BP Location: Right arm, Patient Position: Chair, Cuff Size: Adult Regular)   Pulse 87   Temp 98.3  F (36.8  C) (Tympanic)   Resp 14   Ht 1.651 m (5' 5\")   Wt 52.1 kg (114 lb 12.8 oz)   LMP 2024 (Exact Date)   BMI 19.10 kg/m    Gen: Pleasant, talkative female in no apparent distress   Endocrine: Thyroid without enlargement or nodularity   Dermatology: +acne  Lymph: no appreciable cervical lymphadenopathy  Respiratory: breathing comfortably on room air   Cardiac: warm and well-perfused.   MSK: Grossly normal movement of all four extremities  Psych: mood and affect bright     Assessment/Plan:   My impression is that this is a 23 year old  with primary infertility of ~3 years.   Plan for repeat lab eval including estradiol, LH, FSH, TSH, prolactin, AMH, and Vit D, then day #21 progesterone. Given concern for PCOS, plan to obtain free and total testosterone, DHEAS, 17-OHP, HgbA1c and lipid panel. Plan to obtain pelvic US today to assess for uterine/ovarian/tubal pathology  HSG and SA normal.   We have done letrazole in the past, 1x showed ovualtion. Hyperstimmed on 7.5mg letrazole.   Plan to get baseline pelvic US and then start with 2.5mg letrazole. Recommended monitored cycle with " follicular US and then trigger injection. Would recommended IUI if not prgnant in 3-6 monitored cycles.     Will MyChart with results and make plan from there.     Would consider diagnotic laparoscopy if not pregnant in 3-6 cycles of OI with evidence of ovulation for endometriosis.   I spent 17 min with the patient and then 5 min in documentation and chart review.   Stefanie Mcnair MD  OB/GYN  4/5/2024

## 2024-04-06 LAB
ESTRADIOL SERPL-MCNC: 123 PG/ML
FSH SERPL IRP2-ACNC: 5.9 MIU/ML
MIS SERPL-MCNC: 10.4 NG/ML (ref 1.2–12)
SHBG SERPL-SCNC: 102 NMOL/L (ref 30–135)

## 2024-04-06 ASSESSMENT — ENCOUNTER SYMPTOMS
WOUND: 0
COUGH: 0
NECK STIFFNESS: 0
COLOR CHANGE: 0
WHEEZING: 0
BACK PAIN: 1
ARTHRALGIAS: 1
DIZZINESS: 0
NUMBNESS: 0
DIARRHEA: 0
FLANK PAIN: 0
SHORTNESS OF BREATH: 0
NECK PAIN: 0
NAUSEA: 0
FEVER: 0
CHILLS: 0
WEAKNESS: 0
ABDOMINAL PAIN: 0
VOMITING: 0

## 2024-04-06 NOTE — TELEPHONE ENCOUNTER
Dr. Mcnair prescribed letrozole for Aury but her insurance has her restricted to you.  Are you able to resend the prescription under your name?        Thank you    JENNIFER MOODY, PHARMACIST  [facility]  [phone]     normal/normal affect/alert and oriented x3/normal behavior negative

## 2024-04-08 LAB
17OH-PREG SERPL-MCNC: 121 NG/DL
DHEA-S SERPL-MCNC: 195 UG/DL (ref 35–430)

## 2024-04-08 NOTE — TELEPHONE ENCOUNTER
Marily is out until 4/11, we can hold for her return? Or is it acceptable for a covering provider to prescribe?

## 2024-04-09 LAB
TESTOST FREE SERPL-MCNC: 0.23 NG/DL
TESTOST SERPL-MCNC: 29 NG/DL (ref 8–60)

## 2024-04-10 DIAGNOSIS — E03.8 SUBCLINICAL HYPOTHYROIDISM: Primary | ICD-10-CM

## 2024-04-10 RX ORDER — LETROZOLE 2.5 MG/1
2.5 TABLET, FILM COATED ORAL DAILY
Qty: 5 TABLET | Refills: 12 | Status: SHIPPED | OUTPATIENT
Start: 2024-04-10 | End: 2024-07-12

## 2024-04-10 RX ORDER — LEVOTHYROXINE SODIUM 25 UG/1
25 TABLET ORAL DAILY
Qty: 90 TABLET | Refills: 1 | Status: SHIPPED | OUTPATIENT
Start: 2024-04-10 | End: 2024-06-29

## 2024-04-11 ENCOUNTER — HOSPITAL ENCOUNTER (OUTPATIENT)
Dept: ULTRASOUND IMAGING | Facility: CLINIC | Age: 24
Discharge: HOME | End: 2024-04-11
Attending: OBSTETRICS & GYNECOLOGY | Admitting: OBSTETRICS & GYNECOLOGY
Payer: COMMERCIAL

## 2024-04-11 DIAGNOSIS — Z31.41 FERTILITY TESTING: ICD-10-CM

## 2024-04-11 PROCEDURE — 76830 TRANSVAGINAL US NON-OB: CPT

## 2024-04-12 ENCOUNTER — PREP FOR PROCEDURE (OUTPATIENT)
Dept: OBGYN | Facility: CLINIC | Age: 24
End: 2024-04-12
Payer: COMMERCIAL

## 2024-04-12 DIAGNOSIS — N84.0 UTERINE POLYP: Primary | ICD-10-CM

## 2024-04-15 ENCOUNTER — TELEPHONE (OUTPATIENT)
Dept: OBGYN | Facility: CLINIC | Age: 24
End: 2024-04-15
Payer: COMMERCIAL

## 2024-04-15 NOTE — TELEPHONE ENCOUNTER
"[id number]  Aury BRITNEY Cervantes    You are now scheduled for surgery at [facility].  Below are the details for your surgery.  Please read the \"Preparing for Your Surgery\" instructions and let us know if you have any questions.    Type of surgery: HYSTEROSCOPY, WITH DILATION AND CURETTAGE OF UTERUS, polypectomy   Surgeon:  Stefanie Mcnair MD  Location of surgery: [facility] OR    Date of surgery: 5/2/24    Time: 12:30 pm   Arrival Time: 11:00 am    Time can change, to be confirmed a couple of days prior by pre-op surgery nurse.    Pre-Op Appt Date: done AM of surgery with Dr. Mcnair  Post-Op Appt Date: Not needed   Time:     Packet sent out: Yes  Pre-cert/Authorization completed:  TBD by Financial Securing Office.   MA Sterilization/Hysterectomy Acknowledgment Consent signed: Not Applicable    [facility]  [phone]    Fax: [phone]  Same Day Surgery [phone]  Fax: [phone]  [facility] [phone]    "

## 2024-04-28 ENCOUNTER — ANESTHESIA EVENT (OUTPATIENT)
Dept: SURGERY | Facility: CLINIC | Age: 24
End: 2024-04-28
Payer: COMMERCIAL

## 2024-04-28 NOTE — ANESTHESIA PREPROCEDURE EVALUATION
Anesthesia Pre-Procedure Evaluation    Patient: Aury Cervantes   MRN: [id number] : 2000        Procedure : Procedure(s):  HYSTEROSCOPY, WITH DILATION AND CURETTAGE OF UTERUS  polypectomy          Past Medical History:   Diagnosis Date    Anemia     Depression     Nephrolithiasis     Von Willebrand's disease (H) 2024      Past Surgical History:   Procedure Laterality Date    COMBINED CYSTOSCOPY, RETROGRADES, URETEROSCOPY, LASER HOLMIUM LITHOTRIPSY URETER(S), INSERT STENT Right 9/15/2023    Procedure: Cystoscopy, Right Ureteroscopy, Right Holmium Laser Lithotripsy, Right Retrograde Pyelogram, Right Ureteral Stent Placement;  Surgeon: Stefan Du MD;  Location: [facility] OR    DILATION AND CURETTAGE, OPERATIVE HYSTEROSCOPY, COMBINED N/A 2023    Procedure: HYSTEROSCOPY, WITH DILATION AND CURETTAGE OF UTERUS, polypectomy;  Surgeon: Stefanie Mcnair MD;  Location: [facility] OR    OPTICAL TRACKING SYSTEM ENDOSCOPIC SINUS SURGERY Bilateral 2023    Procedure: SINUS SURGERY, ENDOSCOPIC, USING OPTICAL TRACKING SYSTEM;  Surgeon: Aram Grace MD;  Location: [facility] OR    SEPTOPLASTY, TURBINOPLASTY, COMBINED N/A 2023    Procedure: SEPTOPLASTY, NOSE, WITH TURBINOPLASTY;  Surgeon: Aram Grace MD;  Location: [facility] OR    SURGICAL HISTORY OF -       PE tubes    TONSILLECTOMY, ADENOIDECTOMY ADULT, COMBINED Bilateral 2018    Procedure: COMBINED TONSILLECTOMY, ADENOIDECTOMY ADULT;  Bilateral Adenotonsillectomy;  Surgeon: Kevin Arias MD;  Location: [facility] OR    wisdom teeth Bilateral       Allergies   Allergen Reactions    Sulfa Antibiotics Other (See Comments)     Both parents are allergic        Social History     Tobacco Use    Smoking status: Never    Smokeless tobacco: Never   Substance Use Topics    Alcohol use: Yes     Comment: Very occasionally      Wt Readings from Last 1 Encounters:   24 52.1 kg (114 lb 12.8 oz)        Anesthesia Evaluation   Pt has had prior anesthetic.  Type: General and MAC.    No history of anesthetic complications       ROS/MED HX  ENT/Pulmonary:  - neg pulmonary ROS     Neurologic:  - neg neurologic ROS     Cardiovascular:       METS/Exercise Tolerance: >4 METS    Hematologic: Comments:  Von Willebrand's disease    (+)      anemia (von Willebrand),          Musculoskeletal:  - neg musculoskeletal ROS     GI/Hepatic:  - neg GI/hepatic ROS     Renal/Genitourinary:     (+) renal disease,      Nephrolithiasis ,       Endo:  - neg endo ROS     Psychiatric/Substance Use:     (+) psychiatric history depression       Infectious Disease:  - neg infectious disease ROS     Malignancy:  - neg malignancy ROS     Other:  - neg other ROS          Physical Exam    Airway  airway exam normal      Mallampati: I   TM distance: > 3 FB   Neck ROM: full   Mouth opening: > 3 cm    Respiratory Devices and Support         Dental       (+) Minor Abnormalities - some fillings, tiny chips      Cardiovascular   cardiovascular exam normal       Rhythm and rate: regular and normal     Pulmonary   pulmonary exam normal        breath sounds clear to auscultation           OUTSIDE LABS:  CBC:   Lab Results   Component Value Date    WBC 4.4 04/03/2024    WBC 12.4 (H) 09/29/2023    HGB 12.4 04/03/2024    HGB 11.8 10/24/2023    HCT 39.3 04/03/2024    HCT 35.7 09/29/2023     04/03/2024     09/29/2023     BMP:   Lab Results   Component Value Date     09/29/2023     09/18/2023    POTASSIUM 3.9 09/29/2023    POTASSIUM 3.8 09/18/2023    CHLORIDE 104 09/29/2023    CHLORIDE 106 09/18/2023    CO2 23 09/29/2023    CO2 26 09/18/2023    BUN 9.3 09/29/2023    BUN 9.1 09/18/2023    CR 0.65 09/29/2023    CR 0.65 09/18/2023     (H) 09/29/2023    GLC 89 09/18/2023     COAGS:   Lab Results   Component Value Date    PTT 31 02/02/2021    INR 1.15 (H) 02/02/2021     POC:   Lab Results   Component Value Date    BGM 95 03/22/2018    HCG Negative 09/18/2023    HCGS Negative 08/11/2023      HEPATIC:   Lab Results   Component Value Date    ALBUMIN 4.4 08/11/2023    PROTTOTAL 6.9 08/11/2023    ALT 10 08/11/2023    AST 18 08/11/2023    ALKPHOS 71 08/11/2023    BILITOTAL 0.3 08/11/2023     OTHER:   Lab Results   Component Value Date    A1C 5.1 04/05/2024    VICKIE 9.6 09/29/2023    MAG 1.9 08/11/2023    LIPASE 209 07/20/2022    TSH 3.83 04/03/2024    T4 1.36 09/29/2022    CRP 50.6 (H) 03/17/2015    SED 19 (H) 03/17/2015       Anesthesia Plan    ASA Status:  2    NPO Status:  NPO Appropriate    Anesthesia Type: General.     - Airway: Native airway   Induction: Intravenous, Propofol.   Maintenance: TIVA.        Consents    Anesthesia Plan(s) and associated risks, benefits, and realistic alternatives discussed. Questions answered and patient/representative(s) expressed understanding.     - Discussed: Risks, Benefits and Alternatives for BOTH SEDATION and the PROCEDURE were discussed     - Discussed with:  Patient      - Extended Intubation/Ventilatory Support Discussed: No.      - Patient is DNR/DNI Status: No     Use of blood products discussed: No .     Postoperative Care    Pain management: IV analgesics, Oral pain medications, Multi-modal analgesia.   PONV prophylaxis: Ondansetron (or other 5HT-3), Dexamethasone or Solumedrol, Background Propofol Infusion     Comments:               CIRO Skaggs CRNA    I have reviewed the pertinent notes and labs in the chart from the past 30 days and (re)examined the patient.  Any updates or changes from those notes are reflected in this note.

## 2024-05-02 ENCOUNTER — HOSPITAL ENCOUNTER (OUTPATIENT)
Facility: CLINIC | Age: 24
Discharge: HOME | End: 2024-05-02
Attending: OBSTETRICS & GYNECOLOGY | Admitting: OBSTETRICS & GYNECOLOGY
Payer: COMMERCIAL

## 2024-05-02 ENCOUNTER — ANESTHESIA (OUTPATIENT)
Dept: SURGERY | Facility: CLINIC | Age: 24
End: 2024-05-02
Payer: COMMERCIAL

## 2024-05-02 VITALS
BODY MASS INDEX: 18.97 KG/M2 | SYSTOLIC BLOOD PRESSURE: 102 MMHG | DIASTOLIC BLOOD PRESSURE: 73 MMHG | OXYGEN SATURATION: 87 % | HEART RATE: 67 BPM | RESPIRATION RATE: 16 BRPM | WEIGHT: 114 LBS

## 2024-05-02 DIAGNOSIS — Z98.890 STATUS POST HYSTEROSCOPY: Primary | ICD-10-CM

## 2024-05-02 LAB — HCG UR QL: NEGATIVE

## 2024-05-02 PROCEDURE — 88305 TISSUE EXAM BY PATHOLOGIST: CPT | Mod: TC | Performed by: OBSTETRICS & GYNECOLOGY

## 2024-05-02 PROCEDURE — 250N000009 HC RX 250: Performed by: NURSE ANESTHETIST, CERTIFIED REGISTERED

## 2024-05-02 PROCEDURE — 250N000013 HC RX MED GY IP 250 OP 250 PS 637: Performed by: OBSTETRICS & GYNECOLOGY

## 2024-05-02 PROCEDURE — 272N000001 HC OR GENERAL SUPPLY STERILE: Performed by: OBSTETRICS & GYNECOLOGY

## 2024-05-02 PROCEDURE — 81025 URINE PREGNANCY TEST: CPT | Performed by: OBSTETRICS & GYNECOLOGY

## 2024-05-02 PROCEDURE — 258N000003 HC RX IP 258 OP 636: Performed by: NURSE ANESTHETIST, CERTIFIED REGISTERED

## 2024-05-02 PROCEDURE — 250N000011 HC RX IP 250 OP 636: Performed by: NURSE ANESTHETIST, CERTIFIED REGISTERED

## 2024-05-02 PROCEDURE — 999N000141 HC STATISTIC PRE-PROCEDURE NURSING ASSESSMENT: Performed by: OBSTETRICS & GYNECOLOGY

## 2024-05-02 PROCEDURE — 360N000076 HC SURGERY LEVEL 3, PER MIN: Performed by: OBSTETRICS & GYNECOLOGY

## 2024-05-02 PROCEDURE — 370N000017 HC ANESTHESIA TECHNICAL FEE, PER MIN: Performed by: OBSTETRICS & GYNECOLOGY

## 2024-05-02 PROCEDURE — 710N000012 HC RECOVERY PHASE 2, PER MINUTE: Performed by: OBSTETRICS & GYNECOLOGY

## 2024-05-02 PROCEDURE — 250N000009 HC RX 250: Performed by: OBSTETRICS & GYNECOLOGY

## 2024-05-02 PROCEDURE — 88305 TISSUE EXAM BY PATHOLOGIST: CPT | Mod: 26 | Performed by: PATHOLOGY

## 2024-05-02 PROCEDURE — 58558 HYSTEROSCOPY BIOPSY: CPT | Performed by: OBSTETRICS & GYNECOLOGY

## 2024-05-02 PROCEDURE — 271N000001 HC OR GENERAL SUPPLY NON-STERILE: Performed by: OBSTETRICS & GYNECOLOGY

## 2024-05-02 RX ORDER — OXYCODONE HYDROCHLORIDE 5 MG/1
5-10 TABLET ORAL
Status: DISCONTINUED | OUTPATIENT
Start: 2024-05-02 | End: 2024-05-02 | Stop reason: HOSPADM

## 2024-05-02 RX ORDER — IBUPROFEN 800 MG/1
800 TABLET, FILM COATED ORAL ONCE
Qty: 1 TABLET | Refills: 0 | Status: DISCONTINUED | OUTPATIENT
Start: 2024-05-02 | End: 2024-05-02 | Stop reason: HOSPADM

## 2024-05-02 RX ORDER — ONDANSETRON 4 MG/1
4 TABLET, ORALLY DISINTEGRATING ORAL EVERY 30 MIN PRN
Status: DISCONTINUED | OUTPATIENT
Start: 2024-05-02 | End: 2024-05-02 | Stop reason: HOSPADM

## 2024-05-02 RX ORDER — OXYCODONE HYDROCHLORIDE 5 MG/1
5 TABLET ORAL
Status: DISCONTINUED | OUTPATIENT
Start: 2024-05-02 | End: 2024-05-02 | Stop reason: HOSPADM

## 2024-05-02 RX ORDER — FENTANYL CITRATE 50 UG/ML
25 INJECTION, SOLUTION INTRAMUSCULAR; INTRAVENOUS
Status: DISCONTINUED | OUTPATIENT
Start: 2024-05-02 | End: 2024-05-02 | Stop reason: HOSPADM

## 2024-05-02 RX ORDER — DEXAMETHASONE SODIUM PHOSPHATE 4 MG/ML
4 INJECTION, SOLUTION INTRA-ARTICULAR; INTRALESIONAL; INTRAMUSCULAR; INTRAVENOUS; SOFT TISSUE
Status: DISCONTINUED | OUTPATIENT
Start: 2024-05-02 | End: 2024-05-02 | Stop reason: HOSPADM

## 2024-05-02 RX ORDER — LIDOCAINE HYDROCHLORIDE 10 MG/ML
INJECTION, SOLUTION INFILTRATION; PERINEURAL PRN
Status: DISCONTINUED | OUTPATIENT
Start: 2024-05-02 | End: 2024-05-02 | Stop reason: HOSPADM

## 2024-05-02 RX ORDER — OXYCODONE HYDROCHLORIDE 5 MG/1
10 TABLET ORAL
Status: DISCONTINUED | OUTPATIENT
Start: 2024-05-02 | End: 2024-05-02 | Stop reason: HOSPADM

## 2024-05-02 RX ORDER — PROPOFOL 10 MG/ML
INJECTION, EMULSION INTRAVENOUS CONTINUOUS PRN
Status: DISCONTINUED | OUTPATIENT
Start: 2024-05-02 | End: 2024-05-02

## 2024-05-02 RX ORDER — KETAMINE HYDROCHLORIDE 10 MG/ML
INJECTION INTRAMUSCULAR; INTRAVENOUS PRN
Status: DISCONTINUED | OUTPATIENT
Start: 2024-05-02 | End: 2024-05-02

## 2024-05-02 RX ORDER — NALOXONE HYDROCHLORIDE 0.4 MG/ML
0.1 INJECTION, SOLUTION INTRAMUSCULAR; INTRAVENOUS; SUBCUTANEOUS
Status: DISCONTINUED | OUTPATIENT
Start: 2024-05-02 | End: 2024-05-02 | Stop reason: HOSPADM

## 2024-05-02 RX ORDER — DEXAMETHASONE SODIUM PHOSPHATE 4 MG/ML
INJECTION, SOLUTION INTRA-ARTICULAR; INTRALESIONAL; INTRAMUSCULAR; INTRAVENOUS; SOFT TISSUE PRN
Status: DISCONTINUED | OUTPATIENT
Start: 2024-05-02 | End: 2024-05-02

## 2024-05-02 RX ORDER — ACETAMINOPHEN 325 MG/1
975 TABLET ORAL ONCE
Status: COMPLETED | OUTPATIENT
Start: 2024-05-02 | End: 2024-05-02

## 2024-05-02 RX ORDER — ONDANSETRON 2 MG/ML
4 INJECTION INTRAMUSCULAR; INTRAVENOUS EVERY 30 MIN PRN
Status: DISCONTINUED | OUTPATIENT
Start: 2024-05-02 | End: 2024-05-02 | Stop reason: HOSPADM

## 2024-05-02 RX ORDER — OXYCODONE HYDROCHLORIDE 5 MG/1
5-10 TABLET ORAL EVERY 4 HOURS PRN
Qty: 5 TABLET | Refills: 0 | Status: SHIPPED | OUTPATIENT
Start: 2024-05-02

## 2024-05-02 RX ORDER — LIDOCAINE 40 MG/G
CREAM TOPICAL
Status: DISCONTINUED | OUTPATIENT
Start: 2024-05-02 | End: 2024-05-02 | Stop reason: HOSPADM

## 2024-05-02 RX ORDER — AMOXICILLIN 250 MG
1-2 CAPSULE ORAL 2 TIMES DAILY PRN
Qty: 30 TABLET | Refills: 0 | Status: SHIPPED | OUTPATIENT
Start: 2024-05-02 | End: 2024-05-31

## 2024-05-02 RX ORDER — IBUPROFEN 800 MG/1
800 TABLET, FILM COATED ORAL EVERY 6 HOURS PRN
Qty: 30 TABLET | Refills: 0 | Status: SHIPPED | OUTPATIENT
Start: 2024-05-02 | End: 2024-05-28

## 2024-05-02 RX ORDER — KETOROLAC TROMETHAMINE 30 MG/ML
INJECTION, SOLUTION INTRAMUSCULAR; INTRAVENOUS PRN
Status: DISCONTINUED | OUTPATIENT
Start: 2024-05-02 | End: 2024-05-02

## 2024-05-02 RX ORDER — ACETAMINOPHEN 325 MG/1
975 TABLET ORAL ONCE
Qty: 3 TABLET | Refills: 0 | Status: DISCONTINUED | OUTPATIENT
Start: 2024-05-02 | End: 2024-05-02 | Stop reason: HOSPADM

## 2024-05-02 RX ORDER — ONDANSETRON 2 MG/ML
INJECTION INTRAMUSCULAR; INTRAVENOUS PRN
Status: DISCONTINUED | OUTPATIENT
Start: 2024-05-02 | End: 2024-05-02

## 2024-05-02 RX ORDER — SODIUM CHLORIDE, SODIUM LACTATE, POTASSIUM CHLORIDE, CALCIUM CHLORIDE 600; 310; 30; 20 MG/100ML; MG/100ML; MG/100ML; MG/100ML
INJECTION, SOLUTION INTRAVENOUS CONTINUOUS
Status: DISCONTINUED | OUTPATIENT
Start: 2024-05-02 | End: 2024-05-02 | Stop reason: HOSPADM

## 2024-05-02 RX ADMIN — LIDOCAINE HYDROCHLORIDE 0.2 ML: 10 INJECTION, SOLUTION EPIDURAL; INFILTRATION; INTRACAUDAL; PERINEURAL at 11:48

## 2024-05-02 RX ADMIN — ONDANSETRON 4 MG: 2 INJECTION INTRAMUSCULAR; INTRAVENOUS at 14:18

## 2024-05-02 RX ADMIN — SODIUM CHLORIDE, POTASSIUM CHLORIDE, SODIUM LACTATE AND CALCIUM CHLORIDE: 600; 310; 30; 20 INJECTION, SOLUTION INTRAVENOUS at 11:49

## 2024-05-02 RX ADMIN — DEXAMETHASONE SODIUM PHOSPHATE 4 MG: 4 INJECTION, SOLUTION INTRA-ARTICULAR; INTRALESIONAL; INTRAMUSCULAR; INTRAVENOUS; SOFT TISSUE at 12:58

## 2024-05-02 RX ADMIN — KETOROLAC TROMETHAMINE 15 MG: 30 INJECTION, SOLUTION INTRAMUSCULAR at 13:28

## 2024-05-02 RX ADMIN — MIDAZOLAM 2 MG: 1 INJECTION INTRAMUSCULAR; INTRAVENOUS at 12:52

## 2024-05-02 RX ADMIN — PROPOFOL 150 MCG/KG/MIN: 10 INJECTION, EMULSION INTRAVENOUS at 12:55

## 2024-05-02 RX ADMIN — ACETAMINOPHEN 975 MG: 325 TABLET, FILM COATED ORAL at 11:36

## 2024-05-02 RX ADMIN — FENTANYL CITRATE 25 MCG: 50 INJECTION INTRAMUSCULAR; INTRAVENOUS at 14:10

## 2024-05-02 RX ADMIN — KETAMINE HYDROCHLORIDE 25 MG: 10 INJECTION INTRAMUSCULAR; INTRAVENOUS at 12:58

## 2024-05-02 RX ADMIN — ONDANSETRON 4 MG: 2 INJECTION INTRAMUSCULAR; INTRAVENOUS at 12:58

## 2024-05-02 RX ADMIN — KETAMINE HYDROCHLORIDE 25 MG: 10 INJECTION INTRAMUSCULAR; INTRAVENOUS at 12:55

## 2024-05-02 RX ADMIN — FENTANYL CITRATE 25 MCG: 50 INJECTION INTRAMUSCULAR; INTRAVENOUS at 14:24

## 2024-05-02 ASSESSMENT — ACTIVITIES OF DAILY LIVING (ADL)
ADLS_ACUITY_SCORE: 16
ADLS_ACUITY_SCORE: 18
ADLS_ACUITY_SCORE: 18

## 2024-05-02 NOTE — OR NURSING
Up to Br to urinate ith success.  Some light headedness and nausea.  Treatd with anti-emetic and aromatherapy.

## 2024-05-02 NOTE — H&P
[facility]  OB/GYN Clinic   Pre-Op H&P        HPI: Ms. Cervantes is a 23 year old  who presents for hysteroscopy, D&C and polypectomy. Pelvic US with uterine polyps.     ROS: A 10 pt ROS was completed and found to be otherwise negative unless mentioned in the HPI.      PMH:   Past Medical History:   Diagnosis Date    Anemia     Depression     Nephrolithiasis     Von Willebrand's disease (H) 2024        PSHx:   Past Surgical History:   Procedure Laterality Date    COMBINED CYSTOSCOPY, RETROGRADES, URETEROSCOPY, LASER HOLMIUM LITHOTRIPSY URETER(S), INSERT STENT Right 9/15/2023    Procedure: Cystoscopy, Right Ureteroscopy, Right Holmium Laser Lithotripsy, Right Retrograde Pyelogram, Right Ureteral Stent Placement;  Surgeon: Stefan Du MD;  Location: [facility] OR    DILATION AND CURETTAGE, OPERATIVE HYSTEROSCOPY, COMBINED N/A 2023    Procedure: HYSTEROSCOPY, WITH DILATION AND CURETTAGE OF UTERUS, polypectomy;  Surgeon: Stefanie Mcnair MD;  Location: [facility] OR    OPTICAL TRACKING SYSTEM ENDOSCOPIC SINUS SURGERY Bilateral 2023    Procedure: SINUS SURGERY, ENDOSCOPIC, USING OPTICAL TRACKING SYSTEM;  Surgeon: Aram Grace MD;  Location: [facility] OR    SEPTOPLASTY, TURBINOPLASTY, COMBINED N/A 2023    Procedure: SEPTOPLASTY, NOSE, WITH TURBINOPLASTY;  Surgeon: Aram Grace MD;  Location: [facility] OR    SURGICAL HISTORY OF -       PE tubes    TONSILLECTOMY, ADENOIDECTOMY ADULT, COMBINED Bilateral 2018    Procedure: COMBINED TONSILLECTOMY, ADENOIDECTOMY ADULT;  Bilateral Adenotonsillectomy;  Surgeon: Kevin Arias MD;  Location: [facility] OR    wisdom teeth Bilateral         OBHx:           OB History    Para Term  AB Living   0 0 0 0 0 0   SAB IAB Ectopic Multiple Live Births    0 0 0 0 0          Medications:     Current Facility-Administered Medications   Medication Dose Route Frequency Provider Last Rate Last Admin    lactated ringers infusion    Intravenous Continuous Domingo Gayle APRN CRNA 100 mL/hr at 05/02/24 1149 New Bag at 05/02/24 1149    lidocaine (LMX4) kit   Topical Q1H PRN Domingo Gayle APRN CRNA        lidocaine 1 % 0.1-1 mL  0.1-1 mL Other Q1H PRN Domingo Gayle APRN CRNA   0.2 mL at 05/02/24 1148    sodium chloride (PF) 0.9% PF flush 3 mL  3 mL Intracatheter Q8H Domingo Gayle APRN CRNA        sodium chloride (PF) 0.9% PF flush 3 mL  3 mL Intracatheter q1 min prn Domingo Gayle APRN CRNA          Allergies:      Allergies         Allergies   Allergen Reactions    Sulfa Drugs Other (See Comments)       Both parents are allergic               Social History:   Social History   Social History            Socioeconomic History    Marital status: Single       Spouse name: Not on file    Number of children: Not on file    Years of education: Not on file    Highest education level: Not on file   Occupational History    Not on file   Tobacco Use    Smoking status: Never    Smokeless tobacco: Never   Vaping Use    Vaping status: Never Used   Substance and Sexual Activity    Alcohol use: Not Currently       Alcohol/week: 0.0 standard drinks of alcohol    Drug use: Never    Sexual activity: Yes       Partners: Male   Other Topics Concern    Parent/sibling w/ CABG, MI or angioplasty before 65F 55M? No   Social History Narrative    Not on file      Social Determinants of Health      Financial Resource Strain: Not on file   Food Insecurity: Not on file   Transportation Needs: Not on file   Physical Activity: Not on file   Stress: Not on file   Social Connections: Not on file   Intimate Partner Violence: Not on file   Housing Stability: Not on file         Social History            Socioeconomic History    Marital status: Single       Spouse name: None    Number of children: None    Years of education: None    Highest education level: None   Tobacco Use    Smoking status: Never    Smokeless tobacco: Never    Vaping Use    Vaping status: Never Used   Substance and Sexual Activity    Alcohol use: Not Currently       Alcohol/week: 0.0 standard drinks of alcohol    Drug use: Never    Sexual activity: Yes       Partners: Male   Other Topics Concern    Parent/sibling w/ CABG, MI or angioplasty before 65F 55M? No         Family History:   Family History         Family History   Problem Relation Age of Onset    Depression Mother      Depression Father      Depression Maternal Grandmother      Cancer Maternal Grandmother      Diabetes Maternal Grandfather      Heart Disease Maternal Grandfather      Hypertension Maternal Grandfather      Depression Paternal Grandmother           bipolar    Depression Paternal Grandfather           bipolar    Lung Cancer Maternal Grandmother      No Known Problems Mother      No Known Problems Father      No Known Problems Sister      No Known Problems Sister              Physical Exam:   Wt 51.7 kg (114 lb)   LMP 03/27/2024 (Exact Date)   BMI 18.97 kg/m    Gen: Pleasant, talkative female in no apparent distress   Respiratory: breathing comfortably on room air   Cardiac: Regular rate, warm and well-perfused.   MSK: Grossly normal movement of all four extremities  Psych: flat and tearful      Labs/Imaging:   EXAM: US PELVIC TRANSABDOMINAL AND TRANSVAGINAL  LOCATION: [facility]  DATE: 4/11/2024     INDICATION: Infertility; D and C in April 2023.  COMPARISON: 03/31/2023.  TECHNIQUE: Transabdominal scans were performed. Endovaginal ultrasound was performed to better visualize the adnexa and endometrium.     FINDINGS:     UTERUS: 8.1 x 5.5 x 4.2 cm. Normal in size and position with no masses.     ENDOMETRIUM: 13 mm. Small hyperechoic focus within the lower uterine segment endometrium, measuring up to 6 mm and demonstrating no detectable internal vascularity.     RIGHT OVARY: 4.4 x 3.7 x 3.5 cm. Normal.     LEFT OVARY: 4.0 x 3.6 x 3.3 cm. Normal.     No significant free  fluid.                                                                      IMPRESSION:  1.  There is a 6 mm hyperechoic focus within the lower uterine segment endometrium, possibly an endometrial polyp. Correlation with sonohysterography is recommended.  2.  Normal premenopausal endometrial thickness, measuring 13 mm.             A&P: 24 yo G0 who presents for scheduled hysteroscopy, D&C, polypectomy for uterine polyp.   I did discuss the risks of bleeding, infection and uterine injury. Written informed consent was signed and she is agreeable to a blood transfusion if indicated. She is medically-cleared for this minor procedure and has no major medical co-morbidities.      Stefanie Mcnair MD  OB/GYN  5/2/2024

## 2024-05-02 NOTE — OP NOTE
"[facility] Gynecology Operative Note    Patient: Aury Cervantes  : 2000  MRN: [id number]    Date of Service: 2024    Pre-operative diagnosis:  - uterine polyp    Post-operative diagnosis:  - Same    Procedure:   - hysteroscopy, dilation and curettage, polypectomy     Surgeon: Stefanie Mcnair MD   Assistants: none    Anesthesia: Local with MAC    EBL: 5 mL  Urine: Voided prior to the start of the case  Fluids: See anesthesia record  Hysteroscope Fluid Deficit: 70cc     Specimens: Uterine polyp  Complications: none apparent     Findings: Normal-sized anteverted uterus with no appreciable ovarian or fallopian tube enlargement. External genitalia, vagina and cervix all within normal limits to gross examination.  Hysteroscopy revealed small 2 mm x 2 mm posterior uterine wall polyp.    Indications:  22 yo G0 who presents for scheduled hysteroscopy, D&C, polypectomy for uterine polyp.   I did discuss the risks of bleeding, infection and uterine injury. Written informed consent was signed and she is agreeable to a blood transfusion if indicated. She is medically-cleared for this minor procedure and has no major medical co-morbidities.     Procedure: The patient was taken to the operating room where she was placed in the dorsal lithotomy position. Sedation was administered until the patient was found to be comfortable. An exam under anesthesia was completed. The patient was then prepped and draped in the usual sterile fashion followed by a \"Time-Out\" to verify the correct patient and procedure. A speculum was inserted into the vagina and the cervix was visualized. 2cc of 1% lidocaine was injected into the anterior cervical lip and a single-toothed tenaculum was placed at 12 o'clock position. A paracervical block with 8 cc of 1% lidocaine  was performed at 4 and 8 o'clock. The cervix was dilated using Hegar dilators to 7mm until a Hysteroscope could be passed through the cervix. The " intrauterine cavity was inspected with visualization of the above-noted findings.  The MyoSure device was used to morcellate the uterine polyp.  All instruments and the tenaculum were removed from the cervix and good hemostasis was noted at the tenaculum puncture sites. The speculum was removed from the vagina. The patient tolerated the procedure well and was taken to the recovery room in stable condition.     Stefanie Mcnair MD  5/2/2024 1:29 PM

## 2024-05-02 NOTE — DISCHARGE INSTRUCTIONS
Same Day Surgery Discharge Instructions  Special Precautions After Surgery - Adult    It is not unusual to feel lightheaded or faint, up to 24 hours after surgery or while taking pain medication.  If you have these symptoms; sit for a few minutes before standing and have someone assist you when getting up.  You should rest and relax for the next 24 hours and must have someone stay with you for at least 24 hours after your discharge.  DO NOT DRIVE any vehicle or operate mechanical equipment for 24 hours following the end of your surgery.  DO NOT DRIVE while taking narcotic pain medications that have been prescribed by your physician.  If you had a limb operated on, you must be able to use it fully to drive.  DO NOT drink alcoholic beverages for 24 hours following surgery or while taking prescription pain medication.  Drink clear liquids (apple juice, ginger ale, broth, 7-Up, etc.).  Progress to your regular diet as you feel able.  Any questions call your physician and do not make important decisions for 24 hours.      __________________________________________________________________________________________________________________________________  IMPORTANT NUMBERS:    [facility] Main Number:  [phone], [phone]  Pharmacy:  [phone]  Same Day Surgery:  [phone], Monday - Friday until 8:30 p.m.  Urgent Care:  [phone]  Emergency Room:  [phone]      [facility]:  [phone]                                                                             [facility]:  [phone] option 1  [facility]:  [phone]     OB Clinic:  [phone]   Surgery [facility]ty Clinic:  [phone]   Home Medical Equipment: [phone]  [facility]:  [phone]

## 2024-05-02 NOTE — ANESTHESIA POSTPROCEDURE EVALUATION
Patient: Aury Cervantes    Procedure: Procedure(s):  HYSTEROSCOPY, WITH DILATION AND CURETTAGE OF UTERUS  polypectomy       Anesthesia Type:  General    Note:  Disposition: Outpatient   Postop Pain Control: Uneventful            Sign Out: Well controlled pain   PONV: No   Neuro/Psych: Uneventful            Sign Out: Acceptable/Baseline neuro status   Airway/Respiratory: Uneventful            Sign Out: Acceptable/Baseline resp. status   CV/Hemodynamics: Uneventful            Sign Out: Acceptable CV status; No obvious hypovolemia; No obvious fluid overload   Other NRE: NONE   DID A NON-ROUTINE EVENT OCCUR? No           Last vitals:  Vitals:    05/02/24 1345   BP: 109/77   Pulse: 76   Resp: 16   SpO2: 100%       Electronically Signed By: Konrad Ambrocio CRNA, APRN CRNA  May 2, 2024  2:14 PM

## 2024-05-02 NOTE — ANESTHESIA CARE TRANSFER NOTE
Patient: Aury Cervantes    Procedure: Procedure(s):  HYSTEROSCOPY, WITH DILATION AND CURETTAGE OF UTERUS  polypectomy       Diagnosis: Uterine polyp [N84.0]  Diagnosis Additional Information: No value filed.    Anesthesia Type:   General     Note:    Oropharynx: oropharynx clear of all foreign objects and spontaneously breathing  Level of Consciousness: drowsy  Oxygen Supplementation: nasal cannula  Level of Supplemental Oxygen (L/min / FiO2): 2  Independent Airway: airway patency satisfactory and stable  Dentition: dentition unchanged  Vital Signs Stable: post-procedure vital signs reviewed and stable  Report to RN Given: handoff report given  Patient transferred to: Phase II    Handoff Report: Identifed the Patient, Identified the Reponsible Provider, Reviewed the pertinent medical history, Discussed the surgical course, Reviewed Intra-OP anesthesia mangement and issues during anesthesia, Set expectations for post-procedure period and Allowed opportunity for questions and acknowledgement of understanding      Vitals:  Vitals Value Taken Time   /64 05/02/24 1338   Temp     Pulse 84 05/02/24 1338   Resp     SpO2     Vitals shown include unfiled device data.    Electronically Signed By: Konrad Ambrocio CRNA, APRN CRNA  May 2, 2024  1:40 PM

## 2024-05-06 LAB
PATH REPORT.COMMENTS IMP SPEC: NORMAL
PATH REPORT.FINAL DX SPEC: NORMAL
PATH REPORT.GROSS SPEC: NORMAL
PATH REPORT.MICROSCOPIC SPEC OTHER STN: NORMAL
PATH REPORT.RELEVANT HX SPEC: NORMAL
PHOTO IMAGE: NORMAL

## 2024-05-28 ENCOUNTER — OFFICE VISIT (OUTPATIENT)
Dept: OBGYN | Facility: CLINIC | Age: 24
End: 2024-05-28
Payer: COMMERCIAL

## 2024-05-28 VITALS
DIASTOLIC BLOOD PRESSURE: 66 MMHG | BODY MASS INDEX: 19.37 KG/M2 | WEIGHT: 116.4 LBS | TEMPERATURE: 97.1 F | SYSTOLIC BLOOD PRESSURE: 109 MMHG | HEART RATE: 77 BPM

## 2024-05-28 DIAGNOSIS — N97.0 PRIMARY ANOVULATORY INFERTILITY: ICD-10-CM

## 2024-05-28 DIAGNOSIS — N97.0 SECONDARY ANOVULATORY INFERTILITY: Primary | ICD-10-CM

## 2024-05-28 PROCEDURE — 99213 OFFICE O/P EST LOW 20 MIN: CPT | Mod: 25 | Performed by: OBSTETRICS & GYNECOLOGY

## 2024-05-28 PROCEDURE — 76830 TRANSVAGINAL US NON-OB: CPT | Performed by: OBSTETRICS & GYNECOLOGY

## 2024-05-28 NOTE — PROGRESS NOTES
Follicular US:     CD # 10 Patient's last menstrual period was 05/19/2024 (exact date).  Cycle #1 letrazole 2.5mg    Transvaginal US:   Retroverted uterus with 6.3 EMS, not trilaminar, right ovary with 14mm follicle, left ovary with 14mm follicle, ovaries appear polycystic       A/P: 22 yo G0 with primary infertility, PCOS/anovulation.   S/p hysteroscopy/polypectomy x2  Had OI with 2.5mg letrazole. Plan for b-hcg trigger injection on Thursday 5/30 and IUI Friday 5/31.   Pt has failed outpatient OI with letrazole and needs a monitored cycle. This is medically-necessary as the next step in fertility treatments.   Needs cycle day #21 progesterone level.     Stefanie Mcnair MD  OB/GYN

## 2024-05-28 NOTE — PROGRESS NOTES
"Initial /66 (BP Location: Left arm, Patient Position: Chair, Cuff Size: Adult Regular)   Pulse 77   Temp 97.1  F (36.2  C) (Tympanic)   Wt 52.8 kg (116 lb 6.4 oz)   LMP 05/19/2024 (Exact Date)   BMI 19.37 kg/m   Estimated body mass index is 19.37 kg/m  as calculated from the following:    Height as of 4/5/24: 1.651 m (5' 5\").    Weight as of this encounter: 52.8 kg (116 lb 6.4 oz). .  "

## 2024-05-29 RX ORDER — CHORIONIC GONADOTROPIN 10000 UNIT
10000 KIT INTRAMUSCULAR ONCE
Status: CANCELLED | OUTPATIENT
Start: 2024-05-29 | End: 2024-05-29

## 2024-05-30 ENCOUNTER — ALLIED HEALTH/NURSE VISIT (OUTPATIENT)
Dept: OBGYN | Facility: CLINIC | Age: 24
End: 2024-05-30
Payer: COMMERCIAL

## 2024-05-30 DIAGNOSIS — N97.9 FEMALE INFERTILITY: Primary | ICD-10-CM

## 2024-05-30 PROCEDURE — 96372 THER/PROPH/DIAG INJ SC/IM: CPT | Performed by: OBSTETRICS & GYNECOLOGY

## 2024-05-30 PROCEDURE — 99207 PR DROP WITH A PROCEDURE: CPT | Mod: 25

## 2024-05-30 RX ORDER — CHORIONIC GONADOTROPIN 10000 UNIT
10000 KIT INTRAMUSCULAR ONCE
Status: COMPLETED | OUTPATIENT
Start: 2024-05-30 | End: 2024-05-30

## 2024-05-30 RX ADMIN — CHORIONIC GONADOTROPIN 10000 UNITS: KIT at 13:24

## 2024-05-30 NOTE — NURSING NOTE
"Initial LMP 05/19/2024 (Exact Date)  Estimated body mass index is 19.37 kg/m  as calculated from the following:    Height as of 4/5/24: 1.651 m (5' 5\").    Weight as of 5/28/24: 52.8 kg (116 lb 6.4 oz). .    "

## 2024-05-30 NOTE — PROGRESS NOTES
Clinic Administered Medication Documentation        Patient was given Chorionic Gonadotropin. Prior to medication administration, verified patient's identity using patient s name and date of birth. Please see MAR and medication order for additional information. Patient instructed to remain in clinic for 15 minutes and report any adverse reaction to staff immediately.    Vial/Syringe: Single dose vial. Was entire vial of medication used? Yes      Given left ventrogluteal

## 2024-05-31 ENCOUNTER — ALLIED HEALTH/NURSE VISIT (OUTPATIENT)
Dept: OBGYN | Facility: CLINIC | Age: 24
End: 2024-05-31
Payer: COMMERCIAL

## 2024-05-31 ENCOUNTER — OFFICE VISIT (OUTPATIENT)
Dept: OBGYN | Facility: CLINIC | Age: 24
End: 2024-05-31
Payer: COMMERCIAL

## 2024-05-31 VITALS
RESPIRATION RATE: 18 BRPM | HEART RATE: 99 BPM | WEIGHT: 115 LBS | BODY MASS INDEX: 19.16 KG/M2 | DIASTOLIC BLOOD PRESSURE: 69 MMHG | SYSTOLIC BLOOD PRESSURE: 103 MMHG | TEMPERATURE: 97.8 F | HEIGHT: 65 IN

## 2024-05-31 DIAGNOSIS — N97.0 SECONDARY ANOVULATORY INFERTILITY: Primary | ICD-10-CM

## 2024-05-31 DIAGNOSIS — N97.9 FEMALE INFERTILITY: Primary | ICD-10-CM

## 2024-05-31 PROCEDURE — 99207 PR NO CHARGE NURSE ONLY: CPT

## 2024-05-31 PROCEDURE — 58322 ARTIFICIAL INSEMINATION: CPT | Performed by: OBSTETRICS & GYNECOLOGY

## 2024-05-31 NOTE — PROGRESS NOTES
"[facility]  OB/GYN Clinic    CC: IUI    Infertility treatments to date:   Cycle #1 with letrazole 2.5mg OI monitored cycle     S: Patient presents with her partner, who has left a fresh semen specimen in collection cup. B-HCG trigger injection yesterday. Denies any symptoms. Informed consent was singed after a discussion of the risks of intrauterine insemination (bleeding, infection, transmission of STIs, uterine cramping).     O:   VS: /69 (BP Location: Right arm, Patient Position: Sitting, Cuff Size: Adult Regular)   Pulse 99   Temp 97.8  F (36.6  C) (Tympanic)   Resp 18   Ht 1.651 m (5' 5\")   Wt 52.2 kg (115 lb)   LMP 05/19/2024 (Exact Date)   BMI 19.14 kg/m      Procedure: The patient was placed in dorsal lithotomy position. A speculum was placed in the vagina and the cervix visualized. A specimen catheter labeled with the patient's information was placed through the cervix and the sample was injected. The patient tolerated the procedure well. There were no complications and no blood loss. The patient remained in our clinic with elevatd pelvis for 20 min after the insemination. Plan for day #21 progesterone. Plan for pregnancy test with missed menses.     Stefanie Mcnair MD  5/31/2024    "

## 2024-05-31 NOTE — NURSING NOTE
"Initial /69 (BP Location: Right arm, Patient Position: Sitting, Cuff Size: Adult Regular)   Pulse 99   Temp 97.8  F (36.6  C) (Tympanic)   Resp 18   Ht 1.651 m (5' 5\")   Wt 52.2 kg (115 lb)   LMP 05/19/2024 (Exact Date)   BMI 19.14 kg/m   Estimated body mass index is 19.14 kg/m  as calculated from the following:    Height as of this encounter: 1.651 m (5' 5\").    Weight as of this encounter: 52.2 kg (115 lb). .    "

## 2024-06-07 ENCOUNTER — LAB (OUTPATIENT)
Dept: LAB | Facility: CLINIC | Age: 24
End: 2024-06-07
Payer: COMMERCIAL

## 2024-06-07 DIAGNOSIS — Z31.41 FERTILITY TESTING: ICD-10-CM

## 2024-06-07 PROCEDURE — 36415 COLL VENOUS BLD VENIPUNCTURE: CPT

## 2024-06-07 PROCEDURE — 84144 ASSAY OF PROGESTERONE: CPT

## 2024-06-08 LAB — PROGEST SERPL-MCNC: 11.3 NG/ML

## 2024-06-10 ENCOUNTER — MYC MEDICAL ADVICE (OUTPATIENT)
Dept: FAMILY MEDICINE | Facility: CLINIC | Age: 24
End: 2024-06-10
Payer: COMMERCIAL

## 2024-06-24 ENCOUNTER — OFFICE VISIT (OUTPATIENT)
Dept: OBGYN | Facility: CLINIC | Age: 24
End: 2024-06-24
Payer: COMMERCIAL

## 2024-06-24 DIAGNOSIS — N97.0 SECONDARY ANOVULATORY INFERTILITY: Primary | ICD-10-CM

## 2024-06-24 PROCEDURE — 99213 OFFICE O/P EST LOW 20 MIN: CPT | Mod: 25 | Performed by: OBSTETRICS & GYNECOLOGY

## 2024-06-24 PROCEDURE — 76830 TRANSVAGINAL US NON-OB: CPT | Performed by: OBSTETRICS & GYNECOLOGY

## 2024-06-24 RX ORDER — CHORIONIC GONADOTROPIN 10000 UNIT
10000 KIT INTRAMUSCULAR ONCE
Status: COMPLETED | OUTPATIENT
Start: 2024-06-24 | End: 2024-06-26

## 2024-06-24 NOTE — PROGRESS NOTES
Follicular US:      CD # 11 Patient's last menstrual period was 06/14/2024 (exact date).  Cycle #2, did not take letrazole this cycle (took 2.5mg letrazole last cycle with 11.3 progesterone level)     Transvaginal US:   Retroverted uterus with 13 EMS trilaminar, right ovary with 15mm follicle, left ovary with 17mm follicle, ovaries appear polycystic        A/P: 23 yo G0 with primary infertility, PCOS/anovulation.   S/p hysteroscopy/polypectomy x2  Natural monitored cycle. Plan for b-hcg trigger injection on 6/26 and IUI 6/27.   Pt has failed outpatient OI with letrazole and needs a monitored cycle. This is medically-necessary as the next step in fertility treatments.   Needs cycle day #21 progesterone level.      Stefanie Mcnair MD  OB/GYN

## 2024-06-24 NOTE — PROGRESS NOTES
"Initial LMP 06/14/2024 (Exact Date)  Estimated body mass index is 19.14 kg/m  as calculated from the following:    Height as of 5/31/24: 1.651 m (5' 5\").    Weight as of 5/31/24: 52.2 kg (115 lb). .  "

## 2024-06-26 ENCOUNTER — ALLIED HEALTH/NURSE VISIT (OUTPATIENT)
Dept: OBGYN | Facility: CLINIC | Age: 24
End: 2024-06-26
Payer: COMMERCIAL

## 2024-06-26 DIAGNOSIS — N97.9 FEMALE INFERTILITY: Primary | ICD-10-CM

## 2024-06-26 PROCEDURE — 96372 THER/PROPH/DIAG INJ SC/IM: CPT | Performed by: OBSTETRICS & GYNECOLOGY

## 2024-06-26 PROCEDURE — 99207 PR NO CHARGE NURSE ONLY: CPT

## 2024-06-26 RX ADMIN — CHORIONIC GONADOTROPIN 10000 UNITS: KIT at 10:35

## 2024-06-26 NOTE — PROGRESS NOTES
Clinic Administered Medication Documentation      Injectable Medication Documentation    Is there an active order (written within the past 365 days, with administrations remaining, not ) in the chart? Yes.     Patient was given  chorionic gonadotropin . Prior to medication administration, verified patient's identity using patient s name and date of birth. Please see MAR and medication order for additional information. Patient instructed to report any adverse reaction to staff immediately.    Vial/Syringe: Single dose vial. Was entire vial of medication used? Yes  Was this medication supplied by the patient? No  Is this a medication the patient will need to receive again? Yes. Verified that the patient has refills remaining in their prescription.  Lisa Santos LPN

## 2024-06-27 ENCOUNTER — ALLIED HEALTH/NURSE VISIT (OUTPATIENT)
Dept: OBGYN | Facility: CLINIC | Age: 24
End: 2024-06-27
Payer: COMMERCIAL

## 2024-06-27 ENCOUNTER — OFFICE VISIT (OUTPATIENT)
Dept: OBGYN | Facility: CLINIC | Age: 24
End: 2024-06-27
Payer: COMMERCIAL

## 2024-06-27 VITALS
HEART RATE: 75 BPM | TEMPERATURE: 97 F | WEIGHT: 114.7 LBS | RESPIRATION RATE: 16 BRPM | DIASTOLIC BLOOD PRESSURE: 75 MMHG | HEIGHT: 65 IN | BODY MASS INDEX: 19.11 KG/M2 | SYSTOLIC BLOOD PRESSURE: 109 MMHG

## 2024-06-27 DIAGNOSIS — N97.0 SECONDARY ANOVULATORY INFERTILITY: Primary | ICD-10-CM

## 2024-06-27 DIAGNOSIS — N97.9 FEMALE INFERTILITY: Primary | ICD-10-CM

## 2024-06-27 PROCEDURE — 99207 PR NO CHARGE NURSE ONLY: CPT

## 2024-06-27 PROCEDURE — 58322 ARTIFICIAL INSEMINATION: CPT | Performed by: OBSTETRICS & GYNECOLOGY

## 2024-06-27 NOTE — PROGRESS NOTES
"[facility]  OB/GYN Clinic    CC: IUI      S: Patient presents with her partner, who has left a fresh semen specimen in collection cup. Trigger injection yesterday. Denies any symptoms. Informed consent was singed after a discussion of the risks of intrauterine insemination (bleeding, infection, transmission of STIs, uterine cramping).     O:   VS: /75 (BP Location: Right arm, Patient Position: Sitting, Cuff Size: Adult Regular)   Pulse 75   Temp 97  F (36.1  C)   Resp 16   Ht 1.651 m (5' 5\")   Wt 52 kg (114 lb 11.2 oz)   LMP 06/14/2024 (Exact Date)   BMI 19.09 kg/m      Procedure: The patient was placed in dorsal lithotomy position. A speculum was placed in the vagina and the cervix visualized. A specimen catheter labeled with the patient's information was placed through the cervix and the sample was injected. The patient tolerated the procedure well. There were no complications and no blood loss. The patient remained in our clinic with elevatd pelvis for 15 min after the insemination. Plan for day #21 progesterone. Plan for pregnancy test with missed menses.     Stefanie Mcnair MD  6/27/2024    "

## 2024-06-28 ENCOUNTER — MYC MEDICAL ADVICE (OUTPATIENT)
Dept: FAMILY MEDICINE | Facility: CLINIC | Age: 24
End: 2024-06-28
Payer: COMMERCIAL

## 2024-06-28 DIAGNOSIS — E03.8 SUBCLINICAL HYPOTHYROIDISM: ICD-10-CM

## 2024-06-29 RX ORDER — LEVOTHYROXINE SODIUM 25 UG/1
25 TABLET ORAL DAILY
Qty: 90 TABLET | Refills: 1 | Status: SHIPPED | OUTPATIENT
Start: 2024-06-29 | End: 2024-07-12

## 2024-07-03 ENCOUNTER — LAB (OUTPATIENT)
Dept: LAB | Facility: CLINIC | Age: 24
End: 2024-07-03
Payer: COMMERCIAL

## 2024-07-03 DIAGNOSIS — E03.8 SUBCLINICAL HYPOTHYROIDISM: ICD-10-CM

## 2024-07-03 DIAGNOSIS — Z31.41 FERTILITY TESTING: ICD-10-CM

## 2024-07-03 LAB — TSH SERPL DL<=0.005 MIU/L-ACNC: 2.83 UIU/ML (ref 0.3–4.2)

## 2024-07-03 PROCEDURE — 36415 COLL VENOUS BLD VENIPUNCTURE: CPT

## 2024-07-03 PROCEDURE — 84144 ASSAY OF PROGESTERONE: CPT

## 2024-07-03 PROCEDURE — 84443 ASSAY THYROID STIM HORMONE: CPT

## 2024-07-04 LAB — PROGEST SERPL-MCNC: 9.2 NG/ML

## 2024-07-12 ENCOUNTER — TELEPHONE (OUTPATIENT)
Dept: FAMILY MEDICINE | Facility: CLINIC | Age: 24
End: 2024-07-12
Payer: COMMERCIAL

## 2024-07-12 ENCOUNTER — MYC REFILL (OUTPATIENT)
Dept: FAMILY MEDICINE | Facility: CLINIC | Age: 24
End: 2024-07-12
Payer: COMMERCIAL

## 2024-07-12 DIAGNOSIS — E03.8 SUBCLINICAL HYPOTHYROIDISM: ICD-10-CM

## 2024-07-12 DIAGNOSIS — Z31.41 FERTILITY TESTING: ICD-10-CM

## 2024-07-12 RX ORDER — LETROZOLE 2.5 MG/1
2.5 TABLET, FILM COATED ORAL DAILY
Qty: 5 TABLET | Refills: 12 | Status: SHIPPED | OUTPATIENT
Start: 2024-07-12 | End: 2024-08-06

## 2024-07-12 RX ORDER — LEVOTHYROXINE SODIUM 25 UG/1
25 TABLET ORAL DAILY
Qty: 90 TABLET | Refills: 1 | Status: SHIPPED | OUTPATIENT
Start: 2024-07-12

## 2024-07-12 NOTE — TELEPHONE ENCOUNTER
Order/Referral Request    Who is requesting: patients insurance - [employer] restricted recipient program     Orders being requested: referral for outpatient office visit - dakota esparza @ [facility] 4/3     Reason service is needed/diagnosis: patients insurance is requiring     When are orders needed by: cecip    Has this been discussed with Provider: No    Does patient have a preference on a Group/Provider/Facility? N/a     Does patient have an appointment scheduled?: No    Where to send orders: Fax to [employer]  [phone]     Could we send this information to you in [employer] or would you prefer to receive a phone call?:   Patient would prefer a phone call   Okay to leave a detailed message?: No at Other phone number:  call neda at "[employer]" [phone]

## 2024-07-12 NOTE — TELEPHONE ENCOUNTER
This was ER visit, not outpatient visit.    Also we not suppose provide back up referrals for past visits.    SERJIO Calixto APRN CNP

## 2024-07-17 ENCOUNTER — PATIENT OUTREACH (OUTPATIENT)
Dept: CARE COORDINATION | Facility: CLINIC | Age: 24
End: 2024-07-17
Payer: COMMERCIAL

## 2024-07-23 ENCOUNTER — OFFICE VISIT (OUTPATIENT)
Dept: OBGYN | Facility: CLINIC | Age: 24
End: 2024-07-23
Payer: COMMERCIAL

## 2024-07-23 VITALS
BODY MASS INDEX: 22.95 KG/M2 | DIASTOLIC BLOOD PRESSURE: 76 MMHG | WEIGHT: 116.9 LBS | HEIGHT: 60 IN | HEART RATE: 80 BPM | SYSTOLIC BLOOD PRESSURE: 110 MMHG | TEMPERATURE: 97.9 F

## 2024-07-23 DIAGNOSIS — N97.0 SECONDARY ANOVULATORY INFERTILITY: Primary | ICD-10-CM

## 2024-07-23 PROCEDURE — 76830 TRANSVAGINAL US NON-OB: CPT | Performed by: OBSTETRICS & GYNECOLOGY

## 2024-07-23 PROCEDURE — 99213 OFFICE O/P EST LOW 20 MIN: CPT | Mod: 25 | Performed by: OBSTETRICS & GYNECOLOGY

## 2024-07-23 NOTE — PROGRESS NOTES
Follicular US:      CD # 11 Patient's last menstrual period was 07/13/2024 (exact date).  Cycle #3, took 2x days of 2.5mg letrazole this cycle     Transvaginal US:   Retroverted uterus with 8.4 mm EMS starting to be trilaminar, right ovary with 24mm follicle, left ovary with 11mm follicle, ovaries appear polycystic       A/P: 25 yo G0 with primary infertility, PCOS/anovulation.   S/p hysteroscopy/polypectomy x2  Letrazole OI, monitored cycle. Plan for b-hcg trigger injection on 7/24 and IUI 7/25.   Pt has failed outpatient OI with letrazole and needs a monitored cycle. This is medically-necessary as the next step in fertility treatments.   Needs cycle day #21 progesterone level.      Stefanie Mcnair MD  OB/GYN

## 2024-07-24 ENCOUNTER — ALLIED HEALTH/NURSE VISIT (OUTPATIENT)
Dept: OBGYN | Facility: CLINIC | Age: 24
End: 2024-07-24
Payer: COMMERCIAL

## 2024-07-24 ENCOUNTER — TELEPHONE (OUTPATIENT)
Dept: OBGYN | Facility: CLINIC | Age: 24
End: 2024-07-24

## 2024-07-24 DIAGNOSIS — N97.9 FEMALE INFERTILITY: Primary | ICD-10-CM

## 2024-07-24 PROCEDURE — 96372 THER/PROPH/DIAG INJ SC/IM: CPT | Performed by: OBSTETRICS & GYNECOLOGY

## 2024-07-24 PROCEDURE — 99207 PR NO CHARGE NURSE ONLY: CPT

## 2024-07-24 RX ORDER — CHORIONIC GONADOTROPIN 10000 UNIT
10000 KIT INTRAMUSCULAR ONCE
Status: COMPLETED | OUTPATIENT
Start: 2024-07-24 | End: 2024-07-24

## 2024-07-24 RX ORDER — CHORIONIC GONADOTROPIN 10000 UNIT
10000 KIT INTRAMUSCULAR ONCE
Status: CANCELLED | OUTPATIENT
Start: 2024-07-24

## 2024-07-24 RX ADMIN — CHORIONIC GONADOTROPIN 10000 UNITS: KIT at 13:13

## 2024-07-24 NOTE — PROGRESS NOTES
Clinic Administered Medication Documentation      Injectable Medication Documentation    Is there an active order (written within the past 365 days, with administrations remaining, not ) in the chart? Yes.     Patient was given  Chorionic Gonadotropin 10,000 units . Prior to medication administration, verified patient's identity using patient s name and date of birth. Please see MAR and medication order for additional information. Patient instructed to remain in clinic for 15 minutes and report any adverse reaction to staff immediately.    Vial/Syringe: Single dose vial. Was entire vial of medication used? Yes  Was this medication supplied by the patient? No  Is this a medication the patient will need to receive again? No - not necessary to check for refills remaining.

## 2024-07-25 ENCOUNTER — OFFICE VISIT (OUTPATIENT)
Dept: OBGYN | Facility: CLINIC | Age: 24
End: 2024-07-25
Payer: COMMERCIAL

## 2024-07-25 ENCOUNTER — OFFICE VISIT (OUTPATIENT)
Dept: FAMILY MEDICINE | Facility: CLINIC | Age: 24
End: 2024-07-25
Payer: COMMERCIAL

## 2024-07-25 VITALS
TEMPERATURE: 98.5 F | OXYGEN SATURATION: 99 % | BODY MASS INDEX: 18.99 KG/M2 | SYSTOLIC BLOOD PRESSURE: 116 MMHG | HEIGHT: 65 IN | HEART RATE: 90 BPM | WEIGHT: 114 LBS | RESPIRATION RATE: 18 BRPM | DIASTOLIC BLOOD PRESSURE: 69 MMHG

## 2024-07-25 VITALS
DIASTOLIC BLOOD PRESSURE: 69 MMHG | TEMPERATURE: 98.4 F | BODY MASS INDEX: 19.09 KG/M2 | HEART RATE: 79 BPM | WEIGHT: 114.6 LBS | SYSTOLIC BLOOD PRESSURE: 116 MMHG | RESPIRATION RATE: 18 BRPM | HEIGHT: 65 IN

## 2024-07-25 DIAGNOSIS — R10.9 FLANK PAIN: ICD-10-CM

## 2024-07-25 DIAGNOSIS — N97.0 SECONDARY ANOVULATORY INFERTILITY: Primary | ICD-10-CM

## 2024-07-25 DIAGNOSIS — R42 DIZZINESS: ICD-10-CM

## 2024-07-25 DIAGNOSIS — Z86.2 HISTORY OF ANEMIA: ICD-10-CM

## 2024-07-25 DIAGNOSIS — G43.909 MIGRAINE WITHOUT STATUS MIGRAINOSUS, NOT INTRACTABLE, UNSPECIFIED MIGRAINE TYPE: Primary | ICD-10-CM

## 2024-07-25 LAB
ALBUMIN SERPL BCG-MCNC: 4.7 G/DL (ref 3.5–5.2)
ALBUMIN UR-MCNC: NEGATIVE MG/DL
ALP SERPL-CCNC: 86 U/L (ref 40–150)
ALT SERPL W P-5'-P-CCNC: 15 U/L (ref 0–50)
ANION GAP SERPL CALCULATED.3IONS-SCNC: 13 MMOL/L (ref 7–15)
APPEARANCE UR: CLEAR
AST SERPL W P-5'-P-CCNC: 22 U/L (ref 0–45)
BACTERIA #/AREA URNS HPF: ABNORMAL /HPF
BASOPHILS # BLD AUTO: 0 10E3/UL (ref 0–0.2)
BASOPHILS NFR BLD AUTO: 0 %
BILIRUB SERPL-MCNC: 0.4 MG/DL
BILIRUB UR QL STRIP: NEGATIVE
BUN SERPL-MCNC: 12.4 MG/DL (ref 6–20)
CALCIUM SERPL-MCNC: 9.5 MG/DL (ref 8.8–10.4)
CHLORIDE SERPL-SCNC: 103 MMOL/L (ref 98–107)
COLOR UR AUTO: YELLOW
CREAT SERPL-MCNC: 0.72 MG/DL (ref 0.51–0.95)
EGFRCR SERPLBLD CKD-EPI 2021: >90 ML/MIN/1.73M2
EOSINOPHIL # BLD AUTO: 0.1 10E3/UL (ref 0–0.7)
EOSINOPHIL NFR BLD AUTO: 2 %
ERYTHROCYTE [DISTWIDTH] IN BLOOD BY AUTOMATED COUNT: 14.2 % (ref 10–15)
FERRITIN SERPL-MCNC: 9 NG/ML (ref 6–175)
GLUCOSE SERPL-MCNC: 82 MG/DL (ref 70–99)
GLUCOSE UR STRIP-MCNC: NEGATIVE MG/DL
HCO3 SERPL-SCNC: 24 MMOL/L (ref 22–29)
HCT VFR BLD AUTO: 40.9 % (ref 35–47)
HGB BLD-MCNC: 12.9 G/DL (ref 11.7–15.7)
HGB UR QL STRIP: NEGATIVE
IMM GRANULOCYTES # BLD: 0 10E3/UL
IMM GRANULOCYTES NFR BLD: 0 %
KETONES UR STRIP-MCNC: NEGATIVE MG/DL
LEUKOCYTE ESTERASE UR QL STRIP: NEGATIVE
LYMPHOCYTES # BLD AUTO: 1.6 10E3/UL (ref 0.8–5.3)
LYMPHOCYTES NFR BLD AUTO: 28 %
MCH RBC QN AUTO: 24.3 PG (ref 26.5–33)
MCHC RBC AUTO-ENTMCNC: 31.5 G/DL (ref 31.5–36.5)
MCV RBC AUTO: 77 FL (ref 78–100)
MONOCYTES # BLD AUTO: 0.4 10E3/UL (ref 0–1.3)
MONOCYTES NFR BLD AUTO: 8 %
MUCOUS THREADS #/AREA URNS LPF: PRESENT /LPF
NEUTROPHILS # BLD AUTO: 3.4 10E3/UL (ref 1.6–8.3)
NEUTROPHILS NFR BLD AUTO: 62 %
NITRATE UR QL: NEGATIVE
PH UR STRIP: 7 [PH] (ref 5–7)
PLATELET # BLD AUTO: 188 10E3/UL (ref 150–450)
POTASSIUM SERPL-SCNC: 4 MMOL/L (ref 3.4–5.3)
PROT SERPL-MCNC: 7.5 G/DL (ref 6.4–8.3)
RBC # BLD AUTO: 5.3 10E6/UL (ref 3.8–5.2)
RBC #/AREA URNS AUTO: ABNORMAL /HPF
SODIUM SERPL-SCNC: 140 MMOL/L (ref 135–145)
SP GR UR STRIP: 1.01 (ref 1–1.03)
SQUAMOUS #/AREA URNS AUTO: ABNORMAL /LPF
UROBILINOGEN UR STRIP-ACNC: 1 E.U./DL
WBC # BLD AUTO: 5.6 10E3/UL (ref 4–11)
WBC #/AREA URNS AUTO: ABNORMAL /HPF

## 2024-07-25 PROCEDURE — 80053 COMPREHEN METABOLIC PANEL: CPT | Performed by: NURSE PRACTITIONER

## 2024-07-25 PROCEDURE — 82728 ASSAY OF FERRITIN: CPT | Performed by: NURSE PRACTITIONER

## 2024-07-25 PROCEDURE — 36415 COLL VENOUS BLD VENIPUNCTURE: CPT | Performed by: NURSE PRACTITIONER

## 2024-07-25 PROCEDURE — 99214 OFFICE O/P EST MOD 30 MIN: CPT | Performed by: NURSE PRACTITIONER

## 2024-07-25 PROCEDURE — 81001 URINALYSIS AUTO W/SCOPE: CPT | Performed by: NURSE PRACTITIONER

## 2024-07-25 PROCEDURE — 85025 COMPLETE CBC W/AUTO DIFF WBC: CPT | Performed by: NURSE PRACTITIONER

## 2024-07-25 PROCEDURE — 58322 ARTIFICIAL INSEMINATION: CPT | Performed by: OBSTETRICS & GYNECOLOGY

## 2024-07-25 RX ORDER — CHORIONIC GONADOTROPIN 10000 UNIT
10000 KIT INTRAMUSCULAR ONCE
Status: ACTIVE | OUTPATIENT
Start: 2024-08-08

## 2024-07-25 RX ORDER — SUMATRIPTAN 25 MG/1
25 TABLET, FILM COATED ORAL
Qty: 8 TABLET | Refills: 3 | Status: SHIPPED | OUTPATIENT
Start: 2024-07-25

## 2024-07-25 RX ORDER — KETOROLAC TROMETHAMINE 10 MG/1
10 TABLET, FILM COATED ORAL EVERY 6 HOURS PRN
Qty: 15 TABLET | Refills: 0 | Status: SHIPPED | OUTPATIENT
Start: 2024-07-25 | End: 2024-09-09

## 2024-07-25 ASSESSMENT — PATIENT HEALTH QUESTIONNAIRE - PHQ9
SUM OF ALL RESPONSES TO PHQ QUESTIONS 1-9: 4
SUM OF ALL RESPONSES TO PHQ QUESTIONS 1-9: 4
10. IF YOU CHECKED OFF ANY PROBLEMS, HOW DIFFICULT HAVE THESE PROBLEMS MADE IT FOR YOU TO DO YOUR WORK, TAKE CARE OF THINGS AT HOME, OR GET ALONG WITH OTHER PEOPLE: SOMEWHAT DIFFICULT

## 2024-07-25 ASSESSMENT — PAIN SCALES - GENERAL: PAINLEVEL: NO PAIN (0)

## 2024-07-25 NOTE — PROGRESS NOTES
Patients Significant other came into clinic to leave a sperm sample for patients scheduled IUI appointment with Dr. Mcnair  . Please see office visit note for further documentation.    Bebeto Sevilla MA on 7/25/2024 at 2:15 PM

## 2024-07-25 NOTE — NURSING NOTE
"Initial /69 (BP Location: Right arm, Patient Position: Sitting, Cuff Size: Adult Regular)   Pulse 79   Temp 98.4  F (36.9  C) (Tympanic)   Resp 18   Ht 1.651 m (5' 5\")   Wt 52 kg (114 lb 9.6 oz)   LMP 07/13/2024 (Exact Date)   BMI 19.07 kg/m   Estimated body mass index is 19.07 kg/m  as calculated from the following:    Height as of this encounter: 1.651 m (5' 5\").    Weight as of this encounter: 52 kg (114 lb 9.6 oz). .    " Consent: The risks of atrophy were reviewed with the patient.

## 2024-07-25 NOTE — PROGRESS NOTES
Answers submitted by the patient for this visit:  Patient Health Questionnaire (Submitted on 2024)  If you checked off any problems, how difficult have these problems made it for you to do your work, take care of things at home, or get along with other people?: Somewhat difficult  PHQ9 TOTAL SCORE: 4    Assessment & Plan     Migraine without status migrainosus, not intractable, unspecified migraine type    - SUMAtriptan (IMITREX) 25 MG tablet; Take 1 tablet (25 mg) by mouth at onset of headache for migraine May repeat in 2 hours. Max 8 tablets/24 hours.  - ketorolac (TORADOL) 10 MG tablet; Take 1 tablet (10 mg) by mouth every 6 hours as needed for moderate pain    Dizziness  -possibly due to orthostatic hypotension. Her orthostatic BP readings were normal during clinic visit:  Sittin/79 HR 90  Standin/81, HR 96  -recommended to increase fluid intake, may also try compression stockings  -labs were normal today, exam normal   - REVIEW OF HEALTH MAINTENANCE PROTOCOL ORDERS  - CBC with platelets and differential; Future  - Comprehensive metabolic panel (BMP + Alb, Alk Phos, ALT, AST, Total. Bili, TP); Future  - CBC with platelets and differential  - Comprehensive metabolic panel (BMP + Alb, Alk Phos, ALT, AST, Total. Bili, TP)    Flank pain  -UA normal   - UA with Microscopic reflex to Culture - lab collect; Future  - UA with Microscopic reflex to Culture - lab collect  - UA Microscopic with Reflex to Culture    History of anemia  -Ferritin level on the low end of normal  -recommended to restart iron supplement  - Ferritin; Future  - Ferritin    Subjective   Aury is a 24 year old, presenting for the following health issues:  Dizziness        2024     4:00 PM   Additional Questions   Roomed by april   Accompanied by self         2024     4:00 PM   Patient Reported Additional Medications   Patient reports taking the following new medications none     History of Present Illness       Reason  "for visit:  Recurring issue that happens sometimes  Symptom onset:  1-2 weeks ago  Symptoms include:  Headaches every day, dizziness, lightheadedness, over heating, vision problems, weak and shakey  Symptom intensity:  Moderate  Symptom progression:  Staying the same  Had these symptoms before:  No  What makes it worse:  No  What makes it better:  No    She eats 0-1 servings of fruits and vegetables daily.She consumes 1 sweetened beverage(s) daily.She exercises with enough effort to increase her heart rate 9 or less minutes per day.  She exercises with enough effort to increase her heart rate 3 or less days per week.   She is taking medications regularly.             Review of Systems  Constitutional, HEENT, cardiovascular, pulmonary, gi and gu systems are negative, except as otherwise noted.      Objective    /69   Pulse 90   Temp 98.5  F (36.9  C) (Tympanic)   Resp 18   Ht 1.651 m (5' 5\")   Wt 51.7 kg (114 lb)   LMP 07/13/2024 (Exact Date)   SpO2 99%   BMI 18.97 kg/m    Body mass index is 18.97 kg/m .  Physical Exam   GENERAL: alert and no distress  EYES: Eyes grossly normal to inspection, PERRL and conjunctivae and sclerae normal  RESP: lungs clear to auscultation - no rales, rhonchi or wheezes  CV: regular rate and rhythm, normal S1 S2, no S3 or S4, no murmur, click or rub, no peripheral edema   MS: no gross musculoskeletal defects noted, no edema  SKIN: no suspicious lesions or rashes  NEURO: Normal strength and tone, mentation intact and speech normal  PSYCH: mentation appears normal, affect normal/bright    Results for orders placed or performed in visit on 07/25/24 (from the past 24 hour(s))   CBC with platelets and differential    Narrative    The following orders were created for panel order CBC with platelets and differential.  Procedure                               Abnormality         Status                     ---------                               -----------         ------              "        CBC with platelets and d...[[id number]]  Abnormal            Final result                 Please view results for these tests on the individual orders.   Comprehensive metabolic panel (BMP + Alb, Alk Phos, ALT, AST, Total. Bili, TP)   Result Value Ref Range    Sodium 140 135 - 145 mmol/L    Potassium 4.0 3.4 - 5.3 mmol/L    Carbon Dioxide (CO2) 24 22 - 29 mmol/L    Anion Gap 13 7 - 15 mmol/L    Urea Nitrogen 12.4 6.0 - 20.0 mg/dL    Creatinine 0.72 0.51 - 0.95 mg/dL    GFR Estimate >90 >60 mL/min/1.73m2    Calcium 9.5 8.8 - 10.4 mg/dL    Chloride 103 98 - 107 mmol/L    Glucose 82 70 - 99 mg/dL    Alkaline Phosphatase 86 40 - 150 U/L    AST 22 0 - 45 U/L    ALT 15 0 - 50 U/L    Protein Total 7.5 6.4 - 8.3 g/dL    Albumin 4.7 3.5 - 5.2 g/dL    Bilirubin Total 0.4 <=1.2 mg/dL   UA with Microscopic reflex to Culture - lab collect    Specimen: Urine, Midstream   Result Value Ref Range    Color Urine Yellow Colorless, Straw, Light Yellow, Yellow    Appearance Urine Clear Clear    Glucose Urine Negative Negative mg/dL    Bilirubin Urine Negative Negative    Ketones Urine Negative Negative mg/dL    Specific Gravity Urine 1.010 1.003 - 1.035    Blood Urine Negative Negative    pH Urine 7.0 5.0 - 7.0    Protein Albumin Urine Negative Negative mg/dL    Urobilinogen Urine 1.0 0.2, 1.0 E.U./dL    Nitrite Urine Negative Negative    Leukocyte Esterase Urine Negative Negative   CBC with platelets and differential   Result Value Ref Range    WBC Count 5.6 4.0 - 11.0 10e3/uL    RBC Count 5.30 (H) 3.80 - 5.20 10e6/uL    Hemoglobin 12.9 11.7 - 15.7 g/dL    Hematocrit 40.9 35.0 - 47.0 %    MCV 77 (L) 78 - 100 fL    MCH 24.3 (L) 26.5 - 33.0 pg    MCHC 31.5 31.5 - 36.5 g/dL    RDW 14.2 10.0 - 15.0 %    Platelet Count 188 150 - 450 10e3/uL    % Neutrophils 62 %    % Lymphocytes 28 %    % Monocytes 8 %    % Eosinophils 2 %    % Basophils 0 %    % Immature Granulocytes 0 %    Absolute Neutrophils 3.4 1.6 - 8.3 10e3/uL    Absolute  Lymphocytes 1.6 0.8 - 5.3 10e3/uL    Absolute Monocytes 0.4 0.0 - 1.3 10e3/uL    Absolute Eosinophils 0.1 0.0 - 0.7 10e3/uL    Absolute Basophils 0.0 0.0 - 0.2 10e3/uL    Absolute Immature Granulocytes 0.0 <=0.4 10e3/uL   UA Microscopic with Reflex to Culture   Result Value Ref Range    Bacteria Urine Moderate (A) None Seen /HPF    RBC Urine 0-2 0-2 /HPF /HPF    WBC Urine 0-5 0-5 /HPF /HPF    Squamous Epithelials Urine Few (A) None Seen /LPF    Mucus Urine Present (A) None Seen /LPF    Narrative    Urine Culture not indicated   Ferritin   Result Value Ref Range    Ferritin 9 6 - 175 ng/mL           Signed Electronically by: CIRO Kline CNP

## 2024-07-26 ENCOUNTER — MYC MEDICAL ADVICE (OUTPATIENT)
Dept: FAMILY MEDICINE | Facility: CLINIC | Age: 24
End: 2024-07-26
Payer: COMMERCIAL

## 2024-07-26 RX ORDER — FERROUS SULFATE 325(65) MG
325 TABLET, DELAYED RELEASE (ENTERIC COATED) ORAL DAILY
Qty: 90 TABLET | Refills: 0 | Status: SHIPPED | OUTPATIENT
Start: 2024-07-26

## 2024-07-26 NOTE — PROGRESS NOTES
"[facility]  OB/GYN Clinic    CC: IUI    Infertility treatments to date:   CD#11 Patient's last menstrual period was 07/13/2024 (exact date).  Cycle#3, took 2x days of 2.5mg letrazole this cycle    S: Patient presents with her partner, who has left a fresh semen specimen in collection cup. B-HCG trigger injection yesterday. Denies any symptoms. Informed consent was singed after a discussion of the risks of intrauterine insemination (bleeding, infection, transmission of STIs, uterine cramping).     O:   VS: /69 (BP Location: Right arm, Patient Position: Sitting, Cuff Size: Adult Regular)   Pulse 79   Temp 98.4  F (36.9  C) (Tympanic)   Resp 18   Ht 1.651 m (5' 5\")   Wt 52 kg (114 lb 9.6 oz)   LMP 07/13/2024 (Exact Date)   BMI 19.07 kg/m      Procedure: The patient was placed in dorsal lithotomy position. A speculum was placed in the vagina and the cervix visualized. A specimen catheter labeled with the patient's information was placed through the cervix and the sample was injected. The patient tolerated the procedure well. There were no complications and no blood loss. The patient remained in our clinic with elevatd pelvis for 20 min after the insemination. Plan for day #21 progesterone. Plan for pregnancy test with missed menses.     Stefanie Mcnair MD  7/26/2024    "

## 2024-07-29 NOTE — TELEPHONE ENCOUNTER
Marily,    Please see Live Mobile message below and advise. Last OV 07/25/24.    Thank you  Sukhdev GARNER RN  [facility]

## 2024-07-31 ENCOUNTER — PATIENT OUTREACH (OUTPATIENT)
Dept: CARE COORDINATION | Facility: CLINIC | Age: 24
End: 2024-07-31
Payer: COMMERCIAL

## 2024-08-02 ENCOUNTER — LAB (OUTPATIENT)
Dept: LAB | Facility: CLINIC | Age: 24
End: 2024-08-02
Payer: COMMERCIAL

## 2024-08-02 DIAGNOSIS — Z31.41 FERTILITY TESTING: ICD-10-CM

## 2024-08-02 LAB — PROGEST SERPL-MCNC: 10.6 NG/ML

## 2024-08-02 PROCEDURE — 36415 COLL VENOUS BLD VENIPUNCTURE: CPT

## 2024-08-02 PROCEDURE — 84144 ASSAY OF PROGESTERONE: CPT

## 2024-08-06 ENCOUNTER — MYC REFILL (OUTPATIENT)
Dept: FAMILY MEDICINE | Facility: CLINIC | Age: 24
End: 2024-08-06
Payer: COMMERCIAL

## 2024-08-06 DIAGNOSIS — Z31.41 FERTILITY TESTING: ICD-10-CM

## 2024-08-06 RX ORDER — LETROZOLE 2.5 MG/1
2.5 TABLET, FILM COATED ORAL DAILY
Qty: 5 TABLET | Refills: 12 | Status: SHIPPED | OUTPATIENT
Start: 2024-08-06 | End: 2024-09-02

## 2024-08-06 NOTE — TELEPHONE ENCOUNTER
Requested Prescriptions   Pending Prescriptions Disp Refills    letrozole (FEMARA) 2.5 MG tablet 5 tablet 12     Sig: Take 1 tablet (2.5 mg) by mouth daily On period days day 3 to 7       There is no refill protocol information for this order

## 2024-08-15 ENCOUNTER — OFFICE VISIT (OUTPATIENT)
Dept: OBGYN | Facility: CLINIC | Age: 24
End: 2024-08-15
Payer: COMMERCIAL

## 2024-08-15 VITALS
HEART RATE: 71 BPM | DIASTOLIC BLOOD PRESSURE: 74 MMHG | TEMPERATURE: 98.1 F | RESPIRATION RATE: 18 BRPM | HEIGHT: 60 IN | SYSTOLIC BLOOD PRESSURE: 108 MMHG | BODY MASS INDEX: 22.7 KG/M2 | WEIGHT: 115.6 LBS

## 2024-08-15 DIAGNOSIS — N97.0 SECONDARY ANOVULATORY INFERTILITY: Primary | ICD-10-CM

## 2024-08-15 PROCEDURE — 99213 OFFICE O/P EST LOW 20 MIN: CPT | Mod: 25 | Performed by: OBSTETRICS & GYNECOLOGY

## 2024-08-15 PROCEDURE — 96372 THER/PROPH/DIAG INJ SC/IM: CPT | Performed by: OBSTETRICS & GYNECOLOGY

## 2024-08-15 PROCEDURE — 76830 TRANSVAGINAL US NON-OB: CPT | Performed by: OBSTETRICS & GYNECOLOGY

## 2024-08-15 RX ORDER — CHORIONIC GONADOTROPIN 10000 UNIT
10000 KIT INTRAMUSCULAR ONCE
Status: COMPLETED | OUTPATIENT
Start: 2024-08-15 | End: 2024-08-15

## 2024-08-15 RX ADMIN — CHORIONIC GONADOTROPIN 10000 UNITS: KIT at 13:45

## 2024-08-15 NOTE — PROGRESS NOTES
Clinic Administered Medication Documentation        Patient was given Chorionic Gonadotropin. Prior to medication administration, verified patient's identity using patient s name and date of birth. Please see MAR and medication order for additional information. Patient instructed to remain in clinic for 15 minutes and report any adverse reaction to staff immediately.    Vial/Syringe: Single dose vial. Was entire vial of medication used? Yes  Given in left glute

## 2024-08-15 NOTE — PROGRESS NOTES
Follicular US:      CD # 10 Patient's last menstrual period was 07/13/2024 (exact date).  Cycle #4, 2.5mg letrazole      Transvaginal US:   Retroverted uterus with 11.5 mm EMS starting to be trilaminar, right ovary with no follicles, left ovary with 18mm and 21 mm follicles, ovaries appear polycystic       A/P: 25 yo G0 with primary infertility, PCOS/anovulation.   S/p hysteroscopy/polypectomy x2  Letrazole OI, monitored cycle. Plan for b-hcg trigger injection today and IUI 8/16.   Pt has failed outpatient OI with letrazole and needs a monitored cycle. This is medically-necessary as the next step in fertility treatments.   Needs cycle day #21 progesterone level.      Stefanie Mcnair MD  OB/GYN

## 2024-08-15 NOTE — NURSING NOTE
Initial /74 (BP Location: Right arm, Patient Position: Sitting, Cuff Size: Adult Regular)   Pulse 71   Temp 98.1  F (36.7  C) (Tympanic)   Resp 18   Ht 1.524 m (5')   Wt 52.4 kg (115 lb 9.6 oz)   LMP 07/13/2024 (Exact Date)   BMI 22.58 kg/m   Estimated body mass index is 22.58 kg/m  as calculated from the following:    Height as of this encounter: 1.524 m (5').    Weight as of this encounter: 52.4 kg (115 lb 9.6 oz). .

## 2024-08-16 ENCOUNTER — OFFICE VISIT (OUTPATIENT)
Dept: OBGYN | Facility: CLINIC | Age: 24
End: 2024-08-16
Payer: COMMERCIAL

## 2024-08-16 ENCOUNTER — ALLIED HEALTH/NURSE VISIT (OUTPATIENT)
Dept: OBGYN | Facility: CLINIC | Age: 24
End: 2024-08-16
Payer: COMMERCIAL

## 2024-08-16 VITALS
BODY MASS INDEX: 23.11 KG/M2 | WEIGHT: 117.7 LBS | HEIGHT: 60 IN | TEMPERATURE: 97.5 F | HEART RATE: 86 BPM | RESPIRATION RATE: 16 BRPM | SYSTOLIC BLOOD PRESSURE: 121 MMHG | DIASTOLIC BLOOD PRESSURE: 75 MMHG

## 2024-08-16 DIAGNOSIS — N97.9 FEMALE INFERTILITY: Primary | ICD-10-CM

## 2024-08-16 DIAGNOSIS — N97.0 SECONDARY ANOVULATORY INFERTILITY: Primary | ICD-10-CM

## 2024-08-16 PROCEDURE — 58322 ARTIFICIAL INSEMINATION: CPT | Performed by: OBSTETRICS & GYNECOLOGY

## 2024-08-16 PROCEDURE — 99207 PR NO CHARGE NURSE ONLY: CPT

## 2024-08-16 NOTE — PROGRESS NOTES
[facility]  OB/GYN Clinic    CC: IUI    Infertility treatments to date:   Cycle #4, 2.5mg letrazole     S: Patient presents with her partner, who has left a fresh semen specimen in collection cup. B-HCG trigger injection yesterday. Denies any symptoms. Informed consent was singed after a discussion of the risks of intrauterine insemination (bleeding, infection, transmission of STIs, uterine cramping).     O:   VS: /75 (BP Location: Right arm, Patient Position: Sitting, Cuff Size: Adult Regular)   Pulse 86   Temp 97.5  F (36.4  C)   Resp 16   Ht 1.524 m (5')   Wt 53.4 kg (117 lb 11.2 oz)   LMP 08/06/2024 (Exact Date)   BMI 22.99 kg/m      Procedure: The patient was placed in dorsal lithotomy position. A speculum was placed in the vagina and the cervix visualized. A specimen catheter labeled with the patient's information was placed through the cervix and the sample was injected. The patient tolerated the procedure well. There were no complications and no blood loss. The patient remained in our clinic with elevatd pelvis for 20 min after the insemination. Plan for day #21 progesterone. Plan for pregnancy test with missed menses.     Stefanie Mcnair MD  8/16/2024

## 2024-08-16 NOTE — PROGRESS NOTES
Patients Significant other came into clinic to leave a sperm sample for patients scheduled IUI appointment with Dr. Mcnair. Please see office visit note for further documentation.    Lexi Rai CMA on 8/16/2024 at 1:01 PM

## 2024-08-23 ENCOUNTER — LAB (OUTPATIENT)
Dept: LAB | Facility: CLINIC | Age: 24
End: 2024-08-23
Payer: COMMERCIAL

## 2024-08-23 DIAGNOSIS — Z31.41 FERTILITY TESTING: ICD-10-CM

## 2024-08-23 LAB — PROGEST SERPL-MCNC: 12.6 NG/ML

## 2024-08-23 PROCEDURE — 36415 COLL VENOUS BLD VENIPUNCTURE: CPT

## 2024-08-23 PROCEDURE — 84144 ASSAY OF PROGESTERONE: CPT

## 2024-09-02 ENCOUNTER — MYC REFILL (OUTPATIENT)
Dept: FAMILY MEDICINE | Facility: CLINIC | Age: 24
End: 2024-09-02
Payer: COMMERCIAL

## 2024-09-02 DIAGNOSIS — Z31.41 FERTILITY TESTING: ICD-10-CM

## 2024-09-04 RX ORDER — LETROZOLE 2.5 MG/1
2.5 TABLET, FILM COATED ORAL DAILY
Qty: 5 TABLET | Refills: 12 | Status: SHIPPED | OUTPATIENT
Start: 2024-09-04

## 2024-09-09 DIAGNOSIS — G43.909 MIGRAINE WITHOUT STATUS MIGRAINOSUS, NOT INTRACTABLE, UNSPECIFIED MIGRAINE TYPE: ICD-10-CM

## 2024-09-09 RX ORDER — KETOROLAC TROMETHAMINE 10 MG/1
10 TABLET, FILM COATED ORAL EVERY 6 HOURS PRN
Qty: 15 TABLET | Refills: 0 | Status: SHIPPED | OUTPATIENT
Start: 2024-09-09

## 2024-09-14 ENCOUNTER — MYC REFILL (OUTPATIENT)
Dept: FAMILY MEDICINE | Facility: CLINIC | Age: 24
End: 2024-09-14
Payer: COMMERCIAL

## 2024-09-14 DIAGNOSIS — R06.02 SOB (SHORTNESS OF BREATH): ICD-10-CM

## 2024-09-14 DIAGNOSIS — R05.8 DRY COUGH: ICD-10-CM

## 2024-09-16 RX ORDER — ALBUTEROL SULFATE 90 UG/1
2 AEROSOL, METERED RESPIRATORY (INHALATION) EVERY 6 HOURS PRN
Qty: 18 G | Refills: 5 | Status: SHIPPED | OUTPATIENT
Start: 2024-09-16

## 2024-10-02 ENCOUNTER — MYC MEDICAL ADVICE (OUTPATIENT)
Dept: FAMILY MEDICINE | Facility: CLINIC | Age: 24
End: 2024-10-02
Payer: COMMERCIAL

## 2024-10-02 DIAGNOSIS — E03.8 SUBCLINICAL HYPOTHYROIDISM: Primary | ICD-10-CM

## 2024-10-03 NOTE — TELEPHONE ENCOUNTER
See MyChart message. Pt was last seen in clinic on 7/25/24, labs last done on 7/3/24 and normal at that time. Ok for recheck of thyroid labs? Orders pended for PCP review.    Eliana Melchor RN  [facility]

## 2024-10-07 ENCOUNTER — LAB (OUTPATIENT)
Dept: LAB | Facility: CLINIC | Age: 24
End: 2024-10-07
Payer: COMMERCIAL

## 2024-10-07 DIAGNOSIS — E03.8 SUBCLINICAL HYPOTHYROIDISM: ICD-10-CM

## 2024-10-07 LAB — TSH SERPL DL<=0.005 MIU/L-ACNC: 2.64 UIU/ML (ref 0.3–4.2)

## 2024-10-07 PROCEDURE — 36415 COLL VENOUS BLD VENIPUNCTURE: CPT

## 2024-10-07 PROCEDURE — 84443 ASSAY THYROID STIM HORMONE: CPT

## 2024-10-31 ENCOUNTER — MYC MEDICAL ADVICE (OUTPATIENT)
Dept: FAMILY MEDICINE | Facility: CLINIC | Age: 24
End: 2024-10-31
Payer: COMMERCIAL

## 2024-10-31 ENCOUNTER — HOSPITAL ENCOUNTER (EMERGENCY)
Facility: CLINIC | Age: 24
Discharge: LEFT BEFORE DISCHARGE | End: 2024-10-31
Admitting: EMERGENCY MEDICINE
Payer: COMMERCIAL

## 2024-10-31 ENCOUNTER — NURSE TRIAGE (OUTPATIENT)
Dept: FAMILY MEDICINE | Facility: CLINIC | Age: 24
End: 2024-10-31
Payer: COMMERCIAL

## 2024-10-31 VITALS
BODY MASS INDEX: 22.85 KG/M2 | RESPIRATION RATE: 18 BRPM | HEART RATE: 78 BPM | OXYGEN SATURATION: 99 % | SYSTOLIC BLOOD PRESSURE: 115 MMHG | TEMPERATURE: 98.5 F | DIASTOLIC BLOOD PRESSURE: 78 MMHG | WEIGHT: 117 LBS

## 2024-10-31 PROCEDURE — 99281 EMR DPT VST MAYX REQ PHY/QHP: CPT | Performed by: EMERGENCY MEDICINE

## 2024-10-31 NOTE — TELEPHONE ENCOUNTER
"Marily Crane:    This is only an update, no need for second level triage, patient my charted she has abdominal pain and thinks she may have H Pylori, she is advised to present to the ER at [place] today to rule out causes secondary to sever upper abdominal pain.      Reason for Disposition   SEVERE abdominal pain (e.g., excruciating)    Answer Assessment - Initial Assessment Questions  1. LOCATION: \"Where does it hurt?\"       Upper stomach pain under the rib cage that shoots to the back of her spine.  2. RADIATION: \"Does the pain shoot anywhere else?\" (e.g., chest, back)      Yes to the back.  3. ONSET: \"When did the pain begin?\" (e.g., minutes, hours or days ago)       Started 2 days ago  4. SUDDEN: \"Gradual or sudden onset?\"      suddenly  5. PATTERN \"Does the pain come and go, or is it constant?\"     - If it comes and goes: \"How long does it last?\" \"Do you have pain now?\"      (Note: Comes and goes means the pain is intermittent. It goes away completely between bouts.)     - If constant: \"Is it getting better, staying the same, or getting worse?\"       (Note: Constant means the pain never goes away completely; most serious pain is constant and gets worse.)       Patient reports she has constant pressure she rates 2/10 and has waves of shooting pain every 30 seconds she rates 8/10.  6. SEVERITY: \"How bad is the pain?\"  (e.g., Scale 1-10; mild, moderate, or severe)     - MILD (1-3): Doesn't interfere with normal activities, abdomen soft and not tender to touch.      - MODERATE (4-7): Interferes with normal activities or awakens from sleep, abdomen tender to touch.      - SEVERE (8-10): Excruciating pain, doubled over, unable to do any normal activities.        Severe shooting pain 8/10, constant pressure like sensation 2/10  7. RECURRENT SYMPTOM: \"Have you ever had this type of stomach pain before?\" If Yes, ask: \"When was the last time?\" and \"What happened that time?\"       Yes patient reports a year ago she " "thought she had H pylori but did not complete the testing to diagnose this.  8. CAUSE: \"What do you think is causing the stomach pain?\"      Patient wonders if she is having symptoms of H Pylori.  9. RELIEVING/AGGRAVATING FACTORS: \"What makes it better or worse?\" (e.g., antacids, bending or twisting motion, bowel movement)      Patient reports laying down worsens this. She also says she feels nauseated when she eats food, so has not been wanting to eat much.  10. OTHER SYMPTOMS: \"Do you have any other symptoms?\" (e.g., back pain, diarrhea, fever, urination pain, vomiting)        No urinary or bowel concerns, no fever, no vomiting but is very nauseated. Also feels disorientated and could pass out since yesterday but she reports she has not passed out.  11. PREGNANCY: \"Is there any chance you are pregnant?\" \"When was your last menstrual period?\"        Patient denies that she is pregnant or is possibly pregnant.    Protocols used: Abdominal Pain - Female-A-OH        CHERYL Escobar    "

## 2024-10-31 NOTE — ED TRIAGE NOTES
2 days ago pt started to have epigastric pain that shoots to the back. Pt gets nausea when eating and the pain gets worse and gets worse when she lays down.

## 2024-11-01 NOTE — ED NOTES
Patient left without being seen after triage at 2011. Patient signed declination of medical screening examination paperwork

## 2024-11-03 ENCOUNTER — HEALTH MAINTENANCE LETTER (OUTPATIENT)
Age: 24
End: 2024-11-03

## 2024-12-01 ENCOUNTER — MYC REFILL (OUTPATIENT)
Dept: FAMILY MEDICINE | Facility: CLINIC | Age: 24
End: 2024-12-01
Payer: COMMERCIAL

## 2024-12-01 DIAGNOSIS — Z31.41 FERTILITY TESTING: ICD-10-CM

## 2024-12-02 RX ORDER — LETROZOLE 2.5 MG/1
2.5 TABLET, FILM COATED ORAL DAILY
Qty: 5 TABLET | Refills: 12 | Status: SHIPPED | OUTPATIENT
Start: 2024-12-02

## 2024-12-09 ENCOUNTER — OFFICE VISIT (OUTPATIENT)
Dept: FAMILY MEDICINE | Facility: CLINIC | Age: 24
End: 2024-12-09
Payer: COMMERCIAL

## 2024-12-09 ENCOUNTER — ANCILLARY PROCEDURE (OUTPATIENT)
Dept: GENERAL RADIOLOGY | Facility: CLINIC | Age: 24
End: 2024-12-09
Attending: NURSE PRACTITIONER
Payer: COMMERCIAL

## 2024-12-09 VITALS
DIASTOLIC BLOOD PRESSURE: 70 MMHG | OXYGEN SATURATION: 100 % | RESPIRATION RATE: 16 BRPM | BODY MASS INDEX: 22.97 KG/M2 | WEIGHT: 117.6 LBS | SYSTOLIC BLOOD PRESSURE: 124 MMHG | TEMPERATURE: 97.5 F | HEART RATE: 85 BPM

## 2024-12-09 DIAGNOSIS — R14.0 BLOATING: ICD-10-CM

## 2024-12-09 DIAGNOSIS — M25.531 RIGHT WRIST PAIN: ICD-10-CM

## 2024-12-09 DIAGNOSIS — E03.8 SUBCLINICAL HYPOTHYROIDISM: ICD-10-CM

## 2024-12-09 DIAGNOSIS — L50.9 URTICARIA: ICD-10-CM

## 2024-12-09 DIAGNOSIS — G43.909 MIGRAINE WITHOUT STATUS MIGRAINOSUS, NOT INTRACTABLE, UNSPECIFIED MIGRAINE TYPE: ICD-10-CM

## 2024-12-09 DIAGNOSIS — M25.531 RIGHT WRIST PAIN: Primary | ICD-10-CM

## 2024-12-09 PROCEDURE — 86364 TISS TRNSGLTMNASE EA IG CLAS: CPT | Performed by: NURSE PRACTITIONER

## 2024-12-09 PROCEDURE — 36415 COLL VENOUS BLD VENIPUNCTURE: CPT | Performed by: NURSE PRACTITIONER

## 2024-12-09 PROCEDURE — 73110 X-RAY EXAM OF WRIST: CPT | Mod: TC | Performed by: RADIOLOGY

## 2024-12-09 PROCEDURE — 99214 OFFICE O/P EST MOD 30 MIN: CPT | Performed by: NURSE PRACTITIONER

## 2024-12-09 RX ORDER — KETOROLAC TROMETHAMINE 10 MG/1
10 TABLET, FILM COATED ORAL EVERY 6 HOURS PRN
Qty: 15 TABLET | Refills: 0 | Status: SHIPPED | OUTPATIENT
Start: 2024-12-09

## 2024-12-09 RX ORDER — LEVOTHYROXINE SODIUM 25 UG/1
25 TABLET ORAL DAILY
Qty: 90 TABLET | Refills: 1 | Status: SHIPPED | OUTPATIENT
Start: 2024-12-09

## 2024-12-09 NOTE — PROGRESS NOTES
"  Assessment & Plan     Right wrist pain  -advised patient that symptoms can be due to carpal tunnel, recommended to try wrist brace at night. Since her pain is chronic and not related to any trauma, or history of overuse will obtain Xray to rule out arthritis, or possible old fractures.   - XR Wrist Right G/E 3 Views; Future    Urticaria  -unclear etiology, patient have concerns about possible histamine intolerance, I am not even sure if its legit diagnosis, recommended to discuss with allergist   - Adult Allergy/Asthma  Referral; Future    Bloating    - Tissue transglutaminase gely IgA and IgG; Future  - Adult Allergy/Asthma  Referral; Future  - Tissue transglutaminase gely IgA and IgG    Migraine without status migrainosus, not intractable, unspecified migraine type    - ketorolac (TORADOL) 10 MG tablet; Take 1 tablet (10 mg) by mouth every 6 hours as needed for moderate pain.    Subclinical hypothyroidism  TSH   Date Value Ref Range Status   10/07/2024 2.64 0.30 - 4.20 uIU/mL Final   08/19/2022 5.42 (H) 0.40 - 4.00 mU/L Final   05/06/2019 6.44 (H) 0.40 - 4.00 mU/L Final    -recent thyroid labs normal, recommended to continue Synthroid as prescribed    - levothyroxine (SYNTHROID/LEVOTHROID) 25 MCG tablet; Take 1 tablet (25 mcg) by mouth daily.      Sol Jeffers is a 24 year old, presenting for the following health issues: right wrist pain, intermittent numbness and tingling. No history of recent trauma, or injury, pain is chronic. Patient denies overuse. Patient also have concerns about \"histamine intolerance\" because she is having intermittent hives, bloating, dizziness, tachycardia and episodic shortness of breath. I advised her that I am not familiar with this diagnosis and I recommended her to see allergist. She can not recall any triggers that causing her symptoms.      Pain (Pain in right wrist )        12/9/2024     1:34 PM   Additional Questions   Roomed by april   Accompanied by " self         12/9/2024     1:34 PM   Patient Reported Additional Medications   Patient reports taking the following new medications none     History of Present Illness       Reason for visit:  Wrist pain and other symptoms  Symptom onset:  3-7 days ago  Symptoms include:  Pain and lack of mobility in wrist  Symptom intensity:  Moderate  Symptom progression:  Worsening  Had these symptoms before:  Yes  Has tried/received treatment for these symptoms:  No  What makes it worse:  Movement  What makes it better:  No   She is taking medications regularly.         Review of Systems  Constitutional, HEENT, cardiovascular, pulmonary, gi and gu systems are negative, except as otherwise noted.      Objective    /70   Pulse 85   Temp 97.5  F (36.4  C) (Tympanic)   Resp 16   Wt 53.3 kg (117 lb 9.6 oz)   SpO2 100%   BMI 22.97 kg/m    Body mass index is 22.97 kg/m .  Physical Exam   GENERAL: alert and no distress  EYES: Eyes grossly normal to inspection, PERRL and conjunctivae and sclerae normal  CV: regular rate and rhythm, normal S1 S2, no S3 or S4, no murmur, click or rub, no peripheral edema   MS: no gross musculoskeletal defects noted, no edema  NEURO: Normal strength and tone, mentation intact and speech normal  PSYCH: mentation appears normal, affect normal/bright            Signed Electronically by: CIRO Kline CNP

## 2024-12-11 LAB
TTG IGA SER-ACNC: 0.4 U/ML
TTG IGG SER-ACNC: <0.6 U/ML

## 2024-12-27 ENCOUNTER — MYC MEDICAL ADVICE (OUTPATIENT)
Dept: FAMILY MEDICINE | Facility: CLINIC | Age: 24
End: 2024-12-27
Payer: COMMERCIAL

## 2024-12-27 DIAGNOSIS — M25.532 PAIN IN BOTH WRISTS: Primary | ICD-10-CM

## 2024-12-27 DIAGNOSIS — M25.531 PAIN IN BOTH WRISTS: Primary | ICD-10-CM

## 2024-12-31 NOTE — TELEPHONE ENCOUNTER
Marily,    Please see [email or web address] message below and advise. Last OV 12/09.    Referral pended for consideration.    Thank you  Sukhdev GARNER RN  [facility]

## 2025-01-30 ENCOUNTER — HOSPITAL ENCOUNTER (EMERGENCY)
Facility: CLINIC | Age: 25
Discharge: HOME | End: 2025-01-30
Attending: STUDENT IN AN ORGANIZED HEALTH CARE EDUCATION/TRAINING PROGRAM
Payer: COMMERCIAL

## 2025-01-30 ENCOUNTER — APPOINTMENT (OUTPATIENT)
Dept: CT IMAGING | Facility: CLINIC | Age: 25
End: 2025-01-30
Attending: STUDENT IN AN ORGANIZED HEALTH CARE EDUCATION/TRAINING PROGRAM
Payer: COMMERCIAL

## 2025-01-30 ENCOUNTER — APPOINTMENT (OUTPATIENT)
Dept: ULTRASOUND IMAGING | Facility: CLINIC | Age: 25
End: 2025-01-30
Attending: STUDENT IN AN ORGANIZED HEALTH CARE EDUCATION/TRAINING PROGRAM
Payer: COMMERCIAL

## 2025-01-30 VITALS
RESPIRATION RATE: 18 BRPM | HEART RATE: 91 BPM | OXYGEN SATURATION: 100 % | DIASTOLIC BLOOD PRESSURE: 77 MMHG | SYSTOLIC BLOOD PRESSURE: 112 MMHG | TEMPERATURE: 98.1 F

## 2025-01-30 DIAGNOSIS — R10.31 RIGHT LOWER QUADRANT ABDOMINAL PAIN: ICD-10-CM

## 2025-01-30 LAB
ALBUMIN SERPL BCG-MCNC: 4.2 G/DL (ref 3.5–5.2)
ALBUMIN UR-MCNC: NEGATIVE MG/DL
ALP SERPL-CCNC: 76 U/L (ref 40–150)
ALT SERPL W P-5'-P-CCNC: 9 U/L (ref 0–50)
ANION GAP SERPL CALCULATED.3IONS-SCNC: 10 MMOL/L (ref 7–15)
APPEARANCE UR: CLEAR
AST SERPL W P-5'-P-CCNC: 14 U/L (ref 0–45)
BACTERIA #/AREA URNS HPF: ABNORMAL /HPF
BASOPHILS # BLD AUTO: 0.1 10E3/UL (ref 0–0.2)
BASOPHILS NFR BLD AUTO: 1 %
BILIRUB SERPL-MCNC: 0.4 MG/DL
BILIRUB UR QL STRIP: NEGATIVE
BUN SERPL-MCNC: 10.5 MG/DL (ref 6–20)
CALCIUM SERPL-MCNC: 9.3 MG/DL (ref 8.8–10.4)
CHLORIDE SERPL-SCNC: 106 MMOL/L (ref 98–107)
COLOR UR AUTO: ABNORMAL
CREAT SERPL-MCNC: 0.7 MG/DL (ref 0.51–0.95)
CRP SERPL-MCNC: 12.66 MG/L
EGFRCR SERPLBLD CKD-EPI 2021: >90 ML/MIN/1.73M2
EOSINOPHIL # BLD AUTO: 0.2 10E3/UL (ref 0–0.7)
EOSINOPHIL NFR BLD AUTO: 2 %
ERYTHROCYTE [DISTWIDTH] IN BLOOD BY AUTOMATED COUNT: 13.6 % (ref 10–15)
GLUCOSE SERPL-MCNC: 101 MG/DL (ref 70–99)
GLUCOSE UR STRIP-MCNC: NEGATIVE MG/DL
HCG SERPL QL: NEGATIVE
HCO3 SERPL-SCNC: 24 MMOL/L (ref 22–29)
HCT VFR BLD AUTO: 35.9 % (ref 35–47)
HGB BLD-MCNC: 11.5 G/DL (ref 11.7–15.7)
HGB UR QL STRIP: ABNORMAL
IMM GRANULOCYTES # BLD: 0 10E3/UL
IMM GRANULOCYTES NFR BLD: 0 %
KETONES UR STRIP-MCNC: NEGATIVE MG/DL
LEUKOCYTE ESTERASE UR QL STRIP: NEGATIVE
LYMPHOCYTES # BLD AUTO: 2 10E3/UL (ref 0.8–5.3)
LYMPHOCYTES NFR BLD AUTO: 27 %
MCH RBC QN AUTO: 25 PG (ref 26.5–33)
MCHC RBC AUTO-ENTMCNC: 32 G/DL (ref 31.5–36.5)
MCV RBC AUTO: 78 FL (ref 78–100)
MONOCYTES # BLD AUTO: 0.8 10E3/UL (ref 0–1.3)
MONOCYTES NFR BLD AUTO: 10 %
NEUTROPHILS # BLD AUTO: 4.4 10E3/UL (ref 1.6–8.3)
NEUTROPHILS NFR BLD AUTO: 60 %
NITRATE UR QL: NEGATIVE
NRBC # BLD AUTO: 0 10E3/UL
NRBC BLD AUTO-RTO: 0 /100
PH UR STRIP: 7 [PH] (ref 5–7)
PLATELET # BLD AUTO: 196 10E3/UL (ref 150–450)
POTASSIUM SERPL-SCNC: 3.7 MMOL/L (ref 3.4–5.3)
PROT SERPL-MCNC: 6.7 G/DL (ref 6.4–8.3)
RBC # BLD AUTO: 4.6 10E6/UL (ref 3.8–5.2)
RBC URINE: 4 /HPF
SODIUM SERPL-SCNC: 140 MMOL/L (ref 135–145)
SP GR UR STRIP: 1.01 (ref 1–1.03)
UROBILINOGEN UR STRIP-MCNC: NORMAL MG/DL
WBC # BLD AUTO: 7.3 10E3/UL (ref 4–11)
WBC URINE: 1 /HPF

## 2025-01-30 PROCEDURE — 81003 URINALYSIS AUTO W/O SCOPE: CPT | Performed by: STUDENT IN AN ORGANIZED HEALTH CARE EDUCATION/TRAINING PROGRAM

## 2025-01-30 PROCEDURE — 250N000011 HC RX IP 250 OP 636: Performed by: STUDENT IN AN ORGANIZED HEALTH CARE EDUCATION/TRAINING PROGRAM

## 2025-01-30 PROCEDURE — 99285 EMERGENCY DEPT VISIT HI MDM: CPT | Mod: 25 | Performed by: STUDENT IN AN ORGANIZED HEALTH CARE EDUCATION/TRAINING PROGRAM

## 2025-01-30 PROCEDURE — 84703 CHORIONIC GONADOTROPIN ASSAY: CPT | Performed by: STUDENT IN AN ORGANIZED HEALTH CARE EDUCATION/TRAINING PROGRAM

## 2025-01-30 PROCEDURE — 96375 TX/PRO/DX INJ NEW DRUG ADDON: CPT | Performed by: STUDENT IN AN ORGANIZED HEALTH CARE EDUCATION/TRAINING PROGRAM

## 2025-01-30 PROCEDURE — 84155 ASSAY OF PROTEIN SERUM: CPT | Performed by: STUDENT IN AN ORGANIZED HEALTH CARE EDUCATION/TRAINING PROGRAM

## 2025-01-30 PROCEDURE — 36415 COLL VENOUS BLD VENIPUNCTURE: CPT | Performed by: STUDENT IN AN ORGANIZED HEALTH CARE EDUCATION/TRAINING PROGRAM

## 2025-01-30 PROCEDURE — 99285 EMERGENCY DEPT VISIT HI MDM: CPT | Performed by: STUDENT IN AN ORGANIZED HEALTH CARE EDUCATION/TRAINING PROGRAM

## 2025-01-30 PROCEDURE — 85041 AUTOMATED RBC COUNT: CPT | Performed by: STUDENT IN AN ORGANIZED HEALTH CARE EDUCATION/TRAINING PROGRAM

## 2025-01-30 PROCEDURE — 86140 C-REACTIVE PROTEIN: CPT | Performed by: STUDENT IN AN ORGANIZED HEALTH CARE EDUCATION/TRAINING PROGRAM

## 2025-01-30 PROCEDURE — 96376 TX/PRO/DX INJ SAME DRUG ADON: CPT | Performed by: STUDENT IN AN ORGANIZED HEALTH CARE EDUCATION/TRAINING PROGRAM

## 2025-01-30 PROCEDURE — 84460 ALANINE AMINO (ALT) (SGPT): CPT | Performed by: STUDENT IN AN ORGANIZED HEALTH CARE EDUCATION/TRAINING PROGRAM

## 2025-01-30 PROCEDURE — 93976 VASCULAR STUDY: CPT

## 2025-01-30 PROCEDURE — 85018 HEMOGLOBIN: CPT | Performed by: STUDENT IN AN ORGANIZED HEALTH CARE EDUCATION/TRAINING PROGRAM

## 2025-01-30 PROCEDURE — 96374 THER/PROPH/DIAG INJ IV PUSH: CPT | Mod: 59 | Performed by: STUDENT IN AN ORGANIZED HEALTH CARE EDUCATION/TRAINING PROGRAM

## 2025-01-30 PROCEDURE — 74177 CT ABD & PELVIS W/CONTRAST: CPT

## 2025-01-30 PROCEDURE — 250N000009 HC RX 250: Performed by: STUDENT IN AN ORGANIZED HEALTH CARE EDUCATION/TRAINING PROGRAM

## 2025-01-30 RX ORDER — HYDROMORPHONE HYDROCHLORIDE 1 MG/ML
0.3 INJECTION, SOLUTION INTRAMUSCULAR; INTRAVENOUS; SUBCUTANEOUS ONCE
Status: COMPLETED | OUTPATIENT
Start: 2025-01-30 | End: 2025-01-30

## 2025-01-30 RX ORDER — IOPAMIDOL 755 MG/ML
57 INJECTION, SOLUTION INTRAVASCULAR ONCE
Status: COMPLETED | OUTPATIENT
Start: 2025-01-30 | End: 2025-01-30

## 2025-01-30 RX ORDER — ONDANSETRON 2 MG/ML
4 INJECTION INTRAMUSCULAR; INTRAVENOUS
Status: COMPLETED | OUTPATIENT
Start: 2025-01-30 | End: 2025-01-30

## 2025-01-30 RX ORDER — KETOROLAC TROMETHAMINE 15 MG/ML
15 INJECTION, SOLUTION INTRAMUSCULAR; INTRAVENOUS ONCE
Status: COMPLETED | OUTPATIENT
Start: 2025-01-30 | End: 2025-01-30

## 2025-01-30 RX ADMIN — SODIUM CHLORIDE 54 ML: 9 INJECTION, SOLUTION INTRAVENOUS at 03:40

## 2025-01-30 RX ADMIN — KETOROLAC TROMETHAMINE 15 MG: 15 INJECTION, SOLUTION INTRAMUSCULAR; INTRAVENOUS at 02:59

## 2025-01-30 RX ADMIN — IOPAMIDOL 57 ML: 755 INJECTION, SOLUTION INTRAVENOUS at 03:40

## 2025-01-30 RX ADMIN — HYDROMORPHONE HYDROCHLORIDE 0.3 MG: 1 INJECTION, SOLUTION INTRAMUSCULAR; INTRAVENOUS; SUBCUTANEOUS at 03:30

## 2025-01-30 RX ADMIN — ONDANSETRON 4 MG: 2 INJECTION, SOLUTION INTRAMUSCULAR; INTRAVENOUS at 02:58

## 2025-01-30 RX ADMIN — HYDROMORPHONE HYDROCHLORIDE 0.3 MG: 1 INJECTION, SOLUTION INTRAMUSCULAR; INTRAVENOUS; SUBCUTANEOUS at 05:10

## 2025-01-30 ASSESSMENT — ACTIVITIES OF DAILY LIVING (ADL)
ADLS_ACUITY_SCORE: 46

## 2025-01-30 NOTE — ED TRIAGE NOTES
Pt presents for eval of right lower quadrant that started yersterday morning and has progressively got worse. Still has appendix. Pain tends to be worse after emptying bladder.     Triage Assessment (Adult)       Row Name 01/30/25 0229          Triage Assessment    Airway WDL WDL        Respiratory WDL    Respiratory WDL WDL        Skin Circulation/Temperature WDL    Skin Circulation/Temperature WDL WDL        Cardiac WDL    Cardiac WDL WDL        Peripheral/Neurovascular WDL    Peripheral Neurovascular WDL WDL        Cognitive/Neuro/Behavioral WDL    Cognitive/Neuro/Behavioral WDL WDL

## 2025-01-30 NOTE — DISCHARGE INSTRUCTIONS
Am not exactly sure what is causing your symptoms.  I am concerned it could be due to an early appendicitis.  I discussed the case with the on-call surgeon.  He recommended either keeping you in the ER for pain management and repeating your labs and CAT scan at noon.  Alternatively, he said is okay with you going home and monitor your symptoms.  If this is appendicitis, your symptoms will get worse and in that case you need to come back to the ER for repeat labs and imaging.  You can use Tylenol or ibuprofen.  Return if you develop fever, severe pain or any other new or concerning symptoms.

## 2025-01-30 NOTE — ED PROVIDER NOTES
History     Chief Complaint   Patient presents with    Abdominal Pain     HPI  Aury Cervantes is a 24 year old female who has iron deficiency anemia, history of kidney stones, von Willebrand's disease, who presents to the ER for evaluation of abdominal pain.  The patient has had progressively worsening abdominal pain over the last day.  Initially started as pain around the bellybutton and now is localized to the right lower quadrant.  She endorses having pain throughout the abdomen, but is worst in the right lower quadrant.  She is never had anything like this before.  It is worse with movement.  She has vomited.  No fevers.  No history of abdominal surgeries.  She had a normal bowel movement yesterday.  She is currently menstruating.  She states this feels completely different than when she has had kidney stones in the past and states it does not feel like period cramps.    Allergies:  Allergies   Allergen Reactions    Sulfa Antibiotics Other (See Comments)     Both parents are allergic         Problem List:    Patient Active Problem List    Diagnosis Date Noted    Obstruction of right ureteropelvic junction (UPJ) due to stone 09/12/2023     Priority: Medium    Nexplanon in place 09/12/2023     Priority: Medium    Nephrolithiasis 09/10/2023     Priority: Medium    Ureteral colic 09/09/2023     Priority: Medium    Chronic rhinitis 07/25/2023     Priority: Medium    Silent sinus syndrome 07/25/2023     Priority: Medium    Nasal obstruction 12/22/2022     Priority: Medium     Added automatically from request for surgery [id number]      Nasal septal deviation 12/22/2022     Priority: Medium     Added automatically from request for surgery [id number]      Hypertrophy of both inferior nasal turbinates 12/22/2022     Priority: Medium     Added automatically from request for surgery [id number]      Dysmenorrhea 05/16/2022     Priority: Medium    Female infertility 05/16/2022     Priority: Medium    Iron deficiency anemia due to  chronic blood loss 02/07/2021     Priority: Medium    Menorrhagia with regular cycle 02/07/2021     Priority: Medium    Anaphylaxis, subsequent encounter 12/04/2018     Priority: Medium    Rhinoconjunctivitis 12/04/2018     Priority: Medium    S/P tonsillectomy and adenoidectomy 03/21/2018     Priority: Medium        Past Medical History:    Past Medical History:   Diagnosis Date    Anemia     Depression     Nephrolithiasis     Von Willebrand's disease (H) 2/22/2024       Past Surgical History:    Past Surgical History:   Procedure Laterality Date    CERVICAL POLYPECTOMY N/A 5/2/2024    Procedure: polypectomy;  Surgeon: Stefanie Mcnair MD;  Location: [facility] OR    COMBINED CYSTOSCOPY, RETROGRADES, URETEROSCOPY, LASER HOLMIUM LITHOTRIPSY URETER(S), INSERT STENT Right 9/15/2023    Procedure: Cystoscopy, Right Ureteroscopy, Right Holmium Laser Lithotripsy, Right Retrograde Pyelogram, Right Ureteral Stent Placement;  Surgeon: Stefan uD MD;  Location: [facility] OR    DILATION AND CURETTAGE, OPERATIVE HYSTEROSCOPY, COMBINED N/A 04/06/2023    Procedure: HYSTEROSCOPY, WITH DILATION AND CURETTAGE OF UTERUS, polypectomy;  Surgeon: Stefanie Mcnair MD;  Location: [facility] OR    DILATION AND CURETTAGE, OPERATIVE HYSTEROSCOPY, COMBINED N/A 5/2/2024    Procedure: HYSTEROSCOPY, WITH DILATION AND CURETTAGE OF UTERUS;  Surgeon: Stefanie Mcnair MD;  Location: [facility] OR    OPTICAL TRACKING SYSTEM ENDOSCOPIC SINUS SURGERY Bilateral 9/29/2023    Procedure: SINUS SURGERY, ENDOSCOPIC, USING OPTICAL TRACKING SYSTEM;  Surgeon: Aram Grace MD;  Location: [facility] OR    SEPTOPLASTY, TURBINOPLASTY, COMBINED N/A 9/29/2023    Procedure: SEPTOPLASTY, NOSE, WITH TURBINOPLASTY;  Surgeon: Aram Grace MD;  Location: [facility] OR    SURGICAL HISTORY OF -       PE tubes    TONSILLECTOMY, ADENOIDECTOMY ADULT, COMBINED Bilateral 03/19/2018    Procedure: COMBINED TONSILLECTOMY, ADENOIDECTOMY ADULT;  Bilateral Adenotonsillectomy;   Surgeon: Kevin Arias MD;  Location: [facility] OR    wisdom teeth Bilateral        Family History:    Family History   Problem Relation Age of Onset    Depression Mother     Depression Father     Depression Maternal Grandmother     Cancer Maternal Grandmother     Lung Cancer Maternal Grandmother     Diabetes Maternal Grandfather     Heart Disease Maternal Grandfather     Hypertension Maternal Grandfather     Depression Paternal Grandmother         bipolar    Depression Paternal Grandfather         bipolar       Social History:  Marital Status:   [2]  Social History     Tobacco Use    Smoking status: Never    Smokeless tobacco: Never   Vaping Use    Vaping status: Never Used   Substance Use Topics    Alcohol use: Yes     Comment: Very occasionally    Drug use: Never        Medications:    acetaminophen (TYLENOL) 500 MG tablet  albuterol (PROAIR HFA/PROVENTIL HFA/VENTOLIN HFA) 108 (90 Base) MCG/ACT inhaler  EPINEPHrine (ANY BX GENERIC EQUIV) 0.3 MG/0.3ML injection 2-pack  ferrous sulfate (FE TABS) 325 (65 Fe) MG EC tablet  ibuprofen (ADVIL/MOTRIN) 200 MG tablet  ketorolac (TORADOL) 10 MG tablet  letrozole (FEMARA) 2.5 MG tablet  levothyroxine (SYNTHROID/LEVOTHROID) 25 MCG tablet  oxyCODONE (ROXICODONE) 5 MG tablet  Prenatal MV & Min w/FA-DHA (PRENATAL GUMMIES) 0.18-25 MG CHEW  SUMAtriptan (IMITREX) 25 MG tablet          Review of Systems  See HPI  Physical Exam   BP: 112/77  Pulse: 91  Temp: 98.1  F (36.7  C)  Resp: 18  SpO2: 100 %      Physical Exam  /77   Pulse 91   Temp 98.1  F (36.7  C) (Tympanic)   Resp 18   SpO2 100%   General: Distressed and crying in pain  Head: atraumatic  Nose: no rhinorrhea or epistaxis  Ears: no external auditory canal discharge or bleeding.    Eyes: Sclera nonicteric. Conjunctiva noninjected. PERRL, EOMI  Mouth: no tonsillar erythema, edema, or exudate.  Moist mucous membranes  Neck: supple, moving spontaneously no midline cervical tenderness  Lungs: No increased work of  breathing.  Clear to auscultation bilaterally.  CV: RRR, peripheral pulses palpable and symmetric  Abdomen: soft, tender to palpation in the right lower quadrant with guarding.  Extremities: Warm and well-perfused.    Skin: no rash or diaphoresis  Neuro: CN II-XII grossly intact, following commands, moving all extremities    ED Course        Procedures             Critical Care time:  none             Results for orders placed or performed during the hospital encounter of 01/30/25 (from the past 24 hours)   Comprehensive metabolic panel   Result Value Ref Range    Sodium 140 135 - 145 mmol/L    Potassium 3.7 3.4 - 5.3 mmol/L    Carbon Dioxide (CO2) 24 22 - 29 mmol/L    Anion Gap 10 7 - 15 mmol/L    Urea Nitrogen 10.5 6.0 - 20.0 mg/dL    Creatinine 0.70 0.51 - 0.95 mg/dL    GFR Estimate >90 >60 mL/min/1.73m2    Calcium 9.3 8.8 - 10.4 mg/dL    Chloride 106 98 - 107 mmol/L    Glucose 101 (H) 70 - 99 mg/dL    Alkaline Phosphatase 76 40 - 150 U/L    AST 14 0 - 45 U/L    ALT 9 0 - 50 U/L    Protein Total 6.7 6.4 - 8.3 g/dL    Albumin 4.2 3.5 - 5.2 g/dL    Bilirubin Total 0.4 <=1.2 mg/dL   CBC with platelets, differential    Narrative    The following orders were created for panel order CBC with platelets, differential.  Procedure                               Abnormality         Status                     ---------                               -----------         ------                     CBC with platelets and d...[[id number]]  Abnormal            Final result                 Please view results for these tests on the individual orders.   HCG qualitative pregnancy (blood)   Result Value Ref Range    hCG Serum Qualitative Negative Negative   CBC with platelets and differential   Result Value Ref Range    WBC Count 7.3 4.0 - 11.0 10e3/uL    RBC Count 4.60 3.80 - 5.20 10e6/uL    Hemoglobin 11.5 (L) 11.7 - 15.7 g/dL    Hematocrit 35.9 35.0 - 47.0 %    MCV 78 78 - 100 fL    MCH 25.0 (L) 26.5 - 33.0 pg    MCHC 32.0 31.5 -  36.5 g/dL    RDW 13.6 10.0 - 15.0 %    Platelet Count 196 150 - 450 10e3/uL    % Neutrophils 60 %    % Lymphocytes 27 %    % Monocytes 10 %    % Eosinophils 2 %    % Basophils 1 %    % Immature Granulocytes 0 %    NRBCs per 100 WBC 0 <1 /100    Absolute Neutrophils 4.4 1.6 - 8.3 10e3/uL    Absolute Lymphocytes 2.0 0.8 - 5.3 10e3/uL    Absolute Monocytes 0.8 0.0 - 1.3 10e3/uL    Absolute Eosinophils 0.2 0.0 - 0.7 10e3/uL    Absolute Basophils 0.1 0.0 - 0.2 10e3/uL    Absolute Immature Granulocytes 0.0 <=0.4 10e3/uL    Absolute NRBCs 0.0 10e3/uL   CRP inflammation   Result Value Ref Range    CRP Inflammation 12.66 (H) <5.00 mg/L   CT Abdomen Pelvis w Contrast    Narrative    EXAM: CT ABDOMEN PELVIS W CONTRAST  LOCATION: [facility]  DATE: 1/30/2025    INDICATION: Severe right lower quadrant pain  COMPARISON: None.  TECHNIQUE: CT scan of the abdomen and pelvis was performed following injection of IV contrast. Multiplanar reformats were obtained. Dose reduction techniques were used.  CONTRAST: 57 mL Isovue 370    FINDINGS:   LOWER CHEST: Lung bases clear.    HEPATOBILIARY: Normal.    PANCREAS: Normal.    SPLEEN: Normal.    ADRENAL GLANDS: Normal.    KIDNEYS/BLADDER: Normal.    BOWEL: Normal caliber. Partial visualization of the appendix. Where visualized series 4 image 39-40 is normal caliber. There is slight haziness of fat right adnexa.    LYMPH NODES: Normal.    VASCULATURE: Normal.    PELVIC ORGANS: Slight haziness of fat right adnexa. Trace amount of free fluid.    MUSCULOSKELETAL: Normal.      Impression    IMPRESSION:   1.  No bowel obstruction or abscess.  2.  Appendix not fully seen. Where visualized proximally appendix is normal caliber with no adjacent inflammation. Distal aspect not seen. Short-term follow-up suggested if there is concern for evolving appendicitis.  3.  Slight haziness right adnexa and trace amount pelvic free fluid. Pelvic ultrasound correlation recommended. Also  recommend correlation for UTI or pelvic inflammatory disease.   UA with Microscopic reflex to Culture    Specimen: Urine, Clean Catch   Result Value Ref Range    Color Urine Straw Colorless, Straw, Light Yellow, Yellow    Appearance Urine Clear Clear    Glucose Urine Negative Negative mg/dL    Bilirubin Urine Negative Negative    Ketones Urine Negative Negative mg/dL    Specific Gravity Urine 1.006 1.003 - 1.035    Blood Urine Moderate (A) Negative    pH Urine 7.0 5.0 - 7.0    Protein Albumin Urine Negative Negative mg/dL    Urobilinogen Urine Normal Normal, 2.0 mg/dL    Nitrite Urine Negative Negative    Leukocyte Esterase Urine Negative Negative    Bacteria Urine Few (A) None Seen /HPF    RBC Urine 4 (H) <=2 /HPF    WBC Urine 1 <=5 /HPF    Narrative    Urine Culture not indicated   US Pelvis Cmplt w Transvag & Doppler LmtPel Duplex Limited    Narrative    EXAM: US PELVIS COMPLETE W TRANSVAGINAL AND DOPPLER LIMITED  LOCATION: [facility]  DATE: 1/30/2025    INDICATION: Right lower quadrant pain, haziness seen around the right adnexa on CT, radiologist recommending pelvic ultrasound  COMPARISON: CT from 1/30/2025.  TECHNIQUE: Transabdominal scans were performed. Endovaginal ultrasound was performed to better visualize the adnexa. Color flow with spectral Doppler and waveform analysis performed.    FINDINGS:    UTERUS: 7.2 x 4.7 x 4.1 cm. Normal in size and position with no masses.    ENDOMETRIUM: 8 mm. Normal smooth endometrium.    RIGHT OVARY: 4.4 x 2.7 x 3.1 cm. Multiple follicles. Normal Doppler flow.    LEFT OVARY: 3.7 x 2.2 x 2.1 cm. Multiple follicles. Normal Doppler flow.    No significant free fluid.      Impression    IMPRESSION:    1.  Negative pelvic ultrasound. No evidence for ovarian torsion.             Medications   ondansetron (ZOFRAN) injection 4 mg (4 mg Intravenous $Given 1/30/25 0258)   ketorolac (TORADOL) injection 15 mg (15 mg Intravenous $Given 1/30/25 0259)    HYDROmorphone (PF) (DILAUDID) injection 0.3 mg (0.3 mg Intravenous $Given 1/30/25 0330)   iopamidol (ISOVUE-370) solution 57 mL (57 mLs Intravenous $Given 1/30/25 0340)   sodium chloride 0.9 % bag 500mL for CT scan flush use (54 mLs Intravenous $Given 1/30/25 0340)   HYDROmorphone (PF) (DILAUDID) injection 0.3 mg (0.3 mg Intravenous $Given 1/30/25 0510)       Assessments & Plan (with Medical Decision Making)     I have reviewed the nursing notes.    I have reviewed the findings, diagnosis, plan and need for follow up with the patient.          Medical Decision Making  Aury Cervantes is a 24 year old female who has iron deficiency anemia, history of kidney stones, von Willebrand's disease, who presents to the ER for evaluation of abdominal pain.  Vital signs are reviewed and reassuring.  I am concerned about acute surgical process given the patient's history and exam.  Most concern for appendicitis.  Patient was given Zofran and Toradol without much improvement.  She is wanting to avoid narcotics as they make her very sick, but she is very uncomfortable and I recommended a low-dose Dilaudid which was helpful and the patient tolerated.  Her labs are relatively reassuring.  UA is negative for infection.  There are some blood which is likely due to her menstruating.  Her CMP is normal.  Her hCG is negative.  She has no leukocytosis.  Hemoglobin is 11.5, potentially due to her menstruating.  CRP slightly elevated at 12.6.  CT of the abdomen pelvis was independently reviewed by me and radiology report reviewed.  The patient's appendix was not completely visualized.  Proximal appendix was normal without evidence of inflammation.  There was some stranding around the right adnexa and a pelvic ultrasound was obtained.  Patient additional dose of Dilaudid while awaiting pelvic ultrasound.  The pelvic ultrasound was normal without evidence of torsion or any thing to explain the haziness around the adnexa seen on CT.  When I  reassessed the patient, they are feeling much better at this time.  Unclear etiology of their symptoms.  I discussed the case with Dr. Lloyd of general surgery because based off of her clinical exam and history I am still concerned for appendicitis since it was not seen on the CT.  I asked him to come examine the patient, but he stated that this would not .  He states that at this time he recommends keeping the patient in the ER and treating her pain and repeating labs and CT at noon.  Alternatively, if patient's pain is well-controlled, she could go home with strict return precautions because if this is appendicitis, it will declare itself.  I had a long discussion with the patient and her .  She feels comfortable going home.  She understands that that this could be appendicitis and if her symptoms are worsening she needs to return.  The patient was discharged in stable condition.        Discharge Medication List as of 1/30/2025  5:42 AM          Final diagnoses:   Right lower quadrant abdominal pain       1/30/2025   [facility] EMERGENCY DEPT       Natan Mae MD  01/30/25 0507

## 2025-02-03 ENCOUNTER — OFFICE VISIT (OUTPATIENT)
Dept: FAMILY MEDICINE | Facility: CLINIC | Age: 25
End: 2025-02-03
Payer: COMMERCIAL

## 2025-02-03 VITALS
TEMPERATURE: 97.9 F | DIASTOLIC BLOOD PRESSURE: 68 MMHG | HEIGHT: 65 IN | RESPIRATION RATE: 18 BRPM | HEART RATE: 91 BPM | BODY MASS INDEX: 19.51 KG/M2 | SYSTOLIC BLOOD PRESSURE: 116 MMHG | WEIGHT: 117.1 LBS | OXYGEN SATURATION: 99 %

## 2025-02-03 DIAGNOSIS — R10.31 ABDOMINAL PAIN, RIGHT LOWER QUADRANT: ICD-10-CM

## 2025-02-03 DIAGNOSIS — G43.909 MIGRAINE WITHOUT STATUS MIGRAINOSUS, NOT INTRACTABLE, UNSPECIFIED MIGRAINE TYPE: Primary | ICD-10-CM

## 2025-02-03 DIAGNOSIS — R10.2 PELVIC PAIN IN FEMALE: ICD-10-CM

## 2025-02-03 LAB
ALBUMIN UR-MCNC: NEGATIVE MG/DL
APPEARANCE UR: CLEAR
BASOPHILS # BLD AUTO: 0 10E3/UL (ref 0–0.2)
BASOPHILS NFR BLD AUTO: 1 %
BILIRUB UR QL STRIP: NEGATIVE
COLOR UR AUTO: YELLOW
EOSINOPHIL # BLD AUTO: 0.1 10E3/UL (ref 0–0.7)
EOSINOPHIL NFR BLD AUTO: 3 %
ERYTHROCYTE [DISTWIDTH] IN BLOOD BY AUTOMATED COUNT: 13.6 % (ref 10–15)
GLUCOSE UR STRIP-MCNC: NEGATIVE MG/DL
HCT VFR BLD AUTO: 35.5 % (ref 35–47)
HGB BLD-MCNC: 11.7 G/DL (ref 11.7–15.7)
HGB UR QL STRIP: NEGATIVE
IMM GRANULOCYTES # BLD: 0 10E3/UL
IMM GRANULOCYTES NFR BLD: 0 %
KETONES UR STRIP-MCNC: NEGATIVE MG/DL
LEUKOCYTE ESTERASE UR QL STRIP: NEGATIVE
LYMPHOCYTES # BLD AUTO: 1.3 10E3/UL (ref 0.8–5.3)
LYMPHOCYTES NFR BLD AUTO: 32 %
MCH RBC QN AUTO: 25.2 PG (ref 26.5–33)
MCHC RBC AUTO-ENTMCNC: 33 G/DL (ref 31.5–36.5)
MCV RBC AUTO: 77 FL (ref 78–100)
MONOCYTES # BLD AUTO: 0.4 10E3/UL (ref 0–1.3)
MONOCYTES NFR BLD AUTO: 10 %
NEUTROPHILS # BLD AUTO: 2.1 10E3/UL (ref 1.6–8.3)
NEUTROPHILS NFR BLD AUTO: 54 %
NITRATE UR QL: NEGATIVE
PH UR STRIP: 6 [PH] (ref 5–7)
PLATELET # BLD AUTO: 199 10E3/UL (ref 150–450)
RBC # BLD AUTO: 4.64 10E6/UL (ref 3.8–5.2)
SP GR UR STRIP: 1.02 (ref 1–1.03)
UROBILINOGEN UR STRIP-ACNC: 4 E.U./DL
WBC # BLD AUTO: 3.9 10E3/UL (ref 4–11)

## 2025-02-03 PROCEDURE — 99214 OFFICE O/P EST MOD 30 MIN: CPT | Performed by: NURSE PRACTITIONER

## 2025-02-03 PROCEDURE — 36415 COLL VENOUS BLD VENIPUNCTURE: CPT | Performed by: NURSE PRACTITIONER

## 2025-02-03 PROCEDURE — 85025 COMPLETE CBC W/AUTO DIFF WBC: CPT | Performed by: NURSE PRACTITIONER

## 2025-02-03 PROCEDURE — 81003 URINALYSIS AUTO W/O SCOPE: CPT | Performed by: NURSE PRACTITIONER

## 2025-02-03 RX ORDER — KETOROLAC TROMETHAMINE 10 MG/1
10 TABLET, FILM COATED ORAL EVERY 6 HOURS PRN
Qty: 15 TABLET | Refills: 0 | Status: SHIPPED | OUTPATIENT
Start: 2025-02-03

## 2025-02-03 ASSESSMENT — ANXIETY QUESTIONNAIRES
8. IF YOU CHECKED OFF ANY PROBLEMS, HOW DIFFICULT HAVE THESE MADE IT FOR YOU TO DO YOUR WORK, TAKE CARE OF THINGS AT HOME, OR GET ALONG WITH OTHER PEOPLE?: NOT DIFFICULT AT ALL
IF YOU CHECKED OFF ANY PROBLEMS ON THIS QUESTIONNAIRE, HOW DIFFICULT HAVE THESE PROBLEMS MADE IT FOR YOU TO DO YOUR WORK, TAKE CARE OF THINGS AT HOME, OR GET ALONG WITH OTHER PEOPLE: NOT DIFFICULT AT ALL
GAD7 TOTAL SCORE: 0
7. FEELING AFRAID AS IF SOMETHING AWFUL MIGHT HAPPEN: NOT AT ALL
3. WORRYING TOO MUCH ABOUT DIFFERENT THINGS: NOT AT ALL
GAD7 TOTAL SCORE: 0
GAD7 TOTAL SCORE: 0
1. FEELING NERVOUS, ANXIOUS, OR ON EDGE: NOT AT ALL
7. FEELING AFRAID AS IF SOMETHING AWFUL MIGHT HAPPEN: NOT AT ALL
5. BEING SO RESTLESS THAT IT IS HARD TO SIT STILL: NOT AT ALL
4. TROUBLE RELAXING: NOT AT ALL
2. NOT BEING ABLE TO STOP OR CONTROL WORRYING: NOT AT ALL
6. BECOMING EASILY ANNOYED OR IRRITABLE: NOT AT ALL

## 2025-02-03 ASSESSMENT — PAIN SCALES - GENERAL: PAINLEVEL_OUTOF10: MILD PAIN (2)

## 2025-02-03 ASSESSMENT — PATIENT HEALTH QUESTIONNAIRE - PHQ9
10. IF YOU CHECKED OFF ANY PROBLEMS, HOW DIFFICULT HAVE THESE PROBLEMS MADE IT FOR YOU TO DO YOUR WORK, TAKE CARE OF THINGS AT HOME, OR GET ALONG WITH OTHER PEOPLE: NOT DIFFICULT AT ALL
SUM OF ALL RESPONSES TO PHQ QUESTIONS 1-9: 0
SUM OF ALL RESPONSES TO PHQ QUESTIONS 1-9: 0

## 2025-02-03 NOTE — PROGRESS NOTES
Assessment & Plan     Migraine without status migrainosus, not intractable, unspecified migraine type  -doing well   - REVIEW OF HEALTH MAINTENANCE PROTOCOL ORDERS  - ketorolac (TORADOL) 10 MG tablet; Take 1 tablet (10 mg) by mouth every 6 hours as needed for moderate pain.    Abdominal pain, right lower quadrant  -labs normal, which is reassuring. I did discuss CT scan results from ER visit, there were concerns about possible appendicitis. Patient states her pain improved now. I advised her to repeat CT scan if she continue to have pain   - REVIEW OF HEALTH MAINTENANCE PROTOCOL ORDERS  - UA Macroscopic with reflex to Microscopic and Culture - Clinic Collect  - CBC with platelets and differential; Future  - CT Abdomen Pelvis w Contrast; Future  - CBC with platelets and differential    Pelvic pain in female  -discussed normal pelvic US results   - REVIEW OF HEALTH MAINTENANCE PROTOCOL ORDERS  - UA Macroscopic with reflex to Microscopic and Culture - Clinic Collect  - CBC with platelets and differential; Future  - CT Abdomen Pelvis w Contrast; Future  - CBC with platelets and differential    Sol Jeffers is a 24 year old, presenting for the following health issues:  ER F/U, Recheck Medication, and Health Maintenance (Declines vaccinations today)        2/3/2025     4:12 PM   Additional Questions   Roomed by Lianna BECERRA LPN   Accompanied by boyfriend         2/3/2025     4:12 PM   Patient Reported Additional Medications   Patient reports taking the following new medications no new meds     HPI     ED/UC Followup:    Facility:  [facility] ED   Date of visit: 1/30/2025  Reason for visit: RLQ abdominal pain  Current Status: Has still been having the abdominal pain but not as severe. Describes it as a constant uncomfortableness. Still has pain when urinating. Currently 2/10 on pain scale, at it's worst when moving or coughing 4/10.     Medication Followup of Ketorolac 10 mg PRN   Taking Medication as  "prescribed: yes  Side Effects:  Nausea if she doesn't eat with it  Medication Helping Symptoms:  yes         Review of Systems  Constitutional, HEENT, cardiovascular, pulmonary, gi and gu systems are negative, except as otherwise noted.      Objective    /68 (BP Location: Left arm, Patient Position: Sitting, Cuff Size: Adult Regular)   Pulse 91   Temp 97.9  F (36.6  C) (Tympanic)   Resp 18   Ht 1.652 m (5' 5.04\")   Wt 53.1 kg (117 lb 1.6 oz)   LMP 01/27/2025 (Exact Date)   SpO2 99%   BMI 19.46 kg/m    Body mass index is 19.46 kg/m .  Physical Exam   GENERAL: alert and no distress  EYES: Eyes grossly normal to inspection, PERRL and conjunctivae and sclerae normal  ABDOMEN: soft, nontender, no hepatosplenomegaly, no masses and bowel sounds normal  SKIN: no suspicious lesions or rashes  NEURO: Normal strength and tone, mentation intact and speech normal  PSYCH: mentation appears normal, affect normal/bright    Results for orders placed or performed in visit on 02/03/25   UA Macroscopic with reflex to Microscopic and Culture - Clinic Collect     Status: Abnormal    Specimen: Urine, Clean Catch   Result Value Ref Range    Color Urine Yellow Colorless, Straw, Light Yellow, Yellow    Appearance Urine Clear Clear    Glucose Urine Negative Negative mg/dL    Bilirubin Urine Negative Negative    Ketones Urine Negative Negative mg/dL    Specific Gravity Urine 1.025 1.003 - 1.035    Blood Urine Negative Negative    pH Urine 6.0 5.0 - 7.0    Protein Albumin Urine Negative Negative mg/dL    Urobilinogen Urine 4.0 (A) 0.2, 1.0 E.U./dL    Nitrite Urine Negative Negative    Leukocyte Esterase Urine Negative Negative    Narrative    Microscopic not indicated   CBC with platelets and differential     Status: Abnormal   Result Value Ref Range    WBC Count 3.9 (L) 4.0 - 11.0 10e3/uL    RBC Count 4.64 3.80 - 5.20 10e6/uL    Hemoglobin 11.7 11.7 - 15.7 g/dL    Hematocrit 35.5 35.0 - 47.0 %    MCV 77 (L) 78 - 100 fL    MCH " 25.2 (L) 26.5 - 33.0 pg    MCHC 33.0 31.5 - 36.5 g/dL    RDW 13.6 10.0 - 15.0 %    Platelet Count 199 150 - 450 10e3/uL    % Neutrophils 54 %    % Lymphocytes 32 %    % Monocytes 10 %    % Eosinophils 3 %    % Basophils 1 %    % Immature Granulocytes 0 %    Absolute Neutrophils 2.1 1.6 - 8.3 10e3/uL    Absolute Lymphocytes 1.3 0.8 - 5.3 10e3/uL    Absolute Monocytes 0.4 0.0 - 1.3 10e3/uL    Absolute Eosinophils 0.1 0.0 - 0.7 10e3/uL    Absolute Basophils 0.0 0.0 - 0.2 10e3/uL    Absolute Immature Granulocytes 0.0 <=0.4 10e3/uL   CBC with platelets and differential     Status: Abnormal    Narrative    The following orders were created for panel order CBC with platelets and differential.  Procedure                               Abnormality         Status                     ---------                               -----------         ------                     CBC with platelets and d...[[id number]]  Abnormal            Final result                 Please view results for these tests on the individual orders.           Signed Electronically by: CIRO Kline CNP    Answers submitted by the patient for this visit:  Patient Health Questionnaire (Submitted on 2/3/2025)  If you checked off any problems, how difficult have these problems made it for you to do your work, take care of things at home, or get along with other people?: Not difficult at all  PHQ9 TOTAL SCORE: 0  Patient Health Questionnaire (G7) (Submitted on 2/3/2025)  PARVEZ 7 TOTAL SCORE: 0

## 2025-02-12 ENCOUNTER — PATIENT OUTREACH (OUTPATIENT)
Dept: CARE COORDINATION | Facility: CLINIC | Age: 25
End: 2025-02-12
Payer: COMMERCIAL

## (undated) DEVICE — SOL NACL 0.9% IRRIG 3000ML BAG 07972-08

## (undated) DEVICE — SYR EAR BULB 2OZ

## (undated) DEVICE — GLOVE BIOGEL PI MICRO INDICATOR UNDERGLOVE SZ 7.0 48970

## (undated) DEVICE — ESU CORD BIPOLAR 12' E0509

## (undated) DEVICE — NDL 19GA 1.5"

## (undated) DEVICE — TUBING SUCTION 12"X1/4" N612

## (undated) DEVICE — DRSG TELFA 3X8" 1238

## (undated) DEVICE — PREP POVIDONE IODINE SWABS X3

## (undated) DEVICE — SOL WATER IRRIG 1000ML BOTTLE 07139-09

## (undated) DEVICE — SYR 50ML LL W/O NDL 309653

## (undated) DEVICE — Device

## (undated) DEVICE — VASELINE PETROLEUM JELLY 5GM 8884433200

## (undated) DEVICE — DECANTER VIAL 2006S

## (undated) DEVICE — ESU SUCTION CAUTERY 10FR FOOT CONTROL E2505-10FR

## (undated) DEVICE — SYR 50ML CATH TIP W/O NDL 309620

## (undated) DEVICE — SINUS BALL INFLATION DEVICE BID30

## (undated) DEVICE — BLADE SINUS RAD12 CVD 4MM FUSION ROTATE W/TRACKING

## (undated) DEVICE — SYR 10ML FINGER CONTROL W/O NDL 309695

## (undated) DEVICE — DECANTER TRANSFER DEVICE 2008S

## (undated) DEVICE — PAD PERI INDIV WRAP 11" 2022

## (undated) DEVICE — GLOVE PROTEXIS W/NEU-THERA 8.0  2D73TE80

## (undated) DEVICE — GOWN LG DISP 9515

## (undated) DEVICE — NDL COUNTER 20CT 31142493

## (undated) DEVICE — SU CHROMIC 5-0 P-3 18" 687G

## (undated) DEVICE — PACK LAPAROSCOPY/PELVISCOPY STD

## (undated) DEVICE — ESU GROUND PAD ADULT W/CORD E7507

## (undated) DEVICE — PAD FLOOR SURGISAFE

## (undated) DEVICE — GLOVE BIOGEL PI ULTRATOUCH G SZ 7.0 42170

## (undated) DEVICE — BASIN SET MINOR DISP

## (undated) DEVICE — SPONGE RAY-TEC 4X8" 7318

## (undated) DEVICE — TRACKER ENT OTS INSTRUMENT FUSION 9733533

## (undated) DEVICE — TUBING IRRIGATOR STRAIGHTSHOT XPS 1895522

## (undated) DEVICE — GLOVE BIOGEL PI ULTRATOUCH G SZ 7.5 42175

## (undated) DEVICE — ESU ELEC BLADE 2.75" COATED/INSULATED E1455

## (undated) DEVICE — STOCKING SLEEVE COMPRESSION CALF MED

## (undated) DEVICE — SUCTION MANIFOLD NEPTUNE 2 SYS 1 PORT 702-025-000

## (undated) DEVICE — SOL NACL 0.9% IRRIG 1000ML BOTTLE 07138-09

## (undated) DEVICE — SU CHROMIC 4-0 FS-2 27" 635H

## (undated) DEVICE — SPECIMEN CONTAINER 4OZ

## (undated) DEVICE — DRSG GAUZE 4X4" 3033

## (undated) DEVICE — PREP CHLORHEXIDINE 4% 4OZ (HIBICLENS) 57504

## (undated) DEVICE — SPLINT INTRANASAL POSISEPX GEL SPONGE 9210584

## (undated) DEVICE — GLOVE BIOGEL PI MICRO SZ 6.5 48565

## (undated) DEVICE — NDL SPINAL 22GA 3.5" QUINCKE 405181

## (undated) DEVICE — MASK CONE SHAPED 00152

## (undated) DEVICE — LABEL MEDICATION SYSTEM 3303-P

## (undated) DEVICE — SU CHROMIC 4-0 P-3 18" 1654G

## (undated) DEVICE — TUBING SUCTION 10'X3/16" N510

## (undated) DEVICE — BLADE INFERIOR TURBINATE 2.9MMX11CM 1882940HR

## (undated) DEVICE — SU ETHILON 3-0 FS-1 18" 669H

## (undated) DEVICE — ENDO SHEATH STORZ SHARPSITE ENDOSCRUB 0DEG 4MM 1912000

## (undated) DEVICE — ANTIFOG SOLUTION W/FOAM PAD CF-1001

## (undated) DEVICE — SUCTION CANISTER MEDIVAC LINER 1500ML W/LID 65651-515

## (undated) DEVICE — LABEL MEDICATION SYSTEM  3304

## (undated) DEVICE — SUCTION TIP YANKAUER STR K87

## (undated) DEVICE — ESU PENCIL W/COATED BLADE E2450H

## (undated) DEVICE — LUBRICATING JELLY 4.25OZ

## (undated) DEVICE — NDL 22GA 1.5"

## (undated) DEVICE — NDS RELIEVA TRACT 16MM X 40MM RT1640A

## (undated) DEVICE — SYR EAR BULB 3OZ 0035830

## (undated) DEVICE — KIT PROCEDURE FLUENT IN/OUT FLOWPAK TISS TRAP FLT-112S

## (undated) DEVICE — ENDO SHEATH ENDOSCRUB 45DEG 4MM 1912015

## (undated) DEVICE — SPONGE COTTONOID 1/2X3" 80-1407

## (undated) DEVICE — PACK MINOR SBA15MIFSE

## (undated) DEVICE — PACK BASIC 9103

## (undated) DEVICE — SOL NACL 0.9% INJ 1000ML BAG 07983-09

## (undated) DEVICE — NDL SPINAL 25GA 3.5" QUINCKE 405180

## (undated) DEVICE — DRAPE SPLIT EENT 76X124" 3X28" 9447

## (undated) DEVICE — TRACKER ENT OTS PATIENT FUSION 9733534

## (undated) RX ORDER — PROPOFOL 10 MG/ML
INJECTION, EMULSION INTRAVENOUS
Status: DISPENSED
Start: 2023-04-06

## (undated) RX ORDER — ONDANSETRON 2 MG/ML
INJECTION INTRAMUSCULAR; INTRAVENOUS
Status: DISPENSED
Start: 2024-05-02

## (undated) RX ORDER — FENTANYL CITRATE 50 UG/ML
INJECTION, SOLUTION INTRAMUSCULAR; INTRAVENOUS
Status: DISPENSED
Start: 2018-03-19

## (undated) RX ORDER — FENTANYL CITRATE 50 UG/ML
INJECTION, SOLUTION INTRAMUSCULAR; INTRAVENOUS
Status: DISPENSED
Start: 2024-05-02

## (undated) RX ORDER — LIDOCAINE HYDROCHLORIDE AND EPINEPHRINE 10; 10 MG/ML; UG/ML
INJECTION, SOLUTION INFILTRATION; PERINEURAL
Status: DISPENSED
Start: 2023-09-29

## (undated) RX ORDER — ACETAMINOPHEN 325 MG/1
TABLET ORAL
Status: DISPENSED
Start: 2018-03-19

## (undated) RX ORDER — DEXAMETHASONE SODIUM PHOSPHATE 4 MG/ML
INJECTION, SOLUTION INTRA-ARTICULAR; INTRALESIONAL; INTRAMUSCULAR; INTRAVENOUS; SOFT TISSUE
Status: DISPENSED
Start: 2018-03-19

## (undated) RX ORDER — PROPOFOL 10 MG/ML
INJECTION, EMULSION INTRAVENOUS
Status: DISPENSED
Start: 2023-09-29

## (undated) RX ORDER — HYDROMORPHONE HYDROCHLORIDE 1 MG/ML
INJECTION, SOLUTION INTRAMUSCULAR; INTRAVENOUS; SUBCUTANEOUS
Status: DISPENSED
Start: 2018-03-19

## (undated) RX ORDER — OXYCODONE HCL 5 MG/5 ML
SOLUTION, ORAL ORAL
Status: DISPENSED
Start: 2018-03-19

## (undated) RX ORDER — CEFAZOLIN SODIUM/WATER 2 G/20 ML
SYRINGE (ML) INTRAVENOUS
Status: DISPENSED
Start: 2023-09-29

## (undated) RX ORDER — PROPOFOL 10 MG/ML
INJECTION, EMULSION INTRAVENOUS
Status: DISPENSED
Start: 2018-03-19

## (undated) RX ORDER — FENTANYL CITRATE 50 UG/ML
INJECTION, SOLUTION INTRAMUSCULAR; INTRAVENOUS
Status: DISPENSED
Start: 2023-09-29

## (undated) RX ORDER — MIDAZOLAM HYDROCHLORIDE 5 MG/ML
INJECTION, SOLUTION INTRAMUSCULAR; INTRAVENOUS
Status: DISPENSED
Start: 2023-04-06

## (undated) RX ORDER — ONDANSETRON 4 MG/1
TABLET, ORALLY DISINTEGRATING ORAL
Status: DISPENSED
Start: 2023-09-29

## (undated) RX ORDER — BUPIVACAINE HYDROCHLORIDE 2.5 MG/ML
INJECTION, SOLUTION EPIDURAL; INFILTRATION; INTRACAUDAL
Status: DISPENSED
Start: 2023-04-06

## (undated) RX ORDER — ONDANSETRON 2 MG/ML
INJECTION INTRAMUSCULAR; INTRAVENOUS
Status: DISPENSED
Start: 2023-09-29

## (undated) RX ORDER — ACETAMINOPHEN 325 MG/1
TABLET ORAL
Status: DISPENSED
Start: 2024-05-02

## (undated) RX ORDER — LIDOCAINE HYDROCHLORIDE 10 MG/ML
INJECTION, SOLUTION INFILTRATION; PERINEURAL
Status: DISPENSED
Start: 2024-05-02

## (undated) RX ORDER — PROPOFOL 10 MG/ML
INJECTION, EMULSION INTRAVENOUS
Status: DISPENSED
Start: 2024-05-02

## (undated) RX ORDER — ACETAMINOPHEN 325 MG/1
TABLET ORAL
Status: DISPENSED
Start: 2023-09-29

## (undated) RX ORDER — DEXAMETHASONE SODIUM PHOSPHATE 4 MG/ML
INJECTION, SOLUTION INTRA-ARTICULAR; INTRALESIONAL; INTRAMUSCULAR; INTRAVENOUS; SOFT TISSUE
Status: DISPENSED
Start: 2024-05-02

## (undated) RX ORDER — GLYCOPYRROLATE 0.2 MG/ML
INJECTION, SOLUTION INTRAMUSCULAR; INTRAVENOUS
Status: DISPENSED
Start: 2018-03-19

## (undated) RX ORDER — LIDOCAINE HYDROCHLORIDE 10 MG/ML
INJECTION, SOLUTION EPIDURAL; INFILTRATION; INTRACAUDAL; PERINEURAL
Status: DISPENSED
Start: 2024-05-02

## (undated) RX ORDER — FENTANYL CITRATE-0.9 % NACL/PF 10 MCG/ML
PLASTIC BAG, INJECTION (ML) INTRAVENOUS
Status: DISPENSED
Start: 2023-09-29

## (undated) RX ORDER — ONDANSETRON 2 MG/ML
INJECTION INTRAMUSCULAR; INTRAVENOUS
Status: DISPENSED
Start: 2023-04-06

## (undated) RX ORDER — TRANEXAMIC ACID 10 MG/ML
INJECTION, SOLUTION INTRAVENOUS
Status: DISPENSED
Start: 2023-09-29

## (undated) RX ORDER — SCOLOPAMINE TRANSDERMAL SYSTEM 1 MG/1
PATCH, EXTENDED RELEASE TRANSDERMAL
Status: DISPENSED
Start: 2018-03-19

## (undated) RX ORDER — HYDROXYZINE HYDROCHLORIDE 50 MG/1
TABLET, FILM COATED ORAL
Status: DISPENSED
Start: 2018-03-19

## (undated) RX ORDER — OXYMETAZOLINE HYDROCHLORIDE 0.05 G/100ML
SPRAY NASAL
Status: DISPENSED
Start: 2023-09-29

## (undated) RX ORDER — LIDOCAINE HYDROCHLORIDE 10 MG/ML
INJECTION, SOLUTION EPIDURAL; INFILTRATION; INTRACAUDAL; PERINEURAL
Status: DISPENSED
Start: 2023-04-06

## (undated) RX ORDER — ONDANSETRON 2 MG/ML
INJECTION INTRAMUSCULAR; INTRAVENOUS
Status: DISPENSED
Start: 2018-03-19

## (undated) RX ORDER — DEXAMETHASONE SODIUM PHOSPHATE 4 MG/ML
INJECTION, SOLUTION INTRA-ARTICULAR; INTRALESIONAL; INTRAMUSCULAR; INTRAVENOUS; SOFT TISSUE
Status: DISPENSED
Start: 2023-09-29

## (undated) RX ORDER — DEXAMETHASONE SODIUM PHOSPHATE 4 MG/ML
INJECTION, SOLUTION INTRA-ARTICULAR; INTRALESIONAL; INTRAMUSCULAR; INTRAVENOUS; SOFT TISSUE
Status: DISPENSED
Start: 2023-04-06

## (undated) RX ORDER — LABETALOL HYDROCHLORIDE 5 MG/ML
INJECTION, SOLUTION INTRAVENOUS
Status: DISPENSED
Start: 2018-03-19

## (undated) RX ORDER — FENTANYL CITRATE 50 UG/ML
INJECTION, SOLUTION INTRAMUSCULAR; INTRAVENOUS
Status: DISPENSED
Start: 2023-04-06

## (undated) RX ORDER — ACETAMINOPHEN 325 MG/1
TABLET ORAL
Status: DISPENSED
Start: 2023-04-06